# Patient Record
Sex: FEMALE | Race: WHITE | NOT HISPANIC OR LATINO | Employment: OTHER | URBAN - METROPOLITAN AREA
[De-identification: names, ages, dates, MRNs, and addresses within clinical notes are randomized per-mention and may not be internally consistent; named-entity substitution may affect disease eponyms.]

---

## 2018-03-18 ENCOUNTER — HOSPITAL ENCOUNTER (INPATIENT)
Facility: HOSPITAL | Age: 83
LOS: 1 days | Discharge: HOME | DRG: 247 | End: 2018-03-20
Attending: SURGERY | Admitting: HOSPITALIST
Payer: MEDICARE

## 2018-03-18 DIAGNOSIS — R74.8 ABNORMAL LEVELS OF OTHER SERUM ENZYMES: ICD-10-CM

## 2018-03-18 DIAGNOSIS — I25.9 MYOCARDIAL ISCHEMIA: ICD-10-CM

## 2018-03-18 DIAGNOSIS — I21.4 NSTEMI (NON-ST ELEVATED MYOCARDIAL INFARCTION): Primary | ICD-10-CM

## 2018-03-18 DIAGNOSIS — R07.9 CHEST PAIN: ICD-10-CM

## 2018-03-18 DIAGNOSIS — I21.21: ICD-10-CM

## 2018-03-18 DIAGNOSIS — M25.512 SHOULDER PAIN, LEFT: ICD-10-CM

## 2018-03-18 LAB
ALBUMIN SERPL BCP-MCNC: 4.4 G/DL
ALP SERPL-CCNC: 58 U/L
ALT SERPL W/O P-5'-P-CCNC: 32 U/L
ANION GAP SERPL CALC-SCNC: 16 MMOL/L
AST SERPL-CCNC: 33 U/L
BASOPHILS # BLD AUTO: 0.06 K/UL
BASOPHILS NFR BLD: 0.7 %
BILIRUB SERPL-MCNC: 0.6 MG/DL
BUN SERPL-MCNC: 19 MG/DL
CALCIUM SERPL-MCNC: 10 MG/DL
CHLORIDE SERPL-SCNC: 101 MMOL/L
CO2 SERPL-SCNC: 27 MMOL/L
CREAT SERPL-MCNC: 0.75 MG/DL
DIFFERENTIAL METHOD: ABNORMAL
EOSINOPHIL # BLD AUTO: 0.2 K/UL
EOSINOPHIL NFR BLD: 1.9 %
ERYTHROCYTE [DISTWIDTH] IN BLOOD BY AUTOMATED COUNT: 14.6 %
EST. GFR  (AFRICAN AMERICAN): >60 ML/MIN/1.73 M^2
EST. GFR  (NON AFRICAN AMERICAN): >60 ML/MIN/1.73 M^2
GLUCOSE SERPL-MCNC: 157 MG/DL
HCT VFR BLD AUTO: 39.9 %
HGB BLD-MCNC: 12.8 G/DL
INR PPP: 1.1
LYMPHOCYTES # BLD AUTO: 2 K/UL
LYMPHOCYTES NFR BLD: 24.3 %
MCH RBC QN AUTO: 28 PG
MCHC RBC AUTO-ENTMCNC: 32.1 G/DL
MCV RBC AUTO: 87 FL
MONOCYTES # BLD AUTO: 0.7 K/UL
MONOCYTES NFR BLD: 8.1 %
NEUTROPHILS # BLD AUTO: 5.4 K/UL
NEUTROPHILS NFR BLD: 64.9 %
PLATELET # BLD AUTO: 222 K/UL
PMV BLD AUTO: 10.9 FL
POTASSIUM SERPL-SCNC: 4.1 MMOL/L
PROT SERPL-MCNC: 7.8 G/DL
PROTHROMBIN TIME: 12.6 SEC
RBC # BLD AUTO: 4.57 M/UL
SODIUM SERPL-SCNC: 144 MMOL/L
TROPONIN I SERPL DL<=0.01 NG/ML-MCNC: 0.78 NG/ML
WBC # BLD AUTO: 8.35 K/UL

## 2018-03-18 PROCEDURE — 96374 THER/PROPH/DIAG INJ IV PUSH: CPT

## 2018-03-18 PROCEDURE — 94760 N-INVAS EAR/PLS OXIMETRY 1: CPT

## 2018-03-18 PROCEDURE — 93005 ELECTROCARDIOGRAM TRACING: CPT

## 2018-03-18 PROCEDURE — 84484 ASSAY OF TROPONIN QUANT: CPT

## 2018-03-18 PROCEDURE — 85025 COMPLETE CBC W/AUTO DIFF WBC: CPT

## 2018-03-18 PROCEDURE — 96361 HYDRATE IV INFUSION ADD-ON: CPT

## 2018-03-18 PROCEDURE — 25000003 PHARM REV CODE 250: Performed by: SURGERY

## 2018-03-18 PROCEDURE — 93010 ELECTROCARDIOGRAM REPORT: CPT | Mod: ,,, | Performed by: INTERNAL MEDICINE

## 2018-03-18 PROCEDURE — 63600175 PHARM REV CODE 636 W HCPCS: Performed by: SURGERY

## 2018-03-18 PROCEDURE — S0028 INJECTION, FAMOTIDINE, 20 MG: HCPCS | Performed by: SURGERY

## 2018-03-18 PROCEDURE — 99291 CRITICAL CARE FIRST HOUR: CPT | Mod: 25

## 2018-03-18 PROCEDURE — 85610 PROTHROMBIN TIME: CPT

## 2018-03-18 PROCEDURE — 96375 TX/PRO/DX INJ NEW DRUG ADDON: CPT

## 2018-03-18 PROCEDURE — 25000003 PHARM REV CODE 250

## 2018-03-18 PROCEDURE — 80053 COMPREHEN METABOLIC PANEL: CPT

## 2018-03-18 PROCEDURE — 21400001 HC TELEMETRY ROOM

## 2018-03-18 PROCEDURE — 27000221 HC OXYGEN, UP TO 24 HOURS

## 2018-03-18 RX ORDER — OXYBUTYNIN CHLORIDE 15 MG/1
5 TABLET, EXTENDED RELEASE ORAL DAILY
COMMUNITY
End: 2018-03-19 | Stop reason: HOSPADM

## 2018-03-18 RX ORDER — METOCLOPRAMIDE HYDROCHLORIDE 5 MG/ML
10 INJECTION INTRAMUSCULAR; INTRAVENOUS
Status: COMPLETED | OUTPATIENT
Start: 2018-03-18 | End: 2018-03-18

## 2018-03-18 RX ORDER — FAMOTIDINE 10 MG/ML
20 INJECTION INTRAVENOUS 2 TIMES DAILY
Status: DISCONTINUED | OUTPATIENT
Start: 2018-03-18 | End: 2018-03-19

## 2018-03-18 RX ORDER — POTASSIUM CITRATE AND CITRIC ACID MONOHYDRATE 1100; 334 MG/5ML; MG/5ML
SOLUTION ORAL
COMMUNITY
End: 2018-03-19 | Stop reason: HOSPADM

## 2018-03-18 RX ORDER — SIMVASTATIN 20 MG/1
20 TABLET, FILM COATED ORAL NIGHTLY
COMMUNITY
End: 2018-03-19 | Stop reason: HOSPADM

## 2018-03-18 RX ORDER — ALPRAZOLAM 0.25 MG/1
0.25 TABLET ORAL 3 TIMES DAILY
COMMUNITY
End: 2018-03-19 | Stop reason: HOSPADM

## 2018-03-18 RX ORDER — ASPIRIN 325 MG
325 TABLET, DELAYED RELEASE (ENTERIC COATED) ORAL
Status: COMPLETED | OUTPATIENT
Start: 2018-03-18 | End: 2018-03-18

## 2018-03-18 RX ORDER — LOSARTAN POTASSIUM AND HYDROCHLOROTHIAZIDE 12.5; 1 MG/1; MG/1
1 TABLET ORAL DAILY
COMMUNITY
End: 2018-03-19 | Stop reason: HOSPADM

## 2018-03-18 RX ORDER — DEXLANSOPRAZOLE 30 MG/1
CAPSULE, DELAYED RELEASE ORAL
COMMUNITY
End: 2018-03-19 | Stop reason: HOSPADM

## 2018-03-18 RX ORDER — METFORMIN HYDROCHLORIDE 1000 MG/1
1000 TABLET ORAL 2 TIMES DAILY WITH MEALS
COMMUNITY
End: 2018-03-19 | Stop reason: HOSPADM

## 2018-03-18 RX ADMIN — METOCLOPRAMIDE 10 MG: 5 INJECTION, SOLUTION INTRAMUSCULAR; INTRAVENOUS at 10:03

## 2018-03-18 RX ADMIN — FAMOTIDINE 20 MG: 10 INJECTION, SOLUTION INTRAVENOUS at 10:03

## 2018-03-18 RX ADMIN — ASPIRIN 325 MG: 325 TABLET, DELAYED RELEASE ORAL at 11:03

## 2018-03-18 RX ADMIN — SODIUM CHLORIDE 1000 ML: 0.9 INJECTION, SOLUTION INTRAVENOUS at 10:03

## 2018-03-18 RX ADMIN — NITROGLYCERIN 1 INCH: 20 OINTMENT TOPICAL at 10:03

## 2018-03-18 RX ADMIN — NITROGLYCERIN: 20 OINTMENT TOPICAL at 11:03

## 2018-03-18 RX ADMIN — LIDOCAINE HYDROCHLORIDE: 20 SOLUTION ORAL; TOPICAL at 11:03

## 2018-03-19 ENCOUNTER — SURGERY (OUTPATIENT)
Age: 83
End: 2018-03-19

## 2018-03-19 PROBLEM — E11.9 DIABETES MELLITUS, TYPE 2: Status: ACTIVE | Noted: 2018-03-19

## 2018-03-19 PROBLEM — E11.9 DIABETES MELLITUS, TYPE 2: Chronic | Status: ACTIVE | Noted: 2018-03-19

## 2018-03-19 PROBLEM — E66.812 CLASS 2 OBESITY IN ADULT: Status: ACTIVE | Noted: 2018-03-19

## 2018-03-19 PROBLEM — R79.89 ELEVATED TROPONIN: Status: ACTIVE | Noted: 2018-03-19

## 2018-03-19 PROBLEM — R07.9 CHEST PAIN: Status: ACTIVE | Noted: 2018-03-19

## 2018-03-19 PROBLEM — K21.9 GERD (GASTROESOPHAGEAL REFLUX DISEASE): Status: ACTIVE | Noted: 2018-03-19

## 2018-03-19 PROBLEM — I10 ESSENTIAL HYPERTENSION: Chronic | Status: ACTIVE | Noted: 2018-03-19

## 2018-03-19 PROBLEM — I10 ESSENTIAL HYPERTENSION: Status: ACTIVE | Noted: 2018-03-19

## 2018-03-19 PROBLEM — I25.9 MYOCARDIAL ISCHEMIA: Status: ACTIVE | Noted: 2018-03-19

## 2018-03-19 PROBLEM — R01.1 MURMUR, CARDIAC: Status: ACTIVE | Noted: 2018-03-19

## 2018-03-19 PROBLEM — E66.9 CLASS 2 OBESITY IN ADULT: Status: ACTIVE | Noted: 2018-03-19

## 2018-03-19 PROBLEM — I21.4 NSTEMI (NON-ST ELEVATED MYOCARDIAL INFARCTION): Status: ACTIVE | Noted: 2018-03-19

## 2018-03-19 LAB
ANION GAP SERPL CALC-SCNC: 9 MMOL/L
AORTIC VALVE STENOSIS: ABNORMAL
BILIRUB UR QL STRIP: NEGATIVE
BNP SERPL-MCNC: 318 PG/ML
BUN SERPL-MCNC: 14 MG/DL
CALCIUM SERPL-MCNC: 9.6 MG/DL
CHLORIDE SERPL-SCNC: 104 MMOL/L
CHOLEST SERPL-MCNC: 150 MG/DL
CHOLEST/HDLC SERPL: 4.4 {RATIO}
CLARITY UR: CLEAR
CO2 SERPL-SCNC: 28 MMOL/L
COLOR UR: YELLOW
CREAT SERPL-MCNC: 0.7 MG/DL
DIASTOLIC DYSFUNCTION: YES
EST. GFR  (AFRICAN AMERICAN): >60 ML/MIN/1.73 M^2
EST. GFR  (NON AFRICAN AMERICAN): >60 ML/MIN/1.73 M^2
ESTIMATED AVG GLUCOSE: 126 MG/DL
ESTIMATED PA SYSTOLIC PRESSURE: 39
GLUCOSE SERPL-MCNC: 119 MG/DL
GLUCOSE UR QL STRIP: NEGATIVE
HBA1C MFR BLD HPLC: 6 %
HDLC SERPL-MCNC: 34 MG/DL
HDLC SERPL: 22.7 %
HGB UR QL STRIP: NEGATIVE
KETONES UR QL STRIP: NEGATIVE
LDLC SERPL CALC-MCNC: 96.2 MG/DL
LEUKOCYTE ESTERASE UR QL STRIP: NEGATIVE
MAGNESIUM SERPL-MCNC: 1.5 MG/DL
MITRAL VALVE MOBILITY: NORMAL
NITRITE UR QL STRIP: NEGATIVE
NONHDLC SERPL-MCNC: 116 MG/DL
PH UR STRIP: 6 [PH] (ref 5–8)
PHOSPHATE SERPL-MCNC: 2.9 MG/DL
POCT GLUCOSE: 134 MG/DL (ref 70–110)
POCT GLUCOSE: 164 MG/DL (ref 70–110)
POCT GLUCOSE: 186 MG/DL (ref 70–110)
POTASSIUM SERPL-SCNC: 4 MMOL/L
PROT UR QL STRIP: NEGATIVE
RETIRED EF AND QEF - SEE NOTES: 55 (ref 55–65)
SODIUM SERPL-SCNC: 141 MMOL/L
SP GR UR STRIP: <=1.005 (ref 1–1.03)
TRIGL SERPL-MCNC: 99 MG/DL
TROPONIN I SERPL DL<=0.01 NG/ML-MCNC: 18.79 NG/ML
TROPONIN I SERPL DL<=0.01 NG/ML-MCNC: 4.93 NG/ML
TSH SERPL DL<=0.005 MIU/L-ACNC: 1.5 UIU/ML
URN SPEC COLLECT METH UR: ABNORMAL
UROBILINOGEN UR STRIP-ACNC: NEGATIVE EU/DL

## 2018-03-19 PROCEDURE — 99152 MOD SED SAME PHYS/QHP 5/>YRS: CPT | Mod: ,,, | Performed by: INTERNAL MEDICINE

## 2018-03-19 PROCEDURE — 93005 ELECTROCARDIOGRAM TRACING: CPT

## 2018-03-19 PROCEDURE — 81003 URINALYSIS AUTO W/O SCOPE: CPT

## 2018-03-19 PROCEDURE — 63600175 PHARM REV CODE 636 W HCPCS: Performed by: NURSE PRACTITIONER

## 2018-03-19 PROCEDURE — 83036 HEMOGLOBIN GLYCOSYLATED A1C: CPT

## 2018-03-19 PROCEDURE — 84443 ASSAY THYROID STIM HORMONE: CPT

## 2018-03-19 PROCEDURE — 80048 BASIC METABOLIC PNL TOTAL CA: CPT

## 2018-03-19 PROCEDURE — 92978 ENDOLUMINL IVUS OCT C 1ST: CPT | Mod: LC

## 2018-03-19 PROCEDURE — 84100 ASSAY OF PHOSPHORUS: CPT

## 2018-03-19 PROCEDURE — 25000003 PHARM REV CODE 250

## 2018-03-19 PROCEDURE — 92978 ENDOLUMINL IVUS OCT C 1ST: CPT | Mod: 26,LC,, | Performed by: INTERNAL MEDICINE

## 2018-03-19 PROCEDURE — 93306 TTE W/DOPPLER COMPLETE: CPT

## 2018-03-19 PROCEDURE — 25000003 PHARM REV CODE 250: Performed by: NURSE PRACTITIONER

## 2018-03-19 PROCEDURE — 36415 COLL VENOUS BLD VENIPUNCTURE: CPT

## 2018-03-19 PROCEDURE — 87086 URINE CULTURE/COLONY COUNT: CPT

## 2018-03-19 PROCEDURE — 83880 ASSAY OF NATRIURETIC PEPTIDE: CPT

## 2018-03-19 PROCEDURE — 21400001 HC TELEMETRY ROOM

## 2018-03-19 PROCEDURE — 80061 LIPID PANEL: CPT

## 2018-03-19 PROCEDURE — A4216 STERILE WATER/SALINE, 10 ML: HCPCS | Performed by: NURSE PRACTITIONER

## 2018-03-19 PROCEDURE — B240ZZ3 ULTRASONOGRAPHY OF SINGLE CORONARY ARTERY, INTRAVASCULAR: ICD-10-PCS | Performed by: INTERNAL MEDICINE

## 2018-03-19 PROCEDURE — 25500020 PHARM REV CODE 255

## 2018-03-19 PROCEDURE — B211YZZ FLUOROSCOPY OF MULTIPLE CORONARY ARTERIES USING OTHER CONTRAST: ICD-10-PCS | Performed by: INTERNAL MEDICINE

## 2018-03-19 PROCEDURE — 027034Z DILATION OF CORONARY ARTERY, ONE ARTERY WITH DRUG-ELUTING INTRALUMINAL DEVICE, PERCUTANEOUS APPROACH: ICD-10-PCS | Performed by: INTERNAL MEDICINE

## 2018-03-19 PROCEDURE — 93458 L HRT ARTERY/VENTRICLE ANGIO: CPT | Mod: 26,59,, | Performed by: INTERNAL MEDICINE

## 2018-03-19 PROCEDURE — 83735 ASSAY OF MAGNESIUM: CPT

## 2018-03-19 PROCEDURE — 63600175 PHARM REV CODE 636 W HCPCS: Performed by: HOSPITALIST

## 2018-03-19 PROCEDURE — 92928 PRQ TCAT PLMT NTRAC ST 1 LES: CPT | Mod: LC,,, | Performed by: INTERNAL MEDICINE

## 2018-03-19 PROCEDURE — 84484 ASSAY OF TROPONIN QUANT: CPT

## 2018-03-19 PROCEDURE — 63600175 PHARM REV CODE 636 W HCPCS

## 2018-03-19 PROCEDURE — 94761 N-INVAS EAR/PLS OXIMETRY MLT: CPT

## 2018-03-19 PROCEDURE — 99223 1ST HOSP IP/OBS HIGH 75: CPT | Mod: 25,GC,, | Performed by: NURSE PRACTITIONER

## 2018-03-19 PROCEDURE — 4A023N7 MEASUREMENT OF CARDIAC SAMPLING AND PRESSURE, LEFT HEART, PERCUTANEOUS APPROACH: ICD-10-PCS | Performed by: INTERNAL MEDICINE

## 2018-03-19 PROCEDURE — 93306 TTE W/DOPPLER COMPLETE: CPT | Mod: 26,,, | Performed by: INTERNAL MEDICINE

## 2018-03-19 PROCEDURE — 25000003 PHARM REV CODE 250: Performed by: INTERNAL MEDICINE

## 2018-03-19 PROCEDURE — B215YZZ FLUOROSCOPY OF LEFT HEART USING OTHER CONTRAST: ICD-10-PCS | Performed by: INTERNAL MEDICINE

## 2018-03-19 RX ORDER — SIMVASTATIN 20 MG/1
20 TABLET, FILM COATED ORAL NIGHTLY
Status: DISCONTINUED | OUTPATIENT
Start: 2018-03-19 | End: 2018-03-20 | Stop reason: HOSPADM

## 2018-03-19 RX ORDER — NITROGLYCERIN 0.4 MG/1
0.4 TABLET SUBLINGUAL EVERY 5 MIN PRN
Status: DISCONTINUED | OUTPATIENT
Start: 2018-03-19 | End: 2018-03-20 | Stop reason: HOSPADM

## 2018-03-19 RX ORDER — HYDROCHLOROTHIAZIDE 12.5 MG/1
12.5 TABLET ORAL DAILY
Status: DISCONTINUED | OUTPATIENT
Start: 2018-03-19 | End: 2018-03-20 | Stop reason: HOSPADM

## 2018-03-19 RX ORDER — LOSARTAN POTASSIUM AND HYDROCHLOROTHIAZIDE 25; 100 MG/1; MG/1
1 TABLET ORAL DAILY
COMMUNITY
End: 2018-04-06 | Stop reason: ALTCHOICE

## 2018-03-19 RX ORDER — ONDANSETRON 8 MG/1
8 TABLET, ORALLY DISINTEGRATING ORAL EVERY 8 HOURS PRN
Status: DISCONTINUED | OUTPATIENT
Start: 2018-03-19 | End: 2018-03-20 | Stop reason: HOSPADM

## 2018-03-19 RX ORDER — ASPIRIN 81 MG/1
81 TABLET ORAL DAILY
Status: DISCONTINUED | OUTPATIENT
Start: 2018-03-19 | End: 2018-03-20 | Stop reason: HOSPADM

## 2018-03-19 RX ORDER — PROCHLORPERAZINE EDISYLATE 5 MG/ML
5 INJECTION INTRAMUSCULAR; INTRAVENOUS EVERY 6 HOURS PRN
Status: DISCONTINUED | OUTPATIENT
Start: 2018-03-19 | End: 2018-03-20 | Stop reason: HOSPADM

## 2018-03-19 RX ORDER — OXYBUTYNIN CHLORIDE 15 MG/1
5 TABLET, EXTENDED RELEASE ORAL DAILY
COMMUNITY
End: 2018-06-06 | Stop reason: SDUPTHER

## 2018-03-19 RX ORDER — CLOPIDOGREL BISULFATE 75 MG/1
75 TABLET ORAL DAILY
Status: DISCONTINUED | OUTPATIENT
Start: 2018-03-20 | End: 2018-03-20 | Stop reason: HOSPADM

## 2018-03-19 RX ORDER — FAMOTIDINE 20 MG/1
20 TABLET, FILM COATED ORAL 2 TIMES DAILY
Status: DISCONTINUED | OUTPATIENT
Start: 2018-03-19 | End: 2018-03-19

## 2018-03-19 RX ORDER — HYDRALAZINE HYDROCHLORIDE 20 MG/ML
10 INJECTION INTRAMUSCULAR; INTRAVENOUS ONCE
Status: COMPLETED | OUTPATIENT
Start: 2018-03-19 | End: 2018-03-19

## 2018-03-19 RX ORDER — DEXLANSOPRAZOLE 60 MG/1
60 CAPSULE, DELAYED RELEASE ORAL DAILY
COMMUNITY
End: 2018-04-23

## 2018-03-19 RX ORDER — SIMVASTATIN 20 MG/1
20 TABLET, FILM COATED ORAL NIGHTLY
Status: ON HOLD | COMMUNITY
End: 2018-03-20 | Stop reason: HOSPADM

## 2018-03-19 RX ORDER — POLYETHYLENE GLYCOL 3350 17 G/17G
17 POWDER, FOR SOLUTION ORAL 2 TIMES DAILY PRN
Status: DISCONTINUED | OUTPATIENT
Start: 2018-03-19 | End: 2018-03-20 | Stop reason: HOSPADM

## 2018-03-19 RX ORDER — IBUPROFEN 200 MG
24 TABLET ORAL
Status: DISCONTINUED | OUTPATIENT
Start: 2018-03-19 | End: 2018-03-20 | Stop reason: HOSPADM

## 2018-03-19 RX ORDER — METHYLPREDNISOLONE SOD SUCC 125 MG
125 VIAL (EA) INJECTION ONCE
Status: COMPLETED | OUTPATIENT
Start: 2018-03-19 | End: 2018-03-19

## 2018-03-19 RX ORDER — SODIUM CHLORIDE 9 MG/ML
3 INJECTION, SOLUTION INTRAVENOUS CONTINUOUS
Status: ACTIVE | OUTPATIENT
Start: 2018-03-19 | End: 2018-03-19

## 2018-03-19 RX ORDER — OXYBUTYNIN CHLORIDE 5 MG/1
5 TABLET, EXTENDED RELEASE ORAL DAILY
Status: DISCONTINUED | OUTPATIENT
Start: 2018-03-19 | End: 2018-03-20 | Stop reason: HOSPADM

## 2018-03-19 RX ORDER — INSULIN ASPART 100 [IU]/ML
0-5 INJECTION, SOLUTION INTRAVENOUS; SUBCUTANEOUS
Status: DISCONTINUED | OUTPATIENT
Start: 2018-03-19 | End: 2018-03-20 | Stop reason: HOSPADM

## 2018-03-19 RX ORDER — METOPROLOL TARTRATE 25 MG/1
25 TABLET, FILM COATED ORAL 2 TIMES DAILY
Status: DISCONTINUED | OUTPATIENT
Start: 2018-03-19 | End: 2018-03-20 | Stop reason: HOSPADM

## 2018-03-19 RX ORDER — IBUPROFEN 200 MG
16 TABLET ORAL
Status: DISCONTINUED | OUTPATIENT
Start: 2018-03-19 | End: 2018-03-20 | Stop reason: HOSPADM

## 2018-03-19 RX ORDER — LOSARTAN POTASSIUM 50 MG/1
100 TABLET ORAL DAILY
Status: DISCONTINUED | OUTPATIENT
Start: 2018-03-19 | End: 2018-03-20 | Stop reason: HOSPADM

## 2018-03-19 RX ORDER — CLOPIDOGREL BISULFATE 75 MG/1
600 TABLET ORAL ONCE
Status: COMPLETED | OUTPATIENT
Start: 2018-03-19 | End: 2018-03-19

## 2018-03-19 RX ORDER — GLUCAGON 1 MG
1 KIT INJECTION
Status: DISCONTINUED | OUTPATIENT
Start: 2018-03-19 | End: 2018-03-20 | Stop reason: HOSPADM

## 2018-03-19 RX ORDER — ACETAMINOPHEN 325 MG/1
650 TABLET ORAL EVERY 4 HOURS PRN
Status: DISCONTINUED | OUTPATIENT
Start: 2018-03-19 | End: 2018-03-20 | Stop reason: HOSPADM

## 2018-03-19 RX ORDER — SODIUM CHLORIDE 0.9 % (FLUSH) 0.9 %
3 SYRINGE (ML) INJECTION EVERY 8 HOURS
Status: DISCONTINUED | OUTPATIENT
Start: 2018-03-19 | End: 2018-03-20 | Stop reason: HOSPADM

## 2018-03-19 RX ORDER — PANTOPRAZOLE SODIUM 40 MG/1
40 TABLET, DELAYED RELEASE ORAL DAILY
Status: DISCONTINUED | OUTPATIENT
Start: 2018-03-19 | End: 2018-03-20 | Stop reason: HOSPADM

## 2018-03-19 RX ORDER — RAMELTEON 8 MG/1
8 TABLET ORAL NIGHTLY PRN
Status: DISCONTINUED | OUTPATIENT
Start: 2018-03-19 | End: 2018-03-20 | Stop reason: HOSPADM

## 2018-03-19 RX ORDER — DIPHENHYDRAMINE HYDROCHLORIDE 50 MG/ML
25 INJECTION INTRAMUSCULAR; INTRAVENOUS ONCE
Status: COMPLETED | OUTPATIENT
Start: 2018-03-19 | End: 2018-03-19

## 2018-03-19 RX ORDER — METFORMIN HYDROCHLORIDE 1000 MG/1
1000 TABLET ORAL 2 TIMES DAILY WITH MEALS
COMMUNITY
End: 2018-06-06 | Stop reason: SDUPTHER

## 2018-03-19 RX ADMIN — OXYBUTYNIN CHLORIDE 5 MG: 5 TABLET, EXTENDED RELEASE ORAL at 08:03

## 2018-03-19 RX ADMIN — METOPROLOL TARTRATE 25 MG: 25 TABLET ORAL at 08:03

## 2018-03-19 RX ADMIN — HYDROCHLOROTHIAZIDE 12.5 MG: 12.5 TABLET ORAL at 08:03

## 2018-03-19 RX ADMIN — SODIUM CHLORIDE 3 ML/KG/HR: 0.9 INJECTION, SOLUTION INTRAVENOUS at 11:03

## 2018-03-19 RX ADMIN — LOSARTAN POTASSIUM 100 MG: 50 TABLET, FILM COATED ORAL at 08:03

## 2018-03-19 RX ADMIN — DIPHENHYDRAMINE HYDROCHLORIDE 25 MG: 50 INJECTION, SOLUTION INTRAMUSCULAR; INTRAVENOUS at 10:03

## 2018-03-19 RX ADMIN — SODIUM CHLORIDE, PRESERVATIVE FREE 3 ML: 5 INJECTION INTRAVENOUS at 05:03

## 2018-03-19 RX ADMIN — SIMVASTATIN 20 MG: 20 TABLET, FILM COATED ORAL at 08:03

## 2018-03-19 RX ADMIN — HYDRALAZINE HYDROCHLORIDE 10 MG: 20 INJECTION INTRAMUSCULAR; INTRAVENOUS at 03:03

## 2018-03-19 RX ADMIN — PANTOPRAZOLE SODIUM 40 MG: 40 TABLET, DELAYED RELEASE ORAL at 05:03

## 2018-03-19 RX ADMIN — SODIUM CHLORIDE, PRESERVATIVE FREE 3 ML: 5 INJECTION INTRAVENOUS at 10:03

## 2018-03-19 RX ADMIN — METHYLPREDNISOLONE SODIUM SUCCINATE 125 MG: 125 INJECTION, POWDER, FOR SOLUTION INTRAMUSCULAR; INTRAVENOUS at 10:03

## 2018-03-19 RX ADMIN — METOPROLOL TARTRATE 25 MG: 25 TABLET ORAL at 10:03

## 2018-03-19 RX ADMIN — SODIUM CHLORIDE 1.5 ML/KG/HR: 0.9 INJECTION, SOLUTION INTRAVENOUS at 04:03

## 2018-03-19 RX ADMIN — CLOPIDOGREL 600 MG: 75 TABLET, FILM COATED ORAL at 11:03

## 2018-03-19 RX ADMIN — ASPIRIN 81 MG: 81 TABLET, COATED ORAL at 08:03

## 2018-03-19 NOTE — SUBJECTIVE & OBJECTIVE
Past Medical History:   Diagnosis Date    Diabetes mellitus     Hyperlipemia     Hypertension        History reviewed. No pertinent surgical history.    Review of patient's allergies indicates:   Allergen Reactions    Iodine and iodide containing products     Sulfa (sulfonamide antibiotics) Hives       No current facility-administered medications on file prior to encounter.      No current outpatient prescriptions on file prior to encounter.     Family History     Problem Relation (Age of Onset)    Heart disease Father    No Known Problems Mother        Social History Main Topics    Smoking status: Never Smoker    Smokeless tobacco: Not on file    Alcohol use No    Drug use: No    Sexual activity: Not on file     Review of Systems   Constitution: Negative for chills, decreased appetite, diaphoresis, fever and weakness.   Cardiovascular: Positive for chest pain. Negative for claudication, cyanosis, dyspnea on exertion, irregular heartbeat, leg swelling, near-syncope, orthopnea, palpitations, paroxysmal nocturnal dyspnea and syncope.   Respiratory: Negative for cough, hemoptysis, shortness of breath and wheezing.    Gastrointestinal: Negative for bloating, abdominal pain, constipation, diarrhea, melena, nausea and vomiting.   Neurological: Negative for dizziness.     Objective:     Vital Signs (Most Recent):  Temp: 97.8 °F (36.6 °C) (03/19/18 0732)  Pulse: 72 (03/19/18 0800)  Resp: 18 (03/19/18 0732)  BP: (!) 148/65 (03/19/18 0732)  SpO2: (!) 94 % (03/19/18 0746) Vital Signs (24h Range):  Temp:  [97.4 °F (36.3 °C)-98.3 °F (36.8 °C)] 97.8 °F (36.6 °C)  Pulse:  [72-95] 72  Resp:  [18-35] 18  SpO2:  [94 %-98 %] 94 %  BP: (148-222)/(65-98) 148/65     Weight: 102.5 kg (226 lb)  Body mass index is 38.79 kg/m².    SpO2: (!) 94 %  O2 Device (Oxygen Therapy): room air      Intake/Output Summary (Last 24 hours) at 03/19/18 1006  Last data filed at 03/19/18 0531   Gross per 24 hour   Intake             1000 ml   Output               800 ml   Net              200 ml       Lines/Drains/Airways     Peripheral Intravenous Line                 Peripheral IV - Single Lumen 03/18/18 2238 Right Antecubital less than 1 day                Physical Exam   Constitutional: She is oriented to person, place, and time. She appears well-developed and well-nourished. No distress.   Cardiovascular: Normal rate and regular rhythm.  Exam reveals no gallop.    No murmur heard.  Pulses:       Radial pulses are 2+ on the right side, and 2+ on the left side.        Dorsalis pedis pulses are 2+ on the right side, and 2+ on the left side.        Posterior tibial pulses are 2+ on the right side, and 2+ on the left side.   Pulmonary/Chest: Effort normal and breath sounds normal. No respiratory distress. She has no wheezes.   Abdominal: Soft. Bowel sounds are normal. She exhibits no distension. There is no tenderness.   Neurological: She is alert and oriented to person, place, and time.   Skin: Skin is warm and dry.       Significant Labs:       Recent Labs  Lab 03/19/18  0445      K 4.0      CO2 28   BUN 14   CREATININE 0.7   MG 1.5*       Recent Labs  Lab 03/18/18 2237   WBC 8.35   RBC 4.57   HGB 12.8   HCT 39.9      MCV 87   MCH 28.0   MCHC 32.1       Recent Labs  Lab 03/19/18  0445   TROPONINI 4.934*       Significant Imaging: Echocardiogram: 2D echo with color flow doppler:   2/14/2018 at [facility]    Normal LV function with EF 60-65%  Normal RV systolic function with estimated PA pressure 25mmHg  Aortic valve mildly sclerotic with mean gradient 17mmHg and SERAFIN 1.68cm2  Mitral Valve mildly sclerotic    Repeat echo ordered and pending today

## 2018-03-19 NOTE — ED NOTES
Report called to [facility] to Calixto Wilburn Rn.  [facility] (#[id number]) has custody of the patient at this time.  Patient ready for transport.

## 2018-03-19 NOTE — ASSESSMENT & PLAN NOTE
-BP elevated on admission at 197/84  -BP trended down overnight and 140s-150s/60s this AM  -on Losartan./HCTZ 100/25mg daily  -will add Metoprolol to regimen  -monitor BP and adjust meds prn

## 2018-03-19 NOTE — ASSESSMENT & PLAN NOTE
Takes Metformin 1000 mg BID. Holding. Check blood glucose AC and HS and use SSI. Diabetic diet,  Check A1c.

## 2018-03-19 NOTE — ASSESSMENT & PLAN NOTE
Epigastric/Sternal Pain  Not responding to out-patient dexlansoprazole or famotadine. She does associate the pain with food. Has not had EGD in the Past  --Give Pantoprozole 40 mg daily. Consider GI Consult if Cardiology finds this to be non-coronary

## 2018-03-19 NOTE — INTERVAL H&P NOTE
The patient has been examined and the H&P has been reviewed:    I concur with the findings and no changes have occurred since H&P was written.    Anesthesia/Surgery risks, benefits and alternative options discussed and understood by patient/family.          Active Hospital Problems    Diagnosis  POA    *NSTEMI (non-ST elevated myocardial infarction) [I21.4]  Yes    Elevated troponin [R74.8]  Yes    GERD (gastroesophageal reflux disease) [K21.9]  Yes    Diabetes mellitus, type 2 [E11.9]  Yes     Chronic    Essential hypertension [I10]  Yes     Chronic    Myocardial ischemia [I25.9]  Yes    Murmur, cardiac [R01.1]  Yes    Class 2 obesity in adult [E66.9]  Yes      Resolved Hospital Problems    Diagnosis Date Resolved POA   No resolved problems to display.

## 2018-03-19 NOTE — NURSING
Patient transferred to floor on tele monitor. VSS no c/o of pain.  Patient attached to tele monitor upon arrival in room.  Right radial site reviewed with Yuli PERSON.  CDI, no hematoma no bleeding.    All questions answered.   at bedside.

## 2018-03-19 NOTE — CONSULTS
"[facility]  Cardiology  Consult Note    Patient Name: Makenzie Haile  MRN: [id number]  Admission Date: 3/18/2018  Hospital Length of Stay: 0 days  Code Status: Full Code   Attending Provider: Mario Griffin MD   Consulting Provider: RODERICK Payne ANP  Primary Care Physician: Edyta Sanders MD  Principal Problem:NSTEMI (non-ST elevated myocardial infarction)    Patient information was obtained from patient and past medical records.     Inpatient consult to [facility]  Consult performed by: COLT EISENBERG  Consult ordered by: RON DE LA FUENTE  Reason for consult: elevated troponin/NSTEMI         Subjective:     Chief Complaint:  Chest pain     HPI:   83yo female with history of DMII, HTN, cardiac murmur, GERD and obesity who presented to the ER with complaints of chest pain. Ms. Haile complains of chest pain described as burning sensation that started about 2 weeks ago. The episodes would occur after eating and would eventually ease with rest or OTC medications. She was seen by her PCP and was given samples of Dexilant. She reports her symptoms persisted and were not improving and she placed a call to his office for follow up. She denies any complaints with exertion but reports she has been more sedentary for the past 2 years since [facility]. She had symptoms on Saturday night that eventually relieved with OTC Pepcid and Tums and was able to rest. She reports recurrent symptoms after eating breakfast yesterday with intermittent symptoms throughout the day. She presented to the ER last night stating "she could take another night of the pain" She denies any worsening pain last night. She reports associated symptoms of radiation to her bilateral shoulders but denies any SOB, nausea, vomiting or diaphoresis. She denies any history of CAD, PCI or CABG. She was a social smoker but quit 30 years ago    Hospital Course:  3/18/2018 Presented to the ER with complaints of chest " pain described as a burning sensation. Initial troponin .776 with EKG with NSR with LAD and no STTWC although poor quality tracing. Admitted to [facility] for further observation and evaluation  3/19/2018 No recurrent symptoms overnight. Repeat troponin this AM up to 4.934 with repeat EKG pending. HR and BP stable with no arrhythmias noted on telemetry overnight. Cardiology consulted for evaluation of elevated troponin. NPO and decision to proceed with [facility] for further evaluation. Echocardiogram today for evaluation of LVEF and valve structure       Past Medical History:   Diagnosis Date    Diabetes mellitus     Hyperlipemia     Hypertension        History reviewed. No pertinent surgical history.    Review of patient's allergies indicates:   Allergen Reactions    Iodine and iodide containing products     Sulfa (sulfonamide antibiotics) Hives       No current facility-administered medications on file prior to encounter.      No current outpatient prescriptions on file prior to encounter.     Family History     Problem Relation (Age of Onset)    Heart disease Father    No Known Problems Mother        Social History Main Topics    Smoking status: Never Smoker    Smokeless tobacco: Not on file    Alcohol use No    Drug use: No    Sexual activity: Not on file     Review of Systems   Constitution: Negative for chills, decreased appetite, diaphoresis, fever and weakness.   Cardiovascular: Positive for chest pain. Negative for claudication, cyanosis, dyspnea on exertion, irregular heartbeat, leg swelling, near-syncope, orthopnea, palpitations, paroxysmal nocturnal dyspnea and syncope.   Respiratory: Negative for cough, hemoptysis, shortness of breath and wheezing.    Gastrointestinal: Negative for bloating, abdominal pain, constipation, diarrhea, melena, nausea and vomiting.   Neurological: Negative for dizziness.     Objective:     Vital Signs (Most Recent):  Temp: 97.8 °F (36.6 °C) (03/19/18  0732)  Pulse: 72 (03/19/18 0800)  Resp: 18 (03/19/18 0732)  BP: (!) 148/65 (03/19/18 0732)  SpO2: (!) 94 % (03/19/18 0746) Vital Signs (24h Range):  Temp:  [97.4 °F (36.3 °C)-98.3 °F (36.8 °C)] 97.8 °F (36.6 °C)  Pulse:  [72-95] 72  Resp:  [18-35] 18  SpO2:  [94 %-98 %] 94 %  BP: (148-222)/(65-98) 148/65     Weight: 102.5 kg (226 lb)  Body mass index is 38.79 kg/m².    SpO2: (!) 94 %  O2 Device (Oxygen Therapy): room air      Intake/Output Summary (Last 24 hours) at 03/19/18 1006  Last data filed at 03/19/18 0531   Gross per 24 hour   Intake             1000 ml   Output              800 ml   Net              200 ml       Lines/Drains/Airways     Peripheral Intravenous Line                 Peripheral IV - Single Lumen 03/18/18 2238 Right Antecubital less than 1 day                Physical Exam   Constitutional: She is oriented to person, place, and time. She appears well-developed and well-nourished. No distress.   Cardiovascular: Normal rate and regular rhythm.  Exam reveals no gallop.    No murmur heard.  Pulses:       Radial pulses are 2+ on the right side, and 2+ on the left side.        Dorsalis pedis pulses are 2+ on the right side, and 2+ on the left side.        Posterior tibial pulses are 2+ on the right side, and 2+ on the left side.   Pulmonary/Chest: Effort normal and breath sounds normal. No respiratory distress. She has no wheezes.   Abdominal: Soft. Bowel sounds are normal. She exhibits no distension. There is no tenderness.   Neurological: She is alert and oriented to person, place, and time.   Skin: Skin is warm and dry.       Significant Labs:       Recent Labs  Lab 03/19/18  0445      K 4.0      CO2 28   BUN 14   CREATININE 0.7   MG 1.5*       Recent Labs  Lab 03/18/18  2237   WBC 8.35   RBC 4.57   HGB 12.8   HCT 39.9      MCV 87   MCH 28.0   MCHC 32.1       Recent Labs  Lab 03/19/18  0445   TROPONINI 4.934*       Significant Imaging: Echocardiogram: 2D echo with color flow  doppler:   2/14/2018 at [facility]    Normal LV function with EF 60-65%  Normal RV systolic function with estimated PA pressure 25mmHg  Aortic valve mildly sclerotic with mean gradient 17mmHg and SERAFIN 1.68cm2  Mitral Valve mildly sclerotic    Repeat echo ordered and pending today     Assessment and Plan:     * NSTEMI (non-ST elevated myocardial infarction)    -presented with complaints of chest pain atypical in presentation  -initial troponin .776 with trend up to 4.934 this AM  -initial EKG with no acute ST changes although poor tracing  -repeat EKG this AM pending  -concerned about ischemic etiology given risk factors and marked troponin elevation  -recommended [facility] for further evaluation-patient agreeable and will remain NPO for C later today; will give dose of IV Solumedrol and IV Benadryl due to documented allergy to Iodine  -continue ASA, statin and ARB; will load with Plavix and place on daily Plavix along with Metoprolol BID  -echocardiogram today for repeat evaluation of LVEF and valve structure; recent echo with normal findings  -further recs once [facility] completed         Murmur, cardiac    -noted on exam  -likely related to sclerotic aortic valve  -recent echo in February with no acute valve abnormality  -repeat echo today         Essential hypertension    -BP elevated on admission at 197/84  -BP trended down overnight and 140s-150s/60s this AM  -on Losartan./HCTZ 100/25mg daily  -will add Metoprolol to regimen  -monitor BP and adjust meds prn         Diabetes mellitus, type 2    -HgbA1c 6.0  -CBG this   -on Metformin BID at home; on hold due to interaction with IV contrast  -anticipate up trending of blood sugar given need for IV Solumedrol due to iodine allergy             VTE Risk Mitigation         Ordered     Place sequential compression device  Until discontinued      03/19/18 0131     Place MAYRA hose  Until discontinued      03/19/18 0131     IP VTE LOW RISK PATIENT  Once      03/19/18  4556          Thank you for your consult. I will follow-up with patient. Please contact us if you have any additional questions.    RODERICK Payne, ANP  Cardiology   [facility]

## 2018-03-19 NOTE — ASSESSMENT & PLAN NOTE
"Chest Pain, Heart Murmer  Hypertension, Hyperlipidemia  Obesity (BMI 38.8)  82 year old obese female with hypertension, hyperlipemia, and diabetes with 2 weeks of burning chest pain, unrelieved by histamine blockers and PPI. ECG with new Left Anterior Fascicular Block. Troponin 0.776. Systolic Murmer which patient states is old and a prior ECHO and ECG that was "Fine."  Cardiomegaly on chest x-ray. Given Aspirin 325 mg in ED  --Trend Troponin, Telemetry Monitoring  --Nitro SL PRN CP, ECG PRN CP  --Start Aspirin 81 mg daily  --Continue home Simastatin 20 mg daily.  --Giving Losartan 100 mg + HCTZ 12.5 for home Hyzaar  --Cardiology Consult    "

## 2018-03-19 NOTE — H&P
"[facility] Hospital Medicine  History & Physical    Patient Name: Makenzie Haile  MRN: [id number]  Admission Date: 3/18/2018  Attending Physician: Mario Griffin MD   Primary Care Provider: Edyta Sanders MD         Patient information was obtained from patient, past medical records and ER records.     Subjective:     Principal Problem:Chest pain    Chief Complaint:   Chief Complaint   Patient presents with    Gastroesophageal Reflux     pt reports indigestion x 2 weeks -seen by PCP two weeks ago for same and Rx Pepcid        HPI: Makenzie Haile is an 82 year old female with a PMHx of Hypertension, Type 2 Diabetes, Hyperlipidemia, Heart Murmer, and Overactive Bladder.  Her PCP is Dr. Sanders.  She lives with her . She walks with a cane at times.    She started have burning mid-sternal, epigastric pain in her chest about 2 weeks ago.  She was started on dexlansoprazole by her PCP without relief, so she also started taking OTC Pepcid without relief the pain. She states that the pain is worse when she eats, except when she eats only bread.  It radiates to both shoulders at times.  The pain got worse last night and is worse tonight the pain is located substernal with radiation into the mid sternum so she went to [facility] for evaluation.  She denies shortness of breath, nausea, fever, or edema.  She states that she an echocardiogram and ECG about a month ago that was "fine."  She has never had a stress test or EGD.  She has not had these symptoms before. Her Chest X-Ray showed clear lungs with Cardiomegaly.  Her Troponin is 0.776 with no prior troponin to compare to. ECG shows Normal sinus rhythm  Left anterior fascicular block, and lateral ST and T wave abnormality. This is a change from ECG in 2016.  Labs were otherwise normal. She was given a GI cocktail and famotidine with minimal relief.  She was then treated with Nitropaste and Aspirin 325 mg and also " given a liter normal saline bolus. She is admitted to [facility], observation status for further Cardiac Work-up. Cardiology will be consulted.     Past Medical History:   Diagnosis Date    Diabetes mellitus     Hyperlipemia     Hypertension        No past surgical history on file.    Review of patient's allergies indicates:   Allergen Reactions    Sulfa (sulfonamide antibiotics) Hives       No current facility-administered medications on file prior to encounter.      No current outpatient prescriptions on file prior to encounter.     Family History     None        Social History Main Topics    Smoking status: Not on file    Smokeless tobacco: Not on file    Alcohol use Not on file    Drug use: Unknown    Sexual activity: Not on file     Review of Systems   Constitutional: Negative for chills, diaphoresis and fever.   HENT: Negative for sore throat and trouble swallowing.    Eyes: Negative for photophobia and visual disturbance.   Respiratory: Negative for cough, shortness of breath and wheezing.    Cardiovascular: Positive for chest pain (Burning, Midsternal, Radiates to both shoulders at times). Negative for palpitations.   Gastrointestinal: Negative for abdominal pain, constipation, diarrhea, nausea and vomiting.        Normal BM on 3/18/18   Endocrine: Negative for polydipsia and polyphagia.   Genitourinary: Positive for frequency (Increased Urine Volume). Negative for decreased urine volume, dysuria, hematuria and urgency.   Musculoskeletal: Negative for joint swelling, neck pain and neck stiffness.   Neurological: Negative for weakness, numbness and headaches.   Psychiatric/Behavioral: Negative for agitation, dysphoric mood and suicidal ideas.     Objective:     Vital Signs (Most Recent):  Temp: 98.3 °F (36.8 °C) (03/19/18 0217)  Pulse: 88 (03/19/18 0314)  Resp: 20 (03/19/18 0217)  BP: (!) 174/73 (03/19/18 0217)  SpO2: (!) 94 % (03/19/18 0217) Vital Signs (24h Range):  Temp:  [98 °F  (36.7 °C)-98.3 °F (36.8 °C)] 98.3 °F (36.8 °C)  Pulse:  [82-95] 88  Resp:  [18-35] 20  SpO2:  [94 %-98 %] 94 %  BP: (172-222)/(73-98) 174/73     Weight: 102.5 kg (226 lb)  Body mass index is 38.79 kg/m².    Physical Exam   Constitutional: She is oriented to person, place, and time. She appears well-developed and well-nourished. No distress.   HENT:   Head: Normocephalic and atraumatic.   Mouth/Throat: Oropharynx is clear and moist. No oropharyngeal exudate.   Eyes: Conjunctivae are normal. Pupils are equal, round, and reactive to light. No scleral icterus.   Neck: Neck supple.   Cardiovascular: Normal rate, regular rhythm and intact distal pulses.  Exam reveals no gallop and no friction rub.    Murmur heard.   Systolic murmur is present with a grade of 3/6   Pulmonary/Chest: Effort normal and breath sounds normal. No respiratory distress. She has no wheezes. She has no rales.   Abdominal: Soft. Bowel sounds are normal. She exhibits distension. There is no tenderness. There is no rebound and no guarding.   Musculoskeletal: She exhibits no edema, tenderness or deformity.   Neurological: She is alert and oriented to person, place, and time. She exhibits normal muscle tone.   Skin: Skin is warm and dry. Capillary refill takes 2 to 3 seconds. No rash noted. She is not diaphoretic. There is pallor.   Psychiatric: She has a normal mood and affect. Her behavior is normal.   Nursing note and vitals reviewed.        CRANIAL NERVES     CN III, IV, VI   Pupils are equal, round, and reactive to light.       Significant Labs:   CBC:   Recent Labs  Lab 03/18/18  2237   WBC 8.35   HGB 12.8   HCT 39.9        CMP:   Recent Labs  Lab 03/18/18  2237      K 4.1      CO2 27   *   BUN 19*   CREATININE 0.75   CALCIUM 10.0   PROT 7.8   ALBUMIN 4.4   BILITOT 0.6   ALKPHOS 58   AST 33   ALT 32   ANIONGAP 16   EGFRNONAA >60.0     Cardiac Markers: No results for input(s): CKMB, MYOGLOBIN, BNP, TROPISTAT in the last 48  "hours.  Coagulation:   Recent Labs  Lab 03/18/18 2237   INR 1.1     POCT Glucose:   Recent Labs  Lab 03/19/18  0253   POCTGLUCOSE 134*     Troponin:   Recent Labs  Lab 03/18/18 2237   TROPONINI 0.776*     ECG 3/18/18 2226 pm  Normal sinus rhythm (HR 94), Left anterior fascicular block, Lateral ST and T wave abnormality    Significant Imaging: I have reviewed all pertinent imaging results/findings within the past 24 hours.   Imaging Results          X-Ray Chest 1 View (Final result)  Result time 03/18/18 22:53:59    Final result by VINAY Ulloa Sr., MD (03/18/18 22:53:59)                 Impression:        1. The lungs are clear.   2. There is mild cardiomegaly.  Electronically signed by: VINAY ULLOA MD  Date:     03/18/18  Time:    22:53              Narrative:    One-view chest x-ray  Clinical History: Chest pain  Finding: The size of the heart is prominent. The lungs are clear. There is no pneumothorax.  The costophrenic angles are sharp.                                Assessment/Plan:     Diabetes mellitus, type 2    Takes Metformin 1000 mg BID. Holding. Check blood glucose AC and HS and use SSI. Diabetic diet,  Check A1c.              GERD (gastroesophageal reflux disease)    Epigastric/Sternal Pain  Not responding to out-patient dexlansoprazole or famotadine. She does associate the pain with food. Has not had EGD in the Past  --Give Pantoprozole 40 mg daily. Consider GI Consult if Cardiology finds this to be non-coronary          Elevated troponin    Chest Pain, Heart Murmer  Hypertension, Hyperlipidemia  Obesity (BMI 38.8)  82 year old obese female with hypertension, hyperlipemia, and diabetes with 2 weeks of burning chest pain, unrelieved by histamine blockers and PPI. ECG with new Left Anterior Fascicular Block. Troponin 0.776. Systolic Murmer which patient states is old and a prior ECHO and ECG that was "Fine."  Cardiomegaly on chest x-ray. Given Aspirin 325 mg in ED  --Trend Troponin, Telemetry " Monitoring  --Nitro SL PRN CP, ECG PRN CP  --Start Aspirin 81 mg daily  --Continue home Simastatin 20 mg daily.  --Giving Losartan 100 mg + HCTZ 12.5 for home Hyzaar  --Cardiology Consult            VTE Risk Mitigation         Ordered     Place sequential compression device  Until discontinued      03/19/18 0131     Place MAYRA hose  Until discontinued      03/19/18 0131     IP VTE LOW RISK PATIENT  Once      03/19/18 0131             Loretta Nichole NP  Department of Hospital Medicine   [facility]

## 2018-03-19 NOTE — PROGRESS NOTES
Report received from Jason Herrera RN and will review orders and resume care once pt arrives to the unit.

## 2018-03-19 NOTE — ASSESSMENT & PLAN NOTE
-noted on exam  -likely related to sclerotic aortic valve  -recent echo in February with no acute valve abnormality  -repeat echo today

## 2018-03-19 NOTE — HPI
"Makenzie Haile is a 82 y.o. woman with hypertension, diabetes mellitus type 2, hyperlipidemia, aortic stenosis, and overactive bladder.  She lives in [place], [place].  She is .  She walks with a cane at times.  Her primary care physician is Dr. Edyta Sanders.   She presented to [facility] on 3/18/18 with 2 weeks of burning mid-sternal epigastric pain unrelieved by dexlansoprazole prescribed by Dr. Sanders and by over-the-counter famotidine.  Pain was worse when eating and radiated to both shoulders.  The pain worsened the night before presentation.  She denied shortness of breath, nausea, fever, or edema.  She reported having an ECG and echocardiogram about a month ago that was "fine."  In the ED, chest x-ray showed clear lungs with cardiomegaly.  Troponin was 0.776.  ECG showed left anterior fascicular block and lateral ST and T wave abnormality, changed from ECG in 2016.  She was given a GI cocktail and famotidine with minimal relief, then given aspirin, nitroglycerin paste, and 1 liter of normal saline.  She was admitted to [facility] with a consult to Cardiology.  "

## 2018-03-19 NOTE — HOSPITAL COURSE
3/18/2018 Presented to the ER with complaints of chest pain described as a burning sensation. Initial troponin .776 with EKG with NSR with LAD and no STTWC although poor quality tracing. Admitted to [facility] for further observation and evaluation  3/19/2018 No recurrent symptoms overnight. Repeat troponin this AM up to 4.934 with repeat EKG pending. HR and BP stable with no arrhythmias noted on telemetry overnight. Cardiology consulted for evaluation of elevated troponin. NPO and decision to proceed with LHC for further evaluation. Echocardiogram today for evaluation of LVEF and valve structure   319/2018 Echocardiogram yesterday with normal LVEF. Underwent LHC via right radial access yesterday as well severe mid LCX stenosis noted and successful PCI with SURAJ performed. 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 Tolerated procedure well and recovered on telemetry floor. Reports no recurrent chest pain/burning overnight. HR and BP stable. Troponin up to 18 this AM-related to combination of NSTEMI and PCI yesterday. No arrhythmias noted on telemetry. On GDMT with ASA, Plavix, statin, BB and ARB. Suitable for discharge today from cardiac standpoint

## 2018-03-19 NOTE — HPI
"81yo female with history of DMII, HTN, cardiac murmur, GERD and obesity who presented to the ER with complaints of chest pain. Ms. Haile complains of chest pain described as burning sensation that started about 2 weeks ago. The episodes would occur after eating and would eventually ease with rest or OTC medications. She was seen by her PCP and was given samples of Dexilant. She reports her symptoms persisted and were not improving and she placed a call to his office for follow up. She denies any complaints with exertion but reports she has been more sedentary for the past 2 years since assisted. She had symptoms on Saturday night that eventually relieved with OTC Pepcid and Tums and was able to rest. She reports recurrent symptoms after eating breakfast yesterday with intermittent symptoms throughout the day. She presented to the ER last night stating "she could take another night of the pain" She denies any worsening pain last night. She reports associated symptoms of radiation to her bilateral shoulders but denies any SOB, nausea, vomiting or diaphoresis. She denies any history of CAD, PCI or CABG. She was a social smoker but quit 30 years ago  "

## 2018-03-19 NOTE — PROCEDURES
Post Procedure Note: PCI    The pt was brought to the cath lab and under sterile technique, right radial access was obtained without difficulty. Images were obtained in multiple views and severe mid LCX stenosis noted and successful PCI with SURAJ performed. Please see full report for details. The pt tolerated the procedure well without complications. Radial band device used with successful hemostasis.     Vitals:    03/19/18 1515 03/19/18 1530 03/19/18 1533 03/19/18 1545   BP: (!) 210/86 (!) 193/81 (!) 193/81 (!) 142/65   BP Location: Left arm Left arm  Left arm   Patient Position: Lying Lying  Lying   Pulse: 76 74  84   Resp: (!) 22 (!) 22  (!) 22   Temp:       TempSrc:       SpO2: (!) 93% 96%  95%   Weight:       Height:             Gen: NAD  Ext: 2+ radial pulse, no evidence of hematoma    Plan:  -Post cath care per protocol  -ASA for life, plavix at least a year  -Cardiac rehab

## 2018-03-19 NOTE — ASSESSMENT & PLAN NOTE
-presented with complaints of chest pain atypical in presentation  -initial troponin .776 with trend up to 4.934 this AM  -initial EKG with no acute ST changes although poor tracing  -repeat EKG this AM pending  -concerned about ischemic etiology given risk factors and marked troponin elevation  -recommended [facility] for further evaluation-patient agreeable and will remain NPO for LHC later today; will give dose of IV Solumedrol and IV Benadryl due to documented allergy to Iodine  -continue ASA, statin and ARB; will load with Plavix and place on daily Plavix along with Metoprolol BID  -echocardiogram today for repeat evaluation of LVEF and valve structure; recent echo with normal findings  -further recs once LHC completed

## 2018-03-19 NOTE — SUBJECTIVE & OBJECTIVE
Past Medical History:   Diagnosis Date    Diabetes mellitus     Hyperlipemia     Hypertension        No past surgical history on file.    Review of patient's allergies indicates:   Allergen Reactions    Sulfa (sulfonamide antibiotics) Hives       No current facility-administered medications on file prior to encounter.      No current outpatient prescriptions on file prior to encounter.     Family History     None        Social History Main Topics    Smoking status: Not on file    Smokeless tobacco: Not on file    Alcohol use Not on file    Drug use: Unknown    Sexual activity: Not on file     Review of Systems   Constitutional: Negative for chills, diaphoresis and fever.   HENT: Negative for sore throat and trouble swallowing.    Eyes: Negative for photophobia and visual disturbance.   Respiratory: Negative for cough, shortness of breath and wheezing.    Cardiovascular: Positive for chest pain (Burning, Midsternal, Radiates to both shoulders at times). Negative for palpitations.   Gastrointestinal: Negative for abdominal pain, constipation, diarrhea, nausea and vomiting.        Normal BM on 3/18/18   Endocrine: Negative for polydipsia and polyphagia.   Genitourinary: Positive for frequency (Increased Urine Volume). Negative for decreased urine volume, dysuria, hematuria and urgency.   Musculoskeletal: Negative for joint swelling, neck pain and neck stiffness.   Neurological: Negative for weakness, numbness and headaches.   Psychiatric/Behavioral: Negative for agitation, dysphoric mood and suicidal ideas.     Objective:     Vital Signs (Most Recent):  Temp: 98.3 °F (36.8 °C) (03/19/18 0217)  Pulse: 88 (03/19/18 0314)  Resp: 20 (03/19/18 0217)  BP: (!) 174/73 (03/19/18 0217)  SpO2: (!) 94 % (03/19/18 0217) Vital Signs (24h Range):  Temp:  [98 °F (36.7 °C)-98.3 °F (36.8 °C)] 98.3 °F (36.8 °C)  Pulse:  [82-95] 88  Resp:  [18-35] 20  SpO2:  [94 %-98 %] 94 %  BP: (172-222)/(73-98) 174/73     Weight: 102.5 kg (226  lb)  Body mass index is 38.79 kg/m².    Physical Exam   Constitutional: She is oriented to person, place, and time. She appears well-developed and well-nourished. No distress.   HENT:   Head: Normocephalic and atraumatic.   Mouth/Throat: Oropharynx is clear and moist. No oropharyngeal exudate.   Eyes: Conjunctivae are normal. Pupils are equal, round, and reactive to light. No scleral icterus.   Neck: Neck supple.   Cardiovascular: Normal rate, regular rhythm and intact distal pulses.  Exam reveals no gallop and no friction rub.    Murmur heard.   Systolic murmur is present with a grade of 3/6   Pulmonary/Chest: Effort normal and breath sounds normal. No respiratory distress. She has no wheezes. She has no rales.   Abdominal: Soft. Bowel sounds are normal. She exhibits distension. There is no tenderness. There is no rebound and no guarding.   Musculoskeletal: She exhibits no edema, tenderness or deformity.   Neurological: She is alert and oriented to person, place, and time. She exhibits normal muscle tone.   Skin: Skin is warm and dry. Capillary refill takes 2 to 3 seconds. No rash noted. She is not diaphoretic. There is pallor.   Psychiatric: She has a normal mood and affect. Her behavior is normal.   Nursing note and vitals reviewed.        CRANIAL NERVES     CN III, IV, VI   Pupils are equal, round, and reactive to light.       Significant Labs:   CBC:   Recent Labs  Lab 03/18/18 2237   WBC 8.35   HGB 12.8   HCT 39.9        CMP:   Recent Labs  Lab 03/18/18 2237      K 4.1      CO2 27   *   BUN 19*   CREATININE 0.75   CALCIUM 10.0   PROT 7.8   ALBUMIN 4.4   BILITOT 0.6   ALKPHOS 58   AST 33   ALT 32   ANIONGAP 16   EGFRNONAA >60.0     Cardiac Markers: No results for input(s): CKMB, MYOGLOBIN, BNP, TROPISTAT in the last 48 hours.  Coagulation:   Recent Labs  Lab 03/18/18 2237   INR 1.1     POCT Glucose:   Recent Labs  Lab 03/19/18  0253   POCTGLUCOSE 134*     Troponin:   Recent  Labs  Lab 03/18/18 2237   TROPONINI 0.776*     ECG 3/18/18 2226 pm  Normal sinus rhythm (HR 94), Left anterior fascicular block, Lateral ST and T wave abnormality    Significant Imaging: I have reviewed all pertinent imaging results/findings within the past 24 hours.   Imaging Results          X-Ray Chest 1 View (Final result)  Result time 03/18/18 22:53:59    Final result by VINAY Ulloa Sr., MD (03/18/18 22:53:59)                 Impression:        1. The lungs are clear.   2. There is mild cardiomegaly.  Electronically signed by: VINAY ULLOA MD  Date:     03/18/18  Time:    22:53              Narrative:    One-view chest x-ray  Clinical History: Chest pain  Finding: The size of the heart is prominent. The lungs are clear. There is no pneumothorax.  The costophrenic angles are sharp.

## 2018-03-19 NOTE — H&P (VIEW-ONLY)
"[facility]  Cardiology  Consult Note    Patient Name: Makenzie Haile  MRN: [id number]  Admission Date: 3/18/2018  Hospital Length of Stay: 0 days  Code Status: Full Code   Attending Provider: Mario Griffin MD   Consulting Provider: RODERICK Payne ANP  Primary Care Physician: Edyta Sanders MD  Principal Problem:NSTEMI (non-ST elevated myocardial infarction)    Patient information was obtained from patient and past medical records.     Inpatient consult to [facility]  Consult performed by: COLT EISENBERG  Consult ordered by: RON DE LA FUENTE  Reason for consult: elevated troponin/NSTEMI         Subjective:     Chief Complaint:  Chest pain     HPI:   83yo female with history of DMII, HTN, cardiac murmur, GERD and obesity who presented to the ER with complaints of chest pain. Ms. Haile complains of chest pain described as burning sensation that started about 2 weeks ago. The episodes would occur after eating and would eventually ease with rest or OTC medications. She was seen by her PCP and was given samples of Dexilant. She reports her symptoms persisted and were not improving and she placed a call to his office for follow up. She denies any complaints with exertion but reports she has been more sedentary for the past 2 years since halfway. She had symptoms on Saturday night that eventually relieved with OTC Pepcid and Tums and was able to rest. She reports recurrent symptoms after eating breakfast yesterday with intermittent symptoms throughout the day. She presented to the ER last night stating "she could take another night of the pain" She denies any worsening pain last night. She reports associated symptoms of radiation to her bilateral shoulders but denies any SOB, nausea, vomiting or diaphoresis. She denies any history of CAD, PCI or CABG. She was a social smoker but quit 30 years ago    Hospital Course:  3/18/2018 Presented to the ER with complaints of chest " pain described as a burning sensation. Initial troponin .776 with EKG with NSR with LAD and no STTWC although poor quality tracing. Admitted to [facility] for further observation and evaluation  3/19/2018 No recurrent symptoms overnight. Repeat troponin this AM up to 4.934 with repeat EKG pending. HR and BP stable with no arrhythmias noted on telemetry overnight. Cardiology consulted for evaluation of elevated troponin. NPO and decision to proceed with [facility] for further evaluation. Echocardiogram today for evaluation of LVEF and valve structure       Past Medical History:   Diagnosis Date    Diabetes mellitus     Hyperlipemia     Hypertension        History reviewed. No pertinent surgical history.    Review of patient's allergies indicates:   Allergen Reactions    Iodine and iodide containing products     Sulfa (sulfonamide antibiotics) Hives       No current facility-administered medications on file prior to encounter.      No current outpatient prescriptions on file prior to encounter.     Family History     Problem Relation (Age of Onset)    Heart disease Father    No Known Problems Mother        Social History Main Topics    Smoking status: Never Smoker    Smokeless tobacco: Not on file    Alcohol use No    Drug use: No    Sexual activity: Not on file     Review of Systems   Constitution: Negative for chills, decreased appetite, diaphoresis, fever and weakness.   Cardiovascular: Positive for chest pain. Negative for claudication, cyanosis, dyspnea on exertion, irregular heartbeat, leg swelling, near-syncope, orthopnea, palpitations, paroxysmal nocturnal dyspnea and syncope.   Respiratory: Negative for cough, hemoptysis, shortness of breath and wheezing.    Gastrointestinal: Negative for bloating, abdominal pain, constipation, diarrhea, melena, nausea and vomiting.   Neurological: Negative for dizziness.     Objective:     Vital Signs (Most Recent):  Temp: 97.8 °F (36.6 °C) (03/19/18  0732)  Pulse: 72 (03/19/18 0800)  Resp: 18 (03/19/18 0732)  BP: (!) 148/65 (03/19/18 0732)  SpO2: (!) 94 % (03/19/18 0746) Vital Signs (24h Range):  Temp:  [97.4 °F (36.3 °C)-98.3 °F (36.8 °C)] 97.8 °F (36.6 °C)  Pulse:  [72-95] 72  Resp:  [18-35] 18  SpO2:  [94 %-98 %] 94 %  BP: (148-222)/(65-98) 148/65     Weight: 102.5 kg (226 lb)  Body mass index is 38.79 kg/m².    SpO2: (!) 94 %  O2 Device (Oxygen Therapy): room air      Intake/Output Summary (Last 24 hours) at 03/19/18 1006  Last data filed at 03/19/18 0531   Gross per 24 hour   Intake             1000 ml   Output              800 ml   Net              200 ml       Lines/Drains/Airways     Peripheral Intravenous Line                 Peripheral IV - Single Lumen 03/18/18 2238 Right Antecubital less than 1 day                Physical Exam   Constitutional: She is oriented to person, place, and time. She appears well-developed and well-nourished. No distress.   Cardiovascular: Normal rate and regular rhythm.  Exam reveals no gallop.    No murmur heard.  Pulses:       Radial pulses are 2+ on the right side, and 2+ on the left side.        Dorsalis pedis pulses are 2+ on the right side, and 2+ on the left side.        Posterior tibial pulses are 2+ on the right side, and 2+ on the left side.   Pulmonary/Chest: Effort normal and breath sounds normal. No respiratory distress. She has no wheezes.   Abdominal: Soft. Bowel sounds are normal. She exhibits no distension. There is no tenderness.   Neurological: She is alert and oriented to person, place, and time.   Skin: Skin is warm and dry.       Significant Labs:       Recent Labs  Lab 03/19/18  0445      K 4.0      CO2 28   BUN 14   CREATININE 0.7   MG 1.5*       Recent Labs  Lab 03/18/18  2237   WBC 8.35   RBC 4.57   HGB 12.8   HCT 39.9      MCV 87   MCH 28.0   MCHC 32.1       Recent Labs  Lab 03/19/18  0445   TROPONINI 4.934*       Significant Imaging: Echocardiogram: 2D echo with color flow  doppler:   2/14/2018 at [facility]    Normal LV function with EF 60-65%  Normal RV systolic function with estimated PA pressure 25mmHg  Aortic valve mildly sclerotic with mean gradient 17mmHg and SERAFIN 1.68cm2  Mitral Valve mildly sclerotic    Repeat echo ordered and pending today     Assessment and Plan:     * NSTEMI (non-ST elevated myocardial infarction)    -presented with complaints of chest pain atypical in presentation  -initial troponin .776 with trend up to 4.934 this AM  -initial EKG with no acute ST changes although poor tracing  -repeat EKG this AM pending  -concerned about ischemic etiology given risk factors and marked troponin elevation  -recommended [facility] for further evaluation-patient agreeable and will remain NPO for C later today; will give dose of IV Solumedrol and IV Benadryl due to documented allergy to Iodine  -continue ASA, statin and ARB; will load with Plavix and place on daily Plavix along with Metoprolol BID  -echocardiogram today for repeat evaluation of LVEF and valve structure; recent echo with normal findings  -further recs once [facility] completed         Murmur, cardiac    -noted on exam  -likely related to sclerotic aortic valve  -recent echo in February with no acute valve abnormality  -repeat echo today         Essential hypertension    -BP elevated on admission at 197/84  -BP trended down overnight and 140s-150s/60s this AM  -on Losartan./HCTZ 100/25mg daily  -will add Metoprolol to regimen  -monitor BP and adjust meds prn         Diabetes mellitus, type 2    -HgbA1c 6.0  -CBG this   -on Metformin BID at home; on hold due to interaction with IV contrast  -anticipate up trending of blood sugar given need for IV Solumedrol due to iodine allergy             VTE Risk Mitigation         Ordered     Place sequential compression device  Until discontinued      03/19/18 0131     Place MAYRA hose  Until discontinued      03/19/18 0131     IP VTE LOW RISK PATIENT  Once      03/19/18  8205          Thank you for your consult. I will follow-up with patient. Please contact us if you have any additional questions.    RODERICK Payne, ANP  Cardiology   [facility]

## 2018-03-19 NOTE — ED PROVIDER NOTES
Encounter Date: 3/18/2018       History     Chief Complaint   Patient presents with    Gastroesophageal Reflux     pt reports indigestion x 2 weeks -seen by PCP two weeks ago for same and Rx Pepcid     Patient has been seen by her primary doctor who is treating her for reflux for last 2 weeks for chest pain she has been taking Pepcid without relief the pain got worse last night and is worse tonight the pain is located substernal with radiation into the mid sternum.  There is no radiation to the left arm however she has had radiating pain in the past but not recently      The history is provided by the patient.   Chest Pain   The current episode started yesterday. Duration of episode(s) is 1 minute. Chest pain occurs frequently. The chest pain is unchanged. The pain is associated with exertion. At its most intense, the chest pain is at 8/10. The chest pain is currently at 4/10. The quality of the pain is described as aching. The pain does not radiate. Chest pain is worsened by certain positions. Pertinent negatives for primary symptoms include no fever, no shortness of breath and no wheezing.     Review of patient's allergies indicates:   Allergen Reactions    Sulfa (sulfonamide antibiotics) Hives     Past Medical History:   Diagnosis Date    Diabetes mellitus     Hyperlipemia     Hypertension      No past surgical history on file.  No family history on file.  Social History   Substance Use Topics    Smoking status: Not on file    Smokeless tobacco: Not on file    Alcohol use Not on file     Review of Systems   Constitutional: Negative.  Negative for fever.   HENT: Negative.    Eyes: Negative.    Respiratory: Negative.  Negative for shortness of breath and wheezing.    Cardiovascular: Positive for chest pain.   Gastrointestinal: Negative.    Endocrine: Negative.    Genitourinary: Negative.    Musculoskeletal: Negative.    Allergic/Immunologic: Negative.    Neurological: Negative.    Hematological: Negative.     Psychiatric/Behavioral: Negative.    All other systems reviewed and are negative.      Physical Exam     Initial Vitals [03/18/18 2211]   BP Pulse Resp Temp SpO2   (!) 214/94 95 20 98 °F (36.7 °C) (!) 94 %      MAP       134         Physical Exam    Nursing note and vitals reviewed.  Constitutional: She appears well-developed and well-nourished. She is not diaphoretic. No distress.   HENT:   Head: Normocephalic.   Eyes: Conjunctivae are normal.   Neck: Normal range of motion. Neck supple.   Cardiovascular: Normal rate, regular rhythm, normal heart sounds and intact distal pulses.   Pulmonary/Chest: Breath sounds normal.   Abdominal: Soft.   Neurological: She is alert and oriented to person, place, and time. She has normal strength.   Skin: Skin is warm and dry.   Psychiatric: She has a normal mood and affect.         ED Course   Critical Care  Date/Time: 3/19/2018 1:33 AM  Performed by: RAMAN DIAZ III  Authorized by: RAMAN DIAZ III   Direct patient critical care time: 30 minutes  Additional history critical care time: 10 minutes  Ordering / reviewing critical care time: 10 minutes  Documentation critical care time: 20 minutes  Consulting other physicians critical care time: 5 minutes  Consult with family critical care time: 15 minutes  Total critical care time (exclusive of procedural time) : 90 minutes        Labs Reviewed   CBC W/ AUTO DIFFERENTIAL - Abnormal; Notable for the following:        Result Value    RDW 14.6 (*)     All other components within normal limits   COMPREHENSIVE METABOLIC PANEL - Abnormal; Notable for the following:     Glucose 157 (*)     BUN, Bld 19 (*)     All other components within normal limits   PROTIME-INR - Abnormal; Notable for the following:     Prothrombin Time 12.6 (*)     All other components within normal limits   TROPONIN I - Abnormal; Notable for the following:     Troponin I 0.776 (*)     All other components within normal limits   TROPONIN I   B-TYPE  NATRIURETIC PEPTIDE   BASIC METABOLIC PANEL   MAGNESIUM   PHOSPHORUS   LIPID PANEL   HEMOGLOBIN A1C   HEMOGLOBIN A1C   POCT GLUCOSE MONITORING CONTINUOUS     EKG Readings: (Independently Interpreted)   Other EKG Interpretations: Repeat EKG done 2 hours after admitted shows no acute changes from previous EKG          Medical Decision Making:   Initial Assessment:   Chest pain  ED Management:  Workup shows elevated troponins with some mild ST segment changes.  Impression is non-STEMI  Will transfer to [facility]  Other:   I have discussed this case with another health care provider.                      Clinical Impression:   The primary encounter diagnosis was Myocardial ischemia. Diagnoses of Chest pain, Shoulder pain, left, and Chest pain were also pertinent to this visit.    Disposition:   Disposition: Admitted                        RAMAN Macario III, MD  03/19/18 0016       RAMAN Macario III, MD  03/19/18 0100       RAMAN Macario III, MD  03/19/18 0134

## 2018-03-19 NOTE — ASSESSMENT & PLAN NOTE
-HgbA1c 6.0  -CBG this   -on Metformin BID at home; on hold due to interaction with IV contrast  -anticipate up trending of blood sugar given need for IV Solumedrol due to iodine allergy

## 2018-03-20 VITALS
TEMPERATURE: 99 F | SYSTOLIC BLOOD PRESSURE: 151 MMHG | OXYGEN SATURATION: 94 % | HEART RATE: 56 BPM | HEIGHT: 64 IN | BODY MASS INDEX: 38.58 KG/M2 | RESPIRATION RATE: 18 BRPM | DIASTOLIC BLOOD PRESSURE: 66 MMHG | WEIGHT: 226 LBS

## 2018-03-20 DIAGNOSIS — I21.21: Primary | ICD-10-CM

## 2018-03-20 PROBLEM — Z95.5 PRESENCE OF DRUG-ELUTING STENT IN LEFT CIRCUMFLEX CORONARY ARTERY: Status: ACTIVE | Noted: 2018-03-19

## 2018-03-20 PROBLEM — I35.0 AORTIC STENOSIS: Chronic | Status: ACTIVE | Noted: 2018-03-19

## 2018-03-20 LAB
BACTERIA UR CULT: NORMAL
POCT GLUCOSE: 132 MG/DL (ref 70–110)

## 2018-03-20 PROCEDURE — A4216 STERILE WATER/SALINE, 10 ML: HCPCS | Performed by: NURSE PRACTITIONER

## 2018-03-20 PROCEDURE — 25000003 PHARM REV CODE 250: Performed by: NURSE PRACTITIONER

## 2018-03-20 PROCEDURE — 99232 SBSQ HOSP IP/OBS MODERATE 35: CPT | Mod: ,,, | Performed by: NURSE PRACTITIONER

## 2018-03-20 PROCEDURE — 94761 N-INVAS EAR/PLS OXIMETRY MLT: CPT

## 2018-03-20 RX ORDER — NITROGLYCERIN 0.4 MG/1
0.4 TABLET SUBLINGUAL EVERY 5 MIN PRN
Qty: 25 TABLET | Refills: 11 | Status: SHIPPED | OUTPATIENT
Start: 2018-03-20 | End: 2019-02-13 | Stop reason: ALTCHOICE

## 2018-03-20 RX ORDER — CLOPIDOGREL BISULFATE 75 MG/1
75 TABLET ORAL DAILY
Qty: 30 TABLET | Refills: 11 | Status: ON HOLD | OUTPATIENT
Start: 2018-03-21 | End: 2019-02-19 | Stop reason: HOSPADM

## 2018-03-20 RX ORDER — ASPIRIN 81 MG/1
81 TABLET ORAL DAILY
Qty: 30 TABLET | Refills: 11 | Status: ON HOLD | OUTPATIENT
Start: 2018-03-21 | End: 2019-02-19 | Stop reason: SDUPTHER

## 2018-03-20 RX ORDER — METOPROLOL SUCCINATE 25 MG/1
25 TABLET, EXTENDED RELEASE ORAL DAILY
Qty: 30 TABLET | Refills: 11 | Status: ON HOLD | OUTPATIENT
Start: 2018-03-20 | End: 2019-02-15 | Stop reason: HOSPADM

## 2018-03-20 RX ORDER — ATORVASTATIN CALCIUM 40 MG/1
40 TABLET, FILM COATED ORAL DAILY
Qty: 30 TABLET | Refills: 11 | Status: SHIPPED | OUTPATIENT
Start: 2018-03-20 | End: 2023-05-25

## 2018-03-20 RX ADMIN — OXYBUTYNIN CHLORIDE 5 MG: 5 TABLET, EXTENDED RELEASE ORAL at 08:03

## 2018-03-20 RX ADMIN — ASPIRIN 81 MG: 81 TABLET, COATED ORAL at 08:03

## 2018-03-20 RX ADMIN — HYDROCHLOROTHIAZIDE 12.5 MG: 12.5 TABLET ORAL at 08:03

## 2018-03-20 RX ADMIN — LOSARTAN POTASSIUM 100 MG: 50 TABLET, FILM COATED ORAL at 08:03

## 2018-03-20 RX ADMIN — PANTOPRAZOLE SODIUM 40 MG: 40 TABLET, DELAYED RELEASE ORAL at 08:03

## 2018-03-20 RX ADMIN — CLOPIDOGREL BISULFATE 75 MG: 75 TABLET ORAL at 08:03

## 2018-03-20 RX ADMIN — SODIUM CHLORIDE, PRESERVATIVE FREE 3 ML: 5 INJECTION INTRAVENOUS at 05:03

## 2018-03-20 NOTE — SUBJECTIVE & OBJECTIVE
Review of Systems   Constitution: Negative for chills, decreased appetite, diaphoresis, fever and weakness.   Cardiovascular: Negative for chest pain, claudication, cyanosis, dyspnea on exertion, irregular heartbeat, leg swelling, near-syncope, orthopnea, palpitations, paroxysmal nocturnal dyspnea and syncope.   Respiratory: Negative for cough, hemoptysis, shortness of breath and wheezing.    Gastrointestinal: Negative for bloating, abdominal pain, constipation, diarrhea, melena, nausea and vomiting.   Neurological: Negative for dizziness.     Objective:     Vital Signs (Most Recent):  Temp: 99.1 °F (37.3 °C) (03/20/18 0752)  Pulse: (!) 56 (03/20/18 0800)  Resp: 18 (03/20/18 0752)  BP: (!) 151/66 (03/20/18 0752)  SpO2: (!) 94 % (03/20/18 0805) Vital Signs (24h Range):  Temp:  [97.1 °F (36.2 °C)-99.1 °F (37.3 °C)] 99.1 °F (37.3 °C)  Pulse:  [56-98] 56  Resp:  [12-22] 18  SpO2:  [92 %-96 %] 94 %  BP: (131-210)/(65-86) 151/66     Weight: 102.5 kg (226 lb)  Body mass index is 38.79 kg/m².     SpO2: (!) 94 %  O2 Device (Oxygen Therapy): room air      Intake/Output Summary (Last 24 hours) at 03/20/18 1110  Last data filed at 03/20/18 0830   Gross per 24 hour   Intake              180 ml   Output              500 ml   Net             -320 ml       Lines/Drains/Airways          No matching active lines, drains, or airways          Physical Exam   Constitutional: She is oriented to person, place, and time. She appears well-developed and well-nourished. No distress.   Cardiovascular: Normal rate and regular rhythm.  Exam reveals no gallop.    No murmur heard.  Pulses:       Radial pulses are 2+ on the right side.   Pulmonary/Chest: Effort normal and breath sounds normal. No respiratory distress. She has no wheezes.   Abdominal: Soft. Bowel sounds are normal. She exhibits no distension. There is no tenderness.   Neurological: She is alert and oriented to person, place, and time.   Skin: Skin is warm and dry.   Right radial  site soft with slight swelling and ecchymosis; no active bleeding or hematoma noted; full ROM and good capillary refill present        Significant Labs:         Recent Labs  Lab 03/19/18  1254   TROPONINI 18.789*       Significant Imaging: Echocardiogram:   2D echo with color flow doppler:   Results for orders placed or performed during the hospital encounter of 03/18/18   2D echo with color flow doppler   Result Value Ref Range    EF 55 55 - 65    Diastolic Dysfunction Yes (A)     Aortic Valve Stenosis MILD (A)     Est. PA Systolic Pressure 39     Mitral Valve Mobility NORMAL

## 2018-03-20 NOTE — ASSESSMENT & PLAN NOTE
-BP elevated on admission at 197/84  -BP 130s-150s/60s-70s overnight  -on Losartan/HCTZ 100/25mg daily at home; added Metoprolol to regimen   -will continue upon discharge  -follow up as an outpatient for further titration

## 2018-03-20 NOTE — ASSESSMENT & PLAN NOTE
-noted on exam  -likely related to sclerotic aortic valve  -recent echo in February with no acute valve abnormality  -repeat echo yesterday with normal LVEF and mild AS

## 2018-03-20 NOTE — DISCHARGE SUMMARY
"[facility] Hospital Medicine  Discharge Summary      Patient Name: Makenzie Haile  MRN: [id number]  Admission Date: 3/18/2018  Hospital Length of Stay: 1 days  Discharge Date and Time: 3/20/2018 12:04 PM  Attending Physician: Gary Chavez MD   Discharging Provider: Gary Chavez MD  Primary Care Provider: Edyta Sanders MD      HPI:   Makenzie Haile is a 82 y.o. woman with hypertension, diabetes mellitus type 2, hyperlipidemia, aortic stenosis, and overactive bladder.  She lives in [place], [place].  She is .  She walks with a cane at times.  Her primary care physician is Dr. Edyta Sanders.   She presented to [facility] on 3/18/18 with 2 weeks of burning mid-sternal epigastric pain unrelieved by dexlansoprazole prescribed by Dr. Sanders and by over-the-counter famotidine.  Pain was worse when eating and radiated to both shoulders.  The pain worsened the night before presentation.  She denied shortness of breath, nausea, fever, or edema.  She reported having an ECG and echocardiogram about a month ago that was "fine."  In the ED, chest x-ray showed clear lungs with cardiomegaly.  Troponin was 0.776.  ECG showed left anterior fascicular block and lateral ST and T wave abnormality, changed from ECG in 2016.  She was given a GI cocktail and famotidine with minimal relief, then given aspirin, nitroglycerin paste, and 1 liter of normal saline.  She was admitted to [facility] with a consult to Cardiology.    Procedure(s) (LRB):  LEFT HEART CATH (N/A)      Hospital Course:   Cardiologist Dr. Ciro Moncada placed a drug-eluting stent in the left circumflex artery via the right radial artery and recommended lifelong aspirin and at least a year of clopidrogel.  Cardiology also started metoprolol succinate and referred her to cardiac rehab.  She was discharged home on 3/20/18 and will follow up with Dr. Moncada.  She will " switch her primary care physician to Dr. Farhat Rapp.    Consults:   Consults         Status Ordering Provider     Inpatient consult to [facility]iology[facility]     Provider:  MD Mark Emery ROSANNE M.          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    Specialties:  INTERVENTIONAL CARDIOLOGY, Cardiology  Contact information:  [address] [place], [place] [id number]  [phone]                 Patient Instructions:     Diet diabetic     Activity as tolerated     Lifting restrictions   Order Comments: Avoid lifting anything over 5 pounds with your right hand for the next 5 days.     Notify your health care provider if you experience any of the following:  difficulty breathing or increased cough     Notify your health care provider if you experience any of the following:  severe uncontrolled pain     Notify your health care provider if you experience any of the following:  persistent dizziness, light-headedness, or visual disturbances     Notify your health care provider if you experience any of the following:  increased confusion or weakness     Notify your health care provider if you experience any  of the following:   Order Comments: Bleeding or worsening bruise on your wrist         Significant Diagnostic Studies:   2D echo with color flow doppler 3/19/18:     1 - Normal left ventricular systolic function (EF 55-60%). Poor endocardial borders but appear normal.     2 - Impaired LV relaxation, increased LVEDP.     3 - Normal right ventricular systolic function .     4 - Mild aortic stenosis, SERAFIN = 1.48 cm2, AVAi = 0.72 cm2/m2, peak velocity = 2.08 m/s, mean gradient = 10 mmHg.     5 - The estimated PA systolic pressure is 39 mmHg.        Medications:  Reconciled Home Medications:   Current Discharge Medication List      START taking these medications    Details   aspirin (ECOTRIN) 81 MG EC tablet Take 1 tablet (81 mg total) by mouth once daily.  Qty: 30 tablet, Refills: 11      atorvastatin (LIPITOR) 40 MG tablet Take 1 tablet (40 mg total) by mouth once daily.  Qty: 30 tablet, Refills: 11      clopidogrel (PLAVIX) 75 mg tablet Take 1 tablet (75 mg total) by mouth once daily.  Qty: 30 tablet, Refills: 11      metoprolol succinate (TOPROL-XL) 25 MG 24 hr tablet Take 1 tablet (25 mg total) by mouth once daily.  Qty: 30 tablet, Refills: 11      nitroGLYCERIN (NITROSTAT) 0.4 MG SL tablet Place 1 tablet (0.4 mg total) under the tongue every 5 (five) minutes as needed for Chest pain.  Qty: 25 tablet, Refills: 11         CONTINUE these medications which have NOT CHANGED    Details   dexlansoprazole (DEXILANT) 60 mg capsule Take 60 mg by mouth once daily.      losartan-hydrochlorothiazide 100-25 mg (HYZAAR) 100-25 mg per tablet Take 1 tablet by mouth once daily.      metFORMIN (GLUCOPHAGE) 1000 MG tablet Take 1,000 mg by mouth 2 (two) times daily with meals.      oxybutynin (DITROPAN XL) 15 MG TR24 Take 5 mg by mouth once daily.         STOP taking these medications       simvastatin (ZOCOR) 20 MG tablet Comments:   Reason for Stopping:               Indwelling Lines/Drains at time of discharge: None  Time spent on  the discharge of patient: 35 minutes  Patient was seen and examined on the date of discharge and determined to be suitable for discharge.         Gary Chavez MD  Department of Hospital Medicine  [facility]

## 2018-03-20 NOTE — HOSPITAL COURSE
Cardiologist Dr. Ciro Moncada placed a drug-eluting stent in the left circumflex artery via the right radial artery and recommended lifelong aspirin and at least a year of clopidrogel.  Cardiology also started metoprolol succinate, changed her statin, and referred her to cardiac rehab.  She was discharged home on 3/20/18 and will follow up with Dr. Moncada.

## 2018-03-20 NOTE — ASSESSMENT & PLAN NOTE
-presented with complaints of chest pain atypical in presentation  -initial troponin .776 with trend up to 4.934; repeat troponin 18 this AM related to combination of NSTEMI and recent PCI   -no recurrent complaints overnight and complete resolution of symptoms per patient   -[facility] yesterday with severe LCX stenosis treated with SURAJ placement with good result  -continue ASA, Plavix, BB and ARB; will change Zocor to Lipitor   -echocardiogram yesterday with normal LVEF and mild AS-unchanged from previous   -will refer to cardiac rehab as an outpatient  -discussed compliance with medication  with particular attention paid to importance of  DAPT for one year without interruption. The risk of abrupt stent occlusion and MI with early discontinuation was specifically stressed with patient, son, daughter and   -suitable for discharge from cardiac standpoint

## 2018-03-20 NOTE — PROGRESS NOTES
.Pharmacy New Medication Education    Patient accepted medication education.    Pharmacy educated patient on name and purpose of medications and possible side effects, using the teach-back method.     Current Inpatient Medication Orders   acetaminophen tablet 650 mg   acetaminophen tablet 650 mg   aspirin EC tablet 81 mg   clopidogrel tablet 75 mg   dextrose 50% injection 12.5 g   dextrose 50% injection 25 g   glucagon (human recombinant) injection 1 mg   glucose chewable tablet 16 g   glucose chewable tablet 24 g   hydroCHLOROthiazide tablet 12.5 mg   insulin aspart U-100 pen 0-5 Units   losartan tablet 100 mg   metoprolol tartrate (LOPRESSOR) tablet 25 mg   nitroGLYCERIN SL tablet 0.4 mg   ondansetron disintegrating tablet 8 mg   ondansetron disintegrating tablet 8 mg   oxybutynin 24 hr tablet 5 mg   pantoprazole EC tablet 40 mg   polyethylene glycol packet 17 g   prochlorperazine injection Soln 5 mg   ramelteon tablet 8 mg   simvastatin tablet 20 mg   sodium chloride 0.9% flush 3 mL       Learners of pharmacy medication education included:  Patient    Patient +/- learner response:  Verbalized Understanding, Teachback

## 2018-03-20 NOTE — PROGRESS NOTES
[facility]  Cardiology  Progress Note    Patient Name: Makenzie Haile  MRN: [id number]  Admission Date: 3/18/2018  Hospital Length of Stay: 1 days  Code Status: Full Code   Attending Physician: Gary Chavez MD   Primary Care Physician: Farhat Rapp MD  Expected Discharge Date: 3/20/2018  Principal Problem:Acute myocardial infarction involving left circumflex coronary artery    Subjective:     Hospital Course:   3/18/2018 Presented to the ER with complaints of chest pain described as a burning sensation. Initial troponin .776 with EKG with NSR with LAD and no STTWC although poor quality tracing. Admitted to [facility] for further observation and evaluation  3/19/2018 No recurrent symptoms overnight. Repeat troponin this AM up to 4.934 with repeat EKG pending. HR and BP stable with no arrhythmias noted on telemetry overnight. Cardiology consulted for evaluation of elevated troponin. NPO and decision to proceed with LHC for further evaluation. Echocardiogram today for evaluation of LVEF and valve structure   319/2018 Echocardiogram yesterday with normal LVEF. Underwent LHC via right radial access yesterday as well severe mid LCX stenosis noted and successful PCI with SURAJ performed. 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 Tolerated procedure well and recovered on telemetry floor. Reports no recurrent chest pain/burning overnight. HR and BP stable. Troponin up to 18 this AM-related to combination of NSTEMI and PCI yesterday. No arrhythmias noted on telemetry. On GDMT with ASA, Plavix, statin, BB and ARB. Suitable for discharge today from cardiac standpoint         Review of Systems   Constitution: Negative for chills, decreased appetite, diaphoresis, fever and weakness.   Cardiovascular: Negative for chest pain, claudication, cyanosis, dyspnea on exertion, irregular heartbeat, leg swelling, near-syncope, orthopnea, palpitations, paroxysmal nocturnal  dyspnea and syncope.   Respiratory: Negative for cough, hemoptysis, shortness of breath and wheezing.    Gastrointestinal: Negative for bloating, abdominal pain, constipation, diarrhea, melena, nausea and vomiting.   Neurological: Negative for dizziness.     Objective:     Vital Signs (Most Recent):  Temp: 99.1 °F (37.3 °C) (03/20/18 0752)  Pulse: (!) 56 (03/20/18 0800)  Resp: 18 (03/20/18 0752)  BP: (!) 151/66 (03/20/18 0752)  SpO2: (!) 94 % (03/20/18 0805) Vital Signs (24h Range):  Temp:  [97.1 °F (36.2 °C)-99.1 °F (37.3 °C)] 99.1 °F (37.3 °C)  Pulse:  [56-98] 56  Resp:  [12-22] 18  SpO2:  [92 %-96 %] 94 %  BP: (131-210)/(65-86) 151/66     Weight: 102.5 kg (226 lb)  Body mass index is 38.79 kg/m².     SpO2: (!) 94 %  O2 Device (Oxygen Therapy): room air      Intake/Output Summary (Last 24 hours) at 03/20/18 1110  Last data filed at 03/20/18 0830   Gross per 24 hour   Intake              180 ml   Output              500 ml   Net             -320 ml       Lines/Drains/Airways          No matching active lines, drains, or airways          Physical Exam   Constitutional: She is oriented to person, place, and time. She appears well-developed and well-nourished. No distress.   Cardiovascular: Normal rate and regular rhythm.  Exam reveals no gallop.    No murmur heard.  Pulses:       Radial pulses are 2+ on the right side.   Pulmonary/Chest: Effort normal and breath sounds normal. No respiratory distress. She has no wheezes.   Abdominal: Soft. Bowel sounds are normal. She exhibits no distension. There is no tenderness.   Neurological: She is alert and oriented to person, place, and time.   Skin: Skin is warm and dry.   Right radial site soft with slight swelling and ecchymosis; no active bleeding or hematoma noted; full ROM and good capillary refill present        Significant Labs:         Recent Labs  Lab 03/19/18  1254   TROPONINI 18.789*       Significant Imaging: Echocardiogram:   2D echo with color flow doppler:  "  Results for orders placed or performed during the hospital encounter of 03/18/18   2D echo with color flow doppler   Result Value Ref Range    EF 55 55 - 65    Diastolic Dysfunction Yes (A)     Aortic Valve Stenosis MILD (A)     Est. PA Systolic Pressure 39     Mitral Valve Mobility NORMAL      Assessment and Plan:     Brief HPI: Seen this morning on AM NP rounds while patient resting in bed with , son and daughter at the bedside. Patient without any complaints and states "I feel great and the best I have in while". Discussed POC as detailed below-all verbalized understanding and agree with POC     * Acute myocardial infarction involving left circumflex coronary artery    -presented with complaints of chest pain atypical in presentation  -initial troponin .776 with trend up to 4.934; repeat troponin 18 this AM related to combination of NSTEMI and recent PCI   -no recurrent complaints overnight and complete resolution of symptoms per patient   -[facility] yesterday with severe LCX stenosis treated with SURAJ placement with good result  -continue ASA, Plavix, BB and ARB; will change Zocor to Lipitor   -echocardiogram yesterday with normal LVEF and mild AS-unchanged from previous   -will refer to cardiac rehab as an outpatient  -discussed compliance with medication  with particular attention paid to importance of  DAPT for one year without interruption. The risk of abrupt stent occlusion and MI with early discontinuation was specifically stressed with patient, son, daughter and   -suitable for discharge from cardiac standpoint        Aortic stenosis    -noted on exam  -likely related to sclerotic aortic valve  -recent echo in February with no acute valve abnormality  -repeat echo yesterday with normal LVEF and mild AS          Essential hypertension    -BP elevated on admission at 197/84  -BP 130s-150s/60s-70s overnight  -on Losartan/HCTZ 100/25mg daily at home; added Metoprolol to regimen   -will continue upon " discharge  -follow up as an outpatient for further titration           Diabetes mellitus, type 2    -HgbA1c 6.0  -CBG this   -on Metformin BID at home; on hold due to interaction with IV contrast  -anticipate up trending of blood sugar given need for IV Solumedrol due to iodine allergy         Elevated troponin    -as detailed previously             VTE Risk Mitigation         Ordered     Place sequential compression device  Until discontinued      03/19/18 0131     Place MAYRA hose  Until discontinued      03/19/18 0131     IP VTE LOW RISK PATIENT  Once      03/19/18 0131        Plan as detailed above. Suitable for discharge from cardiac standpoint   RODERICK Payne, ANP  Cardiology  [facility]

## 2018-03-20 NOTE — NURSING
Pt is awake and alert,oriented x4. Pt continued to be SB/NSR on telemetry with HR in the 50's-60's.Denies any pain. No distress noted. IV removed from right forearm,cath tip intact. Tele monitor removed. Discharge instructions given to patient and spouse. Discussed side effects of new medications.All questions answered. Verbalized understanding.

## 2018-03-21 LAB
CORONARY STENOSIS: ABNORMAL
CORONARY STENT: YES

## 2018-03-22 LAB
POC ACTIVATED CLOTTING TIME K: 263 SEC (ref 74–137)
SAMPLE: ABNORMAL

## 2018-03-26 ENCOUNTER — TELEPHONE (OUTPATIENT)
Dept: CARDIAC REHAB | Facility: CLINIC | Age: 83
End: 2018-03-26

## 2018-03-27 ENCOUNTER — OFFICE VISIT (OUTPATIENT)
Dept: PRIMARY CARE CLINIC | Facility: CLINIC | Age: 83
End: 2018-03-27
Payer: MEDICARE

## 2018-03-27 VITALS
TEMPERATURE: 98 F | SYSTOLIC BLOOD PRESSURE: 138 MMHG | HEART RATE: 69 BPM | WEIGHT: 231.25 LBS | OXYGEN SATURATION: 93 % | BODY MASS INDEX: 39.7 KG/M2 | DIASTOLIC BLOOD PRESSURE: 66 MMHG

## 2018-03-27 DIAGNOSIS — I10 ESSENTIAL HYPERTENSION: Chronic | ICD-10-CM

## 2018-03-27 DIAGNOSIS — E11.9 TYPE 2 DIABETES MELLITUS WITHOUT COMPLICATION, WITHOUT LONG-TERM CURRENT USE OF INSULIN: Chronic | ICD-10-CM

## 2018-03-27 DIAGNOSIS — Z95.5 PRESENCE OF DRUG-ELUTING STENT IN LEFT CIRCUMFLEX CORONARY ARTERY: ICD-10-CM

## 2018-03-27 DIAGNOSIS — I21.21: Primary | ICD-10-CM

## 2018-03-27 DIAGNOSIS — G62.9 NEUROPATHY: ICD-10-CM

## 2018-03-27 PROBLEM — R79.89 ELEVATED TROPONIN: Status: RESOLVED | Noted: 2018-03-19 | Resolved: 2018-03-27

## 2018-03-27 PROCEDURE — 99499 UNLISTED E&M SERVICE: CPT | Mod: S$GLB,,, | Performed by: INTERNAL MEDICINE

## 2018-03-27 PROCEDURE — 99999 PR PBB SHADOW E&M-EST. PATIENT-LVL III: CPT | Mod: PBBFAC,,, | Performed by: INTERNAL MEDICINE

## 2018-03-27 PROCEDURE — 3075F SYST BP GE 130 - 139MM HG: CPT | Mod: CPTII,S$GLB,, | Performed by: INTERNAL MEDICINE

## 2018-03-27 PROCEDURE — 99205 OFFICE O/P NEW HI 60 MIN: CPT | Mod: S$GLB,,, | Performed by: INTERNAL MEDICINE

## 2018-03-27 PROCEDURE — 3078F DIAST BP <80 MM HG: CPT | Mod: CPTII,S$GLB,, | Performed by: INTERNAL MEDICINE

## 2018-03-27 RX ORDER — GABAPENTIN 300 MG/1
300 CAPSULE ORAL NIGHTLY
Qty: 30 CAPSULE | Refills: 11 | Status: SHIPPED | OUTPATIENT
Start: 2018-03-27 | End: 2018-04-23

## 2018-03-27 NOTE — PROGRESS NOTES
[facility]  New Visit Progress Note   Recent Hospital Discharge     PRESENTING HISTORY     Chief Complaint/Reason for Admission:  Follow up Hospital Discharge     PCP: Farhat Rapp MD    History of Present Illness:  Ms. Makenzie Haile is a 82 y.o. female who was recently admitted to the hospital.    Patient Name: Makenzie Haile  MRN: [id number]  Admission Date: 3/18/2018  Hospital Length of Stay: 1 days  Discharge Date and Time: 3/20/2018 12:04 PM  Attending Physician: Gary Chavez MD   Discharging Provider: Gary Chavez MD  Primary Care Provider: Edyta Sanders MD  ___________________________________________________________________    Today:  Presents to [facility] for hospital follow up.  Recently hospitalized for NSTEMI.  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  Responded well to above intervention and supportive care.  Medical therapy maximized with BBlocker, ARB, Aspirin, Plavix, Statin.  Discharged to home with plans for outpatient cardiac rehabilitation.     Accompanied today by family.  Patient ambulatory and independent with ADL's.   No falls reported.   Tells me she is unable to attend cardiac rehabilitation due to transportation issues.   Reports compliance with all medication.   C/O new onset numbness, tingling, burning sensation to tips of fingers and soles of feet    Review of Systems  General ROS: negative for chills, fever or weight loss  + generalized weakness, fatigue- improving   Psychological ROS: negative for hallucination, depression or suicidal ideation  Ophthalmic ROS: negative for blurry vision, photophobia or eye pain  ENT ROS: negative for epistaxis, sore throat or rhinorrhea  Respiratory ROS: no cough, shortness of breath, or wheezing  Cardiovascular ROS: no chest pain or dyspnea on exertion  Gastrointestinal ROS: no abdominal pain, change in bowel habits, or black/ bloody stools  Genito-Urinary ROS: no dysuria, trouble voiding, or  hematuria  Musculoskeletal ROS: negative for gait disturbance or muscular weakness  Neurological ROS: no syncope or seizures; no ataxia  Dermatological ROS: negative for pruritis, rash and jaundice        PAST HISTORY:     Past Medical History:   Diagnosis Date    Acute myocardial infarction involving left circumflex coronary artery 3/19/2018    Diabetes mellitus     Hyperlipemia     Hypertension        Past Surgical History:   Procedure Laterality Date    CORONARY ANGIOPLASTY WITH STENT PLACEMENT  03/19/2018       Family History   Problem Relation Age of Onset    No Known Problems Mother     Heart disease Father        Social History     Social History    Marital status:      Spouse name: N/A    Number of children: N/A    Years of education: N/A     Social History Main Topics    Smoking status: Never Smoker    Smokeless tobacco: Not on file    Alcohol use No    Drug use: No    Sexual activity: Not on file     Other Topics Concern    Not on file     Social History Narrative    No narrative on file       MEDICATIONS & ALLERGIES:     Current Outpatient Prescriptions on File Prior to Visit   Medication Sig Dispense Refill    aspirin (ECOTRIN) 81 MG EC tablet Take 1 tablet (81 mg total) by mouth once daily. 30 tablet 11    atorvastatin (LIPITOR) 40 MG tablet Take 1 tablet (40 mg total) by mouth once daily. 30 tablet 11    clopidogrel (PLAVIX) 75 mg tablet Take 1 tablet (75 mg total) by mouth once daily. 30 tablet 11    dexlansoprazole (DEXILANT) 60 mg capsule Take 60 mg by mouth once daily.      losartan-hydrochlorothiazide 100-25 mg (HYZAAR) 100-25 mg per tablet Take 1 tablet by mouth once daily.      metFORMIN (GLUCOPHAGE) 1000 MG tablet Take 1,000 mg by mouth 2 (two) times daily with meals.      metoprolol succinate (TOPROL-XL) 25 MG 24 hr tablet Take 1 tablet (25 mg total) by mouth once daily. 30 tablet 11    nitroGLYCERIN (NITROSTAT) 0.4 MG SL tablet Place 1 tablet (0.4 mg total)  under the tongue every 5 (five) minutes as needed for Chest pain. 25 tablet 11    oxybutynin (DITROPAN XL) 15 MG TR24 Take 5 mg by mouth once daily.       No current facility-administered medications on file prior to visit.         Review of patient's allergies indicates:   Allergen Reactions    Iodine and iodide containing products     Sulfa (sulfonamide antibiotics) Hives       OBJECTIVE:     Vital Signs:  Vitals:    03/27/18 1317   BP: 138/66   Pulse: 69   Temp: 98.3 °F (36.8 °C)     Wt Readings from Last 1 Encounters:   03/19/18 0217 102.5 kg (226 lb)   03/18/18 2211 102.5 kg (226 lb)     Body mass index is 39.7 kg/m².        Physical Exam:  /66 (BP Location: Left arm, Patient Position: Sitting, BP Method: Small (Automatic))   Pulse 69   Temp 98.3 °F (36.8 °C) (Oral)   Wt 104.9 kg (231 lb 4.2 oz)   SpO2 (!) 93%   BMI 39.70 kg/m²   General appearance: alert, cooperative, no distress  Constitutional:Oriented to person, place, and time  + appears well-developed and well-nourished.   HEENT: Normocephalic, atraumatic, neck symmetrical, no nasal discharge   Eyes: conjunctivae/corneas clear, PERRL, EOM's intact  Lungs: clear to auscultation bilaterally, no dullness to percussion bilaterally  Heart: regular rate and rhythm without rub; no displacement of the PMI   + murmur  Abdomen: soft, non-tender; bowel sounds normoactive; no organomegaly  Extremities: extremities symmetric; no clubbing, cyanosis, + trace bilateral LE edema  Integument: Skin color, texture, turgor normal; no rashes; hair distrubution normal  Neurologic: Alert and oriented X 3, normal strength, normal coordination and gait  Psychiatric: no pressured speech; normal affect; no evidence of impaired cognition     Laboratory  Lab Results   Component Value Date    WBC 8.35 03/18/2018    HGB 12.8 03/18/2018    HCT 39.9 03/18/2018    MCV 87 03/18/2018     03/18/2018     BMP  Lab Results   Component Value Date     03/19/2018    K 4.0  03/19/2018     03/19/2018    CO2 28 03/19/2018    BUN 14 03/19/2018    CREATININE 0.7 03/19/2018    CALCIUM 9.6 03/19/2018    ANIONGAP 9 03/19/2018    ESTGFRAFRICA >60 03/19/2018    EGFRNONAA >60 03/19/2018     Lab Results   Component Value Date    ALT 32 03/18/2018    AST 33 03/18/2018    ALKPHOS 58 03/18/2018    BILITOT 0.6 03/18/2018     Lab Results   Component Value Date    INR 1.1 03/18/2018     Lab Results   Component Value Date    HGBA1C 6.0 (H) 03/19/2018     No results for input(s): POCTGLUCOSE in the last 72 hours.    Diagnostic Results:  2 D echo 3/19/18:    CONCLUSIONS     1 - Normal left ventricular systolic function (EF 55-60%). Poor endocardial borders but appear normal.     2 - Impaired LV relaxation, increased LVEDP.     3 - Normal right ventricular systolic function .     4 - Mild aortic stenosis, SERAFIN = 1.48 cm2, AVAi = 0.72 cm2/m2, peak velocity = 2.08 m/s, mean gradient = 10 mmHg.     5 - The estimated PA systolic pressure is 39 mmHg.       ASSESSMENT & PLAN:       Acute myocardial infarction involving left circumflex coronary artery  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  - recent hospitalization for NSTEMI  - SURAJ placed to LCX  - patient on appropriate medical therapy  - has cardiology follow up 4/23 with Dr Moncada  - cardiac rehab ordered but patient unable to attend due to transportation issues      Essential hypertension  - under good control presently, no medication changes today    Type 2 diabetes mellitus without complication, without long-term current use of insulin  - under good control with HgBA1C 6.0  - stable on Metformin    Neuropathy  - new symptoms suggestive of neuropathy, will send for further evaluation and start a trial of gabapentin   -     Ambulatory consult to Neurology  -     gabapentin (NEURONTIN) 300 MG capsule; Take 1 capsule (300 mg total) by mouth every evening.  Dispense: 30 capsule; Refill: 11      I will see patient again in  [facility] 4/23/18.   Instructions for the patient:      Scheduled Follow-up :  Future Appointments  Date Time Provider Department Center   4/19/2018 4:00 PM Ciro Moncada MD [facility] CARDIO [place]   4/23/2018 11:20 AM Cm Pollock MD [facility]URO [place] [facility]   4/23/2018 1:00 PM Nicole Donohue MD [facility] IM [facility] [place] [facility]       Post Visit Medication List:     Medication List          Accurate as of 3/27/18 11:59 PM. If you have any questions, ask your nurse or doctor.               START taking these medications    gabapentin 300 MG capsule  Commonly known as:  NEURONTIN  Take 1 capsule (300 mg total) by mouth every evening.  Started by:  Nicole Donohue MD        CONTINUE taking these medications    aspirin 81 MG EC tablet  Commonly known as:  ECOTRIN  Take 1 tablet (81 mg total) by mouth once daily.     atorvastatin 40 MG tablet  Commonly known as:  LIPITOR  Take 1 tablet (40 mg total) by mouth once daily.     clopidogrel 75 mg tablet  Commonly known as:  PLAVIX  Take 1 tablet (75 mg total) by mouth once daily.     dexlansoprazole 60 mg capsule  Commonly known as:  DEXILANT     losartan-hydrochlorothiazide 100-25 mg 100-25 mg per tablet  Commonly known as:  HYZAAR     metFORMIN 1000 MG tablet  Commonly known as:  GLUCOPHAGE     metoprolol succinate 25 MG 24 hr tablet  Commonly known as:  TOPROL-XL  Take 1 tablet (25 mg total) by mouth once daily.     nitroGLYCERIN 0.4 MG SL tablet  Commonly known as:  NITROSTAT  Place 1 tablet (0.4 mg total) under the tongue every 5 (five) minutes as needed for Chest pain.     oxybutynin 15 MG Tr24  Commonly known as:  DITROPAN XL           Where to Get Your Medications      These medications were sent to [facility] - [place], [place] - [address]  [address], [place] [address]    Phone:  [phone]   · gabapentin 300 MG capsule         Signing Physician:  Nicole Donohue MD

## 2018-03-29 NOTE — PROGRESS NOTES
Patient, Makenzie Haile (MRN #[id number]), presented with a recorded BMI of 39.7 kg/m^2 and a documented comorbidity(s):  - Diabetes Mellitus Type 2  - Hypertension  to which the severe obesity is a contributing factor. This is consistent with the definition of severe obesity (BMI 35.0-35.9) with comorbidity (ICD-10 E66.01, Z68.35). The patient's severe obesity was monitored, evaluated, addressed and/or treated. This addendum to the medical record is made on 03/29/2018.

## 2018-04-06 ENCOUNTER — TELEPHONE (OUTPATIENT)
Dept: PRIMARY CARE CLINIC | Facility: CLINIC | Age: 83
End: 2018-04-06

## 2018-04-06 NOTE — TELEPHONE ENCOUNTER
Patient states that she is feeling light headed and urinating all night long. She feels tired and fatigued, she is wondering is this could be to much lasix?

## 2018-04-19 ENCOUNTER — OFFICE VISIT (OUTPATIENT)
Dept: CARDIOLOGY | Facility: CLINIC | Age: 83
End: 2018-04-19
Payer: MEDICARE

## 2018-04-19 VITALS
HEART RATE: 72 BPM | SYSTOLIC BLOOD PRESSURE: 160 MMHG | WEIGHT: 231 LBS | BODY MASS INDEX: 42.51 KG/M2 | DIASTOLIC BLOOD PRESSURE: 84 MMHG | HEIGHT: 62 IN

## 2018-04-19 DIAGNOSIS — I35.0 AORTIC VALVE STENOSIS, ETIOLOGY OF CARDIAC VALVE DISEASE UNSPECIFIED: Chronic | ICD-10-CM

## 2018-04-19 DIAGNOSIS — Z95.5 PRESENCE OF DRUG-ELUTING STENT IN LEFT CIRCUMFLEX CORONARY ARTERY: ICD-10-CM

## 2018-04-19 DIAGNOSIS — I21.21: ICD-10-CM

## 2018-04-19 DIAGNOSIS — E66.09 CLASS 2 OBESITY DUE TO EXCESS CALORIES IN ADULT, UNSPECIFIED BMI, UNSPECIFIED WHETHER SERIOUS COMORBIDITY PRESENT: ICD-10-CM

## 2018-04-19 DIAGNOSIS — K21.9 GASTROESOPHAGEAL REFLUX DISEASE, ESOPHAGITIS PRESENCE NOT SPECIFIED: ICD-10-CM

## 2018-04-19 DIAGNOSIS — Z95.5 HISTORY OF CORONARY ARTERY STENT PLACEMENT: ICD-10-CM

## 2018-04-19 DIAGNOSIS — I10 ESSENTIAL HYPERTENSION: Chronic | ICD-10-CM

## 2018-04-19 DIAGNOSIS — I25.10 CORONARY ARTERY DISEASE INVOLVING NATIVE CORONARY ARTERY OF NATIVE HEART WITHOUT ANGINA PECTORIS: Primary | ICD-10-CM

## 2018-04-19 PROCEDURE — 99214 OFFICE O/P EST MOD 30 MIN: CPT | Mod: S$GLB,,, | Performed by: INTERNAL MEDICINE

## 2018-04-19 PROCEDURE — 99499 UNLISTED E&M SERVICE: CPT | Mod: S$GLB,,, | Performed by: INTERNAL MEDICINE

## 2018-04-19 PROCEDURE — 3079F DIAST BP 80-89 MM HG: CPT | Mod: CPTII,S$GLB,, | Performed by: INTERNAL MEDICINE

## 2018-04-19 PROCEDURE — 99999 PR PBB SHADOW E&M-EST. PATIENT-LVL III: CPT | Mod: PBBFAC,,, | Performed by: INTERNAL MEDICINE

## 2018-04-19 PROCEDURE — 3077F SYST BP >= 140 MM HG: CPT | Mod: CPTII,S$GLB,, | Performed by: INTERNAL MEDICINE

## 2018-04-19 NOTE — PROGRESS NOTES
Subjective:    Patient ID:  Makenzie Haile is a 82 y.o. female who presents for follow-up of Coronary Artery Disease      HPI  83 y/o female with hx of CAD s/p recent NSTEMI with subsequent PCI with SURAJ to LCX, HTN, DM, GERD, obesity, mild AS (EF 55-60%, SERAFIN 1.48, PV 2.08, MG 10) who presents after hospitalization for NSTEMI. Had been having epigastric pain for about 2 weeks she thought was heartburn, presented to ED, had elevated trop 0.77, [facility] with LCX lesion s/p PCI with SURAJ, resolution of pain, and discharged home. No recurrent symptoms since discharge. Denies CP, SOB, orthopnea, PND, syncope, palps. Compliant with and tolerating meds well. Inquiring about activity and diet. Cannot do cardiac rehab due to transportation issues. Non smoker, no EtOH.      Review of Systems   Constitution: Negative for weakness and malaise/fatigue.   HENT: Negative for congestion.    Eyes: Negative for blurred vision.   Cardiovascular: Positive for leg swelling. Negative for chest pain, claudication, cyanosis, dyspnea on exertion, irregular heartbeat, near-syncope, orthopnea, palpitations, paroxysmal nocturnal dyspnea and syncope.   Respiratory: Negative for shortness of breath.    Endocrine: Negative for polyuria.   Hematologic/Lymphatic: Negative for bleeding problem.   Skin: Negative for itching and rash.   Musculoskeletal: Negative for joint swelling, muscle cramps and muscle weakness.   Gastrointestinal: Negative for abdominal pain, hematemesis, hematochezia, melena, nausea and vomiting.   Genitourinary: Negative for dysuria and hematuria.   Neurological: Negative for dizziness, focal weakness, headaches, light-headedness and loss of balance.   Psychiatric/Behavioral: Negative for depression. The patient is not nervous/anxious.         Objective:    Physical Exam   Constitutional: She is oriented to person, place, and time. She appears well-developed and well-nourished.   HENT:   Head: Normocephalic and atraumatic.   Neck:  Neck supple. No JVD present.   Cardiovascular: Normal rate and regular rhythm.    Murmur heard.   Harsh midsystolic murmur is present with a grade of 3/6  at the upper right sternal border radiating to the neck  Pulses:       Carotid pulses are 2+ on the right side, and 2+ on the left side.       Radial pulses are 2+ on the right side, and 2+ on the left side.        Femoral pulses are 2+ on the right side, and 2+ on the left side.       Dorsalis pedis pulses are 2+ on the right side, and 2+ on the left side.        Posterior tibial pulses are 1+ on the right side, and 1+ on the left side.   Pulmonary/Chest: Effort normal and breath sounds normal.   Abdominal: Soft. Bowel sounds are normal.   Musculoskeletal: She exhibits edema.   Neurological: She is alert and oriented to person, place, and time.   Skin: Skin is warm and dry.   Psychiatric: She has a normal mood and affect. Her behavior is normal. Thought content normal.         Assessment:       1. Coronary artery disease involving native coronary artery of native heart without angina pectoris    2. History of coronary artery stent placement    3. Essential hypertension    4. Acute myocardial infarction involving left circumflex coronary artery    5. Aortic valve stenosis, etiology of cardiac valve disease unspecified    6. Gastroesophageal reflux disease, esophagitis presence not specified    7. 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 Class 2 obesity due to excess calories in adult, unspecified BMI, unspecified whether serious comorbidity present      81 y/o female with hx and presentation as above. Doing well from a cardiac perspective with no active cardiac complaints. DAPT x at least 1 year. Discussed the importance of regular exercise and heart healthy diet. Weight loss and med compliance. Information given.        Plan:       -Continue current medical management  -f/u in 6 months

## 2018-04-19 NOTE — PROGRESS NOTES
Patient, Makenzie Haile (MRN #[id number]), presented with a recorded BMI of 42.25 kg/m^2 consistent with the definition of morbid obesity (ICD-10 E66.01). The patient's morbid obesity was monitored, evaluated, addressed and/or treated. This addendum to the medical record is made on 04/19/2018.

## 2018-04-23 ENCOUNTER — HOSPITAL ENCOUNTER (OUTPATIENT)
Dept: RADIOLOGY | Facility: HOSPITAL | Age: 83
Discharge: HOME | End: 2018-04-23
Attending: INTERNAL MEDICINE
Payer: MEDICARE

## 2018-04-23 ENCOUNTER — OFFICE VISIT (OUTPATIENT)
Dept: PRIMARY CARE CLINIC | Facility: CLINIC | Age: 83
End: 2018-04-23
Payer: MEDICARE

## 2018-04-23 VITALS
HEART RATE: 70 BPM | WEIGHT: 230.25 LBS | SYSTOLIC BLOOD PRESSURE: 167 MMHG | DIASTOLIC BLOOD PRESSURE: 84 MMHG | TEMPERATURE: 99 F | BODY MASS INDEX: 42.12 KG/M2 | OXYGEN SATURATION: 94 %

## 2018-04-23 DIAGNOSIS — I10 ESSENTIAL HYPERTENSION: Chronic | ICD-10-CM

## 2018-04-23 DIAGNOSIS — Z95.5 PRESENCE OF DRUG-ELUTING STENT IN LEFT CIRCUMFLEX CORONARY ARTERY: ICD-10-CM

## 2018-04-23 DIAGNOSIS — I21.21: Primary | ICD-10-CM

## 2018-04-23 DIAGNOSIS — R05.9 COUGH: ICD-10-CM

## 2018-04-23 PROCEDURE — 99999 PR PBB SHADOW E&M-EST. PATIENT-LVL III: CPT | Mod: PBBFAC,,, | Performed by: INTERNAL MEDICINE

## 2018-04-23 PROCEDURE — 99214 OFFICE O/P EST MOD 30 MIN: CPT | Mod: S$GLB,,, | Performed by: INTERNAL MEDICINE

## 2018-04-23 PROCEDURE — 3077F SYST BP >= 140 MM HG: CPT | Mod: CPTII,S$GLB,, | Performed by: INTERNAL MEDICINE

## 2018-04-23 PROCEDURE — 71046 X-RAY EXAM CHEST 2 VIEWS: CPT | Mod: 26,,, | Performed by: RADIOLOGY

## 2018-04-23 PROCEDURE — 3079F DIAST BP 80-89 MM HG: CPT | Mod: CPTII,S$GLB,, | Performed by: INTERNAL MEDICINE

## 2018-04-23 PROCEDURE — 71046 X-RAY EXAM CHEST 2 VIEWS: CPT | Mod: TC,FY

## 2018-04-23 RX ORDER — FUROSEMIDE 20 MG/1
20 TABLET ORAL DAILY
Qty: 30 TABLET | Refills: 2 | Status: SHIPPED | OUTPATIENT
Start: 2018-04-23 | End: 2018-05-23 | Stop reason: DRUGHIGH

## 2018-04-23 NOTE — PROGRESS NOTES
Subjective:       Patient ID: Makenzie Haile is a 82 y.o. female.    Chief Complaint: Hospital Medicine    HPI:  Returns to [facility] for hospital follow up.  Recently hospitalized for NSTEMI.  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  Responded well to above intervention and supportive care.  Medical therapy maximized with BBlocker, ARB, Aspirin, Plavix, Statin.  Discharged to home with plans for outpatient cardiac rehabilitation.     Was unable to participate in cardiac rehabilitation due to transportation issues.  Had cardiology follow up with Dr Moncada 4/19/18.     Reports new onset LE swelling.  Also with new onset cough- present about 10 days, non productive.  Sleeping upright in recliner.     Review of Systems  General ROS: negative for chills, fever or weight loss  Psychological ROS: negative for hallucination, depression or suicidal ideation  Ophthalmic ROS: negative for blurry vision, photophobia or eye pain  ENT ROS: negative for epistaxis, sore throat or rhinorrhea  Respiratory ROS: no shortness of breath, or wheezing + non productive cough X 10 days  Cardiovascular ROS: no chest pain or dyspnea on exertion  + orthopnea, sleeping in recliner + LE edema   Gastrointestinal ROS: no abdominal pain, change in bowel habits, or black/ bloody stools  Genito-Urinary ROS: no dysuria, trouble voiding, or hematuria  Musculoskeletal ROS: negative for gait disturbance or muscular weakness  Neurological ROS: no syncope or seizures; no ataxia  Dermatological ROS: negative for pruritis, rash and jaundice          Objective:      Vital Signs:  Vitals:    04/23/18 1310   BP: (!) 167/84   Pulse: 70   Temp: 99 °F (37.2 °C)     Wt Readings from Last 1 Encounters:   04/23/18 1310 104.5 kg (230 lb 4.3 oz)     Body mass index is 42.12 kg/m².   SpO2 Readings from Last 1 Encounters:   04/23/18 (!) 94%       Physical Exam:  BP (!) 167/84 (BP Location: Left arm, Patient Position: Sitting, BP Method: Small  (Automatic))   Pulse 70   Temp 99 °F (37.2 °C) (Oral)   Wt 104.5 kg (230 lb 4.3 oz)   SpO2 (!) 94%   BMI 42.12 kg/m²   General appearance: alert, cooperative, no distress  Constitutional:Oriented to person, place, and time.appears well-developed and well-nourished.   HEENT: Normocephalic, atraumatic, neck symmetrical, no nasal discharge   Eyes: conjunctivae/corneas clear, PERRL, EOM's intact  Lungs: clear to auscultation bilaterally, no dullness to percussion bilaterally  Heart: regular rate and rhythm without rub; no displacement of the PMI   + murmur   Abdomen: soft, non-tender; bowel sounds normoactive; no organomegaly  Extremities: extremities symmetric; no clubbing, cyanosis,   +2 LE pittind edema, feet to knees  Integument: Skin color, texture, turgor normal; no rashes; hair distrubution normal  Neurologic: Alert and oriented X 3, normal strength, normal coordination and gait  Psychiatric: no pressured speech; normal affect; no evidence of impaired cognition      Results for AHLINA RODRIGUEZ (MRN [id number]) as of 5/1/2018 20:14   Ref. Range 4/23/2018 14:04   Sodium Latest Ref Range: 136 - 145 mmol/L 140   Potassium Latest Ref Range: 3.5 - 5.1 mmol/L 4.3   Chloride Latest Ref Range: 95 - 110 mmol/L 103   CO2 Latest Ref Range: 23 - 29 mmol/L 29   Anion Gap Latest Ref Range: 8 - 16 mmol/L 8   BUN, Bld Latest Ref Range: 8 - 23 mg/dL 18   Creatinine Latest Ref Range: 0.5 - 1.4 mg/dL 0.8   eGFR if non African American Latest Ref Range: >60 mL/min/1.73 m^2 >60   eGFR if  Latest Ref Range: >60 mL/min/1.73 m^2 >60   Glucose Latest Ref Range: 70 - 110 mg/dL 94   Calcium Latest Ref Range: 8.7 - 10.5 mg/dL 10.1     Results for HALINA RODRIGUEZ (MRN [id number]) as of 5/1/2018 20:14   Ref. Range 4/23/2018 14:04   BNP Latest Ref Range: 0 - 99 pg/mL 615 (H)     Chest X Ray: 4/23/18  Mild cardiomegaly, similar to prior exam.      Assessment:       1. Acute myocardial infarction involving left circumflex  coronary artery, unspecified MI type    2. 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 Essential hypertension    4. 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  - recent hospitalization for NSTEMI  - SURAJ placed to LCX  - patient on appropriate medical therapy  - has established cardiology follow up with Dr Moncada  - cardiac rehab ordered but patient unable to attend due to transportation issues    Essential hypertension  - Blood pressure not at goal today and patient with signs of fluid retention  - will start low dose Lasix and assess response   -     furosemide (LASIX) 20 MG tablet; Take 1 tablet (20 mg total) by mouth once daily.    Cough  -     Basic metabolic panel; Reviewed  -     Brain natriuretic peptide; Reviewed  -     X-Ray Chest PA And Lateral; Reviewed    I will see patient again in [facility] 5/8/18.       Scheduled Follow-up :  Future Appointments  Date Time Provider Department Center   5/8/2018 9:30 AM Nicole Donoh[facility]       Post Visit Medication List:     Medication List          Accurate as of 4/23/18 11:59 PM. If you have any questions, ask your nurse or doctor.               START taking these medications    furosemide 20 MG tablet  Commonly known as:  LASIX  Take 1 tablet (20 mg total) by mouth once daily.  Started by:  Nicole Donohue MD        CONTINUE taking these medications    aspirin 81 MG EC tablet  Commonly known as:  ECOTRIN  Take 1 tablet (81 mg total) by mouth once daily.     atorvastatin 40 MG tablet  Commonly known as:  LIPITOR  Take 1 tablet (40 mg total) by mouth once daily.     CENTRUM SILVER ORAL     clopidogrel 75 mg tablet  Commonly known as:  PLAVIX  Take 1 tablet (75 mg total) by mouth once daily.     metFORMIN 1000 MG  tablet  Commonly known as:  GLUCOPHAGE     metoprolol succinate 25 MG 24 hr tablet  Commonly known as:  TOPROL-XL  Take 1 tablet (25 mg total) by mouth once daily.     nitroGLYCERIN 0.4 MG SL tablet  Commonly known as:  NITROSTAT  Place 1 tablet (0.4 mg total) under the tongue every 5 (five) minutes as needed for Chest pain.     oxybutynin 15 MG Tr24  Commonly known as:  DITROPAN XL        STOP taking these medications    dexlansoprazole 60 mg capsule  Commonly known as:  DEXILANT  Stopped by:  Nicole Donohue MD     gabapentin 300 MG capsule  Commonly known as:  NEURONTIN  Stopped by:  Nicole Donohue MD           Where to Get Your Medications      These medications were sent to [facility] - [place], [place] - [address]  [address], [place] [address]    Phone:  [phone]   · furosemide 20 MG tablet

## 2018-04-24 ENCOUNTER — TELEPHONE (OUTPATIENT)
Dept: PRIMARY CARE CLINIC | Facility: CLINIC | Age: 83
End: 2018-04-24

## 2018-04-24 NOTE — TELEPHONE ENCOUNTER
----- Message from Nicole Donohue MD sent at 4/24/2018  8:54 AM CDT -----  Please let Ms Haile know her labs from yesterday look good, but indicate that she is likely holding on to extra fluid as I discussed with her during the office visit. She is to continue on the Lasix that was started yesterday, restrict her salt intake as discussed, and keep her follow up appointment. Thanks.

## 2018-04-24 NOTE — TELEPHONE ENCOUNTER
Called patient and let her know that her labs looked good but it does look like she is holding onto some extra fluid. She stated that she did not realize how much of what she was eating contained so much salt. She is going to pay better attention now. I also told her per Dr. Donohue to keep her follow up appointments.

## 2018-05-08 ENCOUNTER — OFFICE VISIT (OUTPATIENT)
Dept: PRIMARY CARE CLINIC | Facility: CLINIC | Age: 83
End: 2018-05-08
Payer: MEDICARE

## 2018-05-08 VITALS
HEART RATE: 67 BPM | OXYGEN SATURATION: 93 % | SYSTOLIC BLOOD PRESSURE: 153 MMHG | DIASTOLIC BLOOD PRESSURE: 80 MMHG | BODY MASS INDEX: 41.21 KG/M2 | TEMPERATURE: 98 F | WEIGHT: 225.31 LBS

## 2018-05-08 DIAGNOSIS — R05.9 COUGH: ICD-10-CM

## 2018-05-08 DIAGNOSIS — Z95.5 PRESENCE OF DRUG-ELUTING STENT IN LEFT CIRCUMFLEX CORONARY ARTERY: ICD-10-CM

## 2018-05-08 DIAGNOSIS — I21.21: Primary | ICD-10-CM

## 2018-05-08 DIAGNOSIS — I10 ESSENTIAL HYPERTENSION: Chronic | ICD-10-CM

## 2018-05-08 PROCEDURE — 3077F SYST BP >= 140 MM HG: CPT | Mod: CPTII,S$GLB,, | Performed by: INTERNAL MEDICINE

## 2018-05-08 PROCEDURE — 99999 PR PBB SHADOW E&M-EST. PATIENT-LVL III: CPT | Mod: PBBFAC,,, | Performed by: INTERNAL MEDICINE

## 2018-05-08 PROCEDURE — 3079F DIAST BP 80-89 MM HG: CPT | Mod: CPTII,S$GLB,, | Performed by: INTERNAL MEDICINE

## 2018-05-08 PROCEDURE — 99214 OFFICE O/P EST MOD 30 MIN: CPT | Mod: S$GLB,,, | Performed by: INTERNAL MEDICINE

## 2018-05-08 NOTE — PROGRESS NOTES
Subjective:       Patient ID: Makenzie Haile is a 82 y.o. female.    Chief Complaint: Hospital Follow Up    HPI:  Returns to [facility] for hospital follow up.  Recently hospitalized for NSTEMI.  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  Responded well to above intervention and supportive care.  Medical therapy maximized with BBlocker, ARB, Aspirin, Plavix, Statin.  Discharged to home with plans for outpatient cardiac rehabilitation.      Was unable to participate in cardiac rehabilitation due to transportation issues.  Had cardiology follow up with Dr Moncada 4/19/18.     At hospital follow up visit on 4/23/18, patient found to have LE swelling, new onset cough, dyspnea, and orthopnea- sleeping upright in recliner.  Lasix initiated.  She returns today for further evaluation.     Cough has improved but still present. Non productive.  Denies chest pain, fever, or chills.   Spending some nights in recliner but able to to spend some nights supine in bed with 2 pillows.   No dyspnea with exertion- she tolerated ambulating into office today unassisted from  parking area- did not stop for breaks.   LE swelling resolved since starting Lasix, but returned after dietary indiscretion yesterday- she was on a road trip and at [facility] for lunch.       Review of Systems  General ROS: negative for chills, fever or weight loss  Psychological ROS: negative for hallucination, depression or suicidal ideation  Ophthalmic ROS: negative for blurry vision, photophobia or eye pain  ENT ROS: negative for epistaxis, sore throat or rhinorrhea  Respiratory ROS: no shortness of breath, or wheezing  + non productive cough   Cardiovascular ROS: no chest pain or dyspnea on exertion  + intermittent orthopnea   Gastrointestinal ROS: no abdominal pain, change in bowel habits, or black/ bloody stools  Genito-Urinary ROS: no dysuria, trouble voiding, or hematuria  Musculoskeletal ROS: negative for gait disturbance or  muscular weakness  + edema to ankles   Neurological ROS: no syncope or seizures; no ataxia  Dermatological ROS: negative for pruritis, rash and jaundice        Objective:      Vital Signs:  Vitals:    05/08/18 0936   BP: (!) 153/80   Pulse: 67   Temp: 97.8 °F (36.6 °C)     Wt Readings from Last 1 Encounters:   04/23/18 1310 104.5 kg (230 lb 4.3 oz)     Body mass index is 41.21 kg/m².   SpO2 Readings from Last 1 Encounters:   04/23/18 (!) 94%       Physical Exam:  BP (!) 153/80 (BP Location: Left arm, Patient Position: Sitting, BP Method: Small (Automatic))   Pulse 67   Temp 97.8 °F (36.6 °C) (Oral)   Wt 102.2 kg (225 lb 5 oz)   SpO2 (!) 93%   BMI 41.21 kg/m²   General appearance: alert, cooperative, no distress  Constitutional:Oriented to person, place, and time.appears well-developed and well-nourished.   HEENT: Normocephalic, atraumatic, neck symmetrical, no nasal discharge   Eyes: conjunctivae/corneas clear, PERRL, EOM's intact  Lungs: + decreased breath sounds LLL; no crackles, no wheezing   Heart: regular rate and rhythm without rub; no displacement of the PMI   + murmur   Abdomen: soft, non-tender; bowel sounds normoactive; no organomegaly  Extremities: extremities symmetric; no clubbing, cyanosis, or edema  Integument: Skin color, texture, turgor normal; no rashes; hair distrubution normal  Neurologic: Alert and oriented X 3, normal strength, normal coordination and gait  Psychiatric: no pressured speech; normal affect; no evidence of impaired cognition    Assessment:       1. Acute myocardial infarction involving left circumflex coronary artery, unspecified MI type    2. 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 Essential hypertension    4. 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  - recent  hospitalization for NSTEMI  - SURAJ placed to LCX  - patient on appropriate medical therapy with aspirin, plavix, statin, Bblocker  - has established cardiology follow up with Dr Moncada  - cardiac rehab ordered but patient unable to attend due to transportation issues    Essential hypertension  - not at goal; patient tells me home blood pressures have been well controlled and today's office reading is an outlier  - I have asked her to keep home blood pressure log to present at next visit    Cough  - has improved since last visit but is still present  - will continue Lasix therapy and supportive care, re-assess in two weeks  - repeat Chest X ray and conduct further work up if it does not resolve       I will [facility] again [facility] 5/23/18.  She is to bring home blood pressure log.     Scheduled Follow-up :  Future Appointments  Date Time Provider Department Center   5/23/2018 9:30 AM Nicole Donohue MD [facility]       Post Visit Medication List:     Medication List          Accurate as of 5/8/18 10:53 AM. If you have any questions, ask your nurse or doctor.               CONTINUE taking these medications    ACCU-CHEK FASTCLIX MISC     aspirin 81 MG EC tablet  Commonly known as:  ECOTRIN  Take 1 tablet (81 mg total) by mouth once daily.     atorvastatin 40 MG tablet  Commonly known as:  LIPITOR  Take 1 tablet (40 mg total) by mouth once daily.     BLADDER CONTROL PADS EX ABSORB MISC     CENTRUM SILVER ORAL     clopidogrel 75 mg tablet  Commonly known as:  PLAVIX  Take 1 tablet (75 mg total) by mouth once daily.     furosemide 20 MG tablet  Commonly known as:  LASIX  Take 1 tablet (20 mg total) by mouth once daily.     metFORMIN 1000 MG tablet  Commonly known as:  GLUCOPHAGE     metoprolol succinate 25 MG 24 hr tablet  Commonly known as:  TOPROL-XL  Take 1 tablet (25 mg total) by mouth once daily.     nitroGLYCERIN 0.4 MG SL tablet  Commonly known as:  NITROSTAT  Place 1 tablet (0.4 mg  total) under the tongue every 5 (five) minutes as needed for Chest pain.     oxybutynin 15 MG Tr24  Commonly known as:  DITROPAN XL

## 2018-05-23 ENCOUNTER — OFFICE VISIT (OUTPATIENT)
Dept: PRIMARY CARE CLINIC | Facility: CLINIC | Age: 83
End: 2018-05-23
Payer: MEDICARE

## 2018-05-23 VITALS
OXYGEN SATURATION: 91 % | WEIGHT: 223.19 LBS | SYSTOLIC BLOOD PRESSURE: 186 MMHG | DIASTOLIC BLOOD PRESSURE: 89 MMHG | BODY MASS INDEX: 40.83 KG/M2 | TEMPERATURE: 98 F | HEART RATE: 71 BPM

## 2018-05-23 DIAGNOSIS — I10 ESSENTIAL HYPERTENSION: Chronic | ICD-10-CM

## 2018-05-23 DIAGNOSIS — Z95.5 PRESENCE OF DRUG-ELUTING STENT IN LEFT CIRCUMFLEX CORONARY ARTERY: ICD-10-CM

## 2018-05-23 DIAGNOSIS — R05.9 COUGH: ICD-10-CM

## 2018-05-23 DIAGNOSIS — E11.9 TYPE 2 DIABETES MELLITUS WITHOUT COMPLICATION, WITHOUT LONG-TERM CURRENT USE OF INSULIN: Chronic | ICD-10-CM

## 2018-05-23 DIAGNOSIS — I21.21: Primary | ICD-10-CM

## 2018-05-23 PROCEDURE — 99213 OFFICE O/P EST LOW 20 MIN: CPT | Mod: S$GLB,,, | Performed by: INTERNAL MEDICINE

## 2018-05-23 PROCEDURE — 99999 PR PBB SHADOW E&M-EST. PATIENT-LVL III: CPT | Mod: PBBFAC,,, | Performed by: INTERNAL MEDICINE

## 2018-05-23 PROCEDURE — 3079F DIAST BP 80-89 MM HG: CPT | Mod: CPTII,S$GLB,, | Performed by: INTERNAL MEDICINE

## 2018-05-23 PROCEDURE — 3077F SYST BP >= 140 MM HG: CPT | Mod: CPTII,S$GLB,, | Performed by: INTERNAL MEDICINE

## 2018-05-23 NOTE — PROGRESS NOTES
Subjective:       Patient ID: Makenzie Haile is a 82 y.o. female.    Chief Complaint: Hospital Follow Up     HPI:  Returns to [facility] for continued hospital follow up.  Recently hospitalized for NSTEMI.  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  Responded well to above intervention and supportive care.  Medical therapy maximized with BBlocker, ARB, Aspirin, Plavix, Statin.  Discharged to home with plans for outpatient cardiac rehabilitation.      Was unable to participate in cardiac rehabilitation due to transportation issues.  Had cardiology follow up with Dr Moncada 4/19/18.   At [facility] follow up on 4/23/18, patient found to have cough, orthopnea, dyspnea and LE swelling.  Lasix initiated and when she returned for follow up most symptoms had resolved, but cough persisted.   HTN was uncontrolled. She reported this was an outlier.   She was asked to keep a blood pressure log, adhere to a low sodium diet, and return today for evaluation.    Today all symptoms of volume overload have resolved.  She is sleeping supine in her bed nightly with one or two pillows.  Cough has resolved.  She ambulated from parking lot into my office without dyspnea and without needing a break.  No LE swelling.  Reports mostly following a low sodium diet, with some indiscretion- eating frozen vegetable breakfast sausage biscuits. Counseling provided.   She is motivated to stop the lasix if possible.    HTN remains uncontrolled on office reading today.  In contrast, review of home blood pressure log shows consistently acceptable readings.   I have asked her to return to our office with her home blood pressure cuff and we will compare values in real time.     Review of Systems  General ROS: negative for chills, fever or weight loss  Psychological ROS: negative for hallucination, depression or suicidal ideation  Ophthalmic ROS: negative for blurry vision, photophobia or eye pain  ENT ROS: negative for  epistaxis, sore throat or rhinorrhea  Respiratory ROS: no cough, shortness of breath, or wheezing  Cardiovascular ROS: no chest pain or dyspnea on exertion  Gastrointestinal ROS: no abdominal pain, change in bowel habits, or black/ bloody stools  Genito-Urinary ROS: no dysuria, trouble voiding, or hematuria  Musculoskeletal ROS: negative for gait disturbance or muscular weakness  Neurological ROS: no syncope or seizures; no ataxia  Dermatological ROS: negative for pruritis, rash and jaundice        Objective:      Vital Signs:  Vitals:    05/23/18 0939   BP: (!) 186/89   Pulse:    Temp:      Wt Readings from Last 1 Encounters:   05/08/18 0936 102.2 kg (225 lb 5 oz)     Body mass index is 40.83 kg/m².   SpO2 Readings from Last 1 Encounters:   05/08/18 (!) 93%       Physical Exam:  BP (!) 186/89 (BP Location: Left arm, Patient Position: Sitting, BP Method: Large (Automatic))   Pulse 71   Temp 97.8 °F (36.6 °C) (Oral)   Wt 101.2 kg (223 lb 3.5 oz)   SpO2 (!) 91%   BMI 40.83 kg/m²   General appearance: alert, cooperative, no distress  Constitutional:Oriented to person, place, and time.  + obese   HEENT: Normocephalic, atraumatic, neck symmetrical, no nasal discharge   Eyes: conjunctivae/corneas clear, PERRL, EOM's intact  Lungs: clear to auscultation bilaterally, no dullness to percussion bilaterally  Heart: regular rate and rhythm without rub; no displacement of the PMI   + murmur   Abdomen: soft, non-tender; bowel sounds normoactive; no organomegaly  Extremities: extremities symmetric; no clubbing, cyanosis, or edema  Integument: Skin color, texture, turgor normal; no rashes; hair distrubution normal  Neurologic: Alert and oriented X 3, normal strength, normal coordination and gait  Psychiatric: no pressured speech; normal affect; no evidence of impaired cognition    Assessment:       1. Acute myocardial infarction involving left circumflex coronary artery, unspecified MI type    2. Presence of drug-eluting stent  in left circumflex coronary artery    3. Essential hypertension    4. Type 2 diabetes mellitus without complication, without long-term current use of insulin    5. 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  - recent hospitalization for NSTEMI  - SURAJ placed to LCX  - patient on appropriate medical therapy with aspirin, plavix, statin, Bblocker  - has established cardiology follow up with Dr Moncada  - cardiac rehab ordered but patient unable to attend due to transportation issues    Essential hypertension  - not at goal; patient tells me home blood pressures have been well controlled and today's office reading is an outlier- review of home blood pressure log shows good blood pressure control  - patient will return to clinic with her home blood pressure cuff in order to compare readings    Type 2 diabetes mellitus without complication, without long-term current use of insulin  - HgBA1C 6.0  - on Metformin     Cough  - resolved   - stop Lasix and monitor her response- patient instructed to call clinic for weight gain of 3 lb or more, dyspnea, cough, orthopnea, or LE swelling         I will see [facility] again in [facility]; she will bring her home blood pressure cuff.     Scheduled Follow-up :  Future Appointments  Date Time Provider Department Center   6/6/2018 9:30 AM Nicole Donohue MD [facility]       Post Visit Medication List:     Medication List          Accurate as of 5/23/18 10:18 AM. If you have any questions, ask your nurse or doctor.               CONTINUE taking these medications    ACCU-CHEK FASTCLIX MISC     aspirin 81 MG EC tablet  Commonly known as:  ECOTRIN  Take 1 tablet (81 mg total) by mouth once daily.     atorvastatin 40 MG tablet  Commonly known as:  LIPITOR  Take 1 tablet (40 mg total) by  mouth once daily.     BLADDER CONTROL PADS EX ABSORB MISC     CENTRUM SILVER ORAL     clopidogrel 75 mg tablet  Commonly known as:  PLAVIX  Take 1 tablet (75 mg total) by mouth once daily.     metFORMIN 1000 MG tablet  Commonly known as:  GLUCOPHAGE     metoprolol succinate 25 MG 24 hr tablet  Commonly known as:  TOPROL-XL  Take 1 tablet (25 mg total) by mouth once daily.     nitroGLYCERIN 0.4 MG SL tablet  Commonly known as:  NITROSTAT  Place 1 tablet (0.4 mg total) under the tongue every 5 (five) minutes as needed for Chest pain.     oxybutynin 15 MG Tr24  Commonly known as:  DITROPAN XL        STOP taking these medications    furosemide 20 MG tablet  Commonly known as:  LASIX  Stopped by:  Nicole Donohue MD

## 2018-06-06 ENCOUNTER — OFFICE VISIT (OUTPATIENT)
Dept: PRIMARY CARE CLINIC | Facility: CLINIC | Age: 83
End: 2018-06-06
Payer: MEDICARE

## 2018-06-06 VITALS
WEIGHT: 224.19 LBS | DIASTOLIC BLOOD PRESSURE: 97 MMHG | TEMPERATURE: 99 F | OXYGEN SATURATION: 94 % | BODY MASS INDEX: 41.01 KG/M2 | SYSTOLIC BLOOD PRESSURE: 182 MMHG | HEART RATE: 65 BPM

## 2018-06-06 DIAGNOSIS — E66.01 CLASS 3 SEVERE OBESITY DUE TO EXCESS CALORIES WITH SERIOUS COMORBIDITY AND BODY MASS INDEX (BMI) OF 40.0 TO 44.9 IN ADULT: ICD-10-CM

## 2018-06-06 DIAGNOSIS — I10 ESSENTIAL HYPERTENSION: Chronic | ICD-10-CM

## 2018-06-06 DIAGNOSIS — I35.0 NONRHEUMATIC AORTIC VALVE STENOSIS: Chronic | ICD-10-CM

## 2018-06-06 DIAGNOSIS — I21.21: Primary | ICD-10-CM

## 2018-06-06 DIAGNOSIS — E11.9 TYPE 2 DIABETES MELLITUS WITHOUT COMPLICATION, WITHOUT LONG-TERM CURRENT USE OF INSULIN: ICD-10-CM

## 2018-06-06 DIAGNOSIS — Z95.5 PRESENCE OF DRUG-ELUTING STENT IN LEFT CIRCUMFLEX CORONARY ARTERY: ICD-10-CM

## 2018-06-06 PROBLEM — R05.9 COUGH: Status: RESOLVED | Noted: 2018-04-23 | Resolved: 2018-06-06

## 2018-06-06 PROBLEM — E66.813 CLASS 3 SEVERE OBESITY DUE TO EXCESS CALORIES WITH SERIOUS COMORBIDITY AND BODY MASS INDEX (BMI) OF 40.0 TO 44.9 IN ADULT: Status: ACTIVE | Noted: 2018-03-19

## 2018-06-06 PROCEDURE — 99214 OFFICE O/P EST MOD 30 MIN: CPT | Mod: S$GLB,,, | Performed by: INTERNAL MEDICINE

## 2018-06-06 PROCEDURE — 99999 PR PBB SHADOW E&M-EST. PATIENT-LVL III: CPT | Mod: PBBFAC,,, | Performed by: INTERNAL MEDICINE

## 2018-06-06 PROCEDURE — 3077F SYST BP >= 140 MM HG: CPT | Mod: CPTII,S$GLB,, | Performed by: INTERNAL MEDICINE

## 2018-06-06 PROCEDURE — 3078F DIAST BP <80 MM HG: CPT | Mod: CPTII,S$GLB,, | Performed by: INTERNAL MEDICINE

## 2018-06-06 RX ORDER — METFORMIN HYDROCHLORIDE 1000 MG/1
1000 TABLET ORAL 2 TIMES DAILY WITH MEALS
Qty: 180 TABLET | Refills: 4 | Status: SHIPPED | OUTPATIENT
Start: 2018-06-06

## 2018-06-06 RX ORDER — OXYBUTYNIN CHLORIDE 15 MG/1
15 TABLET, EXTENDED RELEASE ORAL DAILY
Qty: 90 TABLET | Refills: 3 | Status: SHIPPED | OUTPATIENT
Start: 2018-06-06

## 2018-06-06 RX ORDER — LISINOPRIL 20 MG/1
20 TABLET ORAL DAILY
Qty: 30 TABLET | Refills: 0 | Status: ON HOLD | OUTPATIENT
Start: 2018-06-06 | End: 2019-02-15 | Stop reason: SDUPTHER

## 2018-06-06 NOTE — PROGRESS NOTES
Subjective:       Patient ID: Makenzie Haile is a 82 y.o. female.    Chief Complaint: Hospital Follow up    HPI:  Returns to [facility] for continued hospital follow up.  Recently hospitalized for NSTEMI.  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  Responded well to above intervention and supportive care.  Medical therapy maximized with BBlocker, ARB, Aspirin, Plavix, Statin.  Discharged to home with plans for outpatient cardiac rehabilitation.   Was unable to participate in cardiac rehabilitation due to transportation issues.    HTN noted to be uncontrolled during office follow up visits.  Home blood pressure logs reviewed and numbers are in acceptable range.  She returns today for further evaluation and to compare our office reading to the reading on her home blood pressure cuff, as well as to evaluate her method of obtaining home blood pressure reading.     Pressure elevated today on both our manual reading and with her home wrist cuff.  Patient observed using proper technique to measure blood pressure with her wrist cuff.  Discussed adding Lisinopril and monitoring blood pressure further.   She will see her PCP in two weeks for appropriate follow up.   Discussed potential side effects and reactions to ACEI class of medication including cough and angioedema. Patient and her daughter voiced understanding.     Review of Systems  General ROS: negative for chills, fever or weight loss  Psychological ROS: negative for hallucination, depression or suicidal ideation  Ophthalmic ROS: negative for blurry vision, photophobia or eye pain  ENT ROS: negative for epistaxis, sore throat or rhinorrhea  Respiratory ROS: no cough, shortness of breath, or wheezing  Cardiovascular ROS: no chest pain or dyspnea on exertion  Gastrointestinal ROS: no abdominal pain, change in bowel habits, or black/ bloody stools  Genito-Urinary ROS: no dysuria, trouble voiding, or hematuria  Musculoskeletal ROS: negative for gait  disturbance or muscular weakness  Neurological ROS: no syncope or seizures; no ataxia  Dermatological ROS: negative for pruritis, rash and jaundice        Objective:      Vital Signs:  Vitals:    06/06/18 0950   BP: (!) 182/97, 192/77   Pulse:    Temp:      Wt Readings from Last 1 Encounters:   05/23/18 0932 101.2 kg (223 lb 3.5 oz)     There is no height or weight on file to calculate BMI.   SpO2 Readings from Last 1 Encounters:   05/23/18 (!) 91%       Physical Exam:  BP (!) 182/97, 192/77 (BP Location: Other (Comment)) Comment (BP Location): left wrist own wrist cuff from home self performed in clinic  Pulse 65   Temp 99.2 °F (37.3 °C) (Oral)   Wt 101.7 kg (224 lb 3.3 oz)   SpO2 (!) 94%   BMI 41.01 kg/m²   General appearance: alert, cooperative, no distress  Constitutional:Oriented to person, place, and time  + obese   HEENT: Normocephalic, atraumatic, neck symmetrical, no nasal discharge   Eyes: conjunctivae/corneas clear, PERRL, EOM's intact  Lungs: clear to auscultation bilaterally, no dullness to percussion bilaterally  Heart: regular rate and rhythm without rub; no displacement of the PMI   + murmur  Abdomen: soft, non-tender; bowel sounds normoactive; no organomegaly  Extremities: extremities symmetric; no clubbing, cyanosis,   + trace pretibial edema bilateral LE   Integument: Skin color, texture, turgor normal; no rashes; hair distrubution normal  Neurologic: Alert and oriented X 3, normal strength, normal coordination and gait  Psychiatric: no pressured speech; normal affect; no evidence of impaired cognition      Assessment:       1. Acute myocardial infarction involving left circumflex coronary artery, unspecified MI type    2. 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 Nonrheumatic aortic valve stenosis    4. Essential hypertension    5. Class 3 severe obesity due to excess calories with serious comorbidity and body mass index (BMI) of 40.0 to 44.9 in adult    6. 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  - recent hospitalization for NSTEMI  - SURAJ placed to LCX  - patient on appropriate medical therapy with aspirin, plavix, statin, Bblocker  - has established cardiology follow up with Dr Moncada  - cardiac rehab ordered but patient unable to attend due to transportation issues    Nonrheumatic aortic valve stenosis  - mild per last echo    Essential hypertension  - uncontrolled  - patient to continue keeping home blood pressure log to present to her PCP in 2 weeks  - continue to follow low sodium diet   - discussed weight loss and exercise  -     lisinopril (PRINIVIL,ZESTRIL) 20 MG tablet; Take 1 tablet (20 mg total) by mouth once daily.    Class 3 severe obesity due to excess calories with serious comorbidity and body mass index (BMI) of 40.0 to 44.9 in adult  - counseling provided    Type 2 diabetes mellitus without complication, without long-term current use of insulin  - HgBA1C 6.0  - on Metformin     Other orders  Refilled following meds today via [facility] order pharmacy:    -     metFORMIN (GLUCOPHAGE) 1000 MG tablet; Take 1 tablet (1,000 mg total) by mouth 2 (two) times daily with meals.  -     oxybutynin (DITROPAN XL) 15 MG TR24; Take 1 tablet (15 mg total) by mouth once daily.        Patient will be released from [facility].  She will see her PCP, Dr Farhat Rapp, in 2 weeks for HTN check and to resume long term medical management.     Scheduled Follow-up :  No future appointments.    Post Visit Medication List:     Medication List          Accurate as of 6/6/18 10:25 AM. If you have any questions, ask your nurse or doctor.               START taking these medications    lisinopril 20 MG  tablet  Commonly known as:  PRINIVIL,ZESTRIL  Take 1 tablet (20 mg total) by mouth once daily.  Started by:  Nicole Donohue MD        CHANGE how you take these medications    oxybutynin 15 MG Tr24  Commonly known as:  DITROPAN XL  Take 1 tablet (15 mg total) by mouth once daily.  What changed:  how much to take  Changed by:  Nicole Donohue MD        CONTINUE taking these medications    ACCU-CHEK FASTCLIX MISC     aspirin 81 MG EC tablet  Commonly known as:  ECOTRIN  Take 1 tablet (81 mg total) by mouth once daily.     atorvastatin 40 MG tablet  Commonly known as:  LIPITOR  Take 1 tablet (40 mg total) by mouth once daily.     BLADDER CONTROL PADS EX ABSORB MISC     CENTRUM SILVER ORAL     clopidogrel 75 mg tablet  Commonly known as:  PLAVIX  Take 1 tablet (75 mg total) by mouth once daily.     metFORMIN 1000 MG tablet  Commonly known as:  GLUCOPHAGE  Take 1 tablet (1,000 mg total) by mouth 2 (two) times daily with meals.     metoprolol succinate 25 MG 24 hr tablet  Commonly known as:  TOPROL-XL  Take 1 tablet (25 mg total) by mouth once daily.     nitroGLYCERIN 0.4 MG SL tablet  Commonly known as:  NITROSTAT  Place 1 tablet (0.4 mg total) under the tongue every 5 (five) minutes as needed for Chest pain.           Where to Get Your Medications      These medications were sent to [facility] - [place], [place] - [address]  [address], [facility] [address]    Phone:  [phone]   · lisinopril 20 MG tablet     These medications were sent to [facility] - [place], [place] - [address]  [address], [facility] [address]    Phone:  [phone]   · metFORMIN 1000 MG tablet  · oxybutynin 15 MG Tr24

## 2018-10-04 ENCOUNTER — OFFICE VISIT (OUTPATIENT)
Dept: CARDIOLOGY | Facility: CLINIC | Age: 83
End: 2018-10-04
Payer: MEDICARE

## 2018-10-04 VITALS
DIASTOLIC BLOOD PRESSURE: 74 MMHG | SYSTOLIC BLOOD PRESSURE: 180 MMHG | HEART RATE: 59 BPM | OXYGEN SATURATION: 93 % | BODY MASS INDEX: 39.93 KG/M2 | WEIGHT: 217 LBS | HEIGHT: 62 IN

## 2018-10-04 DIAGNOSIS — I10 ESSENTIAL HYPERTENSION: Chronic | ICD-10-CM

## 2018-10-04 DIAGNOSIS — E11.9 TYPE 2 DIABETES MELLITUS WITHOUT COMPLICATION, WITHOUT LONG-TERM CURRENT USE OF INSULIN: ICD-10-CM

## 2018-10-04 DIAGNOSIS — K21.9 GASTROESOPHAGEAL REFLUX DISEASE, ESOPHAGITIS PRESENCE NOT SPECIFIED: ICD-10-CM

## 2018-10-04 DIAGNOSIS — I25.10 CORONARY ARTERY DISEASE INVOLVING NATIVE CORONARY ARTERY OF NATIVE HEART WITHOUT ANGINA PECTORIS: Primary | ICD-10-CM

## 2018-10-04 DIAGNOSIS — Z95.5 PRESENCE OF DRUG-ELUTING STENT IN LEFT CIRCUMFLEX CORONARY ARTERY: ICD-10-CM

## 2018-10-04 DIAGNOSIS — I35.0 NONRHEUMATIC AORTIC VALVE STENOSIS: Chronic | ICD-10-CM

## 2018-10-04 DIAGNOSIS — G62.9 NEUROPATHY: ICD-10-CM

## 2018-10-04 DIAGNOSIS — E66.01 CLASS 3 SEVERE OBESITY DUE TO EXCESS CALORIES WITH SERIOUS COMORBIDITY AND BODY MASS INDEX (BMI) OF 40.0 TO 44.9 IN ADULT: ICD-10-CM

## 2018-10-04 DIAGNOSIS — I21.21: ICD-10-CM

## 2018-10-04 PROCEDURE — 3077F SYST BP >= 140 MM HG: CPT | Mod: CPTII,,, | Performed by: INTERNAL MEDICINE

## 2018-10-04 PROCEDURE — 1101F PT FALLS ASSESS-DOCD LE1/YR: CPT | Mod: CPTII,,, | Performed by: INTERNAL MEDICINE

## 2018-10-04 PROCEDURE — 99213 OFFICE O/P EST LOW 20 MIN: CPT | Mod: PBBFAC,PO | Performed by: INTERNAL MEDICINE

## 2018-10-04 PROCEDURE — 99214 OFFICE O/P EST MOD 30 MIN: CPT | Mod: S$PBB,,, | Performed by: INTERNAL MEDICINE

## 2018-10-04 PROCEDURE — 3078F DIAST BP <80 MM HG: CPT | Mod: CPTII,,, | Performed by: INTERNAL MEDICINE

## 2018-10-04 PROCEDURE — 99999 PR PBB SHADOW E&M-EST. PATIENT-LVL III: CPT | Mod: PBBFAC,,, | Performed by: INTERNAL MEDICINE

## 2018-11-15 ENCOUNTER — OFFICE VISIT (OUTPATIENT)
Dept: CARDIOLOGY | Facility: CLINIC | Age: 83
End: 2018-11-15
Payer: MEDICARE

## 2018-11-15 VITALS
BODY MASS INDEX: 40.67 KG/M2 | SYSTOLIC BLOOD PRESSURE: 197 MMHG | DIASTOLIC BLOOD PRESSURE: 73 MMHG | HEIGHT: 62 IN | WEIGHT: 221 LBS | HEART RATE: 63 BPM

## 2018-11-15 DIAGNOSIS — I10 ESSENTIAL HYPERTENSION: Chronic | ICD-10-CM

## 2018-11-15 DIAGNOSIS — Z95.5 PRESENCE OF DRUG-ELUTING STENT IN LEFT CIRCUMFLEX CORONARY ARTERY: ICD-10-CM

## 2018-11-15 DIAGNOSIS — I25.10 CORONARY ARTERY DISEASE INVOLVING NATIVE CORONARY ARTERY OF NATIVE HEART WITHOUT ANGINA PECTORIS: Primary | ICD-10-CM

## 2018-11-15 DIAGNOSIS — G62.9 NEUROPATHY: ICD-10-CM

## 2018-11-15 DIAGNOSIS — E66.01 CLASS 3 SEVERE OBESITY DUE TO EXCESS CALORIES WITH SERIOUS COMORBIDITY AND BODY MASS INDEX (BMI) OF 40.0 TO 44.9 IN ADULT: ICD-10-CM

## 2018-11-15 DIAGNOSIS — E11.9 TYPE 2 DIABETES MELLITUS WITHOUT COMPLICATION, WITHOUT LONG-TERM CURRENT USE OF INSULIN: ICD-10-CM

## 2018-11-15 DIAGNOSIS — I35.0 NONRHEUMATIC AORTIC VALVE STENOSIS: Chronic | ICD-10-CM

## 2018-11-15 DIAGNOSIS — I21.21: ICD-10-CM

## 2018-11-15 DIAGNOSIS — K21.9 GASTROESOPHAGEAL REFLUX DISEASE, ESOPHAGITIS PRESENCE NOT SPECIFIED: ICD-10-CM

## 2018-11-15 PROCEDURE — 1101F PT FALLS ASSESS-DOCD LE1/YR: CPT | Mod: CPTII,S$GLB,, | Performed by: INTERNAL MEDICINE

## 2018-11-15 PROCEDURE — 3078F DIAST BP <80 MM HG: CPT | Mod: CPTII,S$GLB,, | Performed by: INTERNAL MEDICINE

## 2018-11-15 PROCEDURE — 3077F SYST BP >= 140 MM HG: CPT | Mod: CPTII,S$GLB,, | Performed by: INTERNAL MEDICINE

## 2018-11-15 PROCEDURE — 99214 OFFICE O/P EST MOD 30 MIN: CPT | Mod: S$GLB,,, | Performed by: INTERNAL MEDICINE

## 2018-11-15 PROCEDURE — 99999 PR PBB SHADOW E&M-EST. PATIENT-LVL III: CPT | Mod: PBBFAC,,, | Performed by: INTERNAL MEDICINE

## 2018-11-15 NOTE — PROGRESS NOTES
Subjective:    Patient ID:  Makenzie Haile is a 83 y.o. female who presents for follow-up of Hypertension and Coronary Artery Disease      HPI     82 y/o female with hx of CAD s/p NSTEMI with subsequent PCI with SURAJ to LCX, HTN, DM, GERD, obesity, mild AS (EF 55-60%, SERAFIN 1.48, PV 2.08, MG 10) who presents for f/u. Presented initially with epigastric pain she thought was heartburn, presented to ED, had elevated trop 0.77, [facility] with LCX lesion s/p PCI with SURAJ, resolution of pain, and discharged home.   Has done well with no recurrent symptoms. Denies CP, SOB, orthopnea, PND, syncope, palps. Tolerating meds well. Non smoker, no EtOH. BP elevated again today in clinic as she did not take her meds this AM. Has BP log available and -150's with average in 140's. Attempting to follow a low salt diet, does not exercise.     Review of Systems   Constitution: Negative for weakness and malaise/fatigue.   HENT: Negative for congestion.    Eyes: Negative for blurred vision.   Cardiovascular: Negative for chest pain, claudication, cyanosis, dyspnea on exertion, irregular heartbeat, leg swelling, near-syncope, orthopnea, palpitations, paroxysmal nocturnal dyspnea and syncope.   Respiratory: Negative for shortness of breath.    Endocrine: Negative for polyuria.   Hematologic/Lymphatic: Negative for bleeding problem.   Skin: Negative for itching and rash.   Musculoskeletal: Negative for joint swelling, muscle cramps and muscle weakness.   Gastrointestinal: Negative for abdominal pain, hematemesis, hematochezia, melena, nausea and vomiting.   Genitourinary: Negative for dysuria and hematuria.   Neurological: Negative for dizziness, focal weakness, headaches, light-headedness and loss of balance.   Psychiatric/Behavioral: Negative for depression. The patient is not nervous/anxious.         Objective:    Physical Exam   Constitutional: She is oriented to person, place, and time. She appears well-developed and well-nourished.    HENT:   Head: Normocephalic and atraumatic.   Neck: Neck supple. No JVD present.   Cardiovascular: Normal rate and regular rhythm.   Murmur heard.   Systolic murmur is present with a grade of 3/6.  Pulses:       Carotid pulses are 2+ on the right side, and 2+ on the left side.       Radial pulses are 2+ on the right side, and 2+ on the left side.        Femoral pulses are 2+ on the right side, and 2+ on the left side.       Dorsalis pedis pulses are 2+ on the right side, and 2+ on the left side.        Posterior tibial pulses are 2+ on the right side, and 2+ on the left side.   Pulmonary/Chest: Effort normal and breath sounds normal.   Abdominal: Soft. Bowel sounds are normal.   Musculoskeletal: She exhibits no edema.   Neurological: She is alert and oriented to person, place, and time.   Skin: Skin is warm and dry.   Psychiatric: She has a normal mood and affect. Her behavior is normal. Thought content normal.         Assessment:       1. Coronary artery disease involving native coronary artery of native heart without angina pectoris    2. 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 Essential hypertension    4. Acute myocardial infarction involving left circumflex coronary artery, unspecified MI type    5. Neuropathy    6. Nonrheumatic aortic valve stenosis    7. Type 2 diabetes mellitus without complication, without long-term current use of insulin    8. Gastroesophageal reflux disease, esophagitis presence not specified    9. Class 3 severe obesity due to excess calories with serious comorbidity and body mass index (BMI) of 40.0 to 44.9 in adult      82 y/o pt with hx and presentation as above. Doing well from a cardiac perspective and compensated from a HF perspective. She brought BP cuff with her today and matches our cuff pressure. Will enroll in digital HTN clinic. DAPT x at least 1 year post PCI (3/2019) and will discuss discontinuation of plavix at next clinic visit.         Plan:       -Will enroll in digital HTN clinic  -f/u 6 months

## 2019-02-13 ENCOUNTER — HOSPITAL ENCOUNTER (INPATIENT)
Facility: HOSPITAL | Age: 84
LOS: 2 days | Discharge: HOME | DRG: 357 | End: 2019-02-15
Attending: EMERGENCY MEDICINE | Admitting: INTERNAL MEDICINE
Payer: MEDICARE

## 2019-02-13 DIAGNOSIS — Z95.5 PRESENCE OF DRUG-ELUTING STENT IN LEFT CIRCUMFLEX CORONARY ARTERY: ICD-10-CM

## 2019-02-13 DIAGNOSIS — K92.2 LOWER GI BLEED: Primary | ICD-10-CM

## 2019-02-13 DIAGNOSIS — I10 ESSENTIAL HYPERTENSION: Chronic | ICD-10-CM

## 2019-02-13 DIAGNOSIS — K92.2 ACUTE UPPER GI BLEED: ICD-10-CM

## 2019-02-13 DIAGNOSIS — E11.9 TYPE 2 DIABETES MELLITUS WITHOUT COMPLICATION, WITHOUT LONG-TERM CURRENT USE OF INSULIN: ICD-10-CM

## 2019-02-13 DIAGNOSIS — D64.9 NORMOCYTIC ANEMIA: ICD-10-CM

## 2019-02-13 DIAGNOSIS — R53.1 WEAKNESS: ICD-10-CM

## 2019-02-13 DIAGNOSIS — D62 ACUTE BLOOD LOSS ANEMIA: ICD-10-CM

## 2019-02-13 DIAGNOSIS — K92.2 GASTROINTESTINAL HEMORRHAGE, UNSPECIFIED GASTROINTESTINAL HEMORRHAGE TYPE: ICD-10-CM

## 2019-02-13 DIAGNOSIS — I25.10 CORONARY ARTERY DISEASE INVOLVING NATIVE CORONARY ARTERY OF NATIVE HEART WITHOUT ANGINA PECTORIS: ICD-10-CM

## 2019-02-13 DIAGNOSIS — D50.0 IRON DEFICIENCY ANEMIA DUE TO CHRONIC BLOOD LOSS: ICD-10-CM

## 2019-02-13 DIAGNOSIS — E78.2 MIXED HYPERLIPIDEMIA: ICD-10-CM

## 2019-02-13 PROBLEM — E78.5 HLD (HYPERLIPIDEMIA): Status: ACTIVE | Noted: 2019-02-13

## 2019-02-13 PROBLEM — R23.8 SKIN BREAKDOWN: Status: ACTIVE | Noted: 2019-02-13

## 2019-02-13 LAB
ABO + RH BLD: NORMAL
ALBUMIN SERPL BCP-MCNC: 3.6 G/DL
ALP SERPL-CCNC: 39 U/L
ALT SERPL W/O P-5'-P-CCNC: 10 U/L
ANION GAP SERPL CALC-SCNC: 9 MMOL/L
APTT BLDCRRT: 26.8 SEC
AST SERPL-CCNC: 16 U/L
BACTERIA #/AREA URNS HPF: ABNORMAL /HPF
BASOPHILS # BLD AUTO: 0.02 K/UL
BASOPHILS # BLD AUTO: 0.04 K/UL
BASOPHILS NFR BLD: 0.3 %
BASOPHILS NFR BLD: 0.4 %
BILIRUB SERPL-MCNC: 0.9 MG/DL
BILIRUB UR QL STRIP: NEGATIVE
BLD GP AB SCN CELLS X3 SERPL QL: NORMAL
BUN SERPL-MCNC: 28 MG/DL
CALCIUM SERPL-MCNC: 9.6 MG/DL
CHLORIDE SERPL-SCNC: 106 MMOL/L
CK SERPL-CCNC: 55 U/L
CLARITY UR: CLEAR
CO2 SERPL-SCNC: 26 MMOL/L
COLOR UR: YELLOW
CREAT SERPL-MCNC: 0.8 MG/DL
DIFFERENTIAL METHOD: ABNORMAL
DIFFERENTIAL METHOD: ABNORMAL
EOSINOPHIL # BLD AUTO: 0.1 K/UL
EOSINOPHIL # BLD AUTO: 0.1 K/UL
EOSINOPHIL NFR BLD: 1.3 %
EOSINOPHIL NFR BLD: 1.6 %
ERYTHROCYTE [DISTWIDTH] IN BLOOD BY AUTOMATED COUNT: 14.7 %
ERYTHROCYTE [DISTWIDTH] IN BLOOD BY AUTOMATED COUNT: 14.9 %
EST. GFR  (AFRICAN AMERICAN): >60 ML/MIN/1.73 M^2
EST. GFR  (NON AFRICAN AMERICAN): >60 ML/MIN/1.73 M^2
ESTIMATED AVG GLUCOSE: 117 MG/DL
FERRITIN SERPL-MCNC: 37 NG/ML
FOLATE SERPL-MCNC: 15.4 NG/ML
GLUCOSE SERPL-MCNC: 114 MG/DL
GLUCOSE UR QL STRIP: NEGATIVE
HBA1C MFR BLD HPLC: 5.7 %
HCT VFR BLD AUTO: 27.9 %
HCT VFR BLD AUTO: 33.1 %
HGB BLD-MCNC: 10.5 G/DL
HGB BLD-MCNC: 8.7 G/DL
HGB UR QL STRIP: ABNORMAL
INR PPP: 1.1
IRON SERPL-MCNC: 49 UG/DL
KETONES UR QL STRIP: NEGATIVE
LEUKOCYTE ESTERASE UR QL STRIP: ABNORMAL
LYMPHOCYTES # BLD AUTO: 1.1 K/UL
LYMPHOCYTES # BLD AUTO: 1.8 K/UL
LYMPHOCYTES NFR BLD: 15.8 %
LYMPHOCYTES NFR BLD: 19 %
MCH RBC QN AUTO: 26.9 PG
MCH RBC QN AUTO: 27.1 PG
MCHC RBC AUTO-ENTMCNC: 31.2 G/DL
MCHC RBC AUTO-ENTMCNC: 31.7 G/DL
MCV RBC AUTO: 85 FL
MCV RBC AUTO: 87 FL
MICROSCOPIC COMMENT: ABNORMAL
MONOCYTES # BLD AUTO: 0.5 K/UL
MONOCYTES # BLD AUTO: 0.7 K/UL
MONOCYTES NFR BLD: 6.7 %
MONOCYTES NFR BLD: 7.6 %
NEUTROPHILS # BLD AUTO: 5.3 K/UL
NEUTROPHILS # BLD AUTO: 6.8 K/UL
NEUTROPHILS NFR BLD: 71.7 %
NEUTROPHILS NFR BLD: 75.6 %
NITRITE UR QL STRIP: NEGATIVE
PH UR STRIP: 6 [PH] (ref 5–8)
PLATELET # BLD AUTO: 198 K/UL
PLATELET # BLD AUTO: 218 K/UL
PMV BLD AUTO: 10.5 FL
PMV BLD AUTO: 11.2 FL
POCT GLUCOSE: 108 MG/DL (ref 70–110)
POCT GLUCOSE: 127 MG/DL (ref 70–110)
POCT GLUCOSE: 136 MG/DL (ref 70–110)
POCT GLUCOSE: 142 MG/DL (ref 70–110)
POTASSIUM SERPL-SCNC: 4.2 MMOL/L
PROT SERPL-MCNC: 6.6 G/DL
PROT UR QL STRIP: NEGATIVE
PROTHROMBIN TIME: 10.9 SEC
RBC # BLD AUTO: 3.21 M/UL
RBC # BLD AUTO: 3.91 M/UL
RBC #/AREA URNS HPF: 3 /HPF (ref 0–4)
SATURATED IRON: 15 %
SODIUM SERPL-SCNC: 141 MMOL/L
SP GR UR STRIP: 1.01 (ref 1–1.03)
SQUAMOUS #/AREA URNS HPF: 1 /HPF
TOTAL IRON BINDING CAPACITY: 336 UG/DL
TRANSFERRIN SERPL-MCNC: 227 MG/DL
TSH SERPL DL<=0.005 MIU/L-ACNC: 1.31 UIU/ML
URN SPEC COLLECT METH UR: ABNORMAL
UROBILINOGEN UR STRIP-ACNC: NEGATIVE EU/DL
VIT B12 SERPL-MCNC: 284 PG/ML
WBC # BLD AUTO: 7.02 K/UL
WBC # BLD AUTO: 9.43 K/UL
WBC #/AREA URNS HPF: 12 /HPF (ref 0–5)
WBC CLUMPS URNS QL MICRO: ABNORMAL

## 2019-02-13 PROCEDURE — C9113 INJ PANTOPRAZOLE SODIUM, VIA: HCPCS | Performed by: STUDENT IN AN ORGANIZED HEALTH CARE EDUCATION/TRAINING PROGRAM

## 2019-02-13 PROCEDURE — 85025 COMPLETE CBC W/AUTO DIFF WBC: CPT | Mod: 91

## 2019-02-13 PROCEDURE — 93010 ELECTROCARDIOGRAM REPORT: CPT | Mod: 76,,, | Performed by: STUDENT IN AN ORGANIZED HEALTH CARE EDUCATION/TRAINING PROGRAM

## 2019-02-13 PROCEDURE — 85610 PROTHROMBIN TIME: CPT

## 2019-02-13 PROCEDURE — 82728 ASSAY OF FERRITIN: CPT

## 2019-02-13 PROCEDURE — 25000003 PHARM REV CODE 250: Performed by: EMERGENCY MEDICINE

## 2019-02-13 PROCEDURE — 63600175 PHARM REV CODE 636 W HCPCS: Performed by: STUDENT IN AN ORGANIZED HEALTH CARE EDUCATION/TRAINING PROGRAM

## 2019-02-13 PROCEDURE — 93010 ELECTROCARDIOGRAM REPORT: CPT | Mod: ,,, | Performed by: STUDENT IN AN ORGANIZED HEALTH CARE EDUCATION/TRAINING PROGRAM

## 2019-02-13 PROCEDURE — 85730 THROMBOPLASTIN TIME PARTIAL: CPT

## 2019-02-13 PROCEDURE — 25000003 PHARM REV CODE 250: Performed by: STUDENT IN AN ORGANIZED HEALTH CARE EDUCATION/TRAINING PROGRAM

## 2019-02-13 PROCEDURE — 93005 ELECTROCARDIOGRAM TRACING: CPT

## 2019-02-13 PROCEDURE — 82607 VITAMIN B-12: CPT

## 2019-02-13 PROCEDURE — 20000000 HC ICU ROOM

## 2019-02-13 PROCEDURE — 25500020 PHARM REV CODE 255: Performed by: INTERNAL MEDICINE

## 2019-02-13 PROCEDURE — 84443 ASSAY THYROID STIM HORMONE: CPT

## 2019-02-13 PROCEDURE — 99291 CRITICAL CARE FIRST HOUR: CPT | Mod: 25

## 2019-02-13 PROCEDURE — 81000 URINALYSIS NONAUTO W/SCOPE: CPT

## 2019-02-13 PROCEDURE — 86920 COMPATIBILITY TEST SPIN: CPT

## 2019-02-13 PROCEDURE — 36415 COLL VENOUS BLD VENIPUNCTURE: CPT

## 2019-02-13 PROCEDURE — 93010 EKG 12-LEAD: ICD-10-PCS | Mod: ,,, | Performed by: STUDENT IN AN ORGANIZED HEALTH CARE EDUCATION/TRAINING PROGRAM

## 2019-02-13 PROCEDURE — 83540 ASSAY OF IRON: CPT

## 2019-02-13 PROCEDURE — 25000003 PHARM REV CODE 250: Performed by: INTERNAL MEDICINE

## 2019-02-13 PROCEDURE — 82962 GLUCOSE BLOOD TEST: CPT

## 2019-02-13 PROCEDURE — 82550 ASSAY OF CK (CPK): CPT

## 2019-02-13 PROCEDURE — 81003 URINALYSIS AUTO W/O SCOPE: CPT

## 2019-02-13 PROCEDURE — 96374 THER/PROPH/DIAG INJ IV PUSH: CPT

## 2019-02-13 PROCEDURE — 96361 HYDRATE IV INFUSION ADD-ON: CPT

## 2019-02-13 PROCEDURE — 80053 COMPREHEN METABOLIC PANEL: CPT

## 2019-02-13 PROCEDURE — 82746 ASSAY OF FOLIC ACID SERUM: CPT

## 2019-02-13 PROCEDURE — 83036 HEMOGLOBIN GLYCOSYLATED A1C: CPT

## 2019-02-13 PROCEDURE — 99292 CRITICAL CARE ADDL 30 MIN: CPT

## 2019-02-13 PROCEDURE — 86850 RBC ANTIBODY SCREEN: CPT

## 2019-02-13 PROCEDURE — 94761 N-INVAS EAR/PLS OXIMETRY MLT: CPT

## 2019-02-13 RX ORDER — LABETALOL HCL 20 MG/4 ML
10 SYRINGE (ML) INTRAVENOUS EVERY 6 HOURS PRN
Status: DISCONTINUED | OUTPATIENT
Start: 2019-02-13 | End: 2019-02-13

## 2019-02-13 RX ORDER — LISINOPRIL 20 MG/1
40 TABLET ORAL DAILY
Status: DISCONTINUED | OUTPATIENT
Start: 2019-02-14 | End: 2019-02-15 | Stop reason: HOSPADM

## 2019-02-13 RX ORDER — SODIUM CHLORIDE, SODIUM LACTATE, POTASSIUM CHLORIDE, CALCIUM CHLORIDE 600; 310; 30; 20 MG/100ML; MG/100ML; MG/100ML; MG/100ML
INJECTION, SOLUTION INTRAVENOUS CONTINUOUS
Status: DISCONTINUED | OUTPATIENT
Start: 2019-02-13 | End: 2019-02-13

## 2019-02-13 RX ORDER — POLYETHYLENE GLYCOL 3350, SODIUM SULFATE ANHYDROUS, SODIUM BICARBONATE, SODIUM CHLORIDE, POTASSIUM CHLORIDE 236; 22.74; 6.74; 5.86; 2.97 G/4L; G/4L; G/4L; G/4L; G/4L
4000 POWDER, FOR SOLUTION ORAL ONCE
Status: COMPLETED | OUTPATIENT
Start: 2019-02-13 | End: 2019-02-13

## 2019-02-13 RX ORDER — INSULIN ASPART 100 [IU]/ML
0-5 INJECTION, SOLUTION INTRAVENOUS; SUBCUTANEOUS EVERY 6 HOURS PRN
Status: DISCONTINUED | OUTPATIENT
Start: 2019-02-13 | End: 2019-02-15 | Stop reason: HOSPADM

## 2019-02-13 RX ORDER — ASPIRIN 81 MG/1
81 TABLET ORAL DAILY
Status: DISCONTINUED | OUTPATIENT
Start: 2019-02-13 | End: 2019-02-15 | Stop reason: HOSPADM

## 2019-02-13 RX ORDER — ONDANSETRON 2 MG/ML
4 INJECTION INTRAMUSCULAR; INTRAVENOUS EVERY 8 HOURS PRN
Status: DISCONTINUED | OUTPATIENT
Start: 2019-02-13 | End: 2019-02-15 | Stop reason: HOSPADM

## 2019-02-13 RX ORDER — LANOLIN ALCOHOL/MO/W.PET/CERES
1000 CREAM (GRAM) TOPICAL DAILY
Status: DISCONTINUED | OUTPATIENT
Start: 2019-02-14 | End: 2019-02-15 | Stop reason: HOSPADM

## 2019-02-13 RX ORDER — PANTOPRAZOLE SODIUM 40 MG/10ML
80 INJECTION, POWDER, LYOPHILIZED, FOR SOLUTION INTRAVENOUS ONCE
Status: COMPLETED | OUTPATIENT
Start: 2019-02-13 | End: 2019-02-13

## 2019-02-13 RX ORDER — METOPROLOL SUCCINATE 25 MG/1
25 TABLET, EXTENDED RELEASE ORAL DAILY
Status: DISCONTINUED | OUTPATIENT
Start: 2019-02-14 | End: 2019-02-13

## 2019-02-13 RX ORDER — LISINOPRIL 10 MG/1
20 TABLET ORAL DAILY
Status: DISCONTINUED | OUTPATIENT
Start: 2019-02-13 | End: 2019-02-13

## 2019-02-13 RX ORDER — METOPROLOL SUCCINATE 25 MG/1
25 TABLET, EXTENDED RELEASE ORAL
Status: COMPLETED | OUTPATIENT
Start: 2019-02-13 | End: 2019-02-13

## 2019-02-13 RX ORDER — AMLODIPINE BESYLATE 5 MG/1
5 TABLET ORAL DAILY
Status: DISCONTINUED | OUTPATIENT
Start: 2019-02-13 | End: 2019-02-15

## 2019-02-13 RX ORDER — PANTOPRAZOLE SODIUM 40 MG/10ML
40 INJECTION, POWDER, LYOPHILIZED, FOR SOLUTION INTRAVENOUS 2 TIMES DAILY
Status: DISCONTINUED | OUTPATIENT
Start: 2019-02-14 | End: 2019-02-13

## 2019-02-13 RX ORDER — ATORVASTATIN CALCIUM 40 MG/1
40 TABLET, FILM COATED ORAL DAILY
Status: DISCONTINUED | OUTPATIENT
Start: 2019-02-13 | End: 2019-02-15 | Stop reason: HOSPADM

## 2019-02-13 RX ORDER — OXYBUTYNIN CHLORIDE 5 MG/1
15 TABLET, EXTENDED RELEASE ORAL DAILY
Status: DISCONTINUED | OUTPATIENT
Start: 2019-02-13 | End: 2019-02-15 | Stop reason: HOSPADM

## 2019-02-13 RX ORDER — GLUCAGON 1 MG
1 KIT INJECTION
Status: DISCONTINUED | OUTPATIENT
Start: 2019-02-13 | End: 2019-02-15 | Stop reason: HOSPADM

## 2019-02-13 RX ORDER — PANTOPRAZOLE SODIUM 40 MG/10ML
40 INJECTION, POWDER, LYOPHILIZED, FOR SOLUTION INTRAVENOUS 2 TIMES DAILY
Status: DISCONTINUED | OUTPATIENT
Start: 2019-02-13 | End: 2019-02-15

## 2019-02-13 RX ORDER — DIPHENHYDRAMINE HYDROCHLORIDE 50 MG/ML
50 INJECTION INTRAMUSCULAR; INTRAVENOUS ONCE
Status: COMPLETED | OUTPATIENT
Start: 2019-02-13 | End: 2019-02-13

## 2019-02-13 RX ORDER — LABETALOL HCL 20 MG/4 ML
10 SYRINGE (ML) INTRAVENOUS EVERY 6 HOURS PRN
Status: DISCONTINUED | OUTPATIENT
Start: 2019-02-13 | End: 2019-02-14

## 2019-02-13 RX ORDER — LISINOPRIL 20 MG/1
20 TABLET ORAL 2 TIMES DAILY
Status: DISCONTINUED | OUTPATIENT
Start: 2019-02-13 | End: 2019-02-13

## 2019-02-13 RX ORDER — METOPROLOL SUCCINATE 25 MG/1
25 TABLET, EXTENDED RELEASE ORAL DAILY
Status: DISCONTINUED | OUTPATIENT
Start: 2019-02-13 | End: 2019-02-13

## 2019-02-13 RX ORDER — LISINOPRIL 10 MG/1
20 TABLET ORAL
Status: COMPLETED | OUTPATIENT
Start: 2019-02-13 | End: 2019-02-13

## 2019-02-13 RX ORDER — LISINOPRIL 20 MG/1
20 TABLET ORAL ONCE
Status: COMPLETED | OUTPATIENT
Start: 2019-02-13 | End: 2019-02-13

## 2019-02-13 RX ADMIN — ASPIRIN 81 MG: 81 TABLET, COATED ORAL at 11:02

## 2019-02-13 RX ADMIN — LABETALOL HYDROCHLORIDE 10 MG: 5 INJECTION, SOLUTION INTRAVENOUS at 02:02

## 2019-02-13 RX ADMIN — LABETALOL HYDROCHLORIDE 10 MG: 5 INJECTION, SOLUTION INTRAVENOUS at 11:02

## 2019-02-13 RX ADMIN — SODIUM CHLORIDE, SODIUM LACTATE, POTASSIUM CHLORIDE, AND CALCIUM CHLORIDE: .6; .31; .03; .02 INJECTION, SOLUTION INTRAVENOUS at 11:02

## 2019-02-13 RX ADMIN — POLYETHYLENE GLYCOL 3350, SODIUM SULFATE ANHYDROUS, SODIUM BICARBONATE, SODIUM CHLORIDE, POTASSIUM CHLORIDE 4000 ML: 236; 22.74; 6.74; 5.86; 2.97 POWDER, FOR SOLUTION ORAL at 07:02

## 2019-02-13 RX ADMIN — IOHEXOL 130 ML: 350 INJECTION, SOLUTION INTRAVENOUS at 11:02

## 2019-02-13 RX ADMIN — PANTOPRAZOLE SODIUM 40 MG: 40 INJECTION, POWDER, FOR SOLUTION INTRAVENOUS at 10:02

## 2019-02-13 RX ADMIN — AMLODIPINE BESYLATE 5 MG: 5 TABLET ORAL at 02:02

## 2019-02-13 RX ADMIN — METHYLPREDNISOLONE SODIUM SUCCINATE 20 MG: 40 INJECTION, POWDER, FOR SOLUTION INTRAMUSCULAR; INTRAVENOUS at 11:02

## 2019-02-13 RX ADMIN — LISINOPRIL 20 MG: 10 TABLET ORAL at 09:02

## 2019-02-13 RX ADMIN — PANTOPRAZOLE SODIUM 80 MG: 40 INJECTION, POWDER, FOR SOLUTION INTRAVENOUS at 02:02

## 2019-02-13 RX ADMIN — LISINOPRIL 20 MG: 20 TABLET ORAL at 02:02

## 2019-02-13 RX ADMIN — LABETALOL HYDROCHLORIDE 10 MG: 5 INJECTION, SOLUTION INTRAVENOUS at 10:02

## 2019-02-13 RX ADMIN — SODIUM CHLORIDE 1000 ML: 0.9 INJECTION, SOLUTION INTRAVENOUS at 06:02

## 2019-02-13 RX ADMIN — METOPROLOL SUCCINATE 25 MG: 25 TABLET, EXTENDED RELEASE ORAL at 09:02

## 2019-02-13 RX ADMIN — DIPHENHYDRAMINE HYDROCHLORIDE 50 MG: 50 INJECTION, SOLUTION INTRAMUSCULAR; INTRAVENOUS at 11:02

## 2019-02-13 NOTE — PLAN OF CARE
Pt arrived on unit via bed. Pt is awake and oriented. She is on room air.  Family at the bedside. Telemetry applied on. Fall risk band applied. No lightheadedness or SOB reported. Denies chest pain. Bed in lowest position. Bed alarm set. Pt instructed to call for assistance. VS documented refer to flowsheet. Will continue to monitor.

## 2019-02-13 NOTE — CONSULTS
[facility]    CC: hematochezia    HPI   Ms. Haile is an 83 year old woman with new, severe, worsening, painless hematochezia that occurred this morning and was associated with mild cramping. She had 3 episodes and notes some dark blood with clots. She denies epigastric pain. She does not take NSAIDS since her coronary stenting last march for which she is now on ASA and plavix. She had a colonoscopy over 10 years ago and was told she had diverticulosis. She has had diverticular bleeding over 10 years ago and at the time was being considered for surgical resection as they could not localize the bleed. However, bleeding stopped and has not recurred until now. She denies vomiting, diarrhea, or rectal pain. She has not had any bloody bowel movements for the last 6 hours.     Collateral obtained from son and  at bedside. Chart reviewed and summarized here.    Past Medical History  Diverticulosis with bleeding  Obesity  HTN  DM2  CAD with stent in 3/2018    Past Surgical History  Hysterectomy  Other lower abdominal surgery she can't recall, maybe hernia repair?    Social History  Denies smoking or drinking    Family History  No GI cancers    Review of Systems  General ROS: negative for chills, fever or weight loss  Psychological ROS: negative for hallucination, depression or suicidal ideation  Ophthalmic ROS: negative for blurry vision, photophobia or eye pain  ENT ROS: negative for epistaxis, sore throat or rhinorrhea  Respiratory ROS: no cough, shortness of breath, or wheezing  Cardiovascular ROS: no chest pain or dyspnea on exertion  Gastrointestinal ROS: Positive for hematochezia, cramping, no vomiting or constipation  Genito-Urinary ROS: no dysuria, trouble voiding, or hematuria  Musculoskeletal ROS: negative for gait disturbance or muscular weakness  Neurological ROS: no syncope or seizures; no ataxia Positive for memory loss  Dermatological ROS: negative for pruritis, rash and jaundice    Physical  "Examination  BP (!) 187/75 (BP Location: Left arm, Patient Position: Sitting)   Pulse 90   Temp 97.5 °F (36.4 °C) (Oral)   Resp 18   Ht 5' 4" (1.626 m)   Wt 99.8 kg (220 lb)   SpO2 95%   Breastfeeding? No   BMI 37.76 kg/m²   General appearance: alert, cooperative, no distress, obese  HENT: Normocephalic, atraumatic, neck symmetrical, no nasal discharge   Eyes: conjunctivae/corneas clear, PERRL, EOM's intact  Lungs: clear to auscultation in all fields, no dullness to percussion bilaterally, no wheeze  Heart: normal rate, regular rhythm without rub; palpable peripheral pulses  Abdomen: soft, NT, ND, well healed low transverse scar noted  Extremities: extremities symmetric; no clubbing, cyanosis, or edema  Integument: Skin color, texture, turgor normal; no rashes; hair distrubution normal  Neurologic: Alert and oriented X 3, normal strength, normal coordination  Psychiatric: no pressured speech; normal affect; no evidence of impaired cognition     Labs:  Hg 10.5 (12.8 last year)  MCV 85  Plt 198    BUN 28  Cr 0.8 (at baseline)    Imaging:  CXR 4/23/2018  Cardiomegaly noted    I have personally reviewed these images    Assessment:   Ms. Haile is an 83 year old woman with likely recurrent diverticular bleed on plavix that seems to have stopped with slight anemia. She would benefit from observation overnight with EGD and colonoscopy tomorrow for workup.     Plan:  - hold plavix now but DO NOT stop her aspirin  - high dose IV PPI  - clears now and NPO at midnight  - prep tonight for EGD and colonoscopy tomorrow    Thank you for this consult. Please call with any questions. The case will be discussed with staff, Dr. Meyer.     Noa Washington MD MPH  [facility] PGY 4  [phone] cell  [phone] pager    "

## 2019-02-13 NOTE — ED TRIAGE NOTES
Patient presents to the ED with reports of having rectal bleeding that started at 2AM this morning. States having had x 3 episodes at home. Reports feeling weak with ambulation. Denies any chest pain or shortness of breath. Patient states having intermittent abdominal cramping while having bowel movements but then pain goes away.     Review of patient's allergies indicates:   Allergen Reactions    Iodine and iodide containing products     Sulfa (sulfonamide antibiotics) Hives        Patient has verified the spelling of their name and  on armband.   APPEARANCE: Patient is alert, calm, oriented x 4, and does not appear distressed.  SKIN: Skin is normal for race, warm, and dry. Normal skin turgor and mucous membranes are dry at this time.  CARDIAC: Normal rate and rhythm, no murmur heard. Patient noted to be hypertensive at this time.  RESPIRATORY:Normal rate and effort. Breath sounds clear bilaterally throughout chest. Respirations are equal and unlabored.    GASTRO: Bowel sounds normal, abdomen is soft, no tenderness, and no abdominal distention. Intermittent abdominal cramping. +Rectal Bleeding x 3 episodes.   MUSCLE: Full ROM. No bony tenderness or soft tissue tenderness. No obvious deformity.  PERIPHERAL VASCULAR: peripheral pulses present. Normal cap refill. + Edema to BLE noted. Warm to touch.  MENTAL STATUS: awake, alert and aware of environment.  ENT: EARS: no obvious drainage. NOSE: no active bleeding. THROAT: no redness or swelling.

## 2019-02-13 NOTE — PLAN OF CARE
Problem: Adult Inpatient Plan of Care  Goal: Plan of Care Review  Outcome: Ongoing (interventions implemented as appropriate)  Plan of care reviewed with the patient and family. Family at the bedside. Verbalized clear understanding. Bed alarm set. Bed in lowest position. Pt remain afebrile and free of fall. Call light within reach. Instructed pt to call when getting out of bed. Denies any pain or discomfort. NSR on telemetry monitor. No report of SOB or lightheadedness. NPO after midnight for colonoscopy and EGD tomorrow. Accucheck. Given prn labetalol for high blood pressure. Pt has BM X1 red with clots. Will continue to monitor.

## 2019-02-13 NOTE — PLAN OF CARE
Pt has had her 2nd bloody red BM no noted clots this time. Pt's son requested to see MD. Informed Dr. Hull and MD is aware. Md states will see pt 1- 1/2 hr. Will continue to monitor.

## 2019-02-13 NOTE — PLAN OF CARE
02/13/19 1230   ANDUJAR Message   Medicare Outpatient and Observation Notification regarding financial responsibility Given to patient/caregiver;Explained to patient/caregiver;Signed/date by patient/caregiver   Date ANDUJAR was signed 02/13/19   Time ANDUJAR was signed 1227

## 2019-02-13 NOTE — NURSING
VN note: VN cued into pt's room for introduction and to do admit questions. VN informed pt that VN would be working closely along side bedside nurse, PCT, and the rest of care team and making rounds throughout the shift. Pt verbalized understanding. Allowed time for questions. Patient denies any pain or discomfort at this time.   Patient educated on safety to call before getting up, because we don't want the patient to fall and harm themselves in any way. Patient also educated on pressure injuries and turning in the bed throughout the shift. Patient has family at the bedside.  VN will continue to be available to patient and intervene prn.

## 2019-02-13 NOTE — ED PROVIDER NOTES
Encounter Date: 2/13/2019    SCRIBE #1 NOTE: I, America Conway, am scribing for, and in the presence of,  Dr. Stevens. I have scribed the entire note.       History     Chief Complaint   Patient presents with    Rectal Bleeding     Patient presents to the ED with reports of having rectal bleeding that started at 2AM this morning. States having had x 3 episodes at home. Reports feeling weak with ambulation. Denies any chest pain or shortness of breath.      This is a 83 y.o. female who presents to the Emergency Department with a cc of 3 episodes of bloody stool since 02:00AM hours ago. Prior to each episode, she reports mild lower abdominal cramping that resolves after BM; no other eliciting, exacerbating, or alleviating factors reported. She reports associated dry mouth. Pt denies lightheadedness, dizziness, nausea, vomiting, chest pain, or shortness of breath. Patient reports a prior history of similar symptoms secondary to diverticulosis 10 years ago; per family, she was hospitalized for 1 week at that time where she received multiple blood transfusions, almost had a partial colectomy, but the bleeding resolved spontaneously and she has had no other symptoms since that time.       The history is provided by the patient.     Review of patient's allergies indicates:   Allergen Reactions    Iodine and iodide containing products     Sulfa (sulfonamide antibiotics) Hives     Past Medical History:   Diagnosis Date    Acute myocardial infarction involving left circumflex coronary artery 3/19/2018    Diabetes mellitus     Hyperlipemia     Hypertension      Past Surgical History:   Procedure Laterality Date    CORONARY ANGIOPLASTY WITH STENT PLACEMENT  03/19/2018     Family History   Problem Relation Age of Onset    No Known Problems Mother     Heart disease Father      Social History     Tobacco Use    Smoking status: Never Smoker   Substance Use Topics    Alcohol use: No    Drug use: No     Review of  Systems   Constitutional: Negative for chills, fatigue and fever.   HENT: Negative for facial swelling, trouble swallowing and voice change.         Positive for dry mouth.   Eyes: Negative for photophobia, pain and redness.   Respiratory: Negative for cough, choking and shortness of breath.    Cardiovascular: Negative for chest pain, palpitations and leg swelling.   Gastrointestinal: Positive for abdominal pain (cramping) and blood in stool. Negative for diarrhea, nausea and vomiting.   Genitourinary: Negative for dysuria, frequency and urgency.   Musculoskeletal: Negative for back pain, neck pain and neck stiffness.   Neurological: Negative for dizziness, seizures, speech difficulty, light-headedness, numbness and headaches.   All other systems reviewed and are negative.      Physical Exam     Initial Vitals [02/13/19 0547]   BP Pulse Resp Temp SpO2   (!) 214/85 92 18 97.5 °F (36.4 °C) 95 %      MAP       --         Physical Exam    Nursing note and vitals reviewed.  Constitutional: She appears well-developed and well-nourished. No distress.   HENT:   Head: Normocephalic and atraumatic.   Oropharynx clear; Dry MM   Eyes: Conjunctivae and EOM are normal. Pupils are equal, round, and reactive to light.   Neck: Normal range of motion. Neck supple. No tracheal deviation present.   Cardiovascular: Normal rate, regular rhythm, normal heart sounds and intact distal pulses.   Pulmonary/Chest: Breath sounds normal. No respiratory distress. She has no wheezes. She has no rhonchi. She has no rales.   Abdominal: Soft. Bowel sounds are normal. She exhibits no distension. There is no tenderness.   Musculoskeletal: Normal range of motion. She exhibits edema. She exhibits no tenderness.   1+ pitting edema to bilateral lower extremities.   Neurological: She is alert and oriented to person, place, and time. She has normal strength. No cranial nerve deficit or sensory deficit. GCS score is 15. GCS eye subscore is 4. GCS verbal  subscore is 5. GCS motor subscore is 6.   Skin: Skin is warm and dry. Capillary refill takes less than 2 seconds.   Psychiatric: She has a normal mood and affect.         ED Course   Critical Care  Date/Time: 2/13/2019 8:59 AM  Performed by: Giovanni Stevens MD  Authorized by: Giovanni Stevens MD   Direct patient critical care time: 15 minutes  Additional history critical care time: 15 minutes  Ordering / reviewing critical care time: 15 minutes  Documentation critical care time: 15 minutes  Consulting other physicians critical care time: 15 minutes  Consult with family critical care time: 10 minutes  Total critical care time (exclusive of procedural time) : 85 minutes  Critical care time was exclusive of separately billable procedures and treating other patients.  Critical care was necessary to treat or prevent imminent or life-threatening deterioration of the following conditions: circulatory failure.  Critical care was time spent personally by me on the following activities: discussions with consultants, evaluation of patient's response to treatment, obtaining history from patient or surrogate, ordering and review of laboratory studies, pulse oximetry, review of old charts, ordering and review of radiographic studies, ordering and performing treatments and interventions, re-evaluation of patient's condition, examination of patient and interpretation of cardiac output measurements.        Labs Reviewed   CBC W/ AUTO DIFFERENTIAL   COMPREHENSIVE METABOLIC PANEL   URINALYSIS   POCT GLUCOSE MONITORING CONTINUOUS     EKG Readings: (Independently Interpreted)   Initial Reading: No STEMI. Previous EKG: Compared with most recent EKG Previous EKG Date: 3/19/18 (Nonspecific change). Rhythm: Normal Sinus Rhythm. Heart Rate: 87. Ectopy: No Ectopy. Conduction: LAFP. ST Segments: Normal ST Segments. T Waves: Normal. Axis: Normal.       Imaging Results    None          Medical Decision Making:   Initial Assessment:   This  is a 83 y.o. female  who presents to the Emergency Department with a cc of 3 episodes of blood in stool since 02:00AM  Differential Diagnosis:   Blood loss anemia, hemorrhoids, gastroenteritis, diverticulosis v diverticulitis, inflammatory bowel disease  Independently Interpreted Test(s):   I have ordered and independently interpreted EKG Reading(s) - see prior notes  Clinical Tests:   Lab Tests: Reviewed       <> Summary of Lab: Somewhat decreased H/H compared to 03/18, otherwise benign  ED Management:  Patient remains asymptomatic with stable vital signs.  Has had no further bloody bowel movements.  I discussed the case with Dr. Washington with [facility], who states she will see the patient and discuss the case with staff and give recommendations afterward.    Doctor Felix is seen the patient.  Patient has not had a colonoscopy in several years, and doctor Washington and the GI staff prefer admission for observation and colonoscopy tomorrow to ensure that there is not a a more significant reason for the bleed.  Patient is comfortable with admission and I have order her some blood pressure medicines in the ER since her blood pressures up and she did not take her medicines this morning.  I have also discussed the case with [facility] who will see and admit the patient for observation.                      Clinical Impression:     1. Lower GI bleed    2. Weakness            Disposition:   Disposition: Placed in Observation  Condition: Stable    Scribe attestation:  I, Dr. Giovanni Stevens, personally performed the services described in this documentation. All medical record entries made by the scribe were at my direction and in my presence.  I have reviewed the chart and agree that the record reflects my personal performance and is accurate and complete. Giovanni Stevens MD.  9:02 AM 02/13/2019                        Giovanni Stevens MD  02/13/19 0902

## 2019-02-14 LAB
ALBUMIN SERPL BCP-MCNC: 3.2 G/DL
ALP SERPL-CCNC: 38 U/L
ALT SERPL W/O P-5'-P-CCNC: 10 U/L
ANION GAP SERPL CALC-SCNC: 9 MMOL/L
AST SERPL-CCNC: 16 U/L
BASOPHILS # BLD AUTO: 0.01 K/UL
BASOPHILS # BLD AUTO: 0.02 K/UL
BASOPHILS NFR BLD: 0.1 %
BASOPHILS NFR BLD: 0.2 %
BILIRUB SERPL-MCNC: 2 MG/DL
BLD PROD TYP BPU: NORMAL
BLOOD UNIT EXPIRATION DATE: NORMAL
BLOOD UNIT TYPE CODE: 6200
BLOOD UNIT TYPE: NORMAL
BUN SERPL-MCNC: 15 MG/DL
CALCIUM SERPL-MCNC: 8.8 MG/DL
CHLORIDE SERPL-SCNC: 108 MMOL/L
CO2 SERPL-SCNC: 26 MMOL/L
CODING SYSTEM: NORMAL
CREAT SERPL-MCNC: 0.7 MG/DL
DIFFERENTIAL METHOD: ABNORMAL
DISPENSE STATUS: NORMAL
EOSINOPHIL # BLD AUTO: 0 K/UL
EOSINOPHIL NFR BLD: 0 %
EOSINOPHIL NFR BLD: 0.4 %
ERYTHROCYTE [DISTWIDTH] IN BLOOD BY AUTOMATED COUNT: 14.7 %
ERYTHROCYTE [DISTWIDTH] IN BLOOD BY AUTOMATED COUNT: 14.9 %
ERYTHROCYTE [DISTWIDTH] IN BLOOD BY AUTOMATED COUNT: 15.1 %
ERYTHROCYTE [DISTWIDTH] IN BLOOD BY AUTOMATED COUNT: 15.1 %
ERYTHROCYTE [DISTWIDTH] IN BLOOD BY AUTOMATED COUNT: 15.2 %
EST. GFR  (AFRICAN AMERICAN): >60 ML/MIN/1.73 M^2
EST. GFR  (NON AFRICAN AMERICAN): >60 ML/MIN/1.73 M^2
GLUCOSE SERPL-MCNC: 158 MG/DL
HCT VFR BLD AUTO: 26.2 %
HCT VFR BLD AUTO: 27.4 %
HCT VFR BLD AUTO: 27.6 %
HCT VFR BLD AUTO: 30 %
HCT VFR BLD AUTO: 30.2 %
HGB BLD-MCNC: 8.3 G/DL
HGB BLD-MCNC: 8.8 G/DL
HGB BLD-MCNC: 8.8 G/DL
HGB BLD-MCNC: 9.5 G/DL
HGB BLD-MCNC: 9.6 G/DL
LYMPHOCYTES # BLD AUTO: 0.4 K/UL
LYMPHOCYTES # BLD AUTO: 0.5 K/UL
LYMPHOCYTES # BLD AUTO: 0.5 K/UL
LYMPHOCYTES # BLD AUTO: 0.6 K/UL
LYMPHOCYTES # BLD AUTO: 0.9 K/UL
LYMPHOCYTES NFR BLD: 11.1 %
LYMPHOCYTES NFR BLD: 4.2 %
LYMPHOCYTES NFR BLD: 4.2 %
LYMPHOCYTES NFR BLD: 5.3 %
LYMPHOCYTES NFR BLD: 6.1 %
MCH RBC QN AUTO: 26.4 PG
MCH RBC QN AUTO: 27 PG
MCH RBC QN AUTO: 27.1 PG
MCH RBC QN AUTO: 27.4 PG
MCH RBC QN AUTO: 27.5 PG
MCHC RBC AUTO-ENTMCNC: 31.5 G/DL
MCHC RBC AUTO-ENTMCNC: 31.7 G/DL
MCHC RBC AUTO-ENTMCNC: 31.9 G/DL
MCHC RBC AUTO-ENTMCNC: 32 G/DL
MCHC RBC AUTO-ENTMCNC: 32.1 G/DL
MCV RBC AUTO: 83 FL
MCV RBC AUTO: 85 FL
MCV RBC AUTO: 85 FL
MCV RBC AUTO: 86 FL
MCV RBC AUTO: 86 FL
MONOCYTES # BLD AUTO: 0.2 K/UL
MONOCYTES # BLD AUTO: 0.3 K/UL
MONOCYTES # BLD AUTO: 0.3 K/UL
MONOCYTES # BLD AUTO: 0.5 K/UL
MONOCYTES # BLD AUTO: 0.5 K/UL
MONOCYTES NFR BLD: 2.1 %
MONOCYTES NFR BLD: 3.4 %
MONOCYTES NFR BLD: 3.9 %
MONOCYTES NFR BLD: 4 %
MONOCYTES NFR BLD: 5 %
NEUTROPHILS # BLD AUTO: 10.3 K/UL
NEUTROPHILS # BLD AUTO: 7.1 K/UL
NEUTROPHILS # BLD AUTO: 8.4 K/UL
NEUTROPHILS # BLD AUTO: 8.4 K/UL
NEUTROPHILS # BLD AUTO: 8.5 K/UL
NEUTROPHILS NFR BLD: 84.3 %
NEUTROPHILS NFR BLD: 90.2 %
NEUTROPHILS NFR BLD: 90.5 %
NEUTROPHILS NFR BLD: 91.4 %
NEUTROPHILS NFR BLD: 92.4 %
PLATELET # BLD AUTO: 189 K/UL
PLATELET # BLD AUTO: 196 K/UL
PLATELET # BLD AUTO: 196 K/UL
PLATELET # BLD AUTO: 199 K/UL
PLATELET # BLD AUTO: 223 K/UL
PMV BLD AUTO: 10.3 FL
PMV BLD AUTO: 10.5 FL
PMV BLD AUTO: 10.6 FL
POCT GLUCOSE: 145 MG/DL (ref 70–110)
POCT GLUCOSE: 145 MG/DL (ref 70–110)
POCT GLUCOSE: 183 MG/DL (ref 70–110)
POCT GLUCOSE: 215 MG/DL (ref 70–110)
POTASSIUM SERPL-SCNC: 4.1 MMOL/L
PROT SERPL-MCNC: 5.9 G/DL
RBC # BLD AUTO: 3.07 M/UL
RBC # BLD AUTO: 3.21 M/UL
RBC # BLD AUTO: 3.33 M/UL
RBC # BLD AUTO: 3.49 M/UL
RBC # BLD AUTO: 3.5 M/UL
SODIUM SERPL-SCNC: 143 MMOL/L
TRANS ERYTHROCYTES VOL PATIENT: NORMAL ML
WBC # BLD AUTO: 11.23 K/UL
WBC # BLD AUTO: 8.47 K/UL
WBC # BLD AUTO: 9.05 K/UL
WBC # BLD AUTO: 9.28 K/UL
WBC # BLD AUTO: 9.38 K/UL

## 2019-02-14 PROCEDURE — C9113 INJ PANTOPRAZOLE SODIUM, VIA: HCPCS | Performed by: STUDENT IN AN ORGANIZED HEALTH CARE EDUCATION/TRAINING PROGRAM

## 2019-02-14 PROCEDURE — 94761 N-INVAS EAR/PLS OXIMETRY MLT: CPT

## 2019-02-14 PROCEDURE — 11000001 HC ACUTE MED/SURG PRIVATE ROOM

## 2019-02-14 PROCEDURE — 25000003 PHARM REV CODE 250: Performed by: STUDENT IN AN ORGANIZED HEALTH CARE EDUCATION/TRAINING PROGRAM

## 2019-02-14 PROCEDURE — 25000003 PHARM REV CODE 250: Performed by: RADIOLOGY

## 2019-02-14 PROCEDURE — 25000003 PHARM REV CODE 250: Performed by: HOSPITALIST

## 2019-02-14 PROCEDURE — 27000221 HC OXYGEN, UP TO 24 HOURS

## 2019-02-14 PROCEDURE — 63600175 PHARM REV CODE 636 W HCPCS: Performed by: RADIOLOGY

## 2019-02-14 PROCEDURE — 63600175 PHARM REV CODE 636 W HCPCS: Performed by: STUDENT IN AN ORGANIZED HEALTH CARE EDUCATION/TRAINING PROGRAM

## 2019-02-14 PROCEDURE — P9021 RED BLOOD CELLS UNIT: HCPCS

## 2019-02-14 PROCEDURE — 36430 TRANSFUSION BLD/BLD COMPNT: CPT

## 2019-02-14 PROCEDURE — 85025 COMPLETE CBC W/AUTO DIFF WBC: CPT

## 2019-02-14 PROCEDURE — 36415 COLL VENOUS BLD VENIPUNCTURE: CPT

## 2019-02-14 PROCEDURE — 80053 COMPREHEN METABOLIC PANEL: CPT

## 2019-02-14 RX ORDER — FENTANYL CITRATE 50 UG/ML
INJECTION, SOLUTION INTRAMUSCULAR; INTRAVENOUS CODE/TRAUMA/SEDATION MEDICATION
Status: COMPLETED | OUTPATIENT
Start: 2019-02-14 | End: 2019-02-14

## 2019-02-14 RX ORDER — HYDROCODONE BITARTRATE AND ACETAMINOPHEN 500; 5 MG/1; MG/1
TABLET ORAL
Status: DISCONTINUED | OUTPATIENT
Start: 2019-02-14 | End: 2019-02-14

## 2019-02-14 RX ORDER — LIDOCAINE HYDROCHLORIDE 10 MG/ML
INJECTION INFILTRATION; PERINEURAL CODE/TRAUMA/SEDATION MEDICATION
Status: COMPLETED | OUTPATIENT
Start: 2019-02-14 | End: 2019-02-14

## 2019-02-14 RX ORDER — MIDAZOLAM HYDROCHLORIDE 1 MG/ML
INJECTION INTRAMUSCULAR; INTRAVENOUS CODE/TRAUMA/SEDATION MEDICATION
Status: COMPLETED | OUTPATIENT
Start: 2019-02-14 | End: 2019-02-14

## 2019-02-14 RX ADMIN — CYANOCOBALAMIN TAB 1000 MCG 1000 MCG: 1000 TAB at 08:02

## 2019-02-14 RX ADMIN — AMLODIPINE BESYLATE 5 MG: 5 TABLET ORAL at 08:02

## 2019-02-14 RX ADMIN — LIDOCAINE HYDROCHLORIDE 10 ML: 10 INJECTION, SOLUTION INFILTRATION; PERINEURAL at 03:02

## 2019-02-14 RX ADMIN — METHYLPREDNISOLONE SODIUM SUCCINATE 20 MG: 40 INJECTION, POWDER, FOR SOLUTION INTRAMUSCULAR; INTRAVENOUS at 11:02

## 2019-02-14 RX ADMIN — PANTOPRAZOLE SODIUM 40 MG: 40 INJECTION, POWDER, FOR SOLUTION INTRAVENOUS at 08:02

## 2019-02-14 RX ADMIN — LISINOPRIL 40 MG: 20 TABLET ORAL at 08:02

## 2019-02-14 RX ADMIN — LABETALOL HYDROCHLORIDE 10 MG: 5 INJECTION, SOLUTION INTRAVENOUS at 04:02

## 2019-02-14 RX ADMIN — ATORVASTATIN CALCIUM 40 MG: 40 TABLET, FILM COATED ORAL at 08:02

## 2019-02-14 RX ADMIN — ASPIRIN 81 MG: 81 TABLET, COATED ORAL at 08:02

## 2019-02-14 RX ADMIN — FENTANYL CITRATE 50 MCG: 50 INJECTION, SOLUTION INTRAMUSCULAR; INTRAVENOUS at 03:02

## 2019-02-14 RX ADMIN — OXYBUTYNIN CHLORIDE 15 MG: 5 TABLET, EXTENDED RELEASE ORAL at 08:02

## 2019-02-14 RX ADMIN — INSULIN ASPART 2 UNITS: 100 INJECTION, SOLUTION INTRAVENOUS; SUBCUTANEOUS at 07:02

## 2019-02-14 RX ADMIN — METHYLPREDNISOLONE SODIUM SUCCINATE 20 MG: 40 INJECTION, POWDER, FOR SOLUTION INTRAMUSCULAR; INTRAVENOUS at 06:02

## 2019-02-14 RX ADMIN — METHYLPREDNISOLONE SODIUM SUCCINATE 20 MG: 40 INJECTION, POWDER, FOR SOLUTION INTRAMUSCULAR; INTRAVENOUS at 07:02

## 2019-02-14 RX ADMIN — MIDAZOLAM HYDROCHLORIDE 1 MG: 1 INJECTION, SOLUTION INTRAMUSCULAR; INTRAVENOUS at 03:02

## 2019-02-14 NOTE — PLAN OF CARE
0315: Patient transported to the IR with ICU nurse and IR nurse.  Family updated before transferring patient to the IR.     0450: Procedure completed. Vital signs monitored throughout the procedure. Patient tolerated well. Patient transported back to ICU. No c/o pain, right groin Incision site soft, no bleeding or hematoma noted at site. Right dorsalis pedis pulse 2+. Patient on bed rest to keep the right leg straight for 2 hours. IR MD updated son about the procedure and answered all the questions.     0500: Called primary team to verify about finishing the rest of the prep of Golytely as patient just came back from the IR. Primary team unsure about GI plans after the IR procedure. Will reconfirm with GI.

## 2019-02-14 NOTE — CONSULTS
[facility]  History & Physical - Short Stay  Interventional Radiology    SUBJECTIVE:     Chief Complaint/Reason for Admission: GI bleed    History of Present Illness:  Makenzie Haile is a 83 y.o. female with a history of GI bleed, right colon.    OBJECTIVE:     Vital Signs (Most Recent):  Temp: 98.8 °F (37.1 °C) (02/14/19 0115)  Pulse: 83 (02/14/19 0230)  Resp: (!) 22 (02/14/19 0230)  BP: (!) 177/66 (02/14/19 0130)  SpO2: 95 % (02/14/19 0230)    Physical Exam:  Awake, alert and oriented   In no acute distress  Pe,  Eomi  No labored breathing   Moves extremities spontaneously      ASSESSMENT/PLAN:     GI bleed, right colon    Angio/embolization      Chart documents Iodine allergy but patient underwent CTA, will clarify exact nature of allergy.     Sedation/Anesthesia Assessment:  ASA Classification: III = Severe systemic disease limiting activity  Mallampati Score: II (hard and soft palate, upper portion of tonsils anduvula visible)    Sedation History: no problems    Sedation Plan: moderate    Above allergy was possible hives to contract approx 40 years ago. Will monitor during procedure with benadryl as needed.

## 2019-02-14 NOTE — PLAN OF CARE
Problem: Bleeding (Gastrointestinal Bleeding)  Goal: Hemostasis  Outcome: Outcome(s) achieved Date Met: 02/14/19  Coil placed by the IR, no bloody BM, no c/o abdominal pain, right groin site soft, no bleeding or hemartoma noted, patient on bed rest for two hours to keep the right leg straight, fall precautions maintained, bed locked and in lowest position.

## 2019-02-14 NOTE — PLAN OF CARE
Spoke with Dr. Washington to verify about EGD and  Colonoscopy. No need to finish second part of  Golytely after the IR procedure but keep patient NPO until she discuss the case with the staff.

## 2019-02-14 NOTE — PLAN OF CARE
"Pt reports she lives with her spouse and has independent with use of a cane. Pt reports she still drives and her spouse or son can provide assistance and transportation as needed. TN discussed PCC follow up as Tn noted pt has been to PCC in past however pt stated, "It's too far . I want to see my doctor this time."  Tn gave pt d/c brochure and card and encouraged to call for any needs/concerns.     02/14/19 1372   Discharge Assessment   Assessment Type Discharge Planning Assessment   Confirmed/corrected address and phone number on facesheet? Yes   Assessment information obtained from? Patient   Expected Length of Stay (days) 2   Communicated expected length of stay with patient/caregiver yes   Prior to hospitilization cognitive status: Alert/Oriented   Prior to hospitalization functional status: Independent;Assistive Equipment   Current cognitive status: Alert/Oriented   Current Functional Status: Needs Assistance   Lives With spouse   Able to Return to Prior Arrangements yes   Is patient able to care for self after discharge? Yes   Who are your caregiver(s) and their phone number(s)? Pedro (spouse) [phone] or Esequiel (son) [phone]   Patient's perception of discharge disposition home or selfcare   Readmission Within the Last 30 Days no previous admission in last 30 days   Patient currently being followed by outpatient case management? No   Patient currently receives any other outside agency services? No   Equipment Currently Used at Home cane, straight  (pt reports she only uses cane but has rollator, w/c, bsc)   Do you have any problems affording any of your prescribed medications? No   Is the patient taking medications as prescribed? yes   Does the patient have transportation home? Yes   Transportation Anticipated family or friend will provide   Does the patient receive services at the [facility]? No   Discharge Plan A Home with family   Discharge Plan B Home with family;Home Health   DME Needed Upon " Discharge  none   Patient/Family in Agreement with Plan yes

## 2019-02-14 NOTE — OP NOTE
Interventional Radiology Procedure Note    Procedure: angiogram/embolization branch sma    Pre procedure diagnosis: gi bleed    Post procedure diagnosis: same    : MD Ana    Specimens removed: none     Estimated Blood Loss: 0 ml     Complications: none     Findings: small vascular blush seen in the same region as CTA. 2mm coils placed within the branch.

## 2019-02-14 NOTE — PROGRESS NOTES
"[facility] Hospital Medicine Progress Note    Primary Team: [facility] Hospitalist Team A  Attending Physician: Shey Burgess MD  Resident: Lesli  Intern: Kurtis    Subjective:      Patient doing well no recent bloody movements. Denies sob, chest pain, or dizziness.    Please see most recent plan of care note written by me last night; in summary: patient had a large bbm with vasgovagal symptoms. Underwent CTA, IR came in for procedure.      Objective:     Last 24 Hour Vital Signs:  BP  Min: 126/51  Max: 227/89  Temp  Av.6 °F (37 °C)  Min: 98 °F (36.7 °C)  Max: 99.1 °F (37.3 °C)  Pulse  Av.2  Min: 63  Max: 98  Resp  Av.2  Min: 16  Max: 50  SpO2  Av.8 %  Min: 91 %  Max: 99 %  Height  Av' 4" (162.6 cm)  Min: 5' 4" (162.6 cm)  Max: 5' 4" (162.6 cm)  Weight  Av.7 kg (213 lb 3.2 oz)  Min: 96.7 kg (213 lb 3.2 oz)  Max: 96.7 kg (213 lb 3.2 oz)  I/O last 3 completed shifts:  In:  [P.O.:853; Blood:260; IV Piggyback:1000]  Out: 181 [Urine:1810; Stool:1]    Physical Examination:  General appearance: alert, appears stated age and cooperative  Head: Normocephalic, without obvious abnormality, atraumatic  Eyes: PERRLA. Clear conjunctiva. No scleral icterus  Neck: no adenopathy and carotid bruit, bilaterally, that decreases as one auscultates up the neck.   Lungs: clear to auscultation bilaterally  Heart: RRR with no rubs or murmrus. Systolic murmur appreciated   Abdomen: soft, non-tender; bowel sounds normal; no masses,  no organomegaly  Extremities: extremities normal, atraumatic, no cyanosis or edema  Skin: Skin color, texture, turgor normal. No rashes or lesions except for buttocks which was erythremic and raw appearing.          Laboratory:  Laboratory Data Reviewed: yes  Pertinent Findings:  Awaiting labs    Microbiology Data Reviewed: yes  Pertinent Findings:  None    Other Results:    Radiology Data Reviewed: yes  Pertinent Findings:  Abdominal CTA showing acute GI bleed     Current Medications:     " Infusions:       Scheduled:   amLODIPine  5 mg Oral Daily    aspirin  81 mg Oral Daily    atorvastatin  40 mg Oral Daily    cyanocobalamin  1,000 mcg Oral Daily    lisinopril  40 mg Oral Daily    methylPREDNISolone sodium succinate  20 mg Intravenous Q6H    oxybutynin  15 mg Oral Daily    pantoprazole  40 mg Intravenous BID        PRN:  sodium chloride, dextrose 50%, glucagon (human recombinant), insulin aspart U-100, labetalol, omnipaque 350 iohexol, ondansetron    Antibiotics and Day Number of Therapy:  N/A    Lines and Day Number of Therapy:  piv    Assessment:     Makenzie Haile is a 83 y.o.female with  Patient Active Problem List    Diagnosis Date Noted    Normocytic anemia 02/13/2019    HLD (hyperlipidemia) 02/13/2019    Skin breakdown 02/13/2019    Lower GI bleed 02/13/2019    Iron deficiency anemia due to chronic blood loss 02/13/2019    Coronary artery disease involving native coronary artery of native heart without angina pectoris 04/19/2018    Neuropathy 03/27/2018    Acute myocardial infarction involving left circumflex coronary artery 03/19/2018    GERD (gastroesophageal reflux disease) 03/19/2018    Type 2 diabetes mellitus without complication, without long-term current use of insulin 03/19/2018    Essential hypertension 03/19/2018    Aortic stenosis 03/19/2018    Class 3 severe obesity due to excess calories with serious comorbidity and body mass index (BMI) of 40.0 to 44.9 in adult 03/19/2018    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   Trending H/H Q6 with morning CBC2 Large bore IVs; transfusion threshold at 8. Typed and screened  Holding plavix; continuing ASA.    9/14:   patient had a large bbm with vasgovagal symptoms. Underwent CTA, IR came in for procedure to embolize. Patient transfused one unit   Awaiting gi recs     Iron  deficiency anemia 2/2 acute on chronic gi bleed  H/H :10.5/33.1. MCV: 85  Iron studies ordered: awaiting Iron/TIBC. B12: 284, 15.4, 37  As above; patient with acute bleeding episode; Hgb: 8.7 from 10.5 in the AM. Transfused one unit. Hgb: 9.5.      HTN  Takes lisinopril 20 bid; transitioned to 40 mg qd and coreg 25;  Patient has been ; started amlodipine. PRN 10mg labetalol       CAD w/ PMH oh PCI w/ SURAJ to LCX  Patient received PCI in March 2018.   Followed by PCP and cards. Takes ASA and plavix. Holding Plavix and continuing ASA.      DM2  Takes metformin 1000 bid; have patient on SSI with hypoglycemic protocol.      HLD  Patient takes atorvastatin; continuing.     Overflow incontinence    Patient takes oxybutynin; continuing      Urinalysis with occasional WBCs  Asymptomatic. Will not treat per guidelines     HCM  A1C: 5.7  TSH: wnl  Immunizations up to date.      F: None; patient has been HTN  E: Awaiting cmp  N: NPO  DVTppx: Holding anticoagulation. SCDs  Dispo: Awaiting       Jacky Hull DO  \[facility]\"" Internal Medicine HO-I    \[facility]\"" Medicine Hospitalist Pager numbers:   \[facility]\"" Hospitalist Medicine Team A (Gilson/Aditya): [phone]  \[facility]\"" Hospitalist Medicine Team B (Ginger/Ran):  [phone]

## 2019-02-14 NOTE — PLAN OF CARE
Pt transferred from the floor to the ICU due to active GI bleeding and passing out. AAO x4, no c/o abdominal pain, BP high, PRN labetalol given, on room air, STAT abdomen and pelvis CT ordered, patient allergic to iodine, prophylaxis benadryl and methyl prednisone given prior to CT.    Dr. Bautista notified charge nurse Elayne the CT results of active GI bleeding in the cecum. Primary team notified by charge nurse. Plan to call the IR by the primary team.

## 2019-02-14 NOTE — PLAN OF CARE
"Medicine team in ICU speaking with family members of another patient when a code blue was called on Ms. Haile. Medicine team with ICU RNs rushed to bedside. At arrival, multiple RNs and RTs in hallway and at bedside. Several members of group stating code blue was not a code blue but should have been a rapid.    Immediately enter room to find patient sitting on bedside commode appearing embarrassed. Per her RN, patient was using restroom then "appeared to either lose consciousness or have a seizure" . Likely patient vaso-vagaled.  Patient was awake and aware of surroundings upon team's arrival to room, as above. Patient was slowly moved to bed with vitals taken showing: bp 162/106, hr 63, rr 22, and o2 sat 97%. Stat H/H ordered.    Spoke with [facility] fellow, Dr. Washington, who recommended calling IR for either CTA or tagged rbc study. Called IR who preferred STAT CTA abdomen. Patient with hx of iodine allergy; however,per chart review, patient underwent LHC with IV solumedrol and iv benadryl.  CTA of abdomen ordered. IR requested they be called when study performed so that they can assess for active bleed that would required immediate intervention. Stat H/H 8.7; down from 10.5 Patient consented for blood. Patient given iv solumedrol. CTA ordered and showing active bleed. One unit of prbcs ordered.    Awaiting IR response to finding.   "

## 2019-02-14 NOTE — PROGRESS NOTES
"[facility]    CC: Hematochezia    Interval History: Overnight, patient had bloody BM with vasovagal syncopal episode. CTA was then obtained suggesting bleeding in right colon. IR was consulted for embolization had 2mm coil placed in vascular bed which was bleeding. H/H had decreased to 8.7 from 10.5 and patient was given 1 unit PRBC .   Patient has since had 1 maroon BM, no further syncopal episodes. Denies any chest pain,SOB, abdominal pain, Nausea or vomiting.       Past Medical History:   Diagnosis Date    Acute myocardial infarction involving left circumflex coronary artery 3/19/2018    Diabetes mellitus     Hyperlipemia     Hypertension          Review of Systems  Gastrointestinal ROS: no abdominal pain, + bright red bloody BM     Physical Examination  BP (!) 144/63 (BP Location: Right arm, Patient Position: Lying)   Pulse 79   Temp 98.2 °F (36.8 °C) (Oral)   Resp (!) 21   Ht 5' 4" (1.626 m)   Wt 96.7 kg (213 lb 3.2 oz)   SpO2 99%   Breastfeeding? Unknown   BMI 36.60 kg/m²   General appearance: alert, cooperative, no distress  HENT: Normocephalic, atraumatic, neck symmetrical, no nasal discharge   Lungs: clear to auscultation bilaterally, no dullness to percussion bilaterally  Heart: regular rate and rhythm without rub; no displacement of the PMI   Abdomen: soft, non-tender; bowel sounds normoactive; no organomegaly    Labs:  Hb 10.5- > 8.7    Imaging:  CTA abdomen: Intraluminal contrast seen in right colon suggesting acute GI bleeding.    I have personally reviewed these images.     Assessment:   Ms. Haile is an 83 year old woman with likely recurrent diverticular bleed on plavix and ASA. Patient with recurrent bleed associated with vasovagal syncope overnight 12/13, underwent CTA abdomen showing right colon bleed, IR consulted and performed embolization.  If any further bleeding, may require surgical consultation for possible partial colectomy.      Plan:  - s/p IR embolization with 2mm " coil placed in area of bleed on CTA  -OK to stop IV PPI, OK to start advancing diet   -continue to monitor H/H for signs of re-bleed  -if remains stable, could step down from ICU

## 2019-02-14 NOTE — H&P
[facility] Medicine H&P Note     Admitting Team: [facility] Hospitalist Team A  Attending Physician: Shey Burgess MD  Resident: Lesli  Intern: Kurtis    Date of Admit: 2/13/2019    Chief Complaint     Rectal bleeding x4 episodes    Subjective:      History of Present Illness:  Makenzie Haile is a 83 y.o.  female who  has a past medical history of Acute myocardial infarction involving left circumflex coronary artery (3/19/2018), Diabetes mellitus, Hyperlipemia, and Hypertension.. The patient presented to [facility] on 2/13/2019 with a primary complaint of rectal bleeding.     The patient was in their usual state of health, independent of all ADLs (can cook for self, clean the house and go grocery shopping), when she began to experience cramping pain at 2 AM on the day of admission. Patient states that at that time she went to use the restroom and noticed some dark-red stool. She stated she went back to sleep and had another episode approximately 2 hours after the initial episode.  A few hours patient had another episode. Patient stated that she had one episode when she first arrived to the ED but had not had any more episodes.     Of note, patient did have a similar episode about 10-12 yrs ago; at that time patient was found to have diverticulitis and was transferred to [facility].    Patient denies abdominal pain, nausea and vomiting. Additionally patient denies unintentional wt loss, fevers, chills, night sweats, dysuria and hematuria.    Denies taking ibuprofen, naproxen, goody's powder and GC powder.    Past Medical History:  Past Medical History:   Diagnosis Date    Acute myocardial infarction involving left circumflex coronary artery 3/19/2018    Diabetes mellitus     Hyperlipemia     Hypertension        Past Surgical History:  Past Surgical History:   Procedure Laterality Date    CORONARY ANGIOPLASTY WITH STENT PLACEMENT  03/19/2018   Tonsillectomy, hysterectomy, and hernia  repair    Allergies:  Review of patient's allergies indicates:   Allergen Reactions    Iodine and iodide containing products     Sulfa (sulfonamide antibiotics) Hives       Home Medications:  Prior to Admission medications    Medication Sig Start Date End Date Taking? Authorizing Provider   aspirin (ECOTRIN) 81 MG EC tablet Take 1 tablet (81 mg total) by mouth once daily. 3/21/18 3/21/19 Yes RODERICK Colon ANP   atorvastatin (LIPITOR) 40 MG tablet Take 1 tablet (40 mg total) by mouth once daily. 3/20/18 3/20/19 Yes RODERICK Colon ANP   clopidogrel (PLAVIX) 75 mg tablet Take 1 tablet (75 mg total) by mouth once daily. 3/21/18 3/21/19 Yes RODERICK Colon ANP   lisinopril (PRINIVIL,ZESTRIL) 20 MG tablet Take 1 tablet (20 mg total) by mouth once daily. 6/6/18 6/6/19 Yes Nicole Donohue MD   metFORMIN (GLUCOPHAGE) 1000 MG tablet Take 1 tablet (1,000 mg total) by mouth 2 (two) times daily with meals. 6/6/18  Yes Nicole Donohue MD   metoprolol succinate (TOPROL-XL) 25 MG 24 hr tablet Take 1 tablet (25 mg total) by mouth once daily. 3/20/18 3/20/19 Yes RODERICK Colon ANP   oxybutynin (DITROPAN XL) 15 MG TR24 Take 1 tablet (15 mg total) by mouth once daily. 6/6/18  Yes Nicole Donohue MD   folic acid/multivit-min/lutein (CENTRUM SILVER ORAL) Take by mouth.    Historical Provider, MD   incontinence pad,liner,disp (BLADDER CONTROL PADS EX ABSORB MISC) bladder control pads    Historical Provider, MD   lancets (ACCU-CHEK FASTCLIX MISC) Accu-Chek FastClix    Historical Provider, MD   nitroGLYCERIN (NITROSTAT) 0.4 MG SL tablet Place 1 tablet (0.4 mg total) under the tongue every 5 (five) minutes as needed for Chest pain. 3/20/18 2/13/19  RODERICK Colon ANP       Family History:  Family History   Problem Relation Age of Onset    No Known Problems Mother     Heart disease Father        Social History:  Social History     Tobacco Use    Smoking status: Never Smoker   Substance  "Use Topics    Alcohol use: No    Drug use: No       Review of Systems:  Please see HPI.    All other systems are reviewed and are negative.    Health Maintaince :   Primary Care Physician: Tamela    Immunizations:   TDap UTD  Flu UTD  Pna UTD    Cancer Screening:  PAP: no longer indicated  MMG: no longer indicated  Colonoscopy: patient to undergo colonoscopy by GI tomorrow.       Objective:   Last 24 Hour Vital Signs:  BP  Min: 167/70  Max: 227/89  Temp  Av.3 °F (36.8 °C)  Min: 97.5 °F (36.4 °C)  Max: 99.1 °F (37.3 °C)  Pulse  Av.7  Min: 64  Max: 92  Resp  Av.9  Min: 16  Max: 24  SpO2  Av.2 %  Min: 94 %  Max: 97 %  Height  Av' 4" (162.6 cm)  Min: 5' 4" (162.6 cm)  Max: 5' 4" (162.6 cm)  Weight  Av.2 kg (216 lb 9.6 oz)  Min: 96.7 kg (213 lb 3.2 oz)  Max: 99.8 kg (220 lb)  Body mass index is 36.6 kg/m².  No intake/output data recorded.    Physical Examination:  General appearance: alert, appears stated age and cooperative  Head: Normocephalic, without obvious abnormality, atraumatic  Eyes: PERRLA. Clear conjunctiva. No scleral icterus  Neck: no adenopathy and carotid bruit, bilaterally, that decreases as one auscultates up the neck.   Lungs: clear to auscultation bilaterally  Heart: RRR with no rubs or murmrus. Systolic murmur appreciated   Abdomen: soft, non-tender; bowel sounds normal; no masses,  no organomegaly  Extremities: extremities normal, atraumatic, no cyanosis or edema  Skin: Skin color, texture, turgor normal. No rashes or lesions except for buttocks which was erythremic and raw appearing.   Neurologic: AAOx3. UE 5/5 bilaterally. LE 3/5 bilaterally. UE and LE sensation intact.    Rectal exam: maroon colored stool on SUN    Laboratory:  Most Recent Data:  CBC:   Lab Results   Component Value Date    WBC 7.02 2019    HGB 10.5 (L) 2019    HCT 33.1 (L) 2019     2019    MCV 85 2019    RDW 14.7 (H) 2019     WBC Differential: 75.6 % N,  " 15.8 % L, 6.7 % M, 1.6 % Eo, 0.3 % Baso,     BMP:   Lab Results   Component Value Date     02/13/2019    K 4.2 02/13/2019     02/13/2019    CO2 26 02/13/2019    BUN 28 (H) 02/13/2019    CREATININE 0.8 02/13/2019     (H) 02/13/2019    CALCIUM 9.6 02/13/2019    MG 1.5 (L) 03/19/2018    PHOS 2.9 03/19/2018     LFTs:   Lab Results   Component Value Date    PROT 6.6 02/13/2019    ALBUMIN 3.6 02/13/2019    BILITOT 0.9 02/13/2019    AST 16 02/13/2019    ALKPHOS 39 (L) 02/13/2019    ALT 10 02/13/2019     Coags:   Lab Results   Component Value Date    INR 1.1 02/13/2019     FLP:   Lab Results   Component Value Date    CHOL 150 03/19/2018    HDL 34 (L) 03/19/2018    LDLCALC 96.2 03/19/2018    TRIG 99 03/19/2018    CHOLHDL 22.7 03/19/2018     DM:   Lab Results   Component Value Date    HGBA1C 5.7 (H) 02/13/2019    HGBA1C 6.0 (H) 03/19/2018    LDLCALC 96.2 03/19/2018    CREATININE 0.8 02/13/2019     Thyroid:   Lab Results   Component Value Date    TSH 1.307 02/13/2019     Anemia:   Lab Results   Component Value Date    FERRITIN 37 02/13/2019    TENRLION04 284 02/13/2019    FOLATE 15.4 02/13/2019     Cardiac:   Lab Results   Component Value Date    TROPONINI 18.789 (H) 03/19/2018     (H) 04/23/2018     Urinalysis:   Lab Results   Component Value Date    LABURIN No significant growth 03/19/2018    COLORU Yellow 02/13/2019    SPECGRAV 1.010 02/13/2019    NITRITE Negative 02/13/2019    KETONESU Negative 02/13/2019    UROBILINOGEN Negative 02/13/2019    WBCUA 12 (H) 02/13/2019       Trended Lab Data:  Recent Labs   Lab 02/13/19  0618   WBC 7.02   HGB 10.5*   HCT 33.1*      MCV 85   RDW 14.7*      K 4.2      CO2 26   BUN 28*   CREATININE 0.8   *   PROT 6.6   ALBUMIN 3.6   BILITOT 0.9   AST 16   ALKPHOS 39*   ALT 10         Microbiology Data:  None    Other Results:  EKG (my interpretation): No ST elevations or ST depressions. No LVH.    Radiology:  Imaging Results    None               Assessment:     Makenzie Haile is a 83 y.o. female with a PMH of HTN, DM, HLD, PCI w/ SURAJ to LCX who presented to the ED with a CC of rectal bleeding. Patient seen by GI and will undergo EGD and colonoscopy. H/H Q6 with daily CBC. Transfusion threshold of ten. Patient doing well with no other acute complaints.        Plan:     Rectal bleeding   Patient with 4 episodes of maroon colored stool since morning of admission.   SUN + , maroon colored.   Consulted GI who will perform an EGD and colonoscopy in the AM.   Trending H/H Q6 with morning CBC2 Large bore IVs; transfusion threshold at 8. Typed and screened  Holding plavix; continuing ASA.       Normocytic anemia  H/H :10.5/33.1. MCV: 85  Iron studies ordered: awaiting Iron/TIBC. B12: 284, 15.4, 37  Awaiting iron as above, likely GENEVIEVE that will require ferrous sulfate.     HTN  Takes lisinopril 20 bid; transitioned to 40 mg qd and coreg 25;  Patient has been ; started amlodipine. PRN 10mg labetalol      CAD w/ PMH oh PCI w/ SURAJ to LCX  Patient received PCI in March 2018.   Followed by PCP and cards. Takes ASA and plavix. Holding Plavix and continuing ASA.     DM  Takes metformin 1000 bid; have patient on SSI with hypoglycemic protocol.     HLD  Patient takes atorvastatin; continuing.    Overflow incontinence    Patient takes oxybutynin; continuing     Urinalysis with occasional WBCs  Asymptomatic. Will not treat per guidelines    HCM  A1C: 5.7  TSH: wnl  Immunizations up to date.     F: None; patient has been HTN  E: None  N: Clear liquid diet with NPO at midnight  DVTppx: Holding anticoagulation. SCDs  Dispo: Colonoscopy and EGD.       Code Status:     Full    Jacky Hull,   \[facility]\"" Internal Medicine HO-I    \[facility]\"" Medicine Hospitalist Pager numbers:   \[facility]\"" Hospitalist Medicine Team A (Gilson/Aditya): [phone]  \[facility]\"" Hospitalist Medicine Team B (Ginger/Ran):  [phone]

## 2019-02-14 NOTE — CONSULTS
Patient seen over video, chart reviewed.   83 year old woman with new, severe painless hematochezia that occurred this morning with mild cramping. She had 3 episodes and notes some dark blood with clots. Since her coronary stenting last march for which she is now on ASA and plavix. She had a colonoscopy over 10 years ago and was told she had diverticulosis & was even being considered for surgical resection as they could not localize the bleed. However, bleeding stopped and has not recurred until now. She has not had any bloody bowel movements for the last 6 hours.      Past Medical History  Diverticulosis with bleeding  Obesity  HTN  DM2  CAD with stent in 3/2018     Past Surgical History  Hysterectomy  Other lower abdominal surgery she can't recall, maybe hernia repair?    Labs :  Hb 8.7   Ferritin 37   INR 1.1   Cr 0.8   B12 284   Urine WBC 12       RECS :  Patient looks stable as of now. No more GI bleed   - GI on board , planning for EGD/colonoscopy   - high dose IV PPI   - on ASA but off Plavix   - f/u CT abdomen/pelvis report   - if patient continues to bleed heavy, then might need surgical consultation STAT   - Hb Q6   - B12 < 400, so will supplement

## 2019-02-14 NOTE — NURSING
Pt was unresponsive for short period of time during pt having BM, 1L thin bright red bloody stool and urine noted, code called; Pulse +, pt had vagal down for a minute.  BP rechecked_ 162/106, 63, 22, 97%.   Pt is currently stable, will be transfer to ICU.

## 2019-02-15 VITALS
WEIGHT: 201.94 LBS | RESPIRATION RATE: 18 BRPM | OXYGEN SATURATION: 95 % | HEIGHT: 64 IN | BODY MASS INDEX: 34.48 KG/M2 | HEART RATE: 90 BPM | SYSTOLIC BLOOD PRESSURE: 133 MMHG | DIASTOLIC BLOOD PRESSURE: 58 MMHG | TEMPERATURE: 97 F

## 2019-02-15 LAB
ALBUMIN SERPL BCP-MCNC: 3.2 G/DL
ALP SERPL-CCNC: 30 U/L
ALT SERPL W/O P-5'-P-CCNC: 10 U/L
ANION GAP SERPL CALC-SCNC: 6 MMOL/L
AST SERPL-CCNC: 18 U/L
BASOPHILS # BLD AUTO: 0.01 K/UL
BASOPHILS NFR BLD: 0.1 %
BILIRUB SERPL-MCNC: 0.6 MG/DL
BUN SERPL-MCNC: 23 MG/DL
CALCIUM SERPL-MCNC: 8.9 MG/DL
CHLORIDE SERPL-SCNC: 106 MMOL/L
CO2 SERPL-SCNC: 27 MMOL/L
CREAT SERPL-MCNC: 0.8 MG/DL
DIFFERENTIAL METHOD: ABNORMAL
EOSINOPHIL # BLD AUTO: 0 K/UL
EOSINOPHIL NFR BLD: 0 %
ERYTHROCYTE [DISTWIDTH] IN BLOOD BY AUTOMATED COUNT: 15.1 %
ERYTHROCYTE [DISTWIDTH] IN BLOOD BY AUTOMATED COUNT: 15.1 %
ERYTHROCYTE [DISTWIDTH] IN BLOOD BY AUTOMATED COUNT: 15.2 %
EST. GFR  (AFRICAN AMERICAN): >60 ML/MIN/1.73 M^2
EST. GFR  (NON AFRICAN AMERICAN): >60 ML/MIN/1.73 M^2
GLUCOSE SERPL-MCNC: 157 MG/DL
HCT VFR BLD AUTO: 24 %
HCT VFR BLD AUTO: 24.7 %
HCT VFR BLD AUTO: 25 %
HGB BLD-MCNC: 7.5 G/DL
HGB BLD-MCNC: 7.8 G/DL
HGB BLD-MCNC: 7.9 G/DL
LYMPHOCYTES # BLD AUTO: 0.5 K/UL
LYMPHOCYTES # BLD AUTO: 0.6 K/UL
LYMPHOCYTES # BLD AUTO: 1.3 K/UL
LYMPHOCYTES NFR BLD: 5 %
LYMPHOCYTES NFR BLD: 5.3 %
LYMPHOCYTES NFR BLD: 9.6 %
MCH RBC QN AUTO: 26.6 PG
MCH RBC QN AUTO: 26.9 PG
MCH RBC QN AUTO: 27.1 PG
MCHC RBC AUTO-ENTMCNC: 31.2 G/DL
MCHC RBC AUTO-ENTMCNC: 31.3 G/DL
MCHC RBC AUTO-ENTMCNC: 32 G/DL
MCV RBC AUTO: 85 FL
MCV RBC AUTO: 85 FL
MCV RBC AUTO: 86 FL
MONOCYTES # BLD AUTO: 0.2 K/UL
MONOCYTES # BLD AUTO: 0.6 K/UL
MONOCYTES # BLD AUTO: 0.8 K/UL
MONOCYTES NFR BLD: 2.2 %
MONOCYTES NFR BLD: 5 %
MONOCYTES NFR BLD: 5.9 %
NEUTROPHILS # BLD AUTO: 10.8 K/UL
NEUTROPHILS # BLD AUTO: 11.3 K/UL
NEUTROPHILS # BLD AUTO: 9.7 K/UL
NEUTROPHILS NFR BLD: 84.2 %
NEUTROPHILS NFR BLD: 89.4 %
NEUTROPHILS NFR BLD: 92.4 %
PLATELET # BLD AUTO: 198 K/UL
PLATELET # BLD AUTO: 205 K/UL
PLATELET # BLD AUTO: 211 K/UL
PMV BLD AUTO: 10.2 FL
PMV BLD AUTO: 10.7 FL
PMV BLD AUTO: 10.7 FL
POCT GLUCOSE: 126 MG/DL (ref 70–110)
POCT GLUCOSE: 159 MG/DL (ref 70–110)
POTASSIUM SERPL-SCNC: 4.1 MMOL/L
PROT SERPL-MCNC: 5.7 G/DL
RBC # BLD AUTO: 2.79 M/UL
RBC # BLD AUTO: 2.92 M/UL
RBC # BLD AUTO: 2.93 M/UL
SODIUM SERPL-SCNC: 139 MMOL/L
WBC # BLD AUTO: 10.51 K/UL
WBC # BLD AUTO: 12.04 K/UL
WBC # BLD AUTO: 13.46 K/UL

## 2019-02-15 PROCEDURE — 25000003 PHARM REV CODE 250: Performed by: HOSPITALIST

## 2019-02-15 PROCEDURE — 80053 COMPREHEN METABOLIC PANEL: CPT

## 2019-02-15 PROCEDURE — 94761 N-INVAS EAR/PLS OXIMETRY MLT: CPT

## 2019-02-15 PROCEDURE — 85025 COMPLETE CBC W/AUTO DIFF WBC: CPT | Mod: 91

## 2019-02-15 PROCEDURE — 36415 COLL VENOUS BLD VENIPUNCTURE: CPT

## 2019-02-15 PROCEDURE — 63600175 PHARM REV CODE 636 W HCPCS: Performed by: STUDENT IN AN ORGANIZED HEALTH CARE EDUCATION/TRAINING PROGRAM

## 2019-02-15 PROCEDURE — 25000003 PHARM REV CODE 250: Performed by: STUDENT IN AN ORGANIZED HEALTH CARE EDUCATION/TRAINING PROGRAM

## 2019-02-15 PROCEDURE — C9113 INJ PANTOPRAZOLE SODIUM, VIA: HCPCS | Performed by: STUDENT IN AN ORGANIZED HEALTH CARE EDUCATION/TRAINING PROGRAM

## 2019-02-15 RX ORDER — CARVEDILOL 25 MG/1
25 TABLET ORAL DAILY
Status: DISCONTINUED | OUTPATIENT
Start: 2019-02-15 | End: 2019-02-15 | Stop reason: HOSPADM

## 2019-02-15 RX ORDER — CARVEDILOL 25 MG/1
25 TABLET ORAL 2 TIMES DAILY
Status: DISCONTINUED | OUTPATIENT
Start: 2019-02-15 | End: 2019-02-15

## 2019-02-15 RX ORDER — LISINOPRIL 20 MG/1
40 TABLET ORAL DAILY
Qty: 30 TABLET | Refills: 0 | Status: SHIPPED | OUTPATIENT
Start: 2019-02-15 | End: 2023-03-23

## 2019-02-15 RX ORDER — CARVEDILOL 25 MG/1
25 TABLET ORAL 2 TIMES DAILY
Qty: 60 TABLET | Refills: 11 | Status: ON HOLD | OUTPATIENT
Start: 2019-02-15 | End: 2019-02-19 | Stop reason: SDUPTHER

## 2019-02-15 RX ORDER — DOCUSATE SODIUM 100 MG/1
100 CAPSULE, LIQUID FILLED ORAL 2 TIMES DAILY
Refills: 0 | COMMUNITY
Start: 2019-02-15 | End: 2019-04-18

## 2019-02-15 RX ORDER — FERROUS SULFATE 325(65) MG
325 TABLET ORAL
Refills: 0 | COMMUNITY
Start: 2019-02-15

## 2019-02-15 RX ADMIN — ATORVASTATIN CALCIUM 40 MG: 40 TABLET, FILM COATED ORAL at 09:02

## 2019-02-15 RX ADMIN — LISINOPRIL 40 MG: 20 TABLET ORAL at 05:02

## 2019-02-15 RX ADMIN — PANTOPRAZOLE SODIUM 40 MG: 40 INJECTION, POWDER, FOR SOLUTION INTRAVENOUS at 09:02

## 2019-02-15 RX ADMIN — ASPIRIN 81 MG: 81 TABLET, COATED ORAL at 09:02

## 2019-02-15 RX ADMIN — OXYBUTYNIN CHLORIDE 15 MG: 5 TABLET, EXTENDED RELEASE ORAL at 09:02

## 2019-02-15 RX ADMIN — AMLODIPINE BESYLATE 5 MG: 5 TABLET ORAL at 09:02

## 2019-02-15 RX ADMIN — CARVEDILOL 25 MG: 25 TABLET, FILM COATED ORAL at 09:02

## 2019-02-15 RX ADMIN — CYANOCOBALAMIN TAB 1000 MCG 1000 MCG: 1000 TAB at 09:02

## 2019-02-15 NOTE — DISCHARGE INSTRUCTIONS
Anemia (English) View Edit Remove   Diabetes, General Information (English) View Edit Remove   Diabetes, Healthy Meals for (English) View Edit Remove   Diabetes, Long-Term Complications (English) View Edit Remove   Diabetes, Type 2, Understanding (English) View Edit Remove   High Blood Pressure (Hypertension), Discharge Instructions (English) View Edit Remove   Gastrointestinal (GI) Bleeding, When You Have (English) View Edit Remove

## 2019-02-15 NOTE — PLAN OF CARE
Problem: Adult Inpatient Plan of Care  Goal: Plan of Care Review  Patient on oxygen with documented flow.  Will attempt to wean per O2 order protocol.

## 2019-02-15 NOTE — NURSING
Received patient upon rounds, 1905H, Seen patient in bed, on sitting position. Conscious , coherent to time, place date, and situation, with saline lock on the left arm gauge 18 , and right hand gauge 18 flushed patent, with clean and dry dressing.Patient has no subjective complaint of any pain, afebrile, stable VS. Telemetry Normal Sinus Rhythm (60's). Oxygen saturation 97% on room air.Patient had one BM around 2100H, moderate amount of dark red loose to liquid stool,  informed. Will monitor CBC every 6 hours.  Advised patient to call for any assistance. Advised to be on bed rest as ordered for now. Blood sugar monitored.Safety fall precaution measures noted, Bed alarm ON. Call bell with in reach. Will continue to monitor patient.

## 2019-02-15 NOTE — PROGRESS NOTES
"[facility]    CC: Hematochezia    Interval History: No further bleeding per patient, feels as though she is about to have a BM. Hb has remained stable and patient has not required any further blood transfusions. Denies any nausea, vomiting, abdominal pain.       Past Medical History:   Diagnosis Date    Acute myocardial infarction involving left circumflex coronary artery 3/19/2018    Diabetes mellitus     Hyperlipemia     Hypertension          Review of Systems  Gastrointestinal ROS: no abdominal pain, no further hematochezia     Physical Examination  BP (!) 154/67   Pulse (!) 58   Temp 97.6 °F (36.4 °C)   Resp 17   Ht 5' 4" (1.626 m)   Wt 91.6 kg (201 lb 15.1 oz)   SpO2 (!) 93%   Breastfeeding? Unknown   BMI 34.66 kg/m²   General appearance: alert, cooperative, no distress  HENT: Normocephalic, atraumatic, neck symmetrical, no nasal discharge   Lungs: clear to auscultation bilaterally, no dullness to percussion bilaterally  Heart: regular rate and rhythm without rub; no displacement of the PMI   Abdomen: soft, non-tender; bowel sounds normoactive; no organomegaly    Labs:  Hb 10.5- > 8.7->7.9-> 7.8    Imaging:  CTA abdomen: Intraluminal contrast seen in right colon suggesting acute GI bleeding.    I have personally reviewed these images.     Assessment:   Ms. Haile is an 83 year old woman with likely recurrent diverticular bleed on plavix and ASA. Patient with recurrent bleed associated with vasovagal syncope overnight 12/13, underwent CTA abdomen showing right colon bleed, IR consulted and performed embolization.  If any further bleeding, may require surgical consultation for possible partial colectomy.      Plan:  - s/p IR embolization with 2mm coil placed in area of bleed on CTA 2/14  -is tolerating PO diet.  -Hb has remained stable, OK from our standpoint discharge home and resume ASA/plavix         "

## 2019-02-15 NOTE — NURSING
Pt arrived from ICU. Telemetry monitor placed on patient. Bed in low and locked position. Bed alarm on. Call bell within reach. Family at bedside. Will continue to monitor.

## 2019-02-15 NOTE — PROGRESS NOTES
[facility] Medicine Progress Note    Primary Team: [facility] Hospitalist Team A  Attending Physician: Shey Burgess MD  Resident: Lesil  Intern: Kurtis    Subjective:      Patient with one dark red loose stool overnight. Patient denies any dizziness, lightheadedness, weakness, abdominal pain. She has tolerated her food. She has no complaints at this time.      Objective:     Last 24 Hour Vital Signs:  BP  Min: 113/58  Max: 202/79  Temp  Av.6 °F (36.4 °C)  Min: 96.9 °F (36.1 °C)  Max: 98.2 °F (36.8 °C)  Pulse  Av.9  Min: 53  Max: 103  Resp  Av  Min: 15  Max: 24  SpO2  Av.8 %  Min: 91 %  Max: 100 %  Weight  Av.6 kg (201 lb 15.1 oz)  Min: 91.6 kg (201 lb 15.1 oz)  Max: 91.6 kg (201 lb 15.1 oz)  I/O last 3 completed shifts:  In: 460 [P.O.:200; Blood:260]  Out: 1785 [Urine:1785]    Physical Examination:  General appearance: alert, appears stated age and cooperative  Head: Normocephalic, without obvious abnormality, atraumatic  Eyes: PERRLA. Clear conjunctiva. No scleral icterus  Neck: no adenopathy and carotid bruit, bilaterally, that decreases as one auscultates up the neck.   Lungs: clear to auscultation bilaterally  Heart: RRR with no rubs or murmrus. Systolic murmur appreciated at RUSB radiating to carotids    Abdomen: soft, non-tender; bowel sounds normal; no masses,  no organomegaly  Extremities: extremities normal, atraumatic, no cyanosis or edema  Skin: Skin color, texture, turgor normal. No rashes or lesions    Laboratory:  Laboratory Data Reviewed: yes  Pertinent Findings:  Trended Lab Data:  Recent Labs   Lab 19  0618  19  0603  19  1734 19  2342 02/15/19  0539   WBC 7.02   < > 9.38   < > 9.28 10.51 12.04   HGB 10.5*   < > 9.5*   < > 8.3* 7.9* 7.8*   HCT 33.1*   < > 30.2*   < > 26.2* 24.7* 25.0*      < > 189   < > 223 198 205   MCV 85   < > 86   < > 85 85 85   RDW 14.7*   < > 15.1*   < > 15.2* 15.1* 15.1*     --  143  --   --   --  139   K 4.2  --   4.1  --   --   --  4.1     --  108  --   --   --  106   CO2 26  --  26  --   --   --  27   BUN 28*  --  15  --   --   --  23   CREATININE 0.8  --  0.7  --   --   --  0.8   *  --  158*  --   --   --  157*   PROT 6.6  --  5.9*  --   --   --  5.7*   ALBUMIN 3.6  --  3.2*  --   --   --  3.2*   BILITOT 0.9  --  2.0*  --   --   --  0.6   AST 16  --  16  --   --   --  18   ALKPHOS 39*  --  38*  --   --   --  30*   ALT 10  --  10  --   --   --  10    < > = values in this interval not displayed.     Microbiology Data Reviewed: yes  Pertinent Findings:  None    Other Results:    Radiology Data Reviewed: yes  Pertinent Findings:  2/13 Abdominal CTA showing acute GI bleed     Current Medications:     Infusions:       Scheduled:   amLODIPine  5 mg Oral Daily    aspirin  81 mg Oral Daily    atorvastatin  40 mg Oral Daily    cyanocobalamin  1,000 mcg Oral Daily    lisinopril  40 mg Oral Daily    oxybutynin  15 mg Oral Daily    pantoprazole  40 mg Intravenous BID        PRN:  dextrose 50%, glucagon (human recombinant), insulin aspart U-100, ondansetron    Antibiotics and Day Number of Therapy:  N/A    Assessment:     Makenzie Haile is a 83 y.o.female with  Patient Active Problem List    Diagnosis Date Noted    Acute blood loss anemia     Acute upper GI bleed     Normocytic anemia 02/13/2019    HLD (hyperlipidemia) 02/13/2019    Skin breakdown 02/13/2019    Lower GI bleed 02/13/2019    Iron deficiency anemia due to chronic blood loss 02/13/2019    Coronary artery disease involving native coronary artery of native heart without angina pectoris 04/19/2018    Neuropathy 03/27/2018    Acute myocardial infarction involving left circumflex coronary artery 03/19/2018    GERD (gastroesophageal reflux disease) 03/19/2018    Type 2 diabetes mellitus without complication, without long-term current use of insulin 03/19/2018    Essential hypertension 03/19/2018    Aortic stenosis 03/19/2018    Class 3 severe  obesity due to excess calories with serious comorbidity and body mass index (BMI) of 40.0 to 44.9 in adult 03/19/2018    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   Trending H/H Q6 with morning CBC; 2 Large bore IVs; transfusion threshold at 8. Typed and screened  Holding plavix; continuing ASA.    9/14:   patient had a large bbm with vasgovagal symptoms. Underwent CTA, IR came in for procedure to embolize. Patient transfused one unit   - H/H down trending but stable, 8.3 -> 7.9 -> 7.8, no transfusion at this time, will continue to monitor      Iron deficiency anemia 2/2 acute on chronic gi bleed  H/H :10.5/33.1. MCV: 85  Iron studies ordered: awaiting Iron/TIBC. B12: 284, 15.4, 37  As above; patient with acute bleeding episode; Hgb: 8.7 from 10.5 in the AM. Transfused one unit. Hgb: 9.5.      HTN  Takes lisinopril 20 bid; transitioned to 40 mg qd and coreg 25;  Patient has been ; started amlodipine 5mg.   - coreg added today, will monitor      CAD w/ PMH oh PCI w/ SURAJ to LCX  Patient received PCI in March 2018.   Followed by PCP and cards. Takes ASA and plavix. Holding Plavix and continuing ASA.      DM2  - hb a1c 5.7  - Takes metformin 1000 bid; have patient on SSI with hypoglycemic protocol.       HLD  Patient takes atorvastatin; continuing.     Overflow incontinence    Patient takes oxybutynin; continuing      Urinalysis with occasional WBCs  Asymptomatic. Will not treat per guidelines     HCM  A1C: 5.7  TSH: wnl  Immunizations up to date.      F: None; patient has been HTN  E: Awaiting cmp  N: NPO  DVTppx: Holding anticoagulation. SCDs  Dispo: Monitoring H/H       Robinson Rizzo MD  [facility] Internal Medicine/Pediatrics HO-I    [facility] Medicine Hospitalist Pager numbers:   [facility] Hospitalist Medicine Team A (Gilson/Aditya): [phone]  [facility] Hospitalist Medicine Team B  (Ginger/Ran):  [phone]

## 2019-02-15 NOTE — PLAN OF CARE
Problem: Adult Inpatient Plan of Care  Goal: Plan of Care Review  Outcome: Ongoing (interventions implemented as appropriate)  Patient's blood pressure in the morning was on the high side, Dr. Genao informed may give lisinopril dose early. Afebrile.Not in any pain. One episode of moderate amount dark red loose to liquid stool last night. Purewick external catheter changed last night, intact. No nausea no vomiting over the night. Free from fall. IV line patent. Telemetry no ectopy.Will endorse patient to day shift Nurse.

## 2019-02-15 NOTE — DISCHARGE SUMMARY
[facility] Hospital Medicine Discharge Summary    Primary Team: [facility] Hospitalist Team A   Attending Physician: Shey Burgess MD  Resident: Dr. Ballesteros   Intern: Dr. 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    Neuropathy    Coronary artery disease involving native coronary artery of native heart without angina pectoris    Normocytic anemia    HLD (hyperlipidemia)    Skin breakdown    Lower GI bleed    Iron deficiency anemia due to chronic blood loss    Acute blood loss anemia    Acute upper GI bleed       Consultants and Procedures     Consultants:  Gastroenterology   Interventional Radiology   Intensive Care     Procedures:   Angiogram/Embolization branch of SMA - 2mm coils placed within branch     Imaging:  CTA abdomen and pelvis (2/13/19)   The visualized portion of the heart is unremarkable.  The lung bases are clear.  Small right hepatic lobe cyst is seen.  There is no intra-or extrahepatic biliary ductal dilatation.  The gallbladder is unremarkable.  The stomach, pancreas, spleen, and adrenal glands are unremarkable.  Kidneys are functioning.  Prominent left extrarenal pelvis is seen.  No definite hydronephrosis.  Right renal cyst is visualized.  Ureters are unremarkable along their courses.  Urinary bladder is unremarkable.  Uterus has been removed.  Appendix is visualized and is unremarkable.  The visualized loops of small and  large bowel show no evidence of obstruction or inflammation.  Sigmoid colon is tortuous.  Scattered colonic diverticula are seen.  There is intraluminal contrast accumulation seen within the right colon on arterial and delayed phase consistent with active GI bleeding.  No free air or free fluid.  Aorta tapers normally with moderate atherosclerosis.  No acute osseous abnormality identified.  Degenerative changes are seen.  Subcutaneous soft tissue structures are unremarkable.      Brief History of Present Illness      Makenzie Haile is a 83 y.o.  female who  has a past medical history of Acute myocardial infarction involving left circumflex coronary artery (3/19/2018), Diabetes mellitus, Hyperlipemia, and Hypertension.. The patient presented to [facility] on 2/13/2019 with a primary complaint of rectal bleeding.      The patient was in their usual state of health, independent of all ADLs (can cook for self, clean the house and go grocery shopping), when she began to experience cramping pain at 2 AM on the day of admission. Patient states that at that time she went to use the restroom and noticed some dark-red stool. Had 2 more episodes and 1 more while in ED.   Of note, patient did have a similar episode about 10-12 yrs ago; at that time patient was found to have diverticulitis and was transferred to [facility].  Patient denies abdominal pain, nausea and vomiting. Additionally patient denies unintentional wt loss, fevers, chills, night sweats, dysuria and hematuria.  Denies taking ibuprofen, naproxen, goody's powder and GC powder.    For the full HPI please refer to the History & Physical from this admission.    Hospital Course By Problem with Pertinent Findings     Rectal bleeding   Patient with 4 episodes of maroon colored stool since morning of admission.   Trending H/H Q6 with morning CBC; 2 Large bore IVs; transfusion threshold at 8. Typed and screened  Holding plavix; continuing ASA.   2/14:  patient had a large bbm with vasgovagal symptoms. Underwent CTA, IR came in for procedure to embolize. Patient transfused one unit   - H/H down trending but stable, 8.3 -> 7.9 -> 7.8, no transfusion needed at this time  - pt stable with no weakness, dizziness, ready for discharge   - follow up with GI in 2 weeks      Iron deficiency anemia 2/2 acute on chronic gi bleed  H/H :10.5/33.1. MCV: 85  Iron studies ordered: awaiting Iron/TIBC. B12: 284, 15.4, 37  As above; patient with acute bleeding episode; Hgb: 8.7 from 10.5 in the AM. Transfused one unit. Hgb: 9.5, 7.9 at discharge  - Follow up with PCP in 2 weeks for recheck   - start ferrous sulfate 325mg with colace daily      HTN  -Takes lisinopril 20 bid and toprol XL 25 at home  - switched to lisinopril 40 mg qd and coreg 25mg BID, will discharge on this regimen; discontinue toprol XL     CAD w/ PMH oh PCI w/ SURAJ to LCX  Patient received PCI in March 2018.   Followed by PCP and cards. Takes ASA and plavix. Holding Plavix and continuing ASA.   - can continue both at discharge      DM2  - hb a1c 5.7  - Takes metformin 1000 bid; have patient on SSI with hypoglycemic protocol.       HLD  Patient takes atorvastatin; continuing.     Overflow incontinence    Patient takes oxybutynin; continuing      Urinalysis with occasional WBCs  Asymptomatic. Will not treat per guidelines     HCM  A1C: 5.7  TSH: wnl  Immunizations up to date.         Discharge Medications      Miranda Haile   Home Medication Instructions GUSTAVO:[id number]    Printed on:02/15/19 0973   Medication Information                      aspirin (ECOTRIN) 81 MG EC tablet  Take 1 tablet (81 mg total) by mouth once daily.             atorvastatin (LIPITOR) 40 MG tablet  Take 1 tablet (40 mg total) by mouth once daily.             carvedilol (COREG) 25 MG tablet  Take 1 tablet (25 mg total) by mouth 2 (two) times daily.             clopidogrel (PLAVIX) 75 mg tablet  Take 1 tablet (75 mg total) by mouth once daily.              docusate sodium (COLACE) 100 MG capsule  Take 1 capsule (100 mg total) by mouth 2 (two) times daily.             ferrous sulfate (FEOSOL) 325 mg (65 mg iron) Tab tablet  Take 1 tablet (325 mg total) by mouth daily with breakfast.             folic acid/multivit-min/lutein (CENTRUM SILVER ORAL)  Take by mouth.             incontinence pad,liner,disp (BLADDER CONTROL PADS EX ABSORB MISC)  bladder control pads             lancets (ACCU-CHEK FASTCLIX MISC)  Accu-Chek FastClix             lisinopril (PRINIVIL,ZESTRIL) 20 MG tablet  Take 2 tablets (40 mg total) by mouth once daily.             metFORMIN (GLUCOPHAGE) 1000 MG tablet  Take 1 tablet (1,000 mg total) by mouth 2 (two) times daily with meals.             oxybutynin (DITROPAN XL) 15 MG TR24  Take 1 tablet (15 mg total) by mouth once daily.                 Discharge Information:   Diet:  Diabetic     Physical Activity:  As tolerated              Instructions:  1. Take all medications as prescribed  2. Keep all follow-up appointments  3. Return to the hospital or call your primary care physicians if any worsening symptoms such as fever, chest pain, shortness of breath, return of symptoms, or any other concerns.    Follow-Up Appointments:  Follow up with PCP 2/22/19 with a CBC to check Hb, BP check to monitor for HTN as changed medications   Follow up with GI, will call with appointment     Robinson Rizzo MD  [facility]/Pediatrics, HO-1

## 2019-02-15 NOTE — PLAN OF CARE
Pt with  and son at bedside that will transport her home.  Appts scheduled.  She uses a cane at home to ambulate.  No other needs identified.         02/15/19 1316   Final Note   Assessment Type Final Discharge Note   Anticipated Discharge Disposition Home   Hospital Follow Up  Appt(s) scheduled? Yes   Discharge plans and expectations educations in teach back method with documentation complete? Yes   Right Care Referral Info   Post Acute Recommendation No Care       Follow up with MD Rona Vazquez from Dr. Meyer office will call you with appt date and time.  [address]   [facility] [place] [address]   [phone]     Feb22 Follow up with Farhat Rapp MD   Friday Feb 22, 2019   9:30am  [facility]

## 2019-02-16 ENCOUNTER — HOSPITAL ENCOUNTER (INPATIENT)
Facility: HOSPITAL | Age: 84
LOS: 5 days | Discharge: HOME HEALTH | DRG: 378 | End: 2019-02-21
Attending: EMERGENCY MEDICINE | Admitting: INTERNAL MEDICINE
Payer: MEDICARE

## 2019-02-16 ENCOUNTER — NURSE TRIAGE (OUTPATIENT)
Dept: ADMINISTRATIVE | Facility: CLINIC | Age: 84
End: 2019-02-16

## 2019-02-16 DIAGNOSIS — I25.10 CORONARY ARTERY DISEASE INVOLVING NATIVE CORONARY ARTERY OF NATIVE HEART WITHOUT ANGINA PECTORIS: ICD-10-CM

## 2019-02-16 DIAGNOSIS — E11.9 TYPE 2 DIABETES MELLITUS WITHOUT COMPLICATION, WITHOUT LONG-TERM CURRENT USE OF INSULIN: ICD-10-CM

## 2019-02-16 DIAGNOSIS — D50.0 IRON DEFICIENCY ANEMIA DUE TO CHRONIC BLOOD LOSS: ICD-10-CM

## 2019-02-16 DIAGNOSIS — R79.89 ELEVATED TROPONIN: ICD-10-CM

## 2019-02-16 DIAGNOSIS — K92.2 LOWER GI BLEED: Primary | ICD-10-CM

## 2019-02-16 DIAGNOSIS — D64.9 SYMPTOMATIC ANEMIA: ICD-10-CM

## 2019-02-16 DIAGNOSIS — N39.490 OVERFLOW INCONTINENCE OF URINE: ICD-10-CM

## 2019-02-16 DIAGNOSIS — Z95.5 PRESENCE OF DRUG-ELUTING STENT IN LEFT CIRCUMFLEX CORONARY ARTERY: ICD-10-CM

## 2019-02-16 DIAGNOSIS — I10 ESSENTIAL HYPERTENSION: Chronic | ICD-10-CM

## 2019-02-16 DIAGNOSIS — E78.2 MIXED HYPERLIPIDEMIA: ICD-10-CM

## 2019-02-16 DIAGNOSIS — Z87.19 HISTORY OF GI BLEED: ICD-10-CM

## 2019-02-16 DIAGNOSIS — R01.1 SYSTOLIC MURMUR: ICD-10-CM

## 2019-02-16 DIAGNOSIS — R53.81 PHYSICAL DECONDITIONING: ICD-10-CM

## 2019-02-16 DIAGNOSIS — K57.31 DIVERTICULOSIS OF LARGE INTESTINE WITH HEMORRHAGE: ICD-10-CM

## 2019-02-16 DIAGNOSIS — R53.1 WEAKNESS: ICD-10-CM

## 2019-02-16 DIAGNOSIS — N17.9 AKI (ACUTE KIDNEY INJURY): ICD-10-CM

## 2019-02-16 DIAGNOSIS — K92.1 GASTROINTESTINAL HEMORRHAGE WITH MELENA: ICD-10-CM

## 2019-02-16 DIAGNOSIS — R00.1 BRADYCARDIA: ICD-10-CM

## 2019-02-16 LAB
ABO + RH BLD: NORMAL
ALBUMIN SERPL BCP-MCNC: 3.3 G/DL
ALP SERPL-CCNC: 35 U/L
ALT SERPL W/O P-5'-P-CCNC: 13 U/L
ANION GAP SERPL CALC-SCNC: 8 MMOL/L
APTT BLDCRRT: 21.1 SEC
AST SERPL-CCNC: 22 U/L
BASOPHILS # BLD AUTO: 0.03 K/UL
BASOPHILS NFR BLD: 0.3 %
BILIRUB SERPL-MCNC: 0.5 MG/DL
BILIRUB UR QL STRIP: NEGATIVE
BLD GP AB SCN CELLS X3 SERPL QL: NORMAL
BUN SERPL-MCNC: 33 MG/DL
CALCIUM SERPL-MCNC: 9.2 MG/DL
CHLORIDE SERPL-SCNC: 106 MMOL/L
CLARITY UR: CLEAR
CO2 SERPL-SCNC: 27 MMOL/L
COLOR UR: YELLOW
CREAT SERPL-MCNC: 1.1 MG/DL
DIFFERENTIAL METHOD: ABNORMAL
EOSINOPHIL # BLD AUTO: 0.1 K/UL
EOSINOPHIL NFR BLD: 1.5 %
ERYTHROCYTE [DISTWIDTH] IN BLOOD BY AUTOMATED COUNT: 15.3 %
EST. GFR  (AFRICAN AMERICAN): 54 ML/MIN/1.73 M^2
EST. GFR  (NON AFRICAN AMERICAN): 47 ML/MIN/1.73 M^2
GLUCOSE SERPL-MCNC: 133 MG/DL
GLUCOSE UR QL STRIP: NEGATIVE
HCT VFR BLD AUTO: 23 %
HGB BLD-MCNC: 7.2 G/DL
HGB UR QL STRIP: NEGATIVE
INR PPP: 1
KETONES UR QL STRIP: ABNORMAL
LEUKOCYTE ESTERASE UR QL STRIP: NEGATIVE
LYMPHOCYTES # BLD AUTO: 1.4 K/UL
LYMPHOCYTES NFR BLD: 14.7 %
MCH RBC QN AUTO: 27.2 PG
MCHC RBC AUTO-ENTMCNC: 31.3 G/DL
MCV RBC AUTO: 87 FL
MONOCYTES # BLD AUTO: 0.7 K/UL
MONOCYTES NFR BLD: 7.6 %
NEUTROPHILS # BLD AUTO: 7.3 K/UL
NEUTROPHILS NFR BLD: 75.7 %
NITRITE UR QL STRIP: NEGATIVE
OB PNL STL: POSITIVE
PH UR STRIP: 6 [PH] (ref 5–8)
PLATELET # BLD AUTO: 228 K/UL
PMV BLD AUTO: 10.5 FL
POCT GLUCOSE: 148 MG/DL (ref 70–110)
POTASSIUM SERPL-SCNC: 4.3 MMOL/L
PROT SERPL-MCNC: 5.5 G/DL
PROT UR QL STRIP: NEGATIVE
PROTHROMBIN TIME: 10.5 SEC
RBC # BLD AUTO: 2.65 M/UL
SODIUM SERPL-SCNC: 141 MMOL/L
SP GR UR STRIP: 1.01 (ref 1–1.03)
TROPONIN I SERPL DL<=0.01 NG/ML-MCNC: 0.01 NG/ML
TROPONIN I SERPL DL<=0.01 NG/ML-MCNC: 0.02 NG/ML
TSH SERPL DL<=0.005 MIU/L-ACNC: 2.24 UIU/ML
URN SPEC COLLECT METH UR: ABNORMAL
UROBILINOGEN UR STRIP-ACNC: NEGATIVE EU/DL
WBC # BLD AUTO: 9.61 K/UL

## 2019-02-16 PROCEDURE — 86920 COMPATIBILITY TEST SPIN: CPT

## 2019-02-16 PROCEDURE — 84443 ASSAY THYROID STIM HORMONE: CPT

## 2019-02-16 PROCEDURE — 82272 OCCULT BLD FECES 1-3 TESTS: CPT

## 2019-02-16 PROCEDURE — 85025 COMPLETE CBC W/AUTO DIFF WBC: CPT

## 2019-02-16 PROCEDURE — 25000003 PHARM REV CODE 250: Performed by: EMERGENCY MEDICINE

## 2019-02-16 PROCEDURE — P9021 RED BLOOD CELLS UNIT: HCPCS

## 2019-02-16 PROCEDURE — 85730 THROMBOPLASTIN TIME PARTIAL: CPT

## 2019-02-16 PROCEDURE — 25000003 PHARM REV CODE 250: Performed by: STUDENT IN AN ORGANIZED HEALTH CARE EDUCATION/TRAINING PROGRAM

## 2019-02-16 PROCEDURE — 80053 COMPREHEN METABOLIC PANEL: CPT

## 2019-02-16 PROCEDURE — 96361 HYDRATE IV INFUSION ADD-ON: CPT

## 2019-02-16 PROCEDURE — 99285 EMERGENCY DEPT VISIT HI MDM: CPT | Mod: 25

## 2019-02-16 PROCEDURE — 11000001 HC ACUTE MED/SURG PRIVATE ROOM

## 2019-02-16 PROCEDURE — 93005 ELECTROCARDIOGRAM TRACING: CPT

## 2019-02-16 PROCEDURE — G0378 HOSPITAL OBSERVATION PER HR: HCPCS

## 2019-02-16 PROCEDURE — 93010 ELECTROCARDIOGRAM REPORT: CPT | Mod: 76,,, | Performed by: STUDENT IN AN ORGANIZED HEALTH CARE EDUCATION/TRAINING PROGRAM

## 2019-02-16 PROCEDURE — 93010 ELECTROCARDIOGRAM REPORT: CPT | Mod: ,,, | Performed by: STUDENT IN AN ORGANIZED HEALTH CARE EDUCATION/TRAINING PROGRAM

## 2019-02-16 PROCEDURE — 85610 PROTHROMBIN TIME: CPT

## 2019-02-16 PROCEDURE — 84484 ASSAY OF TROPONIN QUANT: CPT

## 2019-02-16 PROCEDURE — 36430 TRANSFUSION BLD/BLD COMPNT: CPT

## 2019-02-16 PROCEDURE — 86850 RBC ANTIBODY SCREEN: CPT

## 2019-02-16 PROCEDURE — 82962 GLUCOSE BLOOD TEST: CPT

## 2019-02-16 PROCEDURE — 93010 EKG 12-LEAD: ICD-10-PCS | Mod: 76,,, | Performed by: STUDENT IN AN ORGANIZED HEALTH CARE EDUCATION/TRAINING PROGRAM

## 2019-02-16 PROCEDURE — 81003 URINALYSIS AUTO W/O SCOPE: CPT

## 2019-02-16 PROCEDURE — 36415 COLL VENOUS BLD VENIPUNCTURE: CPT

## 2019-02-16 PROCEDURE — 96360 HYDRATION IV INFUSION INIT: CPT

## 2019-02-16 PROCEDURE — 84484 ASSAY OF TROPONIN QUANT: CPT | Mod: 91

## 2019-02-16 RX ORDER — GLUCAGON 1 MG
1 KIT INJECTION
Status: DISCONTINUED | OUTPATIENT
Start: 2019-02-16 | End: 2019-02-21 | Stop reason: HOSPADM

## 2019-02-16 RX ORDER — OXYBUTYNIN CHLORIDE 5 MG/1
15 TABLET, EXTENDED RELEASE ORAL DAILY
Status: DISCONTINUED | OUTPATIENT
Start: 2019-02-16 | End: 2019-02-21 | Stop reason: HOSPADM

## 2019-02-16 RX ORDER — SODIUM CHLORIDE, SODIUM LACTATE, POTASSIUM CHLORIDE, CALCIUM CHLORIDE 600; 310; 30; 20 MG/100ML; MG/100ML; MG/100ML; MG/100ML
INJECTION, SOLUTION INTRAVENOUS CONTINUOUS
Status: ACTIVE | OUTPATIENT
Start: 2019-02-16 | End: 2019-02-17

## 2019-02-16 RX ORDER — ATORVASTATIN CALCIUM 40 MG/1
40 TABLET, FILM COATED ORAL DAILY
Status: DISCONTINUED | OUTPATIENT
Start: 2019-02-16 | End: 2019-02-21 | Stop reason: HOSPADM

## 2019-02-16 RX ORDER — ONDANSETRON 2 MG/ML
4 INJECTION INTRAMUSCULAR; INTRAVENOUS EVERY 6 HOURS PRN
Status: DISCONTINUED | OUTPATIENT
Start: 2019-02-16 | End: 2019-02-21 | Stop reason: HOSPADM

## 2019-02-16 RX ORDER — IBUPROFEN 200 MG
16 TABLET ORAL
Status: DISCONTINUED | OUTPATIENT
Start: 2019-02-16 | End: 2019-02-21 | Stop reason: HOSPADM

## 2019-02-16 RX ORDER — SODIUM CHLORIDE 0.9 % (FLUSH) 0.9 %
5 SYRINGE (ML) INJECTION
Status: DISCONTINUED | OUTPATIENT
Start: 2019-02-16 | End: 2019-02-21 | Stop reason: HOSPADM

## 2019-02-16 RX ORDER — HYDROCODONE BITARTRATE AND ACETAMINOPHEN 500; 5 MG/1; MG/1
TABLET ORAL
Status: DISCONTINUED | OUTPATIENT
Start: 2019-02-16 | End: 2019-02-21 | Stop reason: HOSPADM

## 2019-02-16 RX ORDER — IBUPROFEN 200 MG
24 TABLET ORAL
Status: DISCONTINUED | OUTPATIENT
Start: 2019-02-16 | End: 2019-02-21 | Stop reason: HOSPADM

## 2019-02-16 RX ADMIN — ATORVASTATIN CALCIUM 40 MG: 40 TABLET, FILM COATED ORAL at 08:02

## 2019-02-16 RX ADMIN — SODIUM CHLORIDE, SODIUM LACTATE, POTASSIUM CHLORIDE, AND CALCIUM CHLORIDE: .6; .31; .03; .02 INJECTION, SOLUTION INTRAVENOUS at 04:02

## 2019-02-16 RX ADMIN — SODIUM CHLORIDE 1000 ML: 0.9 INJECTION, SOLUTION INTRAVENOUS at 11:02

## 2019-02-16 NOTE — H&P
"[facility] Medicine H&P Note     Admitting Team: [facility] Hospitalist Team A  Attending Physician: Shey Burgess MD  Resident: Lesli/Kris  Intern: Domenico    Date of Admit: 2/16/2019    Chief Complaint     Rectal Bleeding (pt presents to ED today family member reports black stool alst night pt appears pale. pt disorented to time. reports headache yesterday resolved with tylenol. pt was discharged yesterday for GI bleed. )   for 1 day    Subjective:      History of Present Illness:  Makenzie Haile is a 83 y.o. female who  has a past medical history of Acute myocardial infarction involving left circumflex coronary artery (3/19/2018), Diabetes mellitus, Hyperlipemia, and Hypertension.. The patient presented to [facility] on 2/16/2019 with a primary complaint of Rectal Bleeding (pt presents to ED today family member reports black stool alst night pt appears pale. pt disorented to time. reports headache yesterday resolved with tylenol. pt was discharged yesterday for GI bleed. )    Patient was discharged yesterday after being admitted for cramping abdominal pain and rectal bleeding.    Patient prior to these admission was independent of all ADLs, but had dark red stool starting 4 days prior to her most recent admission. States that since she left yesterday she continued to feel fatigued, had 2 episodes of dark tarry stool since last night. This morning, she continued to feel more fatigued which prompted her family to bring her to the hospital.    She states she just feels "dry" and tired, tolerated her diet yesterday without any issues. She states she was able to get up to walk around without much assistance but continued to feel "fatigued" and "weak" since her discharge. Endorsed melena, but denied any hematemesis, hematochezia.     Denied any fevers, chills, chest pain, SOB, abdominal pain, dysuria, constipation, diarrhea.    Past Medical History:  Past Medical History:   Diagnosis Date " "   Acute myocardial infarction involving left circumflex coronary artery 3/19/2018    Diabetes mellitus     Hyperlipemia     Hypertension      Past Surgical History:  Coronary Angioplasty with stent placement 3/19/2018  IR embolization with 2mm coil placed of NIKKI 2019    Allergies:  Review of patient's allergies indicates:   Allergen Reactions    Iodine and iodide containing products     Sulfa (sulfonamide antibiotics) Hives       Home Medications:      Family History:  Family History   Problem Relation Age of Onset    No Known Problems Mother     Heart disease Father        Social History:  Social History     Tobacco Use    Smoking status: Never Smoker   Substance Use Topics    Alcohol use: No    Drug use: No       Review of Systems:  Pertinent items are noted in HPI. All other systems are reviewed and are negative.    Health Maintaince :   Primary Care Physician: Ocmund    Immunizations:   TDap UTD    Flu UTD   Pna UTD    Cancer Screening:  PAP: No longer indicated  MMG: No longer indicated  Colonoscopy: C-Scope per GI     Objective:   Last 24 Hour Vital Signs:  BP  Min: 115/49  Max: 133/58  Temp  Av.9 °F (36.6 °C)  Min: 97.4 °F (36.3 °C)  Max: 98.4 °F (36.9 °C)  Pulse  Av.3  Min: 51  Max: 90  Resp  Av.5  Min: 18  Max: 19  SpO2  Av %  Min: 95 %  Max: 97 %  Height  Av' 4" (162.6 cm)  Min: 5' 4" (162.6 cm)  Max: 5' 4" (162.6 cm)  Weight  Av.2 kg (201 lb)  Min: 91.2 kg (201 lb)  Max: 91.2 kg (201 lb)  Body mass index is 34.5 kg/m².  No intake/output data recorded.    Physical Examination:  General: AOx3, NAD, Laying in ED [place] comfortably  HEENT: NC/AT, dry mucus membranes, PERRL, EOMI, JVP unable to assess, No thyromegaly noted, neck with full ROM  Cardiovascular: Regular Rate, Regular Rhythm, Normal S1/S2 appreciated, mid systolic ejection murmur 3/6 loudest at 2nd right intercostal and radiated to carotids.  Respiratory: CTA Bilaterally, no increased work of " breathing, symmetric chest rise on inspiration  Abdomen: Soft, Non-tender, Non-distended, +BS  Rectal: Per ED, Gross blood noted  Patient deferred my exam  Extremity: 2+ distal pulses in all extremities, no edema noted  Skin: warm, dry, no excoriations or bruising noted  Psych: Normal Mood, Normal Affect  Neuro: AAOx4, CN II-XII Grossly intact, Strength 5/5 in all extremities, Sensation to touch and 2 point discrimination intact throughout        Laboratory:  Most Recent Data:  CBC:   Lab Results   Component Value Date    WBC 9.61 02/16/2019    HGB 7.2 (L) 02/16/2019    HCT 23.0 (L) 02/16/2019     02/16/2019    MCV 87 02/16/2019    RDW 15.3 (H) 02/16/2019     WBC Differential: 76 % N, 0 % Bands, 15 % L, 8 % M, 1.5 % Eo, 0.3 % Baso, 0 additional cells seen    BMP:   Lab Results   Component Value Date     02/15/2019    K 4.1 02/15/2019     02/15/2019    CO2 27 02/15/2019    BUN 23 02/15/2019    CREATININE 0.8 02/15/2019     (H) 02/15/2019    CALCIUM 8.9 02/15/2019    MG 1.5 (L) 03/19/2018    PHOS 2.9 03/19/2018     LFTs:   Lab Results   Component Value Date    PROT 5.7 (L) 02/15/2019    ALBUMIN 3.2 (L) 02/15/2019    BILITOT 0.6 02/15/2019    AST 18 02/15/2019    ALKPHOS 30 (L) 02/15/2019    ALT 10 02/15/2019     Coags:   Lab Results   Component Value Date    INR 1.1 02/13/2019     FLP:   Lab Results   Component Value Date    CHOL 150 03/19/2018    HDL 34 (L) 03/19/2018    LDLCALC 96.2 03/19/2018    TRIG 99 03/19/2018    CHOLHDL 22.7 03/19/2018     DM:   Lab Results   Component Value Date    HGBA1C 5.7 (H) 02/13/2019    HGBA1C 6.0 (H) 03/19/2018    LDLCALC 96.2 03/19/2018    CREATININE 0.8 02/15/2019     Thyroid:   Lab Results   Component Value Date    TSH 1.307 02/13/2019     Anemia:   Lab Results   Component Value Date    IRON 49 02/13/2019    TIBC 336 02/13/2019    FERRITIN 37 02/13/2019    YDLLNYRH13 284 02/13/2019    FOLATE 15.4 02/13/2019     Cardiac:   Lab Results   Component Value  Date    TROPONINI 18.789 (H) 03/19/2018     (H) 04/23/2018     Urinalysis:   Lab Results   Component Value Date    LABURIN No significant growth 03/19/2018    COLORU Yellow 02/13/2019    SPECGRAV 1.010 02/13/2019    NITRITE Negative 02/13/2019    KETONESU Negative 02/13/2019    UROBILINOGEN Negative 02/13/2019    WBCUA 12 (H) 02/13/2019       Trended Lab Data:  Recent Labs   Lab 02/13/19  0618  02/14/19  0603  02/14/19  2342 02/15/19  0539 02/15/19  1150   WBC 7.02   < > 9.38   < > 10.51 12.04 13.46*   HGB 10.5*   < > 9.5*   < > 7.9* 7.8* 7.5*   HCT 33.1*   < > 30.2*   < > 24.7* 25.0* 24.0*      < > 189   < > 198 205 211   MCV 85   < > 86   < > 85 85 86   RDW 14.7*   < > 15.1*   < > 15.1* 15.1* 15.2*     --  143  --   --  139  --    K 4.2  --  4.1  --   --  4.1  --      --  108  --   --  106  --    CO2 26  --  26  --   --  27  --    BUN 28*  --  15  --   --  23  --    CREATININE 0.8  --  0.7  --   --  0.8  --    *  --  158*  --   --  157*  --    PROT 6.6  --  5.9*  --   --  5.7*  --    ALBUMIN 3.6  --  3.2*  --   --  3.2*  --    BILITOT 0.9  --  2.0*  --   --  0.6  --    AST 16  --  16  --   --  18  --    ALKPHOS 39*  --  38*  --   --  30*  --    ALT 10  --  10  --   --  10  --     < > = values in this interval not displayed.       Trended Cardiac Data:  No results for input(s): TROPONINI, CKTOTAL, CKMB, BNP in the last 168 hours.    Microbiology Data:  None    Other Results:    Radiology:  Imaging Results          X-Ray Chest 1 View (Final result)  Result time 02/16/19 11:47:18    Final result by Rolando Gómez DO (02/16/19 11:47:18)                 Impression:      No acute cardiopulmonary process.      Electronically signed by: Rolando Gómez DO  Date:    02/16/2019  Time:    11:47             Narrative:    EXAMINATION:  XR CHEST 1 VIEW    CLINICAL HISTORY:  bradycardia;    TECHNIQUE:  Single frontal view of the chest was performed.    COMPARISON:  04/23/2018    FINDINGS:  No  infiltrates or pleural effusions.  The heart appears to be at the upper limits of normal in size.  There is mild tortuosity of the descending thoracic aorta with calcification of the aortic knob..                                 Assessment:     Makenzie Haile is a 83 y.o. female with:  Patient Active Problem List    Diagnosis Date Noted    Acute blood loss anemia     Acute upper GI bleed     Normocytic anemia 02/13/2019    HLD (hyperlipidemia) 02/13/2019    Skin breakdown 02/13/2019    Lower GI bleed 02/13/2019    Iron deficiency anemia due to chronic blood loss 02/13/2019    Coronary artery disease involving native coronary artery of native heart without angina pectoris 04/19/2018    Neuropathy 03/27/2018    Acute myocardial infarction involving left circumflex coronary artery 03/19/2018    GERD (gastroesophageal reflux disease) 03/19/2018    Type 2 diabetes mellitus without complication, without long-term current use of insulin 03/19/2018    Essential hypertension 03/19/2018    Aortic stenosis 03/19/2018    Class 3 severe obesity due to excess calories with serious comorbidity and body mass index (BMI) of 40.0 to 44.9 in adult 03/19/2018    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 03/19/2018        Plan:     Symptomatic Anemia 2/2 Acute on Chronic GI Bleed, hx of GENEVIEVE  - Patient with 2 episodes of dark colored/tarry stool since discharge yesterday  - 2/14 CTA/IR embolized with 2mm coil placed in area of bleed on last admission ([facility]), also required 1U PRBCs  - H/H of previous admission H/H :10.5/33.1 MCV: 85  - Iron Panel: 49 Iron, TIBC 336, Trans 227, Ferritin 37, Folate 15.4, B12 284  - H/H on morning of admit: 7.2/23.0  INR 1.0/PT 10.5/PTT 21.1  - Transfuse 2U PRBCs today and repeat H/H  - Given 1L NS in the ED  Continue LR@100cc/hr  - Will hold ASA/Plavix with concerns of bleeding  Consider starting Protonix 40mg IV BID  - Consulted GI, appreciate their  recommendations, possible scope.    RANDELL  - Admit: BUN 33, Cr 1.1  Baseline Cr of her previous admission 0.7-0.8  - Patient volume down on exam, dry mucus membranes and dry skin, wanting to drink water  - Concern for pre-renal  Given 1L bolus in ED, Will keep her on maintenance fluid 100cc/hr       Essential Hypertension  - /49 in triage, has been 130-140s/60s since fluids started  - On Lisinopril 20mg BID and Toprol-XL 25mg previously but started on: Lisinopril 40mg daily and Coreg 25mg BID  - Per patient family, had taken it the morning of admission.  - Will hold for now, monitor and resume coreg tonight and lisinopril tomorrow morning if no longer actively bleeding.     CAD w/ PMH oh PCI w/ SURAJ to LCX  - PCI in 3/18 - On ASA/Plavix  - Will continue to hold while here in setting of GI bleed    DM2  - 2/13/2019 A1c 5.7  - On Metformin 1000mg BID at home  Will hold  - Starting SSI while here     HLD  - 3/2018: Chol 150, HDL 34, LDL 96, Trigly 99  - on atorvastatin 40mg daily at home  Continue while here     Overflow incontinence    - On Oxybutynin 15mg daily at home  Continue     HCM  - a1c 5.7  TSH WNL  - UTD on immunizations and cancer screenings per family      F: 1L NS Bolus in ED  Continue LR@100cc/hr  E: Will replete as necessary  N: Soft  Code: Full  Allergies: Iodine, Sulfa  DVT Prophylaxis: SCDs, holding anticoagulation in setting of GI bleed/symptomatic anemia  Dispo: Transfusion of PRBCs, pending GI evaluation, admit to floor as we believe she does not have an acute bleed at this time.    Code Status:     Full    Tres Miller MD  [facility] Internal Medicine HO-I    [facility] Medicine Hospitalist Pager numbers:   [facility] Hospitalist Medicine Team A (Gilson/Aditya): [phone]  [facility] Hospitalist Medicine Team B (Ginger/Ran):  [phone]

## 2019-02-16 NOTE — ED PROVIDER NOTES
Sort note: Makenzie Haile nontoxic/afebrile 83 y.o.  presented to the ED with c/o generalized weakness.  Pt recently admitted for GI bleed.      Patient seen and medically screened by Nurse practitioner in Sort process due to ED crowding.  Appropriate tests and/or medications ordered.  Care transferred to an alternate provider when patient was placed in an Exam Room from the [facility] for physical exam, additional orders, and disposition. 10:40 AM. DELON Tafoya  02/16/19 1046

## 2019-02-16 NOTE — ED NOTES
Pt. Discharged yesterday after admit for gi bleed, s/p egd/colonoscopy. Family reports pt. Was confused and weak last night and noted to have low bp and low hr. Pt. Denies abdominal pain and was able to tolerate p.o.. Pt. Had a small black stool this morning which they were informed was to be expected. The patient denies and abdominal pain, n/v or sob. Family also reports she was hypertensive on last admit and had bp medication changes. Pt. Is pale warm and dry. Does not appear in distress.

## 2019-02-16 NOTE — ED NOTES
Pt. And family updated on awaiting inpatient bed assignment and diet has been ordered for pt.. Assist with bedpan to void.

## 2019-02-16 NOTE — ED PROVIDER NOTES
"Encounter Date: 2/16/2019    SCRIBE #1 NOTE: I, Lucita Emmanuel, am scribing for, and in the presence of,  Dr. Art. I have scribed the entire note.       History     Chief Complaint   Patient presents with    Rectal Bleeding     pt presents to ED today family member reports black stool alst night pt appears pale. pt disorented to time. reports headache yesterday resolved with tylenol. pt was discharged yesterday for GI bleed.      Makenzie Haile is a 83 y.o. female who  has a past medical history of Acute myocardial infarction involving left circumflex coronary artery (3/19/2018), Diabetes mellitus, Hyperlipemia, and Hypertension.    The patient presents to the ED due to generalized weakness and fatigue since this morning. The patient was previously admitted 2/13/19 for GI bleed and discharged yesterday. Per relative, at approximately 6:30 PM last night confused, "as if she was really drunk," and hypotensive. Per relative, the patient reported of a headache last night that resolved with Tylenol. Per relative, the patient had 1 BM this morning with dark tarry stools. The patient denies any chest pain, SOB, or fever. Per relative, the patient received blood transfusion during her ED visit on 2/13/19.  Per relative, the patient did not receive an endoscopy or colonoscopy during her stay. Per relative, the patient's blood pressure medication was changed during her stay.      The history is provided by a relative.     Review of patient's allergies indicates:   Allergen Reactions    Iodine and iodide containing products     Sulfa (sulfonamide antibiotics) Hives     Past Medical History:   Diagnosis Date    Acute myocardial infarction involving left circumflex coronary artery 3/19/2018    Diabetes mellitus     Hyperlipemia     Hypertension      Past Surgical History:   Procedure Laterality Date    CORONARY ANGIOPLASTY WITH STENT PLACEMENT  03/19/2018     Family History   Problem Relation Age of Onset    No Known " Problems Mother     Heart disease Father      Social History     Tobacco Use    Smoking status: Never Smoker   Substance Use Topics    Alcohol use: No    Drug use: No     Review of Systems   Constitutional: Positive for fatigue. Negative for fever.   HENT: Negative.    Eyes: Negative.    Respiratory: Negative for shortness of breath.    Cardiovascular: Negative for chest pain.   Gastrointestinal: Positive for blood in stool.   Genitourinary: Negative.    Musculoskeletal: Negative.    Skin: Negative.    Neurological: Positive for weakness.   Psychiatric/Behavioral: Positive for confusion.   All other systems reviewed and are negative.      Physical Exam     Initial Vitals [02/16/19 1041]   BP Pulse Resp Temp SpO2   (!) 115/49 (!) 51 19 98.4 °F (36.9 °C) 97 %      MAP       --         Physical Exam    Nursing note and vitals reviewed.  Constitutional: She appears well-developed and well-nourished.   HENT:   Head: Normocephalic and atraumatic.   Moist MM   Eyes: EOM are normal. Pupils are equal, round, and reactive to light.   Conjunctivae pale   Neck: Normal range of motion. Neck supple.   Cardiovascular: Regular rhythm. Bradycardia present.    Murmur heard.  Systolic ejection mumur   Pulmonary/Chest: Breath sounds normal. No respiratory distress.   Abdominal: Soft. There is no tenderness.   Genitourinary:   Genitourinary Comments: Rectal exam: gross blood noted.    Musculoskeletal: Normal range of motion. She exhibits no tenderness.   Neurological: She is alert and oriented to person, place, and time. She has normal strength.   Skin: Skin is warm and dry. Capillary refill takes less than 2 seconds.         ED Course   Procedures  Labs Reviewed   COMPREHENSIVE METABOLIC PANEL   CBC W/ AUTO DIFFERENTIAL   TROPONIN I   APTT   PROTIME-INR   TSH   URINALYSIS   TYPE & SCREEN   POCT GLUCOSE MONITORING CONTINUOUS     EKG Readings: (Independently Interpreted)   Rate of 49, sinus bradycardia, no ST elevations, Normal T  waves         Imaging Results    None          Medical Decision Making:   Independently Interpreted Test(s):   I have ordered and independently interpreted EKG Reading(s) - see prior notes  Clinical Tests:   Lab Tests: Ordered and Reviewed  Radiological Study: Ordered and Reviewed  Medical Tests: Ordered and Reviewed  ED Management:  11:30 AM Case discussed with Dr. Miles of \[facility]\"" Internal Medicine who will accept the patient for admission.                      Clinical Impression:     1. Weakness         ***Scribe attestation

## 2019-02-16 NOTE — ED NOTES
Pt. Tolerated 100% of diet. Updated pt. And family on bed assignment and awaiting transport to floor

## 2019-02-16 NOTE — TELEPHONE ENCOUNTER
"127/58 HR is 48    Reason for Disposition   Unable to walk, or can only walk with assistance (e.g., requires support)    Answer Assessment - Initial Assessment Questions  1. DESCRIPTION: "Please describe your heart rate or heart beat that you are having" (e.g., fast/slow, regular/irregular, skipped or extra beats, "palpitations")      48  2. ONSET: "When did it start?" (Minutes, hours or days)       today  3. DURATION: "How long does it last" (e.g., seconds, minutes, hours)      Has been since 30 minutes  4. PATTERN "Does it come and go, or has it been constant since it started?"  "Does it get worse with exertion?"   "Are you feeling it now?"      Weakness comes and goes  5. TAP: "Using your hand, can you tap out what you are feeling on a chair or table in front of you, so that I can hear?" (Note: not all patients can do this)        Person calling is a nurse  6. HEART RATE: "Can you tell me your heart rate?" "How many beats in 15 seconds?"  (Note: not all patients can do this)        HR 48  7. RECURRENT SYMPTOM: "Have you ever had this before?" If so, ask: "When was the last time?" and "What happened that time?"       no  8. CAUSE: "What do you think is causing the palpitations?"      Unsure carvedilol  9. CARDIAC HISTORY: "Do you have any history of heart disease?" (e.g., heart attack, angina, bypass surgery, angioplasty, arrhythmia)       Heart attack  10. OTHER SYMPTOMS: "Do you have any other symptoms?" (e.g., dizziness, chest pain, sweating, difficulty breathing)      dizziness and weakness  11. PREGNANCY: "Is there any chance you are pregnant?" "When was your last menstrual period?"     no    Protocols used: ST HEART RATE AND HEART BEAT [id number]      "

## 2019-02-17 PROBLEM — K92.1 GASTROINTESTINAL HEMORRHAGE WITH MELENA: Status: ACTIVE | Noted: 2019-02-17

## 2019-02-17 LAB
ALBUMIN SERPL BCP-MCNC: 2.9 G/DL
ALP SERPL-CCNC: 34 U/L
ALT SERPL W/O P-5'-P-CCNC: 12 U/L
ANION GAP SERPL CALC-SCNC: 7 MMOL/L
AST SERPL-CCNC: 20 U/L
BASOPHILS # BLD AUTO: 0.03 K/UL
BASOPHILS # BLD AUTO: 0.04 K/UL
BASOPHILS NFR BLD: 0.4 %
BASOPHILS NFR BLD: 0.6 %
BILIRUB SERPL-MCNC: 1.3 MG/DL
BUN SERPL-MCNC: 29 MG/DL
CALCIUM SERPL-MCNC: 8.7 MG/DL
CHLORIDE SERPL-SCNC: 108 MMOL/L
CO2 SERPL-SCNC: 26 MMOL/L
CREAT SERPL-MCNC: 0.8 MG/DL
DIFFERENTIAL METHOD: ABNORMAL
DIFFERENTIAL METHOD: ABNORMAL
EOSINOPHIL # BLD AUTO: 0.1 K/UL
EOSINOPHIL # BLD AUTO: 0.2 K/UL
EOSINOPHIL NFR BLD: 1 %
EOSINOPHIL NFR BLD: 2.2 %
ERYTHROCYTE [DISTWIDTH] IN BLOOD BY AUTOMATED COUNT: 14.8 %
ERYTHROCYTE [DISTWIDTH] IN BLOOD BY AUTOMATED COUNT: 15.2 %
EST. GFR  (AFRICAN AMERICAN): >60 ML/MIN/1.73 M^2
EST. GFR  (NON AFRICAN AMERICAN): >60 ML/MIN/1.73 M^2
GLUCOSE SERPL-MCNC: 124 MG/DL
HCT VFR BLD AUTO: 25.5 %
HCT VFR BLD AUTO: 25.8 %
HGB BLD-MCNC: 8.1 G/DL
HGB BLD-MCNC: 8.1 G/DL
HGB BLD-MCNC: 8.5 G/DL
LYMPHOCYTES # BLD AUTO: 0.8 K/UL
LYMPHOCYTES # BLD AUTO: 1.5 K/UL
LYMPHOCYTES NFR BLD: 10.2 %
LYMPHOCYTES NFR BLD: 21.8 %
MCH RBC QN AUTO: 27.6 PG
MCH RBC QN AUTO: 28.1 PG
MCHC RBC AUTO-ENTMCNC: 31.4 G/DL
MCHC RBC AUTO-ENTMCNC: 31.8 G/DL
MCV RBC AUTO: 87 FL
MCV RBC AUTO: 90 FL
MONOCYTES # BLD AUTO: 0.6 K/UL
MONOCYTES # BLD AUTO: 0.9 K/UL
MONOCYTES NFR BLD: 12.9 %
MONOCYTES NFR BLD: 8 %
NEUTROPHILS # BLD AUTO: 4.3 K/UL
NEUTROPHILS # BLD AUTO: 6.4 K/UL
NEUTROPHILS NFR BLD: 62.4 %
NEUTROPHILS NFR BLD: 80.2 %
PLATELET # BLD AUTO: 177 K/UL
PLATELET # BLD AUTO: 182 K/UL
PMV BLD AUTO: 10.2 FL
PMV BLD AUTO: 10.4 FL
POCT GLUCOSE: 117 MG/DL (ref 70–110)
POCT GLUCOSE: 117 MG/DL (ref 70–110)
POCT GLUCOSE: 141 MG/DL (ref 70–110)
POCT GLUCOSE: 195 MG/DL (ref 70–110)
POTASSIUM SERPL-SCNC: 4.2 MMOL/L
PROT SERPL-MCNC: 5.2 G/DL
RBC # BLD AUTO: 2.88 M/UL
RBC # BLD AUTO: 2.93 M/UL
SODIUM SERPL-SCNC: 141 MMOL/L
TROPONIN I SERPL DL<=0.01 NG/ML-MCNC: 0.02 NG/ML
WBC # BLD AUTO: 6.83 K/UL
WBC # BLD AUTO: 8.02 K/UL

## 2019-02-17 PROCEDURE — 99222 1ST HOSP IP/OBS MODERATE 55: CPT | Mod: ,,, | Performed by: INTERNAL MEDICINE

## 2019-02-17 PROCEDURE — 36415 COLL VENOUS BLD VENIPUNCTURE: CPT

## 2019-02-17 PROCEDURE — 25000003 PHARM REV CODE 250: Performed by: STUDENT IN AN ORGANIZED HEALTH CARE EDUCATION/TRAINING PROGRAM

## 2019-02-17 PROCEDURE — 93010 EKG 12-LEAD: ICD-10-PCS | Mod: 76,,, | Performed by: STUDENT IN AN ORGANIZED HEALTH CARE EDUCATION/TRAINING PROGRAM

## 2019-02-17 PROCEDURE — C9113 INJ PANTOPRAZOLE SODIUM, VIA: HCPCS | Performed by: STUDENT IN AN ORGANIZED HEALTH CARE EDUCATION/TRAINING PROGRAM

## 2019-02-17 PROCEDURE — 93005 ELECTROCARDIOGRAM TRACING: CPT

## 2019-02-17 PROCEDURE — 99222 PR INITIAL HOSPITAL CARE,LEVL II: ICD-10-PCS | Mod: ,,, | Performed by: INTERNAL MEDICINE

## 2019-02-17 PROCEDURE — 63600175 PHARM REV CODE 636 W HCPCS: Performed by: STUDENT IN AN ORGANIZED HEALTH CARE EDUCATION/TRAINING PROGRAM

## 2019-02-17 PROCEDURE — 11000001 HC ACUTE MED/SURG PRIVATE ROOM

## 2019-02-17 PROCEDURE — 94761 N-INVAS EAR/PLS OXIMETRY MLT: CPT

## 2019-02-17 PROCEDURE — 80053 COMPREHEN METABOLIC PANEL: CPT

## 2019-02-17 PROCEDURE — 85025 COMPLETE CBC W/AUTO DIFF WBC: CPT

## 2019-02-17 PROCEDURE — 93010 ELECTROCARDIOGRAM REPORT: CPT | Mod: ,,, | Performed by: STUDENT IN AN ORGANIZED HEALTH CARE EDUCATION/TRAINING PROGRAM

## 2019-02-17 PROCEDURE — 85018 HEMOGLOBIN: CPT

## 2019-02-17 PROCEDURE — 97161 PT EVAL LOW COMPLEX 20 MIN: CPT

## 2019-02-17 PROCEDURE — 87040 BLOOD CULTURE FOR BACTERIA: CPT

## 2019-02-17 PROCEDURE — 97116 GAIT TRAINING THERAPY: CPT

## 2019-02-17 PROCEDURE — 25000003 PHARM REV CODE 250: Performed by: INTERNAL MEDICINE

## 2019-02-17 RX ORDER — PANTOPRAZOLE SODIUM 40 MG/1
40 TABLET, DELAYED RELEASE ORAL 2 TIMES DAILY
Status: DISCONTINUED | OUTPATIENT
Start: 2019-02-17 | End: 2019-02-17

## 2019-02-17 RX ORDER — POLYETHYLENE GLYCOL 3350, SODIUM SULFATE ANHYDROUS, SODIUM BICARBONATE, SODIUM CHLORIDE, POTASSIUM CHLORIDE 236; 22.74; 6.74; 5.86; 2.97 G/4L; G/4L; G/4L; G/4L; G/4L
4000 POWDER, FOR SOLUTION ORAL ONCE
Status: COMPLETED | OUTPATIENT
Start: 2019-02-17 | End: 2019-02-17

## 2019-02-17 RX ORDER — PANTOPRAZOLE SODIUM 40 MG/10ML
40 INJECTION, POWDER, LYOPHILIZED, FOR SOLUTION INTRAVENOUS 2 TIMES DAILY
Status: DISCONTINUED | OUTPATIENT
Start: 2019-02-17 | End: 2019-02-18

## 2019-02-17 RX ORDER — ACETAMINOPHEN 325 MG/1
650 TABLET ORAL ONCE
Status: COMPLETED | OUTPATIENT
Start: 2019-02-17 | End: 2019-02-17

## 2019-02-17 RX ORDER — LISINOPRIL 20 MG/1
40 TABLET ORAL DAILY
Status: DISCONTINUED | OUTPATIENT
Start: 2019-02-17 | End: 2019-02-21 | Stop reason: HOSPADM

## 2019-02-17 RX ORDER — DIPHENHYDRAMINE HCL 25 MG
25 CAPSULE ORAL ONCE
Status: COMPLETED | OUTPATIENT
Start: 2019-02-17 | End: 2019-02-17

## 2019-02-17 RX ORDER — ASPIRIN 81 MG/1
81 TABLET ORAL DAILY
Status: DISCONTINUED | OUTPATIENT
Start: 2019-02-17 | End: 2019-02-19

## 2019-02-17 RX ORDER — HYDRALAZINE HYDROCHLORIDE 25 MG/1
25 TABLET, FILM COATED ORAL EVERY 8 HOURS
Status: DISCONTINUED | OUTPATIENT
Start: 2019-02-17 | End: 2019-02-18

## 2019-02-17 RX ADMIN — HYDRALAZINE HYDROCHLORIDE 25 MG: 25 TABLET, FILM COATED ORAL at 10:02

## 2019-02-17 RX ADMIN — DIPHENHYDRAMINE HYDROCHLORIDE 25 MG: 25 CAPSULE ORAL at 02:02

## 2019-02-17 RX ADMIN — ASPIRIN 81 MG: 81 TABLET, COATED ORAL at 10:02

## 2019-02-17 RX ADMIN — ACETAMINOPHEN 650 MG: 325 TABLET ORAL at 02:02

## 2019-02-17 RX ADMIN — OXYBUTYNIN CHLORIDE 15 MG: 5 TABLET, EXTENDED RELEASE ORAL at 08:02

## 2019-02-17 RX ADMIN — LISINOPRIL 40 MG: 20 TABLET ORAL at 10:02

## 2019-02-17 RX ADMIN — ATORVASTATIN CALCIUM 40 MG: 40 TABLET, FILM COATED ORAL at 08:02

## 2019-02-17 RX ADMIN — PANTOPRAZOLE SODIUM 40 MG: 40 INJECTION, POWDER, LYOPHILIZED, FOR SOLUTION INTRAVENOUS at 09:02

## 2019-02-17 RX ADMIN — POLYETHYLENE GLYCOL 3350, SODIUM SULFATE ANHYDROUS, SODIUM BICARBONATE, SODIUM CHLORIDE, POTASSIUM CHLORIDE 4000 ML: 236; 22.74; 6.74; 5.86; 2.97 POWDER, FOR SOLUTION ORAL at 02:02

## 2019-02-17 NOTE — PROGRESS NOTES
"[facility] Medicine Progress Note       Subjective:      History of Present Illness:    Patient states that she is feeling tired today. In addition, states that she feels a little more confused than normal. States that she slept well last night. Has no other complaints.     Daughter asking about gi bleed; informed her we are awaiting gi recs       Objective:   Last 24 Hour Vital Signs:  BP  Min: 115/49  Max: 180/70  Temp  Av.8 °F (37.1 °C)  Min: 96.5 °F (35.8 °C)  Max: 101.4 °F (38.6 °C)  Pulse  Av.1  Min: 48  Max: 91  Resp  Av.6  Min: 16  Max: 68  SpO2  Av.5 %  Min: 92 %  Max: 98 %  Height  Av' 4" (162.6 cm)  Min: 5' 4" (162.6 cm)  Max: 5' 4" (162.6 cm)  Weight  Av.3 kg (214 lb 8.3 oz)  Min: 91.2 kg (201 lb)  Max: 100.5 kg (221 lb 9 oz)  I/O last 3 completed shifts:  In: 250 [P.O.:250]  Out: 1047 [Urine:1047]    Physical Examination:    General appearance: alert, appears stated age and cooperative  Head: Normocephalic, without obvious abnormality, atraumatic  Eyes: PERRLA. Clear conjunctiva. No scleral icterus  Neck: no adenopathy and carotid bruit, bilaterally, that decreases as one auscultates up the neck.   Lungs: clear to auscultation bilaterally  Heart: RRR with no rubs or murmrus. Systolic murmur appreciated   Abdomen: soft, non-tender; bowel sounds normal; no masses,  no organomegaly  Extremities: extremities normal, atraumatic, no cyanosis or edema  Skin: Skin color, texture, turgor normal. No rashes or lesions except for buttocks which was erythremic and raw appearing.   Neuro: Patient taking excess time to answer basic aao questions. Patient was able to state she was in the hospital, the year was 2019 and the president mk. However too extra time as above.         Laboratory:    Trended Lab Data:  Recent Labs   Lab 02/15/19  0539 02/15/19  1150 19  1101 19  0343   WBC 12.04 13.46* 9.61 8.02   HGB 7.8* 7.5* 7.2* 8.1*   HCT 25.0* 24.0* 23.0* 25.5*    211 " 228 177   MCV 85 86 87 87   RDW 15.1* 15.2* 15.3* 14.8*     --  141 141   K 4.1  --  4.3 4.2     --  106 108   CO2 27  --  27 26   BUN 23  --  33* 29*   CREATININE 0.8  --  1.1 0.8   *  --  133* 124*   PROT 5.7*  --  5.5* 5.2*   ALBUMIN 3.2*  --  3.3* 2.9*   BILITOT 0.6  --  0.5 1.3*   AST 18  --  22 20   ALKPHOS 30*  --  35* 34*   ALT 10  --  13 12       Microbiology Data:  Bcx: pending     Other Results:  EKG (my interpretation): RRR with no st elevations or depressions. PVCs noted    Radiology:  Imaging Results          X-Ray Chest 1 View (Final result)  Result time 02/16/19 11:47:18    Final result by Rolando Gómez DO (02/16/19 11:47:18)                 Impression:      No acute cardiopulmonary process.      Electronically signed by: Rolando Gómez DO  Date:    02/16/2019  Time:    11:47             Narrative:    EXAMINATION:  XR CHEST 1 VIEW    CLINICAL HISTORY:  bradycardia;    TECHNIQUE:  Single frontal view of the chest was performed.    COMPARISON:  04/23/2018    FINDINGS:  No infiltrates or pleural effusions.  The heart appears to be at the upper limits of normal in size.  There is mild tortuosity of the descending thoracic aorta with calcification of the aortic knob..                                Current Medications:     Infusions:   lactated ringers 100 mL/hr at 02/16/19 1620        Scheduled:   aspirin  81 mg Oral Daily    atorvastatin  40 mg Oral Daily    oxybutynin  15 mg Oral Daily        PRN:  sodium chloride, dextrose 50%, dextrose 50%, glucagon (human recombinant), glucose, glucose, ondansetron, sodium chloride 0.9%    Antibiotics and Day Number of Therapy:  None    Lines and Day Number of Therapy:  piv         Assessment:     Makenzie Haile is a 83 y.o. female with:  Patient Active Problem List    Diagnosis Date Noted    Symptomatic anemia 02/16/2019    Bradycardia 02/16/2019    Overflow incontinence of urine 02/16/2019    Acute blood loss anemia     Acute  upper GI bleed     Normocytic anemia 02/13/2019    HLD (hyperlipidemia) 02/13/2019    Skin breakdown 02/13/2019    History of GI bleed 02/13/2019    Iron deficiency anemia due to chronic blood loss 02/13/2019    Coronary artery disease involving native coronary artery of native heart without angina pectoris 04/19/2018    Neuropathy 03/27/2018    Acute myocardial infarction involving left circumflex coronary artery 03/19/2018    GERD (gastroesophageal reflux disease) 03/19/2018    Type 2 diabetes mellitus without complication, without long-term current use of insulin 03/19/2018    Essential hypertension 03/19/2018    Aortic stenosis 03/19/2018    Class 3 severe obesity due to excess calories with serious comorbidity and body mass index (BMI) of 40.0 to 44.9 in adult 03/19/2018    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 03/19/2018        Plan:       Symptomatic Anemia 2/2 Acute on Chronic GI Bleed, with GENEVIEVE  - Patient with 2 episodes of dark colored/tarry stool since discharge yesterday  - 2/14 CTA/IR embolized with 2mm coil placed in area of bleed on last admission ([facility]), also required 1U PRBCs  - H/H of previous admission H/H :10.5/33.1 MCV: 85  - Iron Panel: 49 Iron, TIBC 336, Trans 227, Ferritin 37, Folate 15.4, B12 284  - H/H on morning of admit: 7.2/23.0  INR 1.0/PT 10.5/PTT 21.1  - Transfuse 2U PRBCs today and repeat H/H  - Given 1L NS in the ED  Continue LR@100cc/hr  - Will hold ASA/Plavix with concerns of bleeding  Consider starting Protonix 40mg IV BID  - Consulted GI, appreciate their recommendations, possible scope.  2/17: Patient fevered after first unit; patient administered benadryl and tylenol. H/H: 8.1/25.5. Awaiting GI recs      RANDELL  - Admit: BUN 33, Cr 1.1  Baseline Cr of her previous admission 0.7-0.8  - Patient volume down on exam, dry mucus membranes and dry skin, wanting to drink water  - Concern for pre-renal  Given 1L bolus in ED, Will keep her on  maintenance fluid 100cc/hr   - Likely resolved cr 0.8      Essential Hypertension  - /49 in triage, has been 130-140s/60s since fluids started  - On Lisinopril 20mg BID and Toprol-XL 25mg previously but started on: Lisinopril 40mg daily and Coreg 25mg BID  - Per patient family, had taken it the morning of admission.  - Will hold for now, monitor and resume coreg tonight and lisinopril tomorrow morning if no longer actively bleeding.  2/17: /70 --> starting lisinopril 40. Will continue to hold reg currently      CAD w/ PMH oh PCI w/ SURAJ to LCX  - PCI in 3/18 - On ASA/Plavix  - Will continue to hold while here in setting of GI bleed     DM2  - 2/13/2019 A1c 5.7  - On Metformin 1000mg BID at home  Will hold  - Starting SSI while here     HLD  - 3/2018: Chol 150, HDL 34, LDL 96, Trigly 99  - on atorvastatin 40mg daily at home  Continue while here     Overflow incontinence    - On Oxybutynin 15mg daily at home  Continue     HCM  - a1c 5.7  TSH WNL  - UTD on immunizations and cancer screenings per family        F: 1L NS Bolus in ED  Continue LR@100cc/hr  E: Will replete as necessary  N: Soft  Code: Full  Allergies: Iodine, Sulfa  DVT Prophylaxis: SCDs, holding anticoagulation in setting of GI bleed/symptomatic anemia  Dispo: Transfusion of PRBCs, pending GI evaluation, admit to floor as we believe she does not have an acute bleed at this time.      Jacky Hull, DO  [facility]I    [facility] Hospitalist Pager numbers:   [facility] Hospital[facility] Medicine Team A (Gilson/Aditya): [phone]  [facility] Medicine Team B (Ginger/Ran):  [phone]

## 2019-02-17 NOTE — CONSULTS
[facility]  Gastroenterology  Consult Note    Patient Name: Makenzie Haile  MRN: [id number]  Admission Date: 2/16/2019  Hospital Length of Stay: 0 days  Code Status: Full Code   Attending Provider: Shey Burgess MD   Consulting Provider: Colleen Frazier MD  Primary Care Physician: Farhat Rapp MD  Principal Problem:Symptomatic anemia    Inpatient consult to [facility]  Consult performed by: Colleen Frazier MD  Consult ordered by: Tres Miller MD  Reason for consult: Anemia  Assessment/Recommendations: See A/P below        Subjective:     HPI:  84 yo F re-admitted due to possible rectal bleeding and worsening anemia.  The pt was just d/c from the hospital 2/15 s/p IR embolization of right colon bleed.  The pt and her daughter state that she bled again at home but can not provide specifics whether it was red blood or dark blood.  The pt is not sure if she has had a BM since admission last night.  She did take one dose of Plavix on the morning of 2/16.  She and her daughter very much would like to pursue endoscopic evaluation.    Past Medical History:   Diagnosis Date    Acute myocardial infarction involving left circumflex coronary artery 3/19/2018    Diabetes mellitus     Hyperlipemia     Hypertension        Past Surgical History:   Procedure Laterality Date    CORONARY ANGIOPLASTY WITH STENT PLACEMENT  03/19/2018       Review of patient's allergies indicates:   Allergen Reactions    Iodine and iodide containing products     Sulfa (sulfonamide antibiotics) Hives     Family History     Problem Relation (Age of Onset)    Heart disease Father    No Known Problems Mother        Tobacco Use    Smoking status: Never Smoker   Substance and Sexual Activity    Alcohol use: No    Drug use: No    Sexual activity: Not on file     Review of Systems   Constitutional: Negative for appetite change and unexpected weight change.   Eyes: Negative for photophobia and visual  disturbance.   Respiratory: Negative for chest tightness, shortness of breath and wheezing.    Cardiovascular: Negative for chest pain, palpitations and leg swelling.   Genitourinary: Negative for dysuria, flank pain and hematuria.   Musculoskeletal: Negative for joint swelling and myalgias.   Skin: Negative for color change and rash.   Neurological: Negative for dizziness and speech difficulty.   Psychiatric/Behavioral: Negative for confusion and hallucinations.     Objective:     Vital Signs (Most Recent):  Temp: 99.2 °F (37.3 °C) (02/17/19 1100)  Pulse: 66 (02/17/19 1200)  Resp: 18 (02/17/19 1142)  BP: (!) 149/66 (02/17/19 1100)  SpO2: (!) 93 % (02/17/19 1142) Vital Signs (24h Range):  Temp:  [96.5 °F (35.8 °C)-101.4 °F (38.6 °C)] 99.2 °F (37.3 °C)  Pulse:  [48-91] 66  Resp:  [16-68] 18  SpO2:  [92 %-98 %] 93 %  BP: (127-180)/(56-72) 149/66     Weight: 100.5 kg (221 lb 9 oz) (02/17/19 0600)  Body mass index is 38.03 kg/m².      Intake/Output Summary (Last 24 hours) at 2/17/2019 1333  Last data filed at 2/17/2019 0830  Gross per 24 hour   Intake 555 ml   Output 1047 ml   Net -492 ml       Lines/Drains/Airways     Peripheral Intravenous Line                 Peripheral IV - Single Lumen 02/16/19 1103 Left Antecubital 1 day         Peripheral IV - Single Lumen 02/17/19 0230 Left Hand less than 1 day                Physical Exam   Constitutional: She is oriented to person, place, and time. She appears well-developed and well-nourished.   Eyes: EOM are normal. Pupils are equal, round, and reactive to light.   Neck: Normal range of motion. Neck supple.   Cardiovascular: Normal rate and regular rhythm.   Pulmonary/Chest: Effort normal and breath sounds normal.   Abdominal: Soft. Bowel sounds are normal. She exhibits no distension and no mass. There is no tenderness. There is no rebound and no guarding.   Musculoskeletal: Normal range of motion. She exhibits no edema.   Neurological: She is alert and oriented to person,  place, and time.   Psychiatric: She has a normal mood and affect. Her behavior is normal. Judgment and thought content normal.       Significant Labs:  CBC:   Recent Labs   Lab 02/16/19  1101 02/17/19  0343   WBC 9.61 8.02   HGB 7.2* 8.1*   HCT 23.0* 25.5*    177     BMP:   Recent Labs   Lab 02/17/19  0343   *      K 4.2      CO2 26   BUN 29*   CREATININE 0.8   CALCIUM 8.7     CMP:   Recent Labs   Lab 02/17/19  0343   *   CALCIUM 8.7   ALBUMIN 2.9*   PROT 5.2*      K 4.2   CO2 26      BUN 29*   CREATININE 0.8   ALKPHOS 34*   ALT 12   AST 20   BILITOT 1.3*     Coagulation:   Recent Labs   Lab 02/16/19  1101   INR 1.0   APTT 21.1       Significant Imaging:  Imaging results within the past 24 hours have been reviewed.    Assessment/Plan:     * Symptomatic anemia    - It is difficult to determine whether she passed old blood at home or new blood, but I suspect it was old blood  - Transfuse as needed  - Plan for EGD/cscope tomorrow  - clear liquid diet, do not advance  - bid PPI          Thank you for your consult. I will follow-up with patient. Please contact us if you have any additional questions.    Colleen Frazier MD  Gastroenterology  [facility]

## 2019-02-17 NOTE — PLAN OF CARE
Problem: Physical Therapy Goal  Goal: Physical Therapy Goal  Goals to be met by: 3/17/2019     Patient will increase functional independence with mobility by performin. Supine to sit with Modified [place]  2. Sit to stand transfer with Modified [place]  3. Bed to chair transfer with Modified [place] using Rolling Walker  4. Gait  x 150 feet with Modified [place] using Rolling Walker.     Outcome: Ongoing (interventions implemented as appropriate)  Recommend Home with Home Health  May need RW , bedside commode

## 2019-02-17 NOTE — HPI
84 yo F re-admitted due to possible rectal bleeding and worsening anemia.  The pt was just d/c from the hospital 2/15 s/p IR embolization of right colon bleed.  The pt and her daughter state that she bled again at home but can not provide specifics whether it was red blood or dark blood.  The pt is not sure if she has had a BM since admission last night.  She did take one dose of Plavix on the morning of 2/16.  She and her daughter very much would like to pursue endoscopic evaluation.

## 2019-02-17 NOTE — PT/OT/SLP EVAL
Physical Therapy Evaluation    Patient Name:  Makenzie Haile   MRN:  [id number]    Recommendations:     Discharge Recommendations:  other (see comments)(Home with Home Health)   Discharge Equipment Recommendations: walker, rolling, commode   Barriers to discharge: None    Assessment:     Makenzie Haile is a 83 y.o. female admitted with a medical diagnosis of Symptomatic anemia.  She presents with the following impairments/functional limitations:  weakness, gait instability, impaired balance, impaired endurance, impaired cardiopulmonary response to activity, impaired self care skills, impaired functional mobilty, edema, decreased lower extremity function . Patient able to use RW to ambulate in room to chair at bedside. Very slow yolanda and small step lengths. Would benefit from post hospitalization therapy  .    Rehab Prognosis: Good; patient would benefit from acute skilled PT services to address these deficits and reach maximum level of function.    Recent Surgery: Procedure(s) (LRB):  EGD (ESOPHAGOGASTRODUODENOSCOPY) (N/A)  COLONOSCOPY (N/A)      Plan:     During this hospitalization, patient to be seen 6 x/week to address the identified rehab impairments via gait training, therapeutic activities, therapeutic exercises and progress toward the following goals:    · Plan of Care Expires:  03/17/19    Subjective     Chief Complaint: weakness  Patient/Family Comments/goals: go home  Pain/Comfort:  · Pain Rating 1: 0/10  · Pain Rating Post-Intervention 1: 0/10    Patients cultural, spiritual, Caodaism conflicts given the current situation:      Living Environment:  Lives with spouse no concerns  Prior to admission, patients level of function was independent.  Equipment used at home: cane, straight.  DME owned (not currently used): single point cane.  Upon discharge, patient will have assistance from family.    Objective:     Communicated with primary nurse prior to session.  Patient found all lines intact, call  button in reach, bed alarm on and family present telemetry, peripheral IV  upon PT entry to room.    General Precautions: Standard, fall   Orthopedic Precautions:N/A   Braces: N/A     Exams:  · RLE ROM: WFL  · RLE Strength: WFL  · LLE ROM: WFL  · LLE Strength: WFL    Functional Mobility:  · Bed Mobility:     · Supine to Sit: minimum assistance  · Transfers:     · Sit to Stand:  contact guard assistance and minimum assistance with rolling walker  · Gait: 15 ft with RW and CG assist   · Balance: fair with RW      Therapeutic Activities and Exercises:   Reviewed AROM ex for BUE and BLE      AM-PAC 6 CLICK MOBILITY  Total Score:19     Patient left up in chair with all lines intact, call button in reach and primary nurse notified.    GOALS:   Multidisciplinary Problems     Physical Therapy Goals        Problem: Physical Therapy Goal    Goal Priority Disciplines Outcome Goal Variances Interventions   Physical Therapy Goal     PT, PT/OT Ongoing (interventions implemented as appropriate)     Description:  Goals to be met by: 3/17/2019     Patient will increase functional independence with mobility by performin. Supine to sit with Modified [place]  2. Sit to stand transfer with Modified [place]  3. Bed to chair transfer with Modified [place] using Rolling Walker  4. Gait  x 150 feet with Modified [place] using Rolling Walker.                       History:     Past Medical History:   Diagnosis Date    Acute myocardial infarction involving left circumflex coronary artery 3/19/2018    Diabetes mellitus     Hyperlipemia     Hypertension        Past Surgical History:   Procedure Laterality Date    CORONARY ANGIOPLASTY WITH STENT PLACEMENT  2018       Time Tracking:     PT Received On: 19  PT Start Time: 1105     PT Stop Time: 1132  PT Total Time (min): 27 min     Billable Minutes: Evaluation 10 and Gait Training 16      Buzz Amador, PT  2019

## 2019-02-17 NOTE — PLAN OF CARE
Problem: Adult Inpatient Plan of Care  Goal: Plan of Care Review  Outcome: Ongoing (interventions implemented as appropriate)   02/17/19 0430   Plan of Care Review   Plan of Care Reviewed With patient   POC reviewed with the pt, verbalized understanding.  AAOx4.  No complaint of pain or any other distress noted.  Pt received 1 unit of blood throughout night, 2 unit held due to fever.  Pt received tylenol and benedryl one time dose.  On continuous telemetry monitor, SR.  No ectopy noted.  Infusing LR @ 100 mL/hr.  Blood glucose monitored.  Neuro check done.  Safety maintained, free of falls throughout shift.  Instructed to call for any assistance.  Will continue to monitor.

## 2019-02-17 NOTE — SUBJECTIVE & OBJECTIVE
Past Medical History:   Diagnosis Date    Acute myocardial infarction involving left circumflex coronary artery 3/19/2018    Diabetes mellitus     Hyperlipemia     Hypertension        Past Surgical History:   Procedure Laterality Date    CORONARY ANGIOPLASTY WITH STENT PLACEMENT  03/19/2018       Review of patient's allergies indicates:   Allergen Reactions    Iodine and iodide containing products     Sulfa (sulfonamide antibiotics) Hives     Family History     Problem Relation (Age of Onset)    Heart disease Father    No Known Problems Mother        Tobacco Use    Smoking status: Never Smoker   Substance and Sexual Activity    Alcohol use: No    Drug use: No    Sexual activity: Not on file     Review of Systems   Constitutional: Negative for appetite change and unexpected weight change.   Eyes: Negative for photophobia and visual disturbance.   Respiratory: Negative for chest tightness, shortness of breath and wheezing.    Cardiovascular: Negative for chest pain, palpitations and leg swelling.   Genitourinary: Negative for dysuria, flank pain and hematuria.   Musculoskeletal: Negative for joint swelling and myalgias.   Skin: Negative for color change and rash.   Neurological: Negative for dizziness and speech difficulty.   Psychiatric/Behavioral: Negative for confusion and hallucinations.     Objective:     Vital Signs (Most Recent):  Temp: 99.2 °F (37.3 °C) (02/17/19 1100)  Pulse: 66 (02/17/19 1200)  Resp: 18 (02/17/19 1142)  BP: (!) 149/66 (02/17/19 1100)  SpO2: (!) 93 % (02/17/19 1142) Vital Signs (24h Range):  Temp:  [96.5 °F (35.8 °C)-101.4 °F (38.6 °C)] 99.2 °F (37.3 °C)  Pulse:  [48-91] 66  Resp:  [16-68] 18  SpO2:  [92 %-98 %] 93 %  BP: (127-180)/(56-72) 149/66     Weight: 100.5 kg (221 lb 9 oz) (02/17/19 0600)  Body mass index is 38.03 kg/m².      Intake/Output Summary (Last 24 hours) at 2/17/2019 1333  Last data filed at 2/17/2019 0830  Gross per 24 hour   Intake 555 ml   Output 1047 ml   Net  -492 ml       Lines/Drains/Airways     Peripheral Intravenous Line                 Peripheral IV - Single Lumen 02/16/19 1103 Left Antecubital 1 day         Peripheral IV - Single Lumen 02/17/19 0230 Left Hand less than 1 day                Physical Exam   Constitutional: She is oriented to person, place, and time. She appears well-developed and well-nourished.   Eyes: EOM are normal. Pupils are equal, round, and reactive to light.   Neck: Normal range of motion. Neck supple.   Cardiovascular: Normal rate and regular rhythm.   Pulmonary/Chest: Effort normal and breath sounds normal.   Abdominal: Soft. Bowel sounds are normal. She exhibits no distension and no mass. There is no tenderness. There is no rebound and no guarding.   Musculoskeletal: Normal range of motion. She exhibits no edema.   Neurological: She is alert and oriented to person, place, and time.   Psychiatric: She has a normal mood and affect. Her behavior is normal. Judgment and thought content normal.       Significant Labs:  CBC:   Recent Labs   Lab 02/16/19  1101 02/17/19  0343   WBC 9.61 8.02   HGB 7.2* 8.1*   HCT 23.0* 25.5*    177     BMP:   Recent Labs   Lab 02/17/19  0343   *      K 4.2      CO2 26   BUN 29*   CREATININE 0.8   CALCIUM 8.7     CMP:   Recent Labs   Lab 02/17/19  0343   *   CALCIUM 8.7   ALBUMIN 2.9*   PROT 5.2*      K 4.2   CO2 26      BUN 29*   CREATININE 0.8   ALKPHOS 34*   ALT 12   AST 20   BILITOT 1.3*     Coagulation:   Recent Labs   Lab 02/16/19  1101   INR 1.0   APTT 21.1       Significant Imaging:  Imaging results within the past 24 hours have been reviewed.

## 2019-02-17 NOTE — PLAN OF CARE
Problem: Adult Inpatient Plan of Care  Goal: Plan of Care Review  Outcome: Ongoing (interventions implemented as appropriate)  Patient is currently on room air with acceptable O2 saturations. Will continue to monitor.

## 2019-02-17 NOTE — ASSESSMENT & PLAN NOTE
- It is difficult to determine whether she passed old blood at home or new blood, but I suspect it was old blood  - Transfuse as needed  - Plan for EGD/cscope tomorrow  - clear liquid diet, do not advance  - bid PPI

## 2019-02-18 ENCOUNTER — ANESTHESIA EVENT (OUTPATIENT)
Dept: ENDOSCOPY | Facility: HOSPITAL | Age: 84
DRG: 378 | End: 2019-02-18
Payer: MEDICARE

## 2019-02-18 ENCOUNTER — ANESTHESIA EVENT (OUTPATIENT)
Dept: INTENSIVE CARE | Facility: HOSPITAL | Age: 84
DRG: 378 | End: 2019-02-18
Payer: MEDICARE

## 2019-02-18 ENCOUNTER — ANESTHESIA (OUTPATIENT)
Dept: INTENSIVE CARE | Facility: HOSPITAL | Age: 84
DRG: 378 | End: 2019-02-18
Payer: MEDICARE

## 2019-02-18 ENCOUNTER — ANESTHESIA (OUTPATIENT)
Dept: ENDOSCOPY | Facility: HOSPITAL | Age: 84
DRG: 378 | End: 2019-02-18
Payer: MEDICARE

## 2019-02-18 LAB
ALBUMIN SERPL BCP-MCNC: 2.8 G/DL
ALP SERPL-CCNC: 33 U/L
ALT SERPL W/O P-5'-P-CCNC: 10 U/L
ANION GAP SERPL CALC-SCNC: 4 MMOL/L
AORTIC ROOT ANNULUS: 3.09 CM
AORTIC VALVE CUSP SEPERATION: 1.7 CM
AST SERPL-CCNC: 17 U/L
AV INDEX (PROSTH): 0.54
AV MEAN GRADIENT: 11.31 MMHG
AV PEAK GRADIENT: 17.31 MMHG
AV VALVE AREA: 1.86 CM2
AV VELOCITY RATIO: 0.58
BASOPHILS # BLD AUTO: 0.02 K/UL
BASOPHILS NFR BLD: 0.3 %
BILIRUB SERPL-MCNC: 0.7 MG/DL
BSA FOR ECHO PROCEDURE: 2.13 M2
BUN SERPL-MCNC: 16 MG/DL
CALCIUM SERPL-MCNC: 8.5 MG/DL
CHLORIDE SERPL-SCNC: 108 MMOL/L
CO2 SERPL-SCNC: 32 MMOL/L
CREAT SERPL-MCNC: 0.7 MG/DL
CV ECHO LV RWT: 0.54 CM
DIFFERENTIAL METHOD: ABNORMAL
DOP CALC AO PEAK VEL: 2.08 M/S
DOP CALC AO VTI: 50 CM
DOP CALC LVOT AREA: 3.46 CM2
DOP CALC LVOT DIAMETER: 2.1 CM
DOP CALC LVOT PEAK VEL: 1.2 M/S
DOP CALC LVOT STROKE VOLUME: 92.78 CM3
DOP CALCLVOT PEAK VEL VTI: 26.8 CM
E WAVE DECELERATION TIME: 296.74 MSEC
E/A RATIO: 0.56
ECHO LV POSTERIOR WALL: 1.16 CM (ref 0.6–1.1)
EOSINOPHIL # BLD AUTO: 0.2 K/UL
EOSINOPHIL NFR BLD: 2.9 %
ERYTHROCYTE [DISTWIDTH] IN BLOOD BY AUTOMATED COUNT: 15.2 %
EST. GFR  (AFRICAN AMERICAN): >60 ML/MIN/1.73 M^2
EST. GFR  (NON AFRICAN AMERICAN): >60 ML/MIN/1.73 M^2
FRACTIONAL SHORTENING: 42 % (ref 28–44)
GLUCOSE SERPL-MCNC: 107 MG/DL
HCT VFR BLD AUTO: 25.7 %
HGB BLD-MCNC: 8 G/DL
INTERVENTRICULAR SEPTUM: 1.02 CM (ref 0.6–1.1)
LA MAJOR: 5.19 CM
LA MINOR: 5.36 CM
LA WIDTH: 3.94 CM
LEFT ATRIUM SIZE: 3.71 CM
LEFT ATRIUM VOLUME INDEX: 31.9 ML/M2
LEFT ATRIUM VOLUME: 65.52 CM3
LEFT INTERNAL DIMENSION IN SYSTOLE: 2.51 CM (ref 2.1–4)
LEFT VENTRICLE DIASTOLIC VOLUME INDEX: 40.3 ML/M2
LEFT VENTRICLE DIASTOLIC VOLUME: 82.86 ML
LEFT VENTRICLE MASS INDEX: 78.2 G/M2
LEFT VENTRICLE SYSTOLIC VOLUME INDEX: 10.9 ML/M2
LEFT VENTRICLE SYSTOLIC VOLUME: 22.47 ML
LEFT VENTRICULAR INTERNAL DIMENSION IN DIASTOLE: 4.3 CM (ref 3.5–6)
LEFT VENTRICULAR MASS: 160.84 G
LV LATERAL E/E' RATIO: 9
LYMPHOCYTES # BLD AUTO: 1.4 K/UL
LYMPHOCYTES NFR BLD: 24.1 %
MCH RBC QN AUTO: 27.4 PG
MCHC RBC AUTO-ENTMCNC: 31.1 G/DL
MCV RBC AUTO: 88 FL
MONOCYTES # BLD AUTO: 0.6 K/UL
MONOCYTES NFR BLD: 9.4 %
MV PEAK A VEL: 1.13 M/S
MV PEAK E VEL: 0.63 M/S
NEUTROPHILS # BLD AUTO: 3.7 K/UL
NEUTROPHILS NFR BLD: 63.1 %
PLATELET # BLD AUTO: 178 K/UL
PMV BLD AUTO: 10.5 FL
POCT GLUCOSE: 107 MG/DL (ref 70–110)
POCT GLUCOSE: 122 MG/DL (ref 70–110)
POCT GLUCOSE: 122 MG/DL (ref 70–110)
POTASSIUM SERPL-SCNC: 3.9 MMOL/L
PROT SERPL-MCNC: 5.1 G/DL
PULM VEIN S/D RATIO: 1.91
PV PEAK D VEL: 0.32 M/S
PV PEAK S VEL: 0.61 M/S
PV PEAK VELOCITY: 1.48 CM/S
RA PRESSURE: 3 MMHG
RBC # BLD AUTO: 2.92 M/UL
RIGHT VENTRICULAR END-DIASTOLIC DIMENSION: 3.24 CM
SODIUM SERPL-SCNC: 144 MMOL/L
TDI LATERAL: 0.07
WBC # BLD AUTO: 5.84 K/UL

## 2019-02-18 PROCEDURE — 63600175 PHARM REV CODE 636 W HCPCS: Performed by: ANESTHESIOLOGY

## 2019-02-18 PROCEDURE — 80053 COMPREHEN METABOLIC PANEL: CPT

## 2019-02-18 PROCEDURE — 25000003 PHARM REV CODE 250: Performed by: NURSE ANESTHETIST, CERTIFIED REGISTERED

## 2019-02-18 PROCEDURE — 36415 COLL VENOUS BLD VENIPUNCTURE: CPT

## 2019-02-18 PROCEDURE — 25000003 PHARM REV CODE 250: Performed by: STUDENT IN AN ORGANIZED HEALTH CARE EDUCATION/TRAINING PROGRAM

## 2019-02-18 PROCEDURE — 94761 N-INVAS EAR/PLS OXIMETRY MLT: CPT

## 2019-02-18 PROCEDURE — 63600175 PHARM REV CODE 636 W HCPCS: Performed by: NURSE ANESTHETIST, CERTIFIED REGISTERED

## 2019-02-18 PROCEDURE — 43235 EGD DIAGNOSTIC BRUSH WASH: CPT | Performed by: INTERNAL MEDICINE

## 2019-02-18 PROCEDURE — 76937 US GUIDE VASCULAR ACCESS: CPT | Performed by: ANESTHESIOLOGY

## 2019-02-18 PROCEDURE — 85025 COMPLETE CBC W/AUTO DIFF WBC: CPT

## 2019-02-18 PROCEDURE — 45378 DIAGNOSTIC COLONOSCOPY: CPT | Performed by: INTERNAL MEDICINE

## 2019-02-18 PROCEDURE — 20000000 HC ICU ROOM

## 2019-02-18 PROCEDURE — 37000009 HC ANESTHESIA EA ADD 15 MINS: Performed by: INTERNAL MEDICINE

## 2019-02-18 PROCEDURE — 37000008 HC ANESTHESIA 1ST 15 MINUTES: Performed by: INTERNAL MEDICINE

## 2019-02-18 RX ORDER — HYDRALAZINE HYDROCHLORIDE 25 MG/1
25 TABLET, FILM COATED ORAL ONCE
Status: COMPLETED | OUTPATIENT
Start: 2019-02-18 | End: 2019-02-18

## 2019-02-18 RX ORDER — LABETALOL HCL 20 MG/4 ML
10 SYRINGE (ML) INTRAVENOUS EVERY 6 HOURS PRN
Status: DISCONTINUED | OUTPATIENT
Start: 2019-02-18 | End: 2019-02-18

## 2019-02-18 RX ORDER — LIDOCAINE HCL/PF 100 MG/5ML
SYRINGE (ML) INTRAVENOUS
Status: DISCONTINUED | OUTPATIENT
Start: 2019-02-18 | End: 2019-02-18

## 2019-02-18 RX ORDER — HYDRALAZINE HYDROCHLORIDE 20 MG/ML
10 INJECTION INTRAMUSCULAR; INTRAVENOUS ONCE
Status: COMPLETED | OUTPATIENT
Start: 2019-02-18 | End: 2019-02-18

## 2019-02-18 RX ORDER — SODIUM CHLORIDE 9 MG/ML
INJECTION, SOLUTION INTRAVENOUS CONTINUOUS PRN
Status: DISCONTINUED | OUTPATIENT
Start: 2019-02-18 | End: 2019-02-18

## 2019-02-18 RX ORDER — ETOMIDATE 2 MG/ML
INJECTION INTRAVENOUS
Status: DISCONTINUED | OUTPATIENT
Start: 2019-02-18 | End: 2019-02-18

## 2019-02-18 RX ORDER — PROPOFOL 10 MG/ML
VIAL (ML) INTRAVENOUS CONTINUOUS PRN
Status: DISCONTINUED | OUTPATIENT
Start: 2019-02-18 | End: 2019-02-18

## 2019-02-18 RX ORDER — AMLODIPINE BESYLATE 5 MG/1
5 TABLET ORAL DAILY
Status: DISCONTINUED | OUTPATIENT
Start: 2019-02-18 | End: 2019-02-21 | Stop reason: HOSPADM

## 2019-02-18 RX ORDER — AMLODIPINE BESYLATE 5 MG/1
5 TABLET ORAL DAILY
Status: DISCONTINUED | OUTPATIENT
Start: 2019-02-19 | End: 2019-02-18

## 2019-02-18 RX ORDER — HYDRALAZINE HYDROCHLORIDE 25 MG/1
50 TABLET, FILM COATED ORAL EVERY 8 HOURS
Status: DISCONTINUED | OUTPATIENT
Start: 2019-02-18 | End: 2019-02-20

## 2019-02-18 RX ADMIN — HYDRALAZINE HYDROCHLORIDE 50 MG: 25 TABLET, FILM COATED ORAL at 10:02

## 2019-02-18 RX ADMIN — SODIUM CHLORIDE: 9 INJECTION, SOLUTION INTRAVENOUS at 05:02

## 2019-02-18 RX ADMIN — HYDRALAZINE HYDROCHLORIDE 25 MG: 25 TABLET, FILM COATED ORAL at 02:02

## 2019-02-18 RX ADMIN — LIDOCAINE HYDROCHLORIDE 50 MG: 20 INJECTION, SOLUTION INTRAVENOUS at 05:02

## 2019-02-18 RX ADMIN — ETOMIDATE 8 MG: 2 INJECTION, SOLUTION INTRAVENOUS at 05:02

## 2019-02-18 RX ADMIN — AMLODIPINE BESYLATE 5 MG: 5 TABLET ORAL at 10:02

## 2019-02-18 RX ADMIN — HYDRALAZINE HYDROCHLORIDE 10 MG: 20 INJECTION INTRAMUSCULAR; INTRAVENOUS at 10:02

## 2019-02-18 RX ADMIN — HYDRALAZINE HYDROCHLORIDE 25 MG: 25 TABLET, FILM COATED ORAL at 06:02

## 2019-02-18 RX ADMIN — PROPOFOL 100 MCG/KG/MIN: 10 INJECTION, EMULSION INTRAVENOUS at 05:02

## 2019-02-18 RX ADMIN — LISINOPRIL 40 MG: 20 TABLET ORAL at 12:02

## 2019-02-18 RX ADMIN — HYDRALAZINE HYDROCHLORIDE 25 MG: 25 TABLET, FILM COATED ORAL at 07:02

## 2019-02-18 RX ADMIN — TOPICAL ANESTHETIC 1 EACH: 200 SPRAY DENTAL; PERIODONTAL at 05:02

## 2019-02-18 NOTE — PLAN OF CARE
EGD and colonoscopy complete. There is extensive colonic diverticulosis as the likely bleeding source.   Continue supportive care   Continue aspirin 81mg but do not restart plavix   Full liquid diet today

## 2019-02-18 NOTE — PROGRESS NOTES
[facility] Medicine Progress Note       Subjective:      History of Present Illness:    Patient had a black bowel movement last night; went to assess patient. AAOx3. No sob or cp. Patient seen by GI to be scoped tomorrow.      Patient denies any more black bowel movements. No bloody BMs. Awaiting to be scoped.          Objective:   Last 24 Hour Vital Signs:  BP  Min: 149/66  Max: 198/80  Temp  Av.4 °F (36.9 °C)  Min: 97.4 °F (36.3 °C)  Max: 99.2 °F (37.3 °C)  Pulse  Av.9  Min: 49  Max: 74  Resp  Av.2  Min: 16  Max: 18  SpO2  Av.4 %  Min: 93 %  Max: 99 %  Weight  Av kg (224 lb 13.9 oz)  Min: 102 kg (224 lb 13.9 oz)  Max: 102 kg (224 lb 13.9 oz)  I/O last 3 completed shifts:  In: 4430 [P.O.:4430]  Out: 751 [Urine:747; Stool:4]    Physical Examination:    General appearance: alert, appears stated age and cooperative  Head: Normocephalic, without obvious abnormality, atraumatic  Eyes: PERRLA. Clear conjunctiva. No scleral icterus  Neck: no adenopathy and carotid bruit, bilaterally, that decreases as one auscultates up the neck.   Lungs: clear to auscultation bilaterally  Heart: RRR with no rubs or murmrus. Systolic murmur appreciated   Abdomen: soft, non-tender; bowel sounds normal; no masses,  no organomegaly  Extremities: extremities normal, atraumatic, no cyanosis or edema  Skin: Skin color, texture, turgor normal. No rashes or lesions except for buttocks which was erythremic and raw appearing.   Neuro: AAOx3. Back to baseline.        Laboratory:    Trended Lab Data:  Recent Labs   Lab 19  1101 19  0343 19  1607 19  1916 19  0402   WBC 9.61 8.02 6.83  --  5.84   HGB 7.2* 8.1* 8.1* 8.5* 8.0*   HCT 23.0* 25.5* 25.8*  --  25.7*    177 182  --  178   MCV 87 87 90  --  88   RDW 15.3* 14.8* 15.2*  --  15.2*    141  --   --  144   K 4.3 4.2  --   --  3.9    108  --   --  108   CO2 27 26  --   --  32*   BUN 33* 29*  --   --  16   CREATININE 1.1 0.8   --   --  0.7   * 124*  --   --  107   PROT 5.5* 5.2*  --   --  5.1*   ALBUMIN 3.3* 2.9*  --   --  2.8*   BILITOT 0.5 1.3*  --   --  0.7   AST 22 20  --   --  17   ALKPHOS 35* 34*  --   --  33*   ALT 13 12  --   --  10       Microbiology Data:  Bcx: pending     Other Results:  EKG (my interpretation): sinus doni    Radiology:  Imaging Results          X-Ray Chest 1 View (Final result)  Result time 02/16/19 11:47:18    Final result by Rolando Gómez DO (02/16/19 11:47:18)                 Impression:      No acute cardiopulmonary process.      Electronically signed by: Rolando Gómez DO  Date:    02/16/2019  Time:    11:47             Narrative:    EXAMINATION:  XR CHEST 1 VIEW    CLINICAL HISTORY:  bradycardia;    TECHNIQUE:  Single frontal view of the chest was performed.    COMPARISON:  04/23/2018    FINDINGS:  No infiltrates or pleural effusions.  The heart appears to be at the upper limits of normal in size.  There is mild tortuosity of the descending thoracic aorta with calcification of the aortic knob..                                Current Medications:     Infusions:       Scheduled:   aspirin  81 mg Oral Daily    atorvastatin  40 mg Oral Daily    hydrALAZINE  25 mg Oral Q8H    lisinopril  40 mg Oral Daily    oxybutynin  15 mg Oral Daily    pantoprazole  40 mg Intravenous BID        PRN:  sodium chloride, dextrose 50%, dextrose 50%, glucagon (human recombinant), glucose, glucose, ondansetron, sodium chloride 0.9%    Antibiotics and Day Number of Therapy:  None    Lines and Day Number of Therapy:  piv         Assessment:     Makenzie Haile is a 83 y.o. female with:  Patient Active Problem List    Diagnosis Date Noted    Gastrointestinal hemorrhage with melena 02/17/2019    RANDELL (acute kidney injury)     Symptomatic anemia 02/16/2019    Bradycardia 02/16/2019    Overflow incontinence of urine 02/16/2019    Acute blood loss anemia     Acute upper GI bleed     Normocytic anemia 02/13/2019     HLD (hyperlipidemia) 02/13/2019    Skin breakdown 02/13/2019    History of GI bleed 02/13/2019    Iron deficiency anemia due to chronic blood loss 02/13/2019    Coronary artery disease involving native coronary artery of native heart without angina pectoris 04/19/2018    Neuropathy 03/27/2018    Acute myocardial infarction involving left circumflex coronary artery 03/19/2018    GERD (gastroesophageal reflux disease) 03/19/2018    Type 2 diabetes mellitus without complication, without long-term current use of insulin 03/19/2018    Essential hypertension 03/19/2018    Aortic stenosis 03/19/2018    Class 3 severe obesity due to excess calories with serious comorbidity and body mass index (BMI) of 40.0 to 44.9 in adult 03/19/2018    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 03/19/2018        Plan:       Symptomatic Anemia 2/2 Acute on Chronic GI Bleed, with GENEVIEVE  - Patient with 2 episodes of dark colored/tarry stool since discharge yesterday  - 2/14 CTA/IR embolized with 2mm coil placed in area of bleed on last admission ([facility]), also required 1U PRBCs  - H/H of previous admission H/H :10.5/33.1 MCV: 85  - Iron Panel: 49 Iron, TIBC 336, Trans 227, Ferritin 37, Folate 15.4, B12 284  - H/H on morning of admit: 7.2/23.0  INR 1.0/PT 10.5/PTT 21.1  - Transfuse 2U PRBCs today and repeat H/H  - Given 1L NS in the ED  Continue LR@100cc/hr  - Will hold ASA/Plavix with concerns of bleeding  Consider starting Protonix 40mg IV BID  - Consulted GI, appreciate their recommendations, possible scope.  2/17: Patient fevered after first unit; patient administered benadryl and tylenol. H/H: 8.1/25.5. Awaiting GI recs   2/18: Patient to be undergo scope today      RANDELL, reosolved  - Admit: BUN 33, Cr 1.1  Baseline Cr of her previous admission 0.7-0.8  - Patient volume down on exam, dry mucus membranes and dry skin, wanting to drink water  - Concern for pre-renal  Given 1L bolus in ED, Will keep her  on maintenance fluid 100cc/hr   -  resolved cr 0.7      Essential Hypertension  - /49 in triage, has been 130-140s/60s since fluids started  - On Lisinopril 20mg BID and Toprol-XL 25mg previously but started on: Lisinopril 40mg daily and Coreg 25mg BID  - Per patient family, had taken it the morning of admission.  - Holding coreg patient doni and lisinopril tomorrow morning if no longer actively bleeding.  2/17: /70 --> starting lisinopril 40. Will continue to hold reg currently   2/18: Added hydralazine. Will continue to monitor and adjust regiment      CAD w/ PMH oh PCI w/ SURAJ to LCX  - PCI in 3/18 - On ASA/Plavix  - Will continue to hold plavix while here in setting of GI bleed. Continuing ASA     DM2  - 2/13/2019 A1c 5.7  - On Metformin 1000mg BID at home  Will hold  - Starting SSI while here     HLD  - 3/2018: Chol 150, HDL 34, LDL 96, Trigly 99  - on atorvastatin 40mg daily at home  Continue while here     Overflow incontinence    - On Oxybutynin 15mg daily at home  Continue     HCM  - a1c 5.7  TSH WNL  - UTD on immunizations and cancer screenings per family        F:  Continue LR@100cc/hr  E: Will replete as necessary  N: Soft  Code: Full  Allergies: Iodine, Sulfa  DVT Prophylaxis: SCDs, holding anticoagulation in setting of GI bleed/symptomatic anemia  Dispo: GI scope       Jacky Hull, DO  [facility] Internal Medicine HO-I    [facility] Medicine Hospitalist Pager numbers:   [facility] Hospitalist Medicine Team A (Gilson/Aditya):   [facility] Hospitalist Medicine Team B (Ginger/Ran):  [phone]

## 2019-02-18 NOTE — PLAN OF CARE
Problem: Adult Inpatient Plan of Care  Goal: Plan of Care Review  Outcome: Ongoing (interventions implemented as appropriate)  Patient's blood pressure improved over the night.  No episodes of nausea and vomiting over the night. No subjective complaint of any pain. Four loose BM noted, still not clear, last bm was dark brown. Able to consumed 3500ml of golytely. IV line patent.  Free from fall. Will endorse patient to day shift Nurse.

## 2019-02-18 NOTE — PLAN OF CARE
Problem: Adult Inpatient Plan of Care  Goal: Plan of Care Review  Outcome: Ongoing (interventions implemented as appropriate)  Plan of care reviewed with patient and family today. Pt AA0x4 and able to verbalize needs to staff. Pt started on golitely for procedure in am. Large amounts of dark blood noted with bowel movements. Dr Mccarty notified and came to see pt.Staff will continue to monitor. Evening nurse aware. Pt denies pain. NPO after midnight . Verbalizes to staff understanding. NSR on monitor with 0 red alarms noted.  No acute distress observed at this time. Side rails x2, bed in low position, call bell within reach. Bed alarm in place for patient safety. Will relay to oncoming nurse to monitor for changes in condition.

## 2019-02-18 NOTE — [employer]ATION PATIENT INSTRUCTIONS
Discharge Summary/Instructions after an Endoscopic Procedure  Patient Name: Makenzie Haile  Patient MRN: [id number]  Patient YOB: 1935  Monday, February 18, 2019  Buzz Meyer MD  RESTRICTIONS:  During your procedure today, you received medications for sedation.  These   medications may affect your judgment, balance and coordination.  Therefore,   for 24 hours, you have the following restrictions:   - DO NOT drive a car, operate machinery, make legal/financial decisions,   sign important papers or drink alcohol.    ACTIVITY:  Today: no heavy lifting, straining or running due to procedural   sedation/anesthesia.  The following day: return to full activity including work.  DIET:  Eat and drink normally unless instructed otherwise.     TREATMENT FOR COMMON SIDE EFFECTS:  - Mild abdominal pain, nausea, belching, bloating or excessive gas:  rest,   eat lightly and use a heating pad.  - Sore Throat: treat with throat lozenges and/or gargle with warm salt   water.  - Because air was used during the procedure, expelling large amounts of air   from your rectum or belching is normal.  - If a bowel prep was taken, you may not have a bowel movement for 1-3 days.    This is normal.  SYMPTOMS TO WATCH FOR AND REPORT TO YOUR PHYSICIAN:  1. Abdominal pain or bloating, other than gas cramps.  2. Chest pain.  3. Back pain.  4. Signs of infection such as: chills or fever occurring within 24 hours   after the procedure.  5. Rectal bleeding, which would show as bright red, maroon, or black stools.   (A tablespoon of blood from the rectum is not serious, especially if   hemorrhoids are present.)  6. Vomiting.  7. Weakness or dizziness.  GO DIRECTLY TO THE NEAREST EMERGENCY ROOM IF YOU HAVE ANY OF THE FOLLOWING:      Difficulty breathing              Chills and/or fever over 101 F   Persistent vomiting and/or vomiting blood   Severe abdominal pain   Severe chest pain   Black, tarry stools   Bleeding- more than one  tablespoon   Any other symptom or condition that you feel may need urgent attention  Your doctor recommends these additional instructions:  If any biopsies were taken, your doctors clinic will contact you in 1 to 2   weeks with any results.  Observe in ICU overnight   Full liquid diet today   Reassess in AM for evidence of recurrent bleeding   Continue to avoid antiplatelet therapy for now but may resume aspirin in 48   hours   Do not resume plavix  For questions, problems or results please call your physician - Buzz Meyer MD at Work:  [phone].  EMERGENCY PHONE NUMBER: [phone],  LAB RESULTS: [phone]  IF A COMPLICATION OR EMERGENCY SITUATION ARISES AND YOU ARE UNABLE TO REACH   YOUR PHYSICIAN - GO DIRECTLY TO THE EMERGENCY ROOM.  MD Buzz Stack MD  2/18/2019 5:54:09 PM  This report has been verified and signed electronically.  [employer]

## 2019-02-18 NOTE — PT/OT/SLP PROGRESS
Occupational Therapy      Patient Name:  Makenzie Haile   MRN:  [id number]    Patient not seen today secondary to (pt transfered to ICU for hypertension. Will follow up tomorrow.). Will follow-up 2/19/2018.    Madhuri Sales, OT  2/18/2019

## 2019-02-18 NOTE — ANESTHESIA PREPROCEDURE EVALUATION
02/18/2019  Makenzie Rodriguez is a 83 y.o., female.  Admitted 2 days ago, rec'd 1 PRBC.  NPO.  NAAC.  Allergies - iodine, sulfa  PMH - CAD appears stable presently.  Chest - clear  EF  55 - 60%   2018  EKG - bradycardia  CXR - No acute disease  Anesthesia Evaluation    I have reviewed the Patient Summary Reports.    I have reviewed the Nursing Notes.   I have reviewed the Medications.     Review of Systems  Anesthesia Hx:  No problems with previous Anesthesia   Denies Personal Hx of Anesthesia complications.   Social:  Non-Smoker    Cardiovascular:   Hypertension Past MI CAD      Renal/:   Chronic Renal Disease    Hepatic/GI:   GERD    Endocrine:   Diabetes    The history is provided by a relative.           Review of patient's allergies indicates:   Allergen Reactions    Iodine and iodide containing products      Sulfa (sulfonamide antibiotics) Hives           Past Medical History:   Diagnosis Date    Acute myocardial infarction involving left circumflex coronary artery 3/19/2018    Diabetes mellitus      Hyperlipemia      Hypertension              Past Surgical History:   Procedure Laterality Date    CORONARY ANGIOPLASTY WITH STENT PLACEMENT       Results for CHHAYA RODRIGUEZ (MRN [id number]) as of 2/18/2019 09:40   Ref. Range 2/17/2019 16:07   Hemoglobin Latest Ref Range: 12.0 - 16.0 g/dL 8.1 (L)   Hematocrit Latest Ref Range: 37.0 - 48.5 % 25.8 (L)   Results for CHHAYA RODRIGUEZ (MRN [id number]) as of 2/18/2019 09:40   Ref. Range 2/18/2019 04:02   Sodium Latest Ref Range: 136 - 145 mmol/L 144   Potassium Latest Ref Range: 3.5 - 5.1 mmol/L 3.9   Chloride Latest Ref Range: 95 - 110 mmol/L 108   CO2 Latest Ref Range: 23 - 29 mmol/L 32 (H)   Anion Gap Latest Ref Range: 8 - 16 mmol/L 4 (L)   BUN, Bld Latest Ref Range: 8 - 23 mg/dL 16   Creatinine Latest Ref Range: 0.5 - 1.4 mg/dL 0.7   eGFR if non African American  Latest Ref Range: >60 mL/min/1.73 m^2 >60   eGFR if  Latest Ref Range: >60 mL/min/1.73 m^2 >60   Glucose Latest Ref Range: 70 - 110 mg/dL 107   Calcium Latest Ref Range: 8.7 - 10.5 mg/dL 8.5 (L)   Alkaline Phosphatase Latest Ref Range: 55 - 135 U/L 33 (L)   Total Protein Latest Ref Range: 6.0 - 8.4 g/dL 5.1 (L)   Albumin Latest Ref Range: 3.5 - 5.2 g/dL 2.8 (L)   Total Bilirubin Latest Ref Range: 0.1 - 1.0 mg/dL 0.7   AST Latest Ref Range: 10 - 40 U/L 17   ALT Latest Ref Range: 10 - 44 U/L 10     Temp (°C) 36.3-37.3 36.6  36.6 (97.9)  02/18 0732   Pulse 49-74 55-58  58  02/18 0800   Resp 16-18 18  18  02/18 0732   //80 185/71  185/71  02/18 0732   MAP (mmHg)  102  102  02/18 0732   SpO2 (%) 93-99 92  92  02/18 0748   Weight (kg) 102              Physical Exam  General:  Obesity    Airway/Jaw/Neck:  Airway Findings: Mouth Opening: Normal Tongue: Normal  General Airway Assessment: Adult  Mallampati: III  Improves to II with phonation.  TM Distance: Normal, at least 6 cm  Jaw/Neck Findings:  Neck ROM: Normal ROM       Chest/Lungs:  Chest/Lungs Findings: Clear to auscultation, Normal Respiratory Rate     Heart/Vascular:  Heart Findings: Rate: Normal  Rhythm: Regular Rhythm  Sounds: Quiet        Mental Status:  Mental Status Findings:  Cooperative         Anesthesia Plan  Type of Anesthesia, risks & benefits discussed:  Anesthesia Type:  MAC  Patient's Preference:   Intra-op Monitoring Plan:   Intra-op Monitoring Plan Comments:   Post Op Pain Control Plan:   Post Op Pain Control Plan Comments:   Induction:   IV  Beta Blocker:         Informed Consent: Patient understands risks and agrees with Anesthesia plan.  Questions answered. Anesthesia consent signed with patient.  ASA Score: 3     Day of Surgery Review of History & Physical:            Ready For Surgery From Anesthesia Perspective.

## 2019-02-18 NOTE — PLAN OF CARE
Pt readmitted Saturday 2/16/18 for GI Bleed to 4 th floor  Progress notes reviewed:  Symptomatic Anemia 2/2 Acute on Chronic GI Bleed, with GENEVIEVE  - Patient with 2 episodes of dark colored/tarry stool since discharge yesterday  - 2/14 CTA/IR embolized with 2mm coil placed in area of bleed on last admission ([facility]), also required 1U PRBCs  - H/H on morning of admit: 7.2/23.0  INR 1.0/PT 10.5/PTT 21.1  - Transfuse 2U PRBCs today and repeat H/H  - Given 1L NS in the ED  Continue LR@100cc/hr  - Will hold ASA/Plavix with concerns of bleeding  Consider starting Protonix 40mg IV BID  - Consulted GI, appreciate their recommendations, possible scope.  2/18: Patient to be undergo scope today    Pt's procedure was cancelled due to elevataed BP; pt was sent to ICU for vicente placement.    Tn visited with pt and spouse; pt uses cane to ambulate; no other dme, no hh.  PT/OT following.  Tn to follow up on recommendations.       02/18/19 1456   Discharge Assessment   Assessment Type Discharge Planning Assessment   Confirmed/corrected address and phone number on facesheet? Yes   Assessment information obtained from? Patient   Expected Length of Stay (days) 2   Communicated expected length of stay with patient/caregiver yes   Prior to hospitilization cognitive status: Alert/Oriented   Prior to hospitalization functional status: Independent   Current cognitive status: Alert/Oriented   Current Functional Status: Needs Assistance   Lives With spouse   Able to Return to Prior Arrangements yes   Is patient able to care for self after discharge? Unable to determine at this time (comments)   Who are your caregiver(s) and their phone number(s)? paulo (spouse) [phone] or mary ann Iraheta   Patient's perception of discharge disposition home or selfcare   Readmission Within the Last 30 Days previous discharge plan unsuccessful   If yes, most recent facility name: [facility]   Patient currently being followed by outpatient case management? No  "  Patient currently receives any other outside agency services? No   Equipment Currently Used at Home cane, straight   Do you have any problems affording any of your prescribed medications? No   Is the patient taking medications as prescribed? yes   Does the patient have transportation home? Yes   Transportation Anticipated family or friend will provide   Does the patient receive services at the Coumadin Clinic? No   Discharge Plan A Home with family   Discharge Plan B Home with family;Home Health   DME Needed Upon Discharge  (tbd)   Patient/Family in Agreement with Plan yes   Readmission Questionnaire   At the time of your discharge, did someone talk to you about what your health problems were? Yes   At the time of discharge, did someone talk to you about what to watch out for regarding worsening of your health problem? Yes   At the time of discharge, did someone talk to you about what to do if you experienced worsening of your health problem? Yes   At the time of discharge, did someone talk to you about which medication to take when you left the hospital and which ones to stop taking? Yes   At the time of discharge, did someone talk to you about when and where to follow up with a doctor after you left the hospital? Yes   What do you believe caused you to be sick enough to be re-admitted? " bleeding again"   How often do you need to have someone help you when you read instructions, pamphlets, or other written material from your doctor or pharmacy? Never   Do you have problems taking your medications as prescribed? No   Do you have any problems affording any of  your prescribed medications? No   Do you have problems obtaining/receiving your medications? No   Does the patient have transportation to healthcare appointments? Yes   Living Arrangements house   Does the patient have family/friends to help with healtcare needs after discharge? yes   Does your caregiver provide all the help you need? Yes   Are you currently " feeling confused? No   Are you currently having problems thinking? No   Are you currently having memory problems? No   Have you felt down, depressed, or hopeless? 0   Have you felt little interest or pleasure in doing things? 0   In the last 7 days, my sleep quality was: good

## 2019-02-18 NOTE — ANESTHESIA PROCEDURE NOTES
Arterial    Diagnosis: hypertension    Patient location during procedure: post-op  Procedure start time: 2/18/2019 12:01 PM  Timeout: 2/18/2019 12:00 PM  Procedure end time: 2/18/2019 12:30 PM  Staffing  Anesthesiologist: Steven Galvan MD  Resident/CRNA: Gerber Nelson MD  Performed: resident/CRNA   Anesthesiologist was present at the time of the procedure.  Preanesthetic Checklist  Completed: patient identified, site marked, surgical consent, pre-op evaluation, timeout performed, IV checked, risks and benefits discussed, monitors and equipment checked and anesthesia consent givenArterial  Skin Prep: chlorhexidine gluconate  Orientation: left  Location: radial  Catheter Size: 20 G  Catheter placement by Ultrasound guidance. Heme positive aspiration all ports.  Vessel Caliber: patent  Needle advanced into vessel with real time Ultrasound guidance.  Guidewire confirmed in vessel.  Image recorded and saved.Insertion Attempts: 1  Assessment  Dressing: secured with tape and tegaderm  Patient: Tolerated well

## 2019-02-18 NOTE — [employer] PATIENT INSTRUCTIONS
Discharge Summary/Instructions after an Endoscopic Procedure  Patient Name: Makenzie Haile  Patient MRN: [id number]  Patient YOB: 1935  Monday, February 18, 2019  Buzz Meyer MD  RESTRICTIONS:  During your procedure today, you received medications for sedation.  These   medications may affect your judgment, balance and coordination.  Therefore,   for 24 hours, you have the following restrictions:   - DO NOT drive a car, operate machinery, make legal/financial decisions,   sign important papers or drink alcohol.    ACTIVITY:  Today: no heavy lifting, straining or running due to procedural   sedation/anesthesia.  The following day: return to full activity including work.  DIET:  Eat and drink normally unless instructed otherwise.     TREATMENT FOR COMMON SIDE EFFECTS:  - Mild abdominal pain, nausea, belching, bloating or excessive gas:  rest,   eat lightly and use a heating pad.  - Sore Throat: treat with throat lozenges and/or gargle with warm salt   water.  - Because air was used during the procedure, expelling large amounts of air   from your rectum or belching is normal.  - If a bowel prep was taken, you may not have a bowel movement for 1-3 days.    This is normal.  SYMPTOMS TO WATCH FOR AND REPORT TO YOUR PHYSICIAN:  1. Abdominal pain or bloating, other than gas cramps.  2. Chest pain.  3. Back pain.  4. Signs of infection such as: chills or fever occurring within 24 hours   after the procedure.  5. Rectal bleeding, which would show as bright red, maroon, or black stools.   (A tablespoon of blood from the rectum is not serious, especially if   hemorrhoids are present.)  6. Vomiting.  7. Weakness or dizziness.  GO DIRECTLY TO THE NEAREST EMERGENCY ROOM IF YOU HAVE ANY OF THE FOLLOWING:      Difficulty breathing              Chills and/or fever over 101 F   Persistent vomiting and/or vomiting blood   Severe abdominal pain   Severe chest pain   Black, tarry stools   Bleeding- more than one  tablespoon   Any other symptom or condition that you feel may need urgent attention  Your doctor recommends these additional instructions:  If any biopsies were taken, your doctors clinic will contact you in 1 to 2   weeks with any results.  Proceed to colonoscopy  - Discharge patient to home.  For questions, problems or results please call your physician - Buzz Meyer MD at Work:  [phone].  EMERGENCY PHONE NUMBER: [phone],  LAB RESULTS: [phone]  IF A COMPLICATION OR EMERGENCY SITUATION ARISES AND YOU ARE UNABLE TO REACH   YOUR PHYSICIAN - GO DIRECTLY TO THE EMERGENCY ROOM.  MD Buzz Stack MD  2/18/2019 5:54:51 PM  This report has been verified and signed electronically.  [employer]

## 2019-02-18 NOTE — PLAN OF CARE
Patient re-evaluated and found to have improved BP control and adequate BP monitoring with arterial line. Although her BP instability remains a concern, delay in endoscopy is not recommended in the setting of what appears to be an active GI bleed. EGD and colonoscopy should proceed this afternoon.

## 2019-02-18 NOTE — PT/OT/SLP PROGRESS
Physical Therapy      Patient Name:  Makenzie Haile   MRN:  [id number]    Patient not seen today secondary to (pt t/f'd to ICU). Will follow-up per MD orders.    Brendon Mendes, PTA

## 2019-02-18 NOTE — PLAN OF CARE
Report received from NAYA Koenig. (Patient has been NPO since MN, prep completed by MN. Stools are loose and red. Last dose of plavix yesterday.Daughter at bedside. Procedure consents are signed.)

## 2019-02-19 PROBLEM — K57.31 DIVERTICULOSIS OF LARGE INTESTINE WITH HEMORRHAGE: Status: ACTIVE | Noted: 2019-02-19

## 2019-02-19 LAB
ALBUMIN SERPL BCP-MCNC: 3.2 G/DL
ALP SERPL-CCNC: 44 U/L
ALT SERPL W/O P-5'-P-CCNC: 12 U/L
ANION GAP SERPL CALC-SCNC: 9 MMOL/L
AST SERPL-CCNC: 19 U/L
BASOPHILS # BLD AUTO: 0.03 K/UL
BASOPHILS NFR BLD: 0.3 %
BILIRUB SERPL-MCNC: 0.9 MG/DL
BUN SERPL-MCNC: 12 MG/DL
CALCIUM SERPL-MCNC: 9.6 MG/DL
CHLORIDE SERPL-SCNC: 104 MMOL/L
CO2 SERPL-SCNC: 27 MMOL/L
CREAT SERPL-MCNC: 0.7 MG/DL
DIFFERENTIAL METHOD: ABNORMAL
EOSINOPHIL # BLD AUTO: 0.2 K/UL
EOSINOPHIL NFR BLD: 1.7 %
ERYTHROCYTE [DISTWIDTH] IN BLOOD BY AUTOMATED COUNT: 15.3 %
EST. GFR  (AFRICAN AMERICAN): >60 ML/MIN/1.73 M^2
EST. GFR  (NON AFRICAN AMERICAN): >60 ML/MIN/1.73 M^2
GLUCOSE SERPL-MCNC: 116 MG/DL
HCT VFR BLD AUTO: 31.4 %
HGB BLD-MCNC: 10.1 G/DL
LYMPHOCYTES # BLD AUTO: 1.3 K/UL
LYMPHOCYTES NFR BLD: 11.4 %
MCH RBC QN AUTO: 28.3 PG
MCHC RBC AUTO-ENTMCNC: 32.2 G/DL
MCV RBC AUTO: 88 FL
MONOCYTES # BLD AUTO: 0.7 K/UL
MONOCYTES NFR BLD: 6.4 %
NEUTROPHILS # BLD AUTO: 9 K/UL
NEUTROPHILS NFR BLD: 80 %
PLATELET # BLD AUTO: 266 K/UL
PMV BLD AUTO: 10.1 FL
POCT GLUCOSE: 116 MG/DL (ref 70–110)
POCT GLUCOSE: 123 MG/DL (ref 70–110)
POCT GLUCOSE: 125 MG/DL (ref 70–110)
POCT GLUCOSE: 155 MG/DL (ref 70–110)
POTASSIUM SERPL-SCNC: 3.7 MMOL/L
PROT SERPL-MCNC: 6.2 G/DL
RBC # BLD AUTO: 3.57 M/UL
SODIUM SERPL-SCNC: 140 MMOL/L
WBC # BLD AUTO: 11.2 K/UL

## 2019-02-19 PROCEDURE — 97535 SELF CARE MNGMENT TRAINING: CPT

## 2019-02-19 PROCEDURE — 94761 N-INVAS EAR/PLS OXIMETRY MLT: CPT

## 2019-02-19 PROCEDURE — 97110 THERAPEUTIC EXERCISES: CPT | Performed by: PHYSICAL THERAPIST

## 2019-02-19 PROCEDURE — 63600175 PHARM REV CODE 636 W HCPCS: Performed by: STUDENT IN AN ORGANIZED HEALTH CARE EDUCATION/TRAINING PROGRAM

## 2019-02-19 PROCEDURE — 80053 COMPREHEN METABOLIC PANEL: CPT

## 2019-02-19 PROCEDURE — 97530 THERAPEUTIC ACTIVITIES: CPT | Performed by: PHYSICAL THERAPIST

## 2019-02-19 PROCEDURE — 36415 COLL VENOUS BLD VENIPUNCTURE: CPT

## 2019-02-19 PROCEDURE — 25000003 PHARM REV CODE 250: Performed by: STUDENT IN AN ORGANIZED HEALTH CARE EDUCATION/TRAINING PROGRAM

## 2019-02-19 PROCEDURE — 20000000 HC ICU ROOM

## 2019-02-19 PROCEDURE — 97165 OT EVAL LOW COMPLEX 30 MIN: CPT

## 2019-02-19 PROCEDURE — 85025 COMPLETE CBC W/AUTO DIFF WBC: CPT

## 2019-02-19 RX ORDER — RAMELTEON 8 MG/1
8 TABLET ORAL NIGHTLY PRN
Status: DISCONTINUED | OUTPATIENT
Start: 2019-02-19 | End: 2019-02-21 | Stop reason: HOSPADM

## 2019-02-19 RX ORDER — AMLODIPINE BESYLATE 5 MG/1
5 TABLET ORAL DAILY
Qty: 30 TABLET | Refills: 11 | Status: SHIPPED | OUTPATIENT
Start: 2019-02-20 | End: 2020-03-11

## 2019-02-19 RX ORDER — HYDRALAZINE HYDROCHLORIDE 50 MG/1
50 TABLET, FILM COATED ORAL EVERY 8 HOURS
Qty: 90 TABLET | Refills: 11 | Status: SHIPPED | OUTPATIENT
Start: 2019-02-19 | End: 2019-02-20 | Stop reason: HOSPADM

## 2019-02-19 RX ORDER — HYDRALAZINE HYDROCHLORIDE 20 MG/ML
5 INJECTION INTRAMUSCULAR; INTRAVENOUS ONCE
Status: COMPLETED | OUTPATIENT
Start: 2019-02-19 | End: 2019-02-19

## 2019-02-19 RX ORDER — CARVEDILOL 6.25 MG/1
6.25 TABLET ORAL 2 TIMES DAILY
Qty: 60 TABLET | Refills: 11 | Status: SHIPPED | OUTPATIENT
Start: 2019-02-19 | End: 2019-02-20 | Stop reason: SDUPTHER

## 2019-02-19 RX ORDER — CARVEDILOL 6.25 MG/1
6.25 TABLET ORAL 2 TIMES DAILY
Status: DISCONTINUED | OUTPATIENT
Start: 2019-02-19 | End: 2019-02-20

## 2019-02-19 RX ORDER — ASPIRIN 81 MG/1
81 TABLET ORAL DAILY
Qty: 30 TABLET | Refills: 11 | Status: SHIPPED | OUTPATIENT
Start: 2019-02-20 | End: 2023-05-25

## 2019-02-19 RX ADMIN — CARVEDILOL 6.25 MG: 6.25 TABLET, FILM COATED ORAL at 09:02

## 2019-02-19 RX ADMIN — HYDRALAZINE HYDROCHLORIDE 50 MG: 25 TABLET, FILM COATED ORAL at 09:02

## 2019-02-19 RX ADMIN — AMLODIPINE BESYLATE 5 MG: 5 TABLET ORAL at 08:02

## 2019-02-19 RX ADMIN — RAMELTEON 8 MG: 8 TABLET, FILM COATED ORAL at 09:02

## 2019-02-19 RX ADMIN — CARVEDILOL 6.25 MG: 6.25 TABLET, FILM COATED ORAL at 12:02

## 2019-02-19 RX ADMIN — ATORVASTATIN CALCIUM 40 MG: 40 TABLET, FILM COATED ORAL at 08:02

## 2019-02-19 RX ADMIN — HYDRALAZINE HYDROCHLORIDE 50 MG: 25 TABLET, FILM COATED ORAL at 05:02

## 2019-02-19 RX ADMIN — HYDRALAZINE HYDROCHLORIDE 5 MG: 20 INJECTION INTRAMUSCULAR; INTRAVENOUS at 03:02

## 2019-02-19 RX ADMIN — OXYBUTYNIN CHLORIDE 15 MG: 5 TABLET, EXTENDED RELEASE ORAL at 08:02

## 2019-02-19 RX ADMIN — HYDRALAZINE HYDROCHLORIDE 50 MG: 25 TABLET, FILM COATED ORAL at 02:02

## 2019-02-19 RX ADMIN — LISINOPRIL 40 MG: 20 TABLET ORAL at 08:02

## 2019-02-19 NOTE — PLAN OF CARE
[facility] Plan of Care    BP still elevated to 200s systolic earlier via A line reading. Increased dose of hydralazine from 25mg TID to 50mg TID.     2 hours later BP still in 200s systolic. Starting norvasc daily. This will take time to lower BP but want to avoid beta blockers (that may dampen tachycardic response of bleeding) and hctz (that may affect fluid status in setting of bleeding).     Will continue to monitor.     Torri Burgess,   [facility] HO-2  02/18/2019  9:37 PM

## 2019-02-19 NOTE — PLAN OF CARE
Problem: Occupational Therapy Goal  Goal: Occupational Therapy Goal  Goals to be met by: 3/19/2019    Patient will increase functional independence with ADLs by performing:    UE Dressing with Modified [place].  LE Dressing with Modified [place].  Grooming while standing at sink with Supervision.  Toileting from bedside commode with Modified [place] for hygiene and clothing management.   Toilet transfer to bedside commode with Supervision.  Upper extremity exercise program x10 reps per handout, with independence.    Outcome: Ongoing (interventions implemented as appropriate)  Eval performed on this date, report to follow.

## 2019-02-19 NOTE — ANESTHESIA PREPROCEDURE EVALUATION
02/18/2019  Makenzie Rodriguez is a 83 y.o., female.  Admitted 2 days ago, rec'd 1 PRBC.  NPO.  NAAC.  Allergies - iodine, sulfa  PMH - CAD appears stable presently.  Chest - clear  EF  55 - 60%   2018  EKG - bradycardia  CXR - No acute disease  Pre-op Assessment    I have reviewed the Patient Summary Reports.     I have reviewed the Nursing Notes.   I have reviewed the Medications.     Review of Systems  Anesthesia Hx:  No problems with previous Anesthesia   Denies Personal Hx of Anesthesia complications.   Social:  Non-Smoker    Cardiovascular:   Hypertension Past MI CAD      Renal/:   Chronic Renal Disease    Hepatic/GI:   GERD    Endocrine:   Diabetes    The history is provided by a relative.           Review of patient's allergies indicates:   Allergen Reactions    Iodine and iodide containing products      Sulfa (sulfonamide antibiotics) Hives           Past Medical History:   Diagnosis Date    Acute myocardial infarction involving left circumflex coronary artery 3/19/2018    Diabetes mellitus      Hyperlipemia      Hypertension              Past Surgical History:   Procedure Laterality Date    CORONARY ANGIOPLASTY WITH STENT PLACEMENT       Results for CHHAYA RODRIGUEZ (MRN [id number]) as of 2/18/2019 09:40   Ref. Range 2/17/2019 16:07   Hemoglobin Latest Ref Range: 12.0 - 16.0 g/dL 8.1 (L)   Hematocrit Latest Ref Range: 37.0 - 48.5 % 25.8 (L)   Results for CHHAYA RODRIGUEZ (MRN [id number]) as of 2/18/2019 09:40   Ref. Range 2/18/2019 04:02   Sodium Latest Ref Range: 136 - 145 mmol/L 144   Potassium Latest Ref Range: 3.5 - 5.1 mmol/L 3.9   Chloride Latest Ref Range: 95 - 110 mmol/L 108   CO2 Latest Ref Range: 23 - 29 mmol/L 32 (H)   Anion Gap Latest Ref Range: 8 - 16 mmol/L 4 (L)   BUN, Bld Latest Ref Range: 8 - 23 mg/dL 16   Creatinine Latest Ref Range: 0.5 - 1.4 mg/dL 0.7   eGFR if non African American  Latest Ref Range: >60 mL/min/1.73 m^2 >60   eGFR if  Latest Ref Range: >60 mL/min/1.73 m^2 >60   Glucose Latest Ref Range: 70 - 110 mg/dL 107   Calcium Latest Ref Range: 8.7 - 10.5 mg/dL 8.5 (L)   Alkaline Phosphatase Latest Ref Range: 55 - 135 U/L 33 (L)   Total Protein Latest Ref Range: 6.0 - 8.4 g/dL 5.1 (L)   Albumin Latest Ref Range: 3.5 - 5.2 g/dL 2.8 (L)   Total Bilirubin Latest Ref Range: 0.1 - 1.0 mg/dL 0.7   AST Latest Ref Range: 10 - 40 U/L 17   ALT Latest Ref Range: 10 - 44 U/L 10     Temp (°C) 36.3-37.3 36.6  36.6 (97.9)  02/18 0732   Pulse 49-74 55-58  58  02/18 0800   Resp 16-18 18  18  02/18 0732   //80 185/71  185/71  02/18 0732   MAP (mmHg)  102  102  02/18 0732   SpO2 (%) 93-99 92  92  02/18 0748   Weight (kg) 102              Physical Exam  General:  Obesity    Airway/Jaw/Neck:  Airway Findings: Mouth Opening: Normal Tongue: Normal  General Airway Assessment: Adult  Mallampati: III  Improves to II with phonation.  TM Distance: Normal, at least 6 cm  Jaw/Neck Findings:  Neck ROM: Normal ROM       Chest/Lungs:  Chest/Lungs Findings: Clear to auscultation, Normal Respiratory Rate     Heart/Vascular:  Heart Findings: Rate: Normal  Rhythm: Regular Rhythm  Sounds: Quiet        Mental Status:  Mental Status Findings:  Cooperative         Anesthesia Plan  Type of Anesthesia, risks & benefits discussed:  Anesthesia Type:  MAC  Patient's Preference:   Intra-op Monitoring Plan:   Intra-op Monitoring Plan Comments:   Post Op Pain Control Plan:   Post Op Pain Control Plan Comments:   Induction:   IV  Beta Blocker:         Informed Consent: Patient understands risks and agrees with Anesthesia plan.  Questions answered. Anesthesia consent signed with patient.  ASA Score: 3     Day of Surgery Review of History & Physical:            Ready For Surgery From Anesthesia Perspective.

## 2019-02-19 NOTE — TRANSFER OF CARE
"Anesthesia Transfer of Care Note    Patient: Makenzie Haile    Procedure(s) Performed: Procedure(s) (LRB):  EGD (ESOPHAGOGASTRODUODENOSCOPY) (N/A)  COLONOSCOPY (N/A)    Patient location: GI    Anesthesia Type: MAC    Transport from OR: Transported from OR on room air with adequate spontaneous ventilation. Continuous ECG monitoring in transport. Continuous SpO2 monitoring in transport    Post pain: adequate analgesia    Post assessment: no apparent anesthetic complications and tolerated procedure well    Post vital signs: stable    Level of consciousness: awake, alert and oriented    Nausea/Vomiting: no nausea/vomiting    Complications: none    Transfer of care protocol was followed      Last vitals:   Visit Vitals  /86   Pulse 79   Temp 36.9 °C (98.5 °F) (Oral)   Resp 20   Ht 5' 4" (1.626 m)   Wt 102 kg (224 lb 13.9 oz)   SpO2 96%   Breastfeeding? No   BMI 38.60 kg/m²     "

## 2019-02-19 NOTE — PLAN OF CARE
Problem: Physical Therapy Goal  Goal: Physical Therapy Goal  Goals to be met by: 3/17/2019     Patient will increase functional independence with mobility by performin. Supine to sit with Modified [place]  2. Sit to stand transfer with Modified [place]  3. Bed to chair transfer with Modified [place] using Rolling Walker  4. Gait  x 150 feet with Modified [place] using Rolling Walker.      Outcome: Ongoing (interventions implemented as appropriate)  Pt would benefit from skilled PT to progress functional mobility.

## 2019-02-19 NOTE — PROGRESS NOTES
[facility] Medicine Progress Note       Subjective:     Patient denies any black or bloody bowel movements. Denies chest pain, shortness of breath.   Endorses minimal weakness       Objective:   Last 24 Hour Vital Signs:  BP  Min: 133/86  Max: 221/91  Temp  Av.3 °F (36.8 °C)  Min: 97.9 °F (36.6 °C)  Max: 98.7 °F (37.1 °C)  Pulse  Av.3  Min: 58  Max: 87  Resp  Av.7  Min: 16  Max: 26  SpO2  Av.6 %  Min: 92 %  Max: 98 %  Weight  Av.7 kg (210 lb 14.4 oz)  Min: 95.7 kg (210 lb 14.4 oz)  Max: 95.7 kg (210 lb 14.4 oz)  I/O last 3 completed shifts:  In: 4165 [P.O.:3865; I.V.:300]  Out: 604 [Urine:600; Stool:4]    Physical Examination:     General appearance: alert, appears stated age and cooperative  Head: Normocephalic, without obvious abnormality, atraumatic  Eyes: PERRLA. Clear conjunctiva. No scleral icterus  Neck: no adenopathy and carotid bruit, bilaterally, that decreases as one auscultates up the neck.   Lungs: clear to auscultation bilaterally  Heart: RRR with no rubs or murmrus. Systolic murmur appreciated   Abdomen: soft, non-tender; bowel sounds normal; no masses,  no organomegaly  Extremities: extremities normal, atraumatic, no cyanosis or edema  Skin: Skin color, texture, turgor normal. No rashes or lesions except for buttocks which was erythremic and raw appearing.   Neuro: AAOx3.         Laboratory:    Trended Lab Data:  Recent Labs   Lab 19  0343 19  1607 19  1916 19  0402 19  0308   WBC 8.02 6.83  --  5.84 11.20   HGB 8.1* 8.1* 8.5* 8.0* 10.1*   HCT 25.5* 25.8*  --  25.7* 31.4*    182  --  178 266   MCV 87 90  --  88 88   RDW 14.8* 15.2*  --  15.2* 15.3*     --   --  144 140   K 4.2  --   --  3.9 3.7     --   --  108 104   CO2 26  --   --  32* 27   BUN 29*  --   --  16 12   CREATININE 0.8  --   --  0.7 0.7   *  --   --  107 116*   PROT 5.2*  --   --  5.1* 6.2   ALBUMIN 2.9*  --   --  2.8* 3.2*   BILITOT 1.3*  --   --  0.7  0.9   AST 20  --   --  17 19   ALKPHOS 34*  --   --  33* 44*   ALT 12  --   --  10 12       Microbiology Data:  Bcx: [id number]    Other Results:  EKG (my interpretation): sinus doni    Radiology:  Imaging Results          X-Ray Chest 1 View (Final result)  Result time 02/16/19 11:47:18    Final result by Rolando Gómez DO (02/16/19 11:47:18)                 Impression:      No acute cardiopulmonary process.      Electronically signed by: Rolando Gómez DO  Date:    02/16/2019  Time:    11:47             Narrative:    EXAMINATION:  XR CHEST 1 VIEW    CLINICAL HISTORY:  bradycardia;    TECHNIQUE:  Single frontal view of the chest was performed.    COMPARISON:  04/23/2018    FINDINGS:  No infiltrates or pleural effusions.  The heart appears to be at the upper limits of normal in size.  There is mild tortuosity of the descending thoracic aorta with calcification of the aortic knob..                                Current Medications:     Infusions:       Scheduled:   amLODIPine  5 mg Oral Daily    aspirin  81 mg Oral Daily    atorvastatin  40 mg Oral Daily    hydrALAZINE  50 mg Oral Q8H    lisinopril  40 mg Oral Daily    oxybutynin  15 mg Oral Daily        PRN:  sodium chloride, dextrose 50%, dextrose 50%, glucagon (human recombinant), glucose, glucose, ondansetron, sodium chloride 0.9%    Antibiotics and Day Number of Therapy:  None    Lines and Day Number of Therapy:  piv         Assessment:     Makenzie Haile is a 83 y.o. female with:  Patient Active Problem List    Diagnosis Date Noted    Gastrointestinal hemorrhage with melena 02/17/2019    RANDELL (acute kidney injury)     Symptomatic anemia 02/16/2019    Bradycardia 02/16/2019    Overflow incontinence of urine 02/16/2019    Acute blood loss anemia     Acute upper GI bleed     Normocytic anemia 02/13/2019    HLD (hyperlipidemia) 02/13/2019    Skin breakdown 02/13/2019    History of GI bleed 02/13/2019    Iron deficiency anemia due to chronic blood  loss 02/13/2019    Coronary artery disease involving native coronary artery of native heart without angina pectoris 04/19/2018    Neuropathy 03/27/2018    Acute myocardial infarction involving left circumflex coronary artery 03/19/2018    GERD (gastroesophageal reflux disease) 03/19/2018    Type 2 diabetes mellitus without complication, without long-term current use of insulin 03/19/2018    Essential hypertension 03/19/2018    Aortic stenosis 03/19/2018    Class 3 severe obesity due to excess calories with serious comorbidity and body mass index (BMI) of 40.0 to 44.9 in adult 03/19/2018    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   - Patient with 2 episodes of dark colored/tarry stool since discharge yesterday  - 2/14 CTA/IR embolized with 2mm coil placed in area of bleed on last admission ([facility]), also required 1U PRBCs  - H/H of previous admission H/H :10.5/33.1 MCV: 85  - Iron Panel: 49 Iron, TIBC 336, Trans 227, Ferritin 37, Folate 15.4, B12 284  - H/H on morning of admit: 7.2/23.0  INR 1.0/PT 10.5/PTT 21.1  - Transfuse 2U PRBCs today and repeat H/H  - Given 1L NS in the ED  Continue LR@100cc/hr  - Will hold ASA/Plavix with concerns of bleeding  Consider starting Protonix 40mg IV BID  - Consulted GI, appreciate their recommendations, possible scope.  2/17: Patient fevered after first unit; patient administered benadryl and tylenol. H/H: 8.1/25.5. Awaiting GI recs   2/19: Diffuse diverticulosis. Holding ASA for 48 hrs; holding plavix for now. Hgb raises to 10.1    RANDELL, reosolved  - Admit: BUN 33, Cr 1.1  Baseline Cr of her previous admission 0.7-0.8  - Patient volume down on exam, dry mucus membranes and dry skin, wanting to drink water  - Concern for pre-renal  Given 1L bolus in ED, Will keep her on maintenance fluid 100cc/hr   -  resolved cr 0.7       Essential Hypertension  - /49 in triage, has been 130-140s/60s since fluids started  - On Lisinopril 20mg BID and Toprol-XL 25mg previously but started on: Lisinopril 40mg daily and Coreg 25mg BID  - Per patient family, had taken it the morning of admission.  - Holding coreg patient doni and lisinopril tomorrow morning if no longer actively bleeding.  2/17: /70 --> starting lisinopril 40. Will continue to hold reg currently   2/18: Added hydralazine. Will continue to monitor and adjust regiment   2/19: Hydralazine dose increased; amlodipine added. Will likely watch bp today; likely discharge tomorrow.      CAD w/ PMH oh PCI w/ SURAJ to LCX  - PCI in 3/18 - On ASA/Plavix  - Will continue to hold plavix while here in setting of GI bleed. Continuing ASA     DM2  - 2/13/2019 A1c 5.7  - On Metformin 1000mg BID at home  Will hold  - Starting SSI while here     HLD  - 3/2018: Chol 150, HDL 34, LDL 96, Trigly 99  - on atorvastatin 40mg daily at home  Continue while here     Overflow incontinence    - On Oxybutynin 15mg daily at home  Continue     HCM  - a1c 5.7  TSH WNL  - UTD on immunizations and cancer screenings per family        F:  Continue LR@100cc/hr  E: Will replete as necessary  N: Soft  Code: Full  Allergies: Iodine, Sulfa  DVT Prophylaxis: SCDs  Dispo: Lowering patient bp; possible discharge in am if stable      Jacky Hull, DO  [facility] HO-I    [facility] Medicine Hospitalist Pager numbers:   [facility] Hospitalist Medicine Team A (Gilson/Aditya): [phone]  [facility] Hospitalist Medicine Team B (Ginger/Ran):  [phone]

## 2019-02-19 NOTE — PLAN OF CARE
Problem: Adult Inpatient Plan of Care  Goal: Plan of Care Review  Outcome: Ongoing (interventions implemented as appropriate)  Patient on RA, no respiratory distress noted. Ambu bag and mask at the bedside. Will continue to monitor.

## 2019-02-19 NOTE — ANESTHESIA POSTPROCEDURE EVALUATION
"Anesthesia Post Evaluation    Patient: Makenzie Haile    Procedure(s) Performed: Procedure(s) (LRB):  EGD (ESOPHAGOGASTRODUODENOSCOPY) (N/A)  COLONOSCOPY (N/A)    Final Anesthesia Type: MAC  Patient location during evaluation: ICU  Patient participation: Yes- Able to Participate  Level of consciousness: awake and alert and oriented  Post-procedure vital signs: reviewed and stable  Pain management: adequate  Airway patency: patent  PONV status at discharge: No PONV  Anesthetic complications: no      Cardiovascular status: blood pressure returned to baseline  Respiratory status: unassisted  Hydration status: euvolemic  Follow-up not needed.        Visit Vitals  /86   Pulse 87   Temp 36.9 °C (98.5 °F) (Oral)   Resp 20   Ht 5' 4" (1.626 m)   Wt 102 kg (224 lb 13.9 oz)   SpO2 97%   Breastfeeding? No   BMI 38.60 kg/m²       Pain/Lashon Score: Pain Rating Prior to Med Admin: 0 (2/18/2019  9:01 AM)  Pain Rating Post Med Admin: 0 (2/18/2019  9:01 AM)        "

## 2019-02-19 NOTE — ANESTHESIA POSTPROCEDURE EVALUATION
"Anesthesia Post Evaluation    Patient: Makenzie Haile    Procedure(s) Performed: Procedure(s) (LRB):  EGD (ESOPHAGOGASTRODUODENOSCOPY) (N/A)  COLONOSCOPY (N/A)    Final Anesthesia Type: MAC  Patient location during evaluation: GI PACU  Level of consciousness: awake  Post-procedure vital signs: reviewed and stable  Airway patency: patent    Anesthetic complications: no      Cardiovascular status: stable  Respiratory status: spontaneous ventilation  Hydration status: euvolemic  Follow-up not needed.        Visit Vitals  BP (!) 173/69   Pulse 76   Temp 36.8 °C (98.3 °F) (Oral)   Resp 17   Ht 5' 4" (1.626 m)   Wt 95.7 kg (210 lb 14.4 oz)   SpO2 95%   Breastfeeding? No   BMI 36.20 kg/m²       Pain/Lashon Score: Pain Rating Prior to Med Admin: 0 (2/18/2019  9:01 AM)  Pain Rating Post Med Admin: 0 (2/18/2019  9:01 AM)        "

## 2019-02-19 NOTE — PT/OT/SLP PROGRESS
Physical Therapy Treatment    Patient Name:  Makenzie Haile   MRN:  [id number]    Recommendations:     Discharge Recommendations:  (HH OT/PT)   Discharge Equipment Recommendations: walker, rolling, commode   Barriers to discharge: None    Assessment:     Makenzie Haile is a 83 y.o. female admitted with a medical diagnosis of Symptomatic anemia.  She presents with the following impairments/functional limitations:   Pt sit to stand with RW with CG/sup and ambulated 2' from bed to commode.    Rehab Prognosis: Good; patient would benefit from acute skilled PT services to address these deficits and reach maximum level of function.    Recent Surgery: Procedure(s) (LRB):  EGD (ESOPHAGOGASTRODUODENOSCOPY) (N/A)  COLONOSCOPY (N/A) 1 Day Post-Op    Plan:     During this hospitalization, patient to be seen 6 x/week to address the identified rehab impairments via gait training, therapeutic activities, therapeutic exercises and progress toward the following goals:    · Plan of Care Expires:  03/19/19    Subjective     Chief Complaint: needs to use the bathroom  Patient/Family Comments/goals: go home  Pain/Comfort:  ·        Objective:     Communicated with nurse prior to session.  Patient found all lines intact, call button in reach and son present telemetry, peripheral IV, pulse ox (continuous), PureWick, blood pressure cuff  upon PT entry to room.     General Precautions: Standard, fall   Orthopedic Precautions:N/A   Braces:       Functional Mobility:   Pt sit to stand with RW with CG/sup and ambulated 2' from bed to commode.        AM-PAC 6 CLICK MOBILITY  Turning over in bed (including adjusting bedclothes, sheets and blankets)?: 4  Sitting down on and standing up from a chair with arms (e.g., wheelchair, bedside commode, etc.): 3  Moving from lying on back to sitting on the side of the bed?: 3  Moving to and from a bed to a chair (including a wheelchair)?: 3  Need to walk in hospital room?: 3  Climbing 3-5 steps with a  railing?: 3  Basic Mobility Total Score: 19       Therapeutic Activities and Exercises:   Pt went sit to stand x 4 with RW.  Pt performed BLE seated exercises x 15 in all available motions.    Patient left up in chair with call light in reach, son present.    GOALS:   Multidisciplinary Problems     Physical Therapy Goals        Problem: Physical Therapy Goal    Goal Priority Disciplines Outcome Goal Variances Interventions   Physical Therapy Goal     PT, PT/OT Ongoing (interventions implemented as appropriate)     Description:  Goals to be met by: 3/17/2019     Patient will increase functional independence with mobility by performin. Supine to sit with Modified [place]  2. Sit to stand transfer with Modified [place]  3. Bed to chair transfer with Modified [place] using Rolling Walker  4. Gait  x 150 feet with Modified [place] using Rolling Walker.              Problem: Physical Therapy Goal    Goal Priority Disciplines Outcome Goal Variances Interventions   Physical Therapy Goal     PT, PT/OT                      Time Tracking:     PT Received On: 19  PT Start Time: 1141     PT Stop Time: 1204  PT Total Time (min): 23 min     Billable Minutes: Therapeutic Activity 12 and Therapeutic Exercise 11    Treatment Type: Treatment  PT/PTA: PT     PTA Visit Number: 0     Lidia Mcfarland, PT  2019

## 2019-02-19 NOTE — PLAN OF CARE
Problem: Adult Inpatient Plan of Care  Goal: Plan of Care Review  Outcome: Ongoing (interventions implemented as appropriate)  Pt's blood pressure fluctuated throughout the night. Administered PRN hydralazine. Changed linen, pads, and gown after one bowel occurrence. Removed A-line and 20g L AC. Purewick changed. Son at bedside. Safety maintained. Will continue to monitor.

## 2019-02-19 NOTE — PROGRESS NOTES
"[facility]    CC: Diverticular bleed    Interval Hx:   Patient underwent EGD/colonoscopy yesterday, tolerated procedure well. Tolerating PO diet this am. Denies any nausea, vomiting, diarrhea, abdominal pain. Had one BM overnight per nursing, no evidence of blood.     Past Medical History:   Diagnosis Date    Acute myocardial infarction involving left circumflex coronary artery 3/19/2018    Diabetes mellitus     Hyperlipemia     Hypertension          Review of Systems  General ROS: negative for chills, fever or weight loss  Cardiovascular ROS: no chest pain or dyspnea on exertion  Gastrointestinal ROS: no abdominal pain, change in bowel habits, or black/ bloody stools    Physical Examination  BP (!) 173/69   Pulse 76   Temp 98.3 °F (36.8 °C) (Oral)   Resp 17   Ht 5' 4" (1.626 m)   Wt 95.7 kg (210 lb 14.4 oz)   SpO2 95%   Breastfeeding? No   BMI 36.20 kg/m²   General appearance: alert, cooperative, no distress  HENT: Normocephalic, atraumatic, neck symmetrical, no nasal discharge   Lungs: clear to auscultation bilaterally, no dullness to percussion bilaterally  Heart: regular rate and rhythm without rub; no displacement of the PMI   Abdomen: soft, non-tender; bowel sounds normoactive; no organomegaly  Extremities: extremities symmetric; no clubbing, cyanosis, or edema  Neurologic: Alert and oriented X 3, normal strength, normal coordination    Labs:  H/H 10.1/31.4    Imaging:  No new imaging    Assessment:  82 y/o F presenting with LGIB hx of diverticulosis, with recent admission with IR embolization in cecum 2/14. EGD yesterday was normal. Colonoscopy showed extensive colonic diverticulosis which is likely the bleeding source.     Plan:  Hb improved today, no further bleeding per history  Can resume aspirin tomorrow (48 hours post procedure)  Do not resume plavix  Can step down from ICU and advance diet today, can discharge home from GI standpoint if patient does not have any further " bleeding    Case will be discussed with staff, Dr. Meyer. Please call with any questions.     Jing Edmondson MD  [facility] PGY 3  Gastroenterology Service

## 2019-02-19 NOTE — PT/OT/SLP EVAL
Date of Note: 06/29/18


Visit #: 1


Date of Evaluation: 06/29/18


Payer Source: MEDICARE


Surgery Performed?: No


Treatment Diagnosis: cervical pain


History of Condition/Mechanism of Injury:: pt states she has had no definite 

injury.


Prior Level of Function.....Patient was independent with: ADL's, Self Care, 

Ambulation/Mobility, Community Integration/Access


Functional Limitations: Sleep, Reaching, Pushing, Pulling, Lifting, Carrying


Current Subjective/complaints:: pt states her neck started hurting approx 2 

months ago. pt states that it feels like gravel in her neck. pt states she is 

retired since 2009


Treatment Side (optional): N/A


*Precautions: n/a





Medical History


Medical History: Hypertension, Arthritis, Cancer (breast)


Surgical History: Mastectomy (partial mastectomy)


Surgical History Comments:: knee surgery


Smoking Status: Never smoker


Hx Home Medications: carvedilol, buproprion, sertraline, trazadone, vitamin D2, 

Iron, fish oil, calcium, zyrtec, multivitamin, losartan


Patient's Goals: Decrease pain





Pain Assessment





- Pain Description


Pain Location: cervical spine radiating into R shld


Pain Description: Aching, Chronic


Current Pain Intensity: 9


Worst Pain Intensity: 9





Functional Outcome Measure


Neck Disability Index: 25 (50%)





- G Codes & Severity Modifier


G Codes & Modifier: carrying, moving and handling objects current CK.  carrying

, moving and handling objects goal CI


Source of G Code score: neck disability index





Observation





- Observation


Posture: Forward Head, Rounded Shoulders, Increased Thoracic Kyphosis, 

Decreased Lumbar Lordosis, Decreased Cervical Lordosis


Handedness: Right





Gait





- Gait Pattern


General Gait Pattern Observation: No Deviations/Normal





General


Range of Motion: BUE and BLE WFL's


Muscle Strength: BUE and LE grossly 4+/5





- ROM


Cervical Spine Range of Motion Limitations: Soft Tissue Tightness, Muscle 

Weakness, Pain


Comments: Cervical flex WFL's with pain, ext somewhat limited with increased 

pain. Cervical rotation and lat side bending also limited B due to pain





- Strength


Cervical Extension: 3    Fair


Cervical Flexion: 3+   Fair+


Cervical Lateral Flexion: 3-   Fair-


Cervical Rotation: 3-   Fair-





- Special Tests


Foraminal Distraction: Positive





Palpation


Palpation Findings: Tenderness, Trigger Point, Muscle Guarding


Comments:: trigger points noted in B upper trap as well as near cervicothoracic 

junction and medial border of scapula. pt also with hyomobility in cervical 

spine to L.





Sensation





- Sensation


Right Upper Extremity: Intact/Normal


Left Upper Extremity: Intact/Normal


Right Lower Extremity: Intact/Normal


Left Lower Extremity: Intact/Normal





Balance





- Sitting Balance


Static Sitting Balance: Normal


Dynamic Sitting Balance: Normal





- Standing Balance


Static Standing Balance: Good


Dynamic Standing Balance: Good





- Heat/Cryotherapy


Treatment: Hot Pack


Comments:: cervical spine





Interventions





- Exercise/Activities/Manual Therapy


Exercises/Activities: pt performed cervical retraction x 3 reps, lat side 

bending upper trap stretch x 4, scapular retraction x 5


Manual Therapy: gentle trigger point massage/pressure


HOME EXERCISE PROGRAM: pt given written HEP including cervical retraction, 

scapular retraction, corner stretch, upper trap stretch





- Charges


Timed Code Treatment Minutes: 43


Total Treatment Time: 61


Procedures billed for this date of service:: eval med, hot pack





EVALUATION COMPLEXITY LEVEL


EVALUATION COMPLEXITY LEVEL: HISTORY: Medium (OA, HTN, cervical pain), EXAM OF 

BODY SYSTEMS: Medium (pain, strength, ROM, posture), CLINICAL PRESENTATION: 

Medium (evolving), CLINICAL DECISION MAKING: Medium





Assessment


Assessment: pt presents with decreased ROM, pain cervical spine, as well as 

radicular pain into RUE. pt with trigger points and muscle tightness in upper 

trap as well as cervicothoracic junction and medial border of scapula.


Patient Education: Home Exercise Program, Education of Plan of Care


Rehab Potential: Good





Short Term Goals


Goal #1: pt rate pain < 7/10 in cervical spine with no c/o radicular pain in 

RUE.


Goal to be met by: 07/20/18


Goal #2: pt independent with initial HEP


Goal to be met by: 07/20/18


Goal #3: pt demonstrate improved cervical ext and rotation with less pain.


Goal to be met by: 07/20/18





Long Term Goals


Goal #1: pt rate pain <4/10


Goal to be met by: 08/10/18


Goal #2: Cervical ROM WFL's w decreased pain to allow normal activity 

independently


Goal to be met by: 08/10/18


Goal #3: Decreased muscle tightness decreased trigger points noted cervical 

spine


Goal to be met by: 08/10/18





Plan





- Treatment to be Provided


Procedures: Therapeutic Exercises, Therapeutic Activity, Manual Therapy, Massage


Modalities: Cryotherapy, Hot Packs, Mechanical Traction





- Treatment Plan


Frequency: 2-3x a week


Duration: 6 weeks


ORDER # VISITS AND/OR THROUGH DATE: 8/10/18





- Treatment Code


(1) Cervical pain


Code(s): M54.2 - CERVICALGIA   





(2) Muscle tightness


Code(s): M62.89 - OTHER SPECIFIED DISORDERS OF MUSCLE Occupational Therapy   Evaluation    Name: Makenzie Haile  MRN: [id number]  Admitting Diagnosis:  Symptomatic anemia 1 Day Post-Op    Recommendations:     Discharge Recommendations: (Home with HH OT/PT)  Discharge Equipment Recommendations:     Barriers to discharge:  None    Assessment:     Makenzie Haile is a 83 y.o. female with a medical diagnosis of Symptomatic anemia. Performance deficits affecting function: weakness, impaired self care skills, impaired balance, decreased coordination, impaired endurance, impaired functional mobilty, decreased upper extremity function, decreased lower extremity function, impaired cardiopulmonary response to activity.      Rehab Prognosis: Good; patient would benefit from acute skilled OT services to address these deficits and reach maximum level of function.       Plan:     Patient to be seen 5 x/week to address the above listed problems via self-care/home management, therapeutic activities, therapeutic exercises  · Plan of Care Expires: 03/19/19  · Plan of Care Reviewed with: patient, son    Subjective     Chief Complaint: weakness  Patient/Family Comments/goals: return home, maximize independence    Occupational Profile:  Living Environment: lives with spouse in [facility] with no ANIKA; WIS with built-in seat and grab bars   Previous level of function: mod I with ADLs/IADLs; uses straight cane to ambulate at baseline   Roles and Routines: retired  Equipment Used at Home:  bedside commode, walker, rolling, cane, straight  Assistance upon Discharge: spouse, son and daughter live nearby and check on her     Pain/Comfort:  · Pain Rating 1: 0/10    Patients cultural, spiritual, Adventist conflicts given the current situation: no    Objective:     Communicated with: Nurse prior to session.  Patient found HOB elevated with telemetry, peripheral IV, blood pressure cuff, PureWick, pulse ox (continuous) upon OT entry to room.    General Precautions: Standard, fall   Orthopedic  "Precautions:N/A   Braces: N/A     Occupational Performance:    Bed Mobility:    · Patient completed Rolling/Turning to Left with  stand by assistance and contact guard assistance  · Patient completed Supine to Sit with stand by assistance and contact guard assistance    Functional Mobility/Transfers:  · Patient completed Sit <> Stand Transfer with contact guard assistance and minimum assistance  with  no assistive device and rolling walker   · Patient completed Toilet Transfer Step Transfer technique to bedside commode with contact guard assistance and of 2 persons with  rolling walker  · Functional Mobility: Pt able to take several small lateral steps towards HOB with RW and CGA/SBA.   · Pt performed 4x sit<>stand t/fs    Activities of Daily Living:  · Toileting: maximal assistance for perineal hygiene    Cognitive/Visual Perceptual:  AO4    Physical Exam:  Balance:    -       fair+ sitting balance, poor-fair standing balance 2/2 labile BP  Postural examination/scapula alignment:    -       Rounded shoulders  -       Forward head  -       Kyphosis  Upper Extremity Range of Motion:     -       Right Upper Extremity: WFL  -       Left Upper Extremity: WFL  Upper Extremity Strength:    -       Right Upper Extremity: WFL  -       Left Upper Extremity: WFL   Strength:    -       Right Upper Extremity: WFL  -       Left Upper Extremity: WFL  Fine Motor Coordination:    -       Intact    AMPAC 6 Click ADL:  AMPAC Total Score: 21    Treatment & Education:  Pt performed skills as above. Pt and son educated on role of OT/POC. Pt with labile BP throughout session. On first trial of sit<>stand, pt advised she felt she needed to have a BM. Pt sat on bed pan on EOB; BM noted to be loose and maroon in color, RN notified. Pt able to stand at RW for max assist lula hygiene, but reported she was "feeling flush" and "lightheaded" and needed to sit and rest. BP's were stable sitting and post stand.  On third sit<>stand, pt felt " like she needed to have another BM; pt t/f from EOB to BSC. Pt with minimal BM, RN notified. BP was 93/61.  Nurse notified.  Pt stood at RW and bedside chair brought behind her for t/f, pt with uncontrolled descent into chair. Patient will benefit from continued skilled OT to address deficits and improve performance in functional ADL tasks. Cont OT per POC.  Education:    Patient left up in chair with all lines intact, call button in reach, nurse notified and son present    GOALS:   Multidisciplinary Problems     Occupational Therapy Goals        Problem: Occupational Therapy Goal    Goal Priority Disciplines Outcome Interventions   Occupational Therapy Goal     OT, PT/OT Ongoing (interventions implemented as appropriate)    Description:  Goals to be met by: 3/19/2019    Patient will increase functional independence with ADLs by performing:    UE Dressing with Modified [place].  LE Dressing with Modified [place].  Grooming while standing at sink with Supervision.  Toileting from bedside commode with Modified [place] for hygiene and clothing management.   Toilet transfer to bedside commode with Supervision.  Upper extremity exercise program x10 reps per handout, with independence.                      History:     Past Medical History:   Diagnosis Date    Acute myocardial infarction involving left circumflex coronary artery 3/19/2018    Diabetes mellitus     Hyperlipemia     Hypertension        Past Surgical History:   Procedure Laterality Date    COLONOSCOPY N/A 2/18/2019    [facility]    COLONOSCOPY N/A 2/18/2019    [facility]    CORONARY ANGIOPLASTY WITH STENT PLACEMENT  03/19/2018    EGD (ESOPHAGOGASTRODUODENOSCOPY) N/A 2/18/2019    [facility]    EGD (ESOPHAGOGASTRODUODENOSCOPY) N/A 2/18/2019    Performed by Buzz [facility] at [facility]       Time Tracking:     OT Date of  Treatment: 02/19/19  OT Start Time: 1055  OT Stop Time: 1143  OT Total Time (min): 48 min    Billable Minutes:Evaluation 10  Self Care/Home Management 38    WALTER Kwan  2/19/2019     I certify that I was present in the room directing the student in service delivery and guiding them using my skilled judgment. As the co-signing therapist I have reviewed the students documentation and am responsible for the treatment, assessment, and plan.

## 2019-02-20 LAB
ABO + RH BLD: NORMAL
ALBUMIN SERPL BCP-MCNC: 3 G/DL
ALP SERPL-CCNC: 41 U/L
ALT SERPL W/O P-5'-P-CCNC: 11 U/L
ANION GAP SERPL CALC-SCNC: 6 MMOL/L
AST SERPL-CCNC: 16 U/L
BASOPHILS # BLD AUTO: 0.02 K/UL
BASOPHILS NFR BLD: 0.2 %
BILIRUB SERPL-MCNC: 1.1 MG/DL
BLD GP AB SCN CELLS X3 SERPL QL: NORMAL
BLD PROD TYP BPU: NORMAL
BLD PROD TYP BPU: NORMAL
BLOOD UNIT EXPIRATION DATE: NORMAL
BLOOD UNIT EXPIRATION DATE: NORMAL
BLOOD UNIT TYPE CODE: 6200
BLOOD UNIT TYPE CODE: 6200
BLOOD UNIT TYPE: NORMAL
BLOOD UNIT TYPE: NORMAL
BUN SERPL-MCNC: 15 MG/DL
CALCIUM SERPL-MCNC: 9.4 MG/DL
CHLORIDE SERPL-SCNC: 102 MMOL/L
CO2 SERPL-SCNC: 30 MMOL/L
CODING SYSTEM: NORMAL
CODING SYSTEM: NORMAL
CREAT SERPL-MCNC: 0.9 MG/DL
DIFFERENTIAL METHOD: ABNORMAL
DISPENSE STATUS: NORMAL
DISPENSE STATUS: NORMAL
EOSINOPHIL # BLD AUTO: 0.2 K/UL
EOSINOPHIL NFR BLD: 2.3 %
ERYTHROCYTE [DISTWIDTH] IN BLOOD BY AUTOMATED COUNT: 15.4 %
EST. GFR  (AFRICAN AMERICAN): >60 ML/MIN/1.73 M^2
EST. GFR  (NON AFRICAN AMERICAN): >60 ML/MIN/1.73 M^2
GLUCOSE SERPL-MCNC: 113 MG/DL
HCT VFR BLD AUTO: 28.2 %
HCT VFR BLD AUTO: 30.4 %
HGB BLD-MCNC: 9 G/DL
HGB BLD-MCNC: 9.8 G/DL
LYMPHOCYTES # BLD AUTO: 1.5 K/UL
LYMPHOCYTES NFR BLD: 17.7 %
MCH RBC QN AUTO: 28 PG
MCHC RBC AUTO-ENTMCNC: 31.9 G/DL
MCV RBC AUTO: 88 FL
MONOCYTES # BLD AUTO: 0.6 K/UL
MONOCYTES NFR BLD: 7.6 %
NEUTROPHILS # BLD AUTO: 6 K/UL
NEUTROPHILS NFR BLD: 72.1 %
PLATELET # BLD AUTO: 296 K/UL
PMV BLD AUTO: 10.2 FL
POCT GLUCOSE: 118 MG/DL (ref 70–110)
POCT GLUCOSE: 161 MG/DL (ref 70–110)
POCT GLUCOSE: 161 MG/DL (ref 70–110)
POCT GLUCOSE: 172 MG/DL (ref 70–110)
POCT GLUCOSE: 174 MG/DL (ref 70–110)
POTASSIUM SERPL-SCNC: 3.7 MMOL/L
PROT SERPL-MCNC: 5.7 G/DL
RBC # BLD AUTO: 3.22 M/UL
SODIUM SERPL-SCNC: 138 MMOL/L
TRANS ERYTHROCYTES VOL PATIENT: NORMAL ML
TRANS ERYTHROCYTES VOL PATIENT: NORMAL ML
WBC # BLD AUTO: 8.29 K/UL

## 2019-02-20 PROCEDURE — 25000003 PHARM REV CODE 250: Performed by: STUDENT IN AN ORGANIZED HEALTH CARE EDUCATION/TRAINING PROGRAM

## 2019-02-20 PROCEDURE — 85025 COMPLETE CBC W/AUTO DIFF WBC: CPT

## 2019-02-20 PROCEDURE — 97116 GAIT TRAINING THERAPY: CPT

## 2019-02-20 PROCEDURE — 97110 THERAPEUTIC EXERCISES: CPT

## 2019-02-20 PROCEDURE — 36415 COLL VENOUS BLD VENIPUNCTURE: CPT

## 2019-02-20 PROCEDURE — 85018 HEMOGLOBIN: CPT

## 2019-02-20 PROCEDURE — 80053 COMPREHEN METABOLIC PANEL: CPT

## 2019-02-20 PROCEDURE — 85014 HEMATOCRIT: CPT

## 2019-02-20 PROCEDURE — 94761 N-INVAS EAR/PLS OXIMETRY MLT: CPT

## 2019-02-20 PROCEDURE — 11000001 HC ACUTE MED/SURG PRIVATE ROOM

## 2019-02-20 PROCEDURE — 86901 BLOOD TYPING SEROLOGIC RH(D): CPT

## 2019-02-20 RX ORDER — CARVEDILOL 3.12 MG/1
3.12 TABLET ORAL 2 TIMES DAILY
Qty: 60 TABLET | Refills: 11 | Status: SHIPPED | OUTPATIENT
Start: 2019-02-20 | End: 2019-02-21 | Stop reason: HOSPADM

## 2019-02-20 RX ADMIN — OXYBUTYNIN CHLORIDE 15 MG: 5 TABLET, EXTENDED RELEASE ORAL at 08:02

## 2019-02-20 RX ADMIN — AMLODIPINE BESYLATE 5 MG: 5 TABLET ORAL at 08:02

## 2019-02-20 RX ADMIN — ATORVASTATIN CALCIUM 40 MG: 40 TABLET, FILM COATED ORAL at 08:02

## 2019-02-20 RX ADMIN — LISINOPRIL 40 MG: 20 TABLET ORAL at 08:02

## 2019-02-20 NOTE — PLAN OF CARE
Problem: Adult Inpatient Plan of Care  Goal: Plan of Care Review  Outcome: Outcome(s) achieved Date Met: 02/19/19  Pt  AAOx4. Daughter at bedside. Fall risk precautions in place. Pt remained free from fall. Strict I&O in progress. Monitoring for g.i bleeding. BP meds given to manage pressure. Pt denies any discomfort. Pt would like sleep aid, MD ordered meds. Call light within reach. Bed locked in lowest position. Will continue to monitor.

## 2019-02-20 NOTE — PT/OT/SLP PROGRESS
"Physical Therapy Treatment    Patient Name:  Makenzie Haile   MRN:  [id number]    Recommendations:     Discharge Recommendations:  ( OT/PT)   Discharge Equipment Recommendations: commode, walker, rolling   Barriers to discharge: None    Assessment:     aMkenzie Haile is a 83 y.o. female admitted with a medical diagnosis of Diverticulosis of large intestine with hemorrhage.  She presents with the following impairments/functional limitations:  weakness, impaired endurance, impaired self care skills, impaired functional mobilty, gait instability, impaired balance, decreased lower extremity function, decreased upper extremity function, decreased coordination, decreased safety awareness, impaired coordination. Pt able to increase ambulation distance this session. Performed ambulation training by 2 trials with RW and CGA/min A. 1st trial ~8-10 ft fwd and ~8-10 ft bwd, 2nd trial ~30 ft. Pt would benefit from continued PT services to increase out of bed therapeutic activity and exercise addressing all impairments listed above.    Rehab Prognosis: Good; patient would benefit from acute skilled PT services to address these deficits and reach maximum level of function.    Recent Surgery: Procedure(s) (LRB):  EGD (ESOPHAGOGASTRODUODENOSCOPY) (N/A)  COLONOSCOPY (N/A) 2 Days Post-Op    Plan:     During this hospitalization, patient to be seen 6 x/week to address the identified rehab impairments via gait training, therapeutic activities, therapeutic exercises and progress toward the following goals:    · Plan of Care Expires:  03/19/19    Subjective     Chief Complaint: None voiced  Patient/Family Comments/goals: "I want to get out of this bed".  Pain/Comfort:  · Pain Rating 1: 0/10      Objective:     Communicated with  nurse prior to session.  Patient found all lines intact, call button in reach, bed alarm on,  nurse notified and  daughter present peripheral IV, telemetry(Full ICU monitoring)  upon PT entry to room. "     General Precautions: Standard, fall   Orthopedic Precautions:N/A   Braces: N/A     Functional Mobility:  · Bed Mobility:     · Rolling Left:  modified independence  · Scooting: independence and  to EOB  · Supine to Sit: modified independence and supervision  · Transfers:     · Sit to Stand:  supervision and stand by assistance with rolling walker  · Gait:  2 trials with RW and CGA/min A. 1st trial ~8-10 ft fwd and 8-10 ft bwd, 2nd trial ~30 ft.      AM-PAC 6 CLICK MOBILITY  Turning over in bed (including adjusting bedclothes, sheets and blankets)?: 4  Sitting down on and standing up from a chair with arms (e.g., wheelchair, bedside commode, etc.): 3  Moving from lying on back to sitting on the side of the bed?: 3  Moving to and from a bed to a chair (including a wheelchair)?: 3  Need to walk in hospital room?: 3  Climbing 3-5 steps with a railing?: 3  Basic Mobility Total Score: 19       Therapeutic Activities and Exercises:    Pt performed ambulation training by 2 trials both with RW and CGA/min A. 1st trial ~8-10 ft fwd and ~8-10 ft bwd, 2nd trial ~30 ft. Slow yolanda speed throughout. Seated therapeutic exercises BLE's 1 x 10 reps consisting of LAQ's, hip add/abd, hip/knee flexion, and heel and toe raises. Standing therapeutic exercises BLE's consisting of hip/knee flexion (marches in place) 1 x 10 reps each.    Patient left up in chair with all lines intact, call button in reach,  nurse notified and  daughter present..    GOALS:   Multidisciplinary Problems     Physical Therapy Goals        Problem: Physical Therapy Goal    Goal Priority Disciplines Outcome Goal Variances Interventions   Physical Therapy Goal     PT, PT/OT Ongoing (interventions implemented as appropriate)     Description:  Goals to be met by: 3/17/2019     Patient will increase functional independence with mobility by performin. Supine to sit with Modified [place]  2. Sit to stand transfer with Modified [place]  3. Bed to  chair transfer with Modified [place] using Rolling Walker  4. Gait  x 150 feet with Modified [place] using Rolling Walker.              Problem: Physical Therapy Goal    Goal Priority Disciplines Outcome Goal Variances Interventions   Physical Therapy Goal     PT, PT/OT                      Time Tracking:     PT Received On: 02/20/19  PT Start Time: 0915     PT Stop Time: 0954  PT Total Time (min): 39 min     Billable Minutes: Gait Training  24 and Therapeutic Exercise  15    Treatment Type: Treatment  PT/PTA: PTA     PTA Visit Number: 1     Evelina Lees, PTA  02/20/2019

## 2019-02-20 NOTE — PLAN OF CARE
02/20/19 1130   Medicare Message   Important Message from Medicare regarding Discharge Appeal Rights Given to patient/caregiver;Explained to patient/caregiver;Signed/date by patient/caregiver   Date IMM was signed 02/20/19   Time IMM was signed 1139

## 2019-02-20 NOTE — PLAN OF CARE
Problem: Physical Therapy Goal  Goal: Physical Therapy Goal  Goals to be met by: 3/17/2019     Patient will increase functional independence with mobility by performin. Supine to sit with Modified [place]  2. Sit to stand transfer with Modified [place]  3. Bed to chair transfer with Modified [place] using Rolling Walker  4. Gait  x 150 feet with Modified [place] using Rolling Walker.      Outcome: Ongoing (interventions implemented as appropriate)  Pt continues to work and progress toward goals. Able to ambulate today by 2 trials ~8-10 ft fwd/8-10 ft bwd, and 2nd trials ~30 ft both with RW and CGA/min A.

## 2019-02-20 NOTE — PROGRESS NOTES
"[facility]    CC: Diverticular bleed    Interval Hx:   Ms. Haile had another bowel movement yesterday with a little bit of blood but overnight and this morning has not had any. She denies abdominal pain. She is nervous about advancing her diet and says she is OK on clear liquids because she is scared she starts bleeding again.     Past Medical History  CAD  DM  HTN    Review of Systems  General ROS: negative for chills, fever or weight loss  Cardiovascular ROS: no chest pain or dyspnea on exertion  Gastrointestinal ROS: no abdominal pain, change in bowel habits, or black/ bloody stools    Physical Examination  BP (!) 123/59   Pulse 61   Temp 98.1 °F (36.7 °C) (Oral)   Resp (!) 22   Ht 5' 4" (1.626 m)   Wt 96 kg (211 lb 10.3 oz)   SpO2 (!) 93%   Breastfeeding? No   BMI 36.33 kg/m²   General appearance: alert, cooperative, no distress, obese  HENT: Normocephalic, atraumatic, neck symmetrical, no nasal discharge   Lungs: clear to auscultation bilaterally, no dullness to percussion bilaterally  Heart: regular rate and rhythm without rub; no displacement of the PMI   Abdomen: soft, non-tender; bowel sounds normoactive; no organomegaly  Extremities: extremities symmetric; no clubbing, cyanosis, or edema  Neurologic: Alert and oriented, normal strength, normal coordination    Labs:  Hg 9 -> 9.8 both drawn today    Imaging:  CXR 2/16/2019  No acute cardiopulmonary process    I have personally reviewed these images.    Assessment:    84 y/o F presenting with LGIB hx of diverticulosis, with recent admission with IR embolization in cecum 2/14. EGD normal and colonoscopy with extensive colonic diverticulosis which is likely the bleeding source.     Plan:  - continue aspirin  - stop plavix  - OK to discharge from a GI perspective if no further bleeding today    Case will be discussed with staff, Dr. Meyer. Please call with any questions.     Noa Washington MD MPH  [facility] PGY 4  [phone] " cell  [phone] pager

## 2019-02-20 NOTE — PLAN OF CARE
"TN visited pt and daughter; plan for d/c later today if pt tolerates diet and has normal BM.    PT/OT  Recommending  home health:  Pt prefers [facility]; referral sent via [facility] care.    PT/OT also recommending RW and BSC, pt informed Tn she already has both.    TN discussed [facility] follow up as Tn noted pt has been to [facility] in past however pt stated, "It's too far . I want to see my doctor this time."    PCP appt scheduled and GI appt scheduled and placed on avs.    Tn gave pt d/c folder card card and instructed to call for any further needs/questions.  Pt offered pill box but pt stated she already has 2.    Fernanda, RN ICU nurse aware of plan for discharge later today if meets criteria per team       02/20/19 3380   Post-Acute Status   Post-Acute Authorization Home Health/Hospice   Home Health/Hospice Status Referrals Sent             "

## 2019-02-21 VITALS
WEIGHT: 218.94 LBS | HEART RATE: 77 BPM | BODY MASS INDEX: 37.38 KG/M2 | HEIGHT: 64 IN | SYSTOLIC BLOOD PRESSURE: 116 MMHG | OXYGEN SATURATION: 99 % | TEMPERATURE: 97 F | DIASTOLIC BLOOD PRESSURE: 56 MMHG | RESPIRATION RATE: 18 BRPM

## 2019-02-21 LAB
ALBUMIN SERPL BCP-MCNC: 2.9 G/DL
ALP SERPL-CCNC: 43 U/L
ALT SERPL W/O P-5'-P-CCNC: 10 U/L
ANION GAP SERPL CALC-SCNC: 9 MMOL/L
AST SERPL-CCNC: 17 U/L
BASOPHILS # BLD AUTO: 0.04 K/UL
BASOPHILS NFR BLD: 0.5 %
BILIRUB SERPL-MCNC: 1 MG/DL
BUN SERPL-MCNC: 22 MG/DL
CALCIUM SERPL-MCNC: 9.3 MG/DL
CHLORIDE SERPL-SCNC: 100 MMOL/L
CO2 SERPL-SCNC: 25 MMOL/L
CREAT SERPL-MCNC: 0.9 MG/DL
DIFFERENTIAL METHOD: ABNORMAL
EOSINOPHIL # BLD AUTO: 0.2 K/UL
EOSINOPHIL NFR BLD: 3 %
ERYTHROCYTE [DISTWIDTH] IN BLOOD BY AUTOMATED COUNT: 15.1 %
EST. GFR  (AFRICAN AMERICAN): >60 ML/MIN/1.73 M^2
EST. GFR  (NON AFRICAN AMERICAN): >60 ML/MIN/1.73 M^2
GLUCOSE SERPL-MCNC: 111 MG/DL
HCT VFR BLD AUTO: 27.9 %
HGB BLD-MCNC: 8.7 G/DL
LYMPHOCYTES # BLD AUTO: 1.7 K/UL
LYMPHOCYTES NFR BLD: 23.3 %
MCH RBC QN AUTO: 27.4 PG
MCHC RBC AUTO-ENTMCNC: 31.2 G/DL
MCV RBC AUTO: 88 FL
MONOCYTES # BLD AUTO: 0.7 K/UL
MONOCYTES NFR BLD: 8.7 %
NEUTROPHILS # BLD AUTO: 4.8 K/UL
NEUTROPHILS NFR BLD: 64.2 %
PLATELET # BLD AUTO: 249 K/UL
PMV BLD AUTO: 9.9 FL
POCT GLUCOSE: 120 MG/DL (ref 70–110)
POCT GLUCOSE: 208 MG/DL (ref 70–110)
POTASSIUM SERPL-SCNC: 3.5 MMOL/L
PROT SERPL-MCNC: 5.7 G/DL
RBC # BLD AUTO: 3.18 M/UL
SODIUM SERPL-SCNC: 134 MMOL/L
WBC # BLD AUTO: 7.43 K/UL

## 2019-02-21 PROCEDURE — 25000003 PHARM REV CODE 250: Performed by: STUDENT IN AN ORGANIZED HEALTH CARE EDUCATION/TRAINING PROGRAM

## 2019-02-21 PROCEDURE — 36415 COLL VENOUS BLD VENIPUNCTURE: CPT

## 2019-02-21 PROCEDURE — 97530 THERAPEUTIC ACTIVITIES: CPT

## 2019-02-21 PROCEDURE — 63600175 PHARM REV CODE 636 W HCPCS: Performed by: STUDENT IN AN ORGANIZED HEALTH CARE EDUCATION/TRAINING PROGRAM

## 2019-02-21 PROCEDURE — 94761 N-INVAS EAR/PLS OXIMETRY MLT: CPT

## 2019-02-21 PROCEDURE — 80053 COMPREHEN METABOLIC PANEL: CPT

## 2019-02-21 PROCEDURE — 97116 GAIT TRAINING THERAPY: CPT

## 2019-02-21 PROCEDURE — 85025 COMPLETE CBC W/AUTO DIFF WBC: CPT

## 2019-02-21 RX ORDER — HYDRALAZINE HYDROCHLORIDE 25 MG/1
25 TABLET, FILM COATED ORAL EVERY 8 HOURS
Status: DISCONTINUED | OUTPATIENT
Start: 2019-02-21 | End: 2019-02-21

## 2019-02-21 RX ORDER — NAPROXEN SODIUM 220 MG/1
81 TABLET, FILM COATED ORAL DAILY
Status: DISCONTINUED | OUTPATIENT
Start: 2019-02-21 | End: 2019-02-21 | Stop reason: HOSPADM

## 2019-02-21 RX ORDER — CARVEDILOL 3.12 MG/1
3.12 TABLET ORAL 2 TIMES DAILY
Status: DISCONTINUED | OUTPATIENT
Start: 2019-02-21 | End: 2019-02-21

## 2019-02-21 RX ORDER — INSULIN ASPART 100 [IU]/ML
3 INJECTION, SOLUTION INTRAVENOUS; SUBCUTANEOUS
Status: DISCONTINUED | OUTPATIENT
Start: 2019-02-21 | End: 2019-02-21 | Stop reason: HOSPADM

## 2019-02-21 RX ADMIN — ATORVASTATIN CALCIUM 40 MG: 40 TABLET, FILM COATED ORAL at 09:02

## 2019-02-21 RX ADMIN — OXYBUTYNIN CHLORIDE 15 MG: 5 TABLET, EXTENDED RELEASE ORAL at 09:02

## 2019-02-21 RX ADMIN — LISINOPRIL 40 MG: 20 TABLET ORAL at 09:02

## 2019-02-21 RX ADMIN — AMLODIPINE BESYLATE 5 MG: 5 TABLET ORAL at 09:02

## 2019-02-21 RX ADMIN — INSULIN ASPART 3 UNITS: 100 INJECTION, SOLUTION INTRAVENOUS; SUBCUTANEOUS at 01:02

## 2019-02-21 RX ADMIN — ASPIRIN 81 MG 81 MG: 81 TABLET ORAL at 01:02

## 2019-02-21 NOTE — PROGRESS NOTES
"[facility]    CC: Diverticular bleed    Interval Hx:   Ms. Haile is sitting up eating breakfast this morning. States she feels well. Had a BM yesterday, which was brown, no visible blood. She denies any abdominal pain, nausea, vomiting, dizziness. She is still somewhat nervous about being discharged home.     Past Medical History  CAD  DM  HTN    Review of Systems  General ROS: negative for chills, fever or weight loss  Cardiovascular ROS: no chest pain or dyspnea on exertion  Gastrointestinal ROS: no abdominal pain, change in bowel habits, or black/ bloody stools    Physical Examination  BP (!) 163/72 (BP Location: Right arm)   Pulse (!) 58   Temp 97.4 °F (36.3 °C) (Oral)   Resp 18   Ht 5' 4" (1.626 m)   Wt 99.3 kg (218 lb 14.7 oz)   SpO2 99%   Breastfeeding? No   BMI 37.58 kg/m²   General appearance: alert, cooperative, no distress, obese  HENT: Normocephalic, atraumatic, neck symmetrical, no nasal discharge   Lungs: clear to auscultation bilaterally, no dullness to percussion bilaterally  Heart: regular rate and rhythm without rub; no displacement of the PMI   Abdomen: soft, non-tender; bowel sounds normoactive; no organomegaly  Extremities: extremities symmetric; no clubbing, cyanosis, or edema  Neurologic: Alert and oriented, normal strength, normal coordination    Labs:  Hg 8.7    Imaging:  CXR 2/16/2019  No acute cardiopulmonary process    I have personally reviewed these images.    Assessment:    82 y/o F presenting with LGIB hx of diverticulosis, with recent admission with IR embolization in cecum 2/14. EGD normal and colonoscopy with extensive colonic diverticulosis which is likely the bleeding source.     Plan:  - continue aspirin  - stop plavix  -Hb fluctuating, however BM has been brown, suspect bleeding has stopped  - OK to discharge from a GI perspective if no further bleeding today    Case will be discussed with staff, Dr. Meyer. Please call with any questions.     Jing Edmondson " MD  [facility] PGY 3  Gastroenterology Service

## 2019-02-21 NOTE — PLAN OF CARE
Problem: Adult Inpatient Plan of Care  Goal: Plan of Care Review  Outcome: Ongoing (interventions implemented as appropriate)  .Plan of care reviewed with patient. Call light within reach, fall precautions maintained bed alarm set. Patient made aware. Nurse instructed patient to call for assistance. Patient verbalized understanding. Telemetry monitor throughout shift. NAD noted. Will continue to monitor and continue plan of care.

## 2019-02-21 NOTE — PROGRESS NOTES
"[facility] Medicine Progress Note       Subjective:     No more bowel movements yet. No sob, cp, n/v or confusion. No acute complaints. Family awaiting one more BM       Objective:   Last 24 Hour Vital Signs:  BP  Min: 95/53  Max: 163/72  Temp  Av.4 °F (36.3 °C)  Min: 96.2 °F (35.7 °C)  Max: 98.5 °F (36.9 °C)  Pulse  Av.6  Min: 50  Max: 85  Resp  Av.6  Min: 16  Max: 24  SpO2  Av %  Min: 93 %  Max: 99 %  Height  Av' 4" (162.6 cm)  Min: 5' 4" (162.6 cm)  Max: 5' 4" (162.6 cm)  Weight  Av.3 kg (219 lb 0.4 oz)  Min: 99.3 kg (218 lb 14.7 oz)  Max: 99.4 kg (219 lb 2.2 oz)  I/O last 3 completed shifts:  In: 725 [P.O.:725]  Out: 850 [Urine:850]    Physical Examination:   General appearance: alert, appears stated age and cooperative  Head: Normocephalic, without obvious abnormality, atraumatic  Eyes: PERRLA. Clear conjunctiva. No scleral icterus  Neck: no adenopathy and carotid bruit, bilaterally, that decreases as one auscultates up the neck.   Lungs: clear to auscultation bilaterally  Heart: RRR with no rubs or murmrus. Systolic murmur appreciated   Abdomen: soft, non-tender; bowel sounds normal; no masses,  no organomegaly  Extremities: extremities normal, atraumatic, no cyanosis or edema  Skin: Skin color, texture, turgor normal. No rashes or lesions except for buttocks which was erythremic and raw appearing.   Neuro: AAOx3.       Laboratory:    Trended Lab Data:  Recent Labs   Lab 19  0308 19  0317 19  0318 19  1237 19  0517   WBC 11.20  --  8.29  --  7.43   HGB 10.1*  --  9.0* 9.8* 8.7*   HCT 31.4*  --  28.2* 30.4* 27.9*     --  296  --  249   MCV 88  --  88  --  88   RDW 15.3*  --  15.4*  --  15.1*    138  --   --  134*   K 3.7 3.7  --   --  3.5    102  --   --  100   CO2 27 30*  --   --  25   BUN 12 15  --   --  22   CREATININE 0.7 0.9  --   --  0.9   * 113*  --   --  111*   PROT 6.2 5.7*  --   --  5.7*   ALBUMIN 3.2* 3.0*  --   -- "  2.9*   BILITOT 0.9 1.1*  --   --  1.0   AST 19 16  --   --  17   ALKPHOS 44* 41*  --   --  43*   ALT 12 11  --   --  10       Microbiology Data:  Bcx: NGTDx3    Other Results:  EKG (my interpretation): sinus doni    Radiology:  Imaging Results          X-Ray Chest 1 View (Final result)  Result time 02/16/19 11:47:18    Final result by Rolando Gómez DO (02/16/19 11:47:18)                 Impression:      No acute cardiopulmonary process.      Electronically signed by: Rolando Gómez DO  Date:    02/16/2019  Time:    11:47             Narrative:    EXAMINATION:  XR CHEST 1 VIEW    CLINICAL HISTORY:  bradycardia;    TECHNIQUE:  Single frontal view of the chest was performed.    COMPARISON:  04/23/2018    FINDINGS:  No infiltrates or pleural effusions.  The heart appears to be at the upper limits of normal in size.  There is mild tortuosity of the descending thoracic aorta with calcification of the aortic knob..                                Current Medications:     Infusions:       Scheduled:   amLODIPine  5 mg Oral Daily    atorvastatin  40 mg Oral Daily    carvedilol  3.125 mg Oral BID    lisinopril  40 mg Oral Daily    oxybutynin  15 mg Oral Daily        PRN:  sodium chloride, dextrose 50%, dextrose 50%, glucagon (human recombinant), glucose, glucose, ondansetron, ramelteon, sodium chloride 0.9%    Antibiotics and Day Number of Therapy:  None    Lines and Day Number of Therapy:  piv         Assessment:     Makenzie Haile is a 83 y.o. female with:  Patient Active Problem List    Diagnosis Date Noted    Physical deconditioning     Diverticulosis of large intestine with hemorrhage 02/19/2019    Gastrointestinal hemorrhage with melena 02/17/2019    RANDELL (acute kidney injury)     Symptomatic anemia 02/16/2019    Bradycardia 02/16/2019    Overflow incontinence of urine 02/16/2019    Acute blood loss anemia     Acute upper GI bleed     Normocytic anemia 02/13/2019    HLD (hyperlipidemia) 02/13/2019     Skin breakdown 02/13/2019    History of GI bleed 02/13/2019    Iron deficiency anemia due to chronic blood loss 02/13/2019    Coronary artery disease involving native coronary artery of native heart without angina pectoris 04/19/2018    Neuropathy 03/27/2018    Acute myocardial infarction involving left circumflex coronary artery 03/19/2018    GERD (gastroesophageal reflux disease) 03/19/2018    Type 2 diabetes mellitus without complication, without long-term current use of insulin 03/19/2018    Essential hypertension 03/19/2018    Aortic stenosis 03/19/2018    Class 3 severe obesity due to excess calories with serious comorbidity and body mass index (BMI) of 40.0 to 44.9 in adult 03/19/2018    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   - Patient with 2 episodes of dark colored/tarry stool since discharge yesterday  - 2/14 CTA/IR embolized with 2mm coil placed in area of bleed on last admission ([facility]), also required 1U PRBCs  - H/H of previous admission H/H :10.5/33.1 MCV: 85  - Iron Panel: 49 Iron, TIBC 336, Trans 227, Ferritin 37, Folate 15.4, B12 284  - H/H on morning of admit: 7.2/23.0  INR 1.0/PT 10.5/PTT 21.1  - Transfuse 2U PRBCs today and repeat H/H  - Given 1L NS in the ED  Continue LR@100cc/hr  - Will hold ASA/Plavix with concerns of bleeding  Consider starting Protonix 40mg IV BID  - Consulted GI, appreciate their recommendations, possible scope.  2/17: Patient fevered after first unit; patient administered benadryl and tylenol. H/H: 8.1/25.5. Awaiting GI recs   2/19: Diffuse diverticulosis. Holding ASA for 48 hrs; holding plavix for now. Hgb raises to 10.1  2/20: Hgb 9. No recent bloody bowel movement. Spoke with Dr. Washington at bedside: who stated that patient not bleeding is a better sign than a small drop in hgb. Possible dc tomorrow am if  patient has no more bbm.   2/21: Per GI, awaiting one more bm to assure no blood then ok to discharge      Intermittent bradycardia  On admission patient was bradycardic. Held coreg. Recently restarted much lower dose of coreg; 6.125 --> patient still doni. Again, now holding coreg.  2/21: Patient HR still varying; holding coreg still      Essential Hypertension  - /49 in triage, has been 130-140s/60s since fluids started  - On Lisinopril 20mg BID and Toprol-XL 25mg previously but started on: Lisinopril 40mg daily and Coreg 25mg BID  - Per patient family, had taken it the morning of admission.  - Holding coreg patient doni and lisinopril tomorrow morning if no longer actively bleeding.  2/17: /70 --> starting lisinopril 40. Will continue to hold reg currently   2/18: Added hydralazine. Will continue to monitor and adjust regiment   2/19: Hydralazine dose increased; amlodipine added. Will likely watch bp today; likely discharge tomorrow.   2/20: bp currently controlled; holding coreg as above  2/21: On 2/20 stopped hydralazine as patient was hypotensive. Stopped hydralazine.Will monitor bp and adjust as needed. Still holding coreg     CAD w/ PMH oh PCI w/ SURAJ to LCX  - PCI in 3/18 - On ASA/Plavix  - Will continue to hold plavix while here in setting of GI bleed. Continuing ASA     DM2  - 2/13/2019 A1c 5.7  - On Metformin 1000mg BID at home  Will hold  - Aspart 3 tid     HLD  - 3/2018: Chol 150, HDL 34, LDL 96, Trigly 99  - on atorvastatin 40mg daily at home  Continue while here     Overflow incontinence    - On Oxybutynin 15mg daily at home  Continue     HCM  - a1c 5.7  TSH WNL  - UTD on immunizations and cancer screenings per family     RANDELL, reosolved  - Admit: BUN 33, Cr 1.1  Baseline Cr of her previous admission 0.7-0.8  - Patient volume down on exam, dry mucus membranes and dry skin, wanting to drink water  - Concern for pre-renal  Given 1L bolus in ED, Will keep her on maintenance fluid  100cc/hr   -  resolved cr 0.7       F:  none  E: Will replete as necessary  N: Regular  Code: Full  Allergies: Sulfa  DVT Prophylaxis: SCDs  Dispo: awaiting bm then discharge      Jacky Hull, DO  [facility] Internal Medicine HO-I    [facility] Medicine Hospitalist Pager numbers:   [facility] Hospitalist Medicine Team A (Gilson/Aditya): [phone]  [facility] Hospitalist Medicine Team B (Ginger/Ran):  [phone]

## 2019-02-21 NOTE — PLAN OF CARE
Problem: Adult Inpatient Plan of Care  Goal: Plan of Care Review  Outcome: Ongoing (interventions implemented as appropriate)  1925H Patient is a transfer from ICU; awake and orientedx4. Care plan explained and verbalized understanding. VIP care model introduced. iPad offered and refused. On oxygen, O2 saturation 95%. Hooked to heart monitor running Sinus Rhythm 70s. IV site to left hand 20G, flushed and saline locked. Maintained on diabetic diet. Instructed on fall precaution. Fall risk contract signed. Yellow socks on. Up in chair, chair alarm on.    2040H Patient transferred to bed with assist, bedtime care provided. Purewick external urinary catheter placed, right vaginal labia with white small lumps. Bed in lowest position, bed alarms on, call light within reach and instructed to call for help when needed. Son remain at bedside.Will continue  to monitor.

## 2019-02-21 NOTE — PLAN OF CARE
PT with son and spouse at the bedside.  She is ready for discharge.  I called [facility] for [facility], and let him know about pt discharge home today.  He will have home health admit her tomorrow.  All appts are set up.  Her son Esequiel is asking to go home to get her car and come back in one hour to transport her home.  No other needs identified.     Follow up with [facility] [place] [address] [facility]   [address]   [facility] [address]   [phone]    Feb25 Follow up with Farhat Rapp MD   Monday Feb 25, 2019   TIME 9:45       office #: [phone]  [facility]    Mar6 Established Patient Visit with Buzz Meyer MD   Wednesday Mar 6, 2019 2:00 PM  [facility]  [place] [address]   [phone]                        02/21/19 1155   Final Note   Assessment Type Final Discharge Note   Anticipated Discharge Disposition Home-Health   Hospital Follow Up  Appt(s) scheduled? Yes   Discharge plans and expectations educations in teach back method with documentation complete? Yes   Right Care Referral Info   Post Acute Recommendation Home-care

## 2019-02-21 NOTE — PLAN OF CARE
Problem: Fall Injury Risk  Goal: Absence of Fall and Fall-Related Injury  Outcome: Ongoing (interventions implemented as appropriate)  2/22/19 Pt will remain safe and free of falls during this hospitalization.

## 2019-02-21 NOTE — MEDICAL/APP STUDENT
"[facility] Medicine Progress Note    Primary Team: [facility] Hospitalist Team A  Attending Physician: Shey Burgess MD    Subjective:      Patient reports to be doing well today. Tolerating full diet well. Had a small non-bloody BM last night, endorses flatus. Denies nausea/vomiting, dizziness or lightheadedness. BP was in the 90s last night, hydralazine 50mg switched to 25mg TID. Carvedilol held due to bradycardia this morning.      Objective:     Last 24 Hour Vital Signs:  BP  Min: 95/53  Max: 163/72  Temp  Av.4 °F (36.3 °C)  Min: 96.2 °F (35.7 °C)  Max: 98.5 °F (36.9 °C)  Pulse  Av.3  Min: 50  Max: 85  Resp  Av.6  Min: 16  Max: 24  SpO2  Av %  Min: 93 %  Max: 99 %  Height  Av' 4" (162.6 cm)  Min: 5' 4" (162.6 cm)  Max: 5' 4" (162.6 cm)  Weight  Av.3 kg (219 lb 0.4 oz)  Min: 99.3 kg (218 lb 14.7 oz)  Max: 99.4 kg (219 lb 2.2 oz)  I/O last 3 completed shifts:  In: 725 [P.O.:725]  Out: 850 [Urine:850]    Physical Examination:  General appearance: alert, appears stated age and cooperative  Head: Normocephalic, without obvious abnormality, atraumatic  Eyes: PERRLA. Clear conjunctiva. No scleral icterus  Neck: no adenopathy and carotid bruit, bilaterally, that decreases as one auscultates up the neck.   Lungs: clear to auscultation bilaterally  Heart: RRR with no rubs or murmrus. Systolic murmur appreciated   Abdomen: soft, non-tender; bowel sounds normal; no masses,  no organomegaly  Extremities: extremities normal, atraumatic, no cyanosis or edema  Skin: Skin color, texture, turgor normal. No rashes or lesions except for buttocks which was erythremic and raw appearing.   Neuro: AAOx3        Laboratory:    Pertinent Findings:  WBC- 7.43  H/H- 8.7/27.9 (9.8/30.4)  plt - 249    Sodium 134 Abnormally low  mmol/L      Potassium 3.5 mmol/L     Chloride 100 mmol/L     CO2 25 mmol/L     Glucose 111 Abnormally high  mg/dL     BUN, Bld 22 mg/dL     Creatinine 0.9 mg/dL     Calcium 9.3 mg/dL     " Total Protein 5.7 Abnormally low  g/dL     Albumin 2.9 Abnormally low  g/dL     Total Bilirubin 1.0 mg/dL     Alkaline Phosphatase 43 Abnormally low  U/L     AST 17 U/L     ALT 10 U/L     Anion Gap 9 mmol/L          Current Medications:     Infusions:       Scheduled:   amLODIPine  5 mg Oral Daily    atorvastatin  40 mg Oral Daily    hydrALAZINE  25 mg Oral Q8H    insulin aspart U-100  3 Units Subcutaneous TIDWM    lisinopril  40 mg Oral Daily    oxybutynin  15 mg Oral Daily        PRN:  sodium chloride, dextrose 50%, dextrose 50%, glucagon (human recombinant), glucose, glucose, ondansetron, ramelteon, sodium chloride 0.9%    Antibiotics and Day Number of Therapy:  N/A    Lines and Day Number of Therapy:  piv    Assessment:     Makenzie Haile is a 83 y.o.female with  Patient Active Problem List    Diagnosis Date Noted    Physical deconditioning     Diverticulosis of large intestine with hemorrhage 02/19/2019    Gastrointestinal hemorrhage with melena 02/17/2019    RANDELL (acute kidney injury)     Symptomatic anemia 02/16/2019    Bradycardia 02/16/2019    Overflow incontinence of urine 02/16/2019    Acute blood loss anemia     Acute upper GI bleed     Normocytic anemia 02/13/2019    HLD (hyperlipidemia) 02/13/2019    Skin breakdown 02/13/2019    History of GI bleed 02/13/2019    Iron deficiency anemia due to chronic blood loss 02/13/2019    Coronary artery disease involving native coronary artery of native heart without angina pectoris 04/19/2018    Neuropathy 03/27/2018    Acute myocardial infarction involving left circumflex coronary artery 03/19/2018    GERD (gastroesophageal reflux disease) 03/19/2018    Type 2 diabetes mellitus without complication, without long-term current use of insulin 03/19/2018    Essential hypertension 03/19/2018    Aortic stenosis 03/19/2018    Class 3 severe obesity due to excess calories with serious comorbidity and body mass index (BMI) of 40.0 to 44.9 in  adult 03/19/2018    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 ASA held for 48h, restart today. Hold Plavix.  -H/H today: 8.7/27.9 (9.8/30.4 @1230pm 2/20/19)  -patient on regular diet, continue to monitor for bloody stools. If H/H continues to be stable and no more bloody stools, possible discharge today.    Essential Hypertension  -BP at admit: 115/49. BP today: /53-72  -BP currently controlled.  -Continue Amlodipine 5mg, Hydralazine 25mg TID  -2/20 Carvedilol decreased from 6.25->3.125mg. Held due to low HR this morning    CAD  -Restart ASA. Plavix held in context of GI bleed inpatient.     DM2  -Continue SSI. Home Metformin held while here.   -A1C 5.7 (2/13/19)    Dispo: discharge today pending stable H/H, stable BP and no new GI bleed. Set up follow-up with PCP and GI as well as home PT/OT.    Ciro RIVERAU MS-[id number]

## 2019-02-21 NOTE — PLAN OF CARE
VN cued into patients room for DC instructions. Instructions reviewed with pt and family members at bedside. Pt and family verbalized understanding of clinical references, new meds, and follow up appts. Medications delivered bedside. All questions answered. Bedside nurse to remove tele and PIV. All questions answered. Transport requested.

## 2019-02-21 NOTE — PLAN OF CARE
Problem: Physical Therapy Goal  Goal: Physical Therapy Goal  Goals to be met by: 3/17/2019     Patient will increase functional independence with mobility by performin. Supine to sit with Modified [place]  2. Sit to stand transfer with Modified [place]  3. Bed to chair transfer with Modified [place] using Rolling Walker  4. Gait  x 150 feet with Modified [place] using Rolling Walker.      Outcome: Ongoing (interventions implemented as appropriate)  Recommend Home with HH

## 2019-02-21 NOTE — PT/OT/SLP PROGRESS
Physical Therapy Treatment    Patient Name:  Makenzie Haile   MRN:  [id number]    Recommendations:     Discharge Recommendations:  other (see comments)(Home with HH)   Discharge Equipment Recommendations: commode, walker, rolling   Barriers to discharge: None    Assessment:     Makenzie Haile is a 83 y.o. female admitted with a medical diagnosis of Diverticulosis of large intestine with hemorrhage.  She presents with the following impairments/functional limitations:  weakness, gait instability, impaired self care skills, impaired functional mobilty, impaired endurance, impaired balance, decreased lower extremity function . Patient able to ambulate with RW ~ 85 ft and SBA.    Rehab Prognosis: Good; patient would benefit from acute skilled PT services to address these deficits and reach maximum level of function.    Recent Surgery: Procedure(s) (LRB):  EGD (ESOPHAGOGASTRODUODENOSCOPY) (N/A)  COLONOSCOPY (N/A) 3 Days Post-Op    Plan:     During this hospitalization, patient to be seen 6 x/week to address the identified rehab impairments via gait training, therapeutic activities, therapeutic exercises and progress toward the following goals:    · Plan of Care Expires:  03/19/19    Subjective     Chief Complaint: weakness  Patient/Family Comments/goals: go home  Pain/Comfort:  · Pain Rating 1: 0/10  · Pain Rating Post-Intervention 1: 0/10      Objective:     Communicated with primary nurse prior to session.  Patient found all lines intact and call button in reach telemetry, peripheral IV  upon PT entry to room.     General Precautions: Standard, fall   Orthopedic Precautions:N/A   Braces: N/A     Functional Mobility:  · Bed Mobility:     · Supine to Sit: minimum assistance  · Transfers:     · Sit to Stand:  stand by assistance and contact guard assistance with no AD  · Gait: 85 ft with RW and SBA  · Balance: fair with RW      AM-PAC 6 CLICK MOBILITY  Turning over in bed (including adjusting bedclothes, sheets and  blankets)?: 4  Sitting down on and standing up from a chair with arms (e.g., wheelchair, bedside commode, etc.): 4  Moving from lying on back to sitting on the side of the bed?: 3  Moving to and from a bed to a chair (including a wheelchair)?: 3  Need to walk in hospital room?: 3  Climbing 3-5 steps with a railing?: 3  Basic Mobility Total Score: 20       Therapeutic Activities and Exercises:   Reviewed posture and placement of LE with Sit <> stand    Patient left up in chair with all lines intact, call button in reach and family present..    GOALS:   Multidisciplinary Problems     Physical Therapy Goals        Problem: Physical Therapy Goal    Goal Priority Disciplines Outcome Goal Variances Interventions   Physical Therapy Goal     PT, PT/OT Ongoing (interventions implemented as appropriate)     Description:  Goals to be met by: 3/17/2019     Patient will increase functional independence with mobility by performin. Supine to sit with Modified [place]  2. Sit to stand transfer with Modified [place]  3. Bed to chair transfer with Modified [place] using Rolling Walker  4. Gait  x 150 feet with Modified [place] using Rolling Walker.              Problem: Physical Therapy Goal    Goal Priority Disciplines Outcome Goal Variances Interventions   Physical Therapy Goal     PT, PT/OT                      Time Tracking:     PT Received On: 19  PT Start Time: 1035     PT Stop Time: 1110  PT Total Time (min): 35 min     Billable Minutes: Gait Training 25 and Therapeutic Activity 10    Treatment Type: Treatment  PT/PTA: PT     PTA Visit Number: 1     Buzz Amador, PT  2019

## 2019-02-21 NOTE — PT/OT/SLP PROGRESS
Occupational Therapy      Patient Name:  Makenzie Haile   MRN:  [id number]    Patient not seen today secondary to eating lunch and reports she will be discharging home today. Will follow-up at later time.    TAVO Muse  2/21/2019

## 2019-02-21 NOTE — PLAN OF CARE
Problem: Adult Inpatient Plan of Care  Goal: Plan of Care Review  Outcome: Ongoing (interventions implemented as appropriate)  Pt on RA with documented sats.99. Will continue to monitor.

## 2019-02-21 NOTE — PLAN OF CARE
02/21/19 1225   Medicare Message   Important Message from Medicare regarding Discharge Appeal Rights Given to patient/caregiver;Explained to patient/caregiver;Signed/date by patient/caregiver   Date IMM was signed 02/21/19   Time IMM was signed 0900

## 2019-02-21 NOTE — PLAN OF CARE
Problem: Adult Inpatient Plan of Care  Goal: Plan of Care Review  Pt. On RA. Pt with no apparent distress noted. Will continue to monitor

## 2019-02-22 ENCOUNTER — PATIENT OUTREACH (OUTPATIENT)
Dept: ADMINISTRATIVE | Facility: CLINIC | Age: 84
End: 2019-02-22

## 2019-02-22 LAB — BACTERIA BLD CULT: NORMAL

## 2019-02-22 NOTE — PATIENT INSTRUCTIONS

## 2019-02-22 NOTE — DISCHARGE SUMMARY
[facility] Hospital Medicine Discharge Summary    Primary Team: [facility] Hospitalist Team A  Attending Physician: Shey Burgess MD  Resident: Lesli  Intern: Kurtis    Date of Admit: 2/16/2019  Date of Discharge: 2/21/2019    Discharge to: Stable  Condition: Home    Discharge Diagnoses     Patient Active Problem List   Diagnosis    Acute myocardial infarction involving left circumflex coronary artery    GERD (gastroesophageal reflux disease)    Type 2 diabetes mellitus without complication, without long-term current use of insulin    Essential hypertension    Aortic stenosis    Class 3 severe obesity due to excess calories with serious comorbidity and body mass index (BMI) of 40.0 to 44.9 in adult    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    Neuropathy    Coronary artery disease involving native coronary artery of native heart without angina pectoris    Normocytic anemia    HLD (hyperlipidemia)    Skin breakdown    History of GI bleed    Iron deficiency anemia due to chronic blood loss    Acute blood loss anemia    Acute upper GI bleed    Symptomatic anemia    Bradycardia    Overflow incontinence of urine    Gastrointestinal hemorrhage with melena    RANDELL (acute kidney injury)    Diverticulosis of large intestine with hemorrhage       Consultants and Procedures     Consultants:  GI: For endoscopy     Procedures:   EGD 2/18/19  - normal   Colonoscopy 2/18/19  - Long tortuous colon with extensive diverticulosis throughout  - Liquid brown stool mixed with small amounts of red blood   - Extensive diverticulosis throughout the colon  - History of recurrent GI bleeding with report of active bleeding in the cecum/right colon treated by angiogram with embolization - this presentation is most consistent with recurrent diverticular bleeding    Imaging:  CXR: no acute findings    Brief History of Present Illness      Patient was recently discharged from the hospital for rectal bleeding;  underwent an ir procedure to clip SMA. Patient returned to the hospital for a black bowel movement as well as symptomatic anemia. Patient states that she had 2 brbms prior to being admitted  Patient denied fevers, chills, chest pain, sob, and diarrhea.    For the full HPI please refer to the History & Physical from this admission.    Hospital Course By Problem with Pertinent Findings     Symptomatic Anemia 2/2 Acute on Chronic GI Bleed, with GENEVIEVE  - Patient had 2 episodes of dark colored/tarry stool on day prior to admission; received 1 UPRBCs (scheduled for 2; fevered after first prbcs)  - Consulted GI, appreciate their recommendations, possible scope.  Patient fevered after first unit; patient administered benadryl and tylenol. H/H: 8.1/25.5.   Patient underwent scope on 2/18/19 with diffuse diverticulosis but no active bleed  - pt with one bloody BM on 2/19 so patient not comfortable to leave   - no repeat bleeding episodes and H/H remained stable, pt stable for discharge    - follow up with PCP in 1 week to check H/H and monitor for any other episodes of bloody BM     RANDELL, reosolved  - Admit: BUN 33, Cr 1.1  Baseline Cr of her previous admission 0.7-0.8  - Patient volume down on exam, dry mucus membranes and dry skin, wanting to drink water  - Concern for pre-renal  Given 1L bolus in ED, Will keep her on maintenance fluid 100cc/hr   -  resolved cr 0.7      Essential Hypertension  - /49 in triage, had been 130-140s/60s since fluids started  - On Lisinopril 20mg BID and Toprol-XL 25mg previously but started on: Lisinopril 40mg daily and Coreg 25mg BID  - Held coreg as patient was doni  2/17: /70 --> starting lisinopril 40. Held coreg  2/18: Added hydralazine. Will continue to monitor and adjust regiment   - pt to be on lisinopril 20mg and amlodipine 5mg at discharge, discontinue coreg and metoprolol  - follow up with PCP in 1 week for BP check and need to adjust medications further      CAD w/ PMH oh  PCI w/ SURAJ to LCX  - PCI in 3/18 - On ASA/Plavix  - Will continue to hold plavix while here in setting of GI bleed. Continuing ASA  - continue ASA at discharge, do not continue plavix      DM2  - 2/13/2019 A1c 5.7  - On Metformin 1000mg BID at home; held  - SSI with hypoglycemic protocol  - continue metformin at home      HLD  - 3/2018: Chol 150, HDL 34, LDL 96, Trigly 99  - on atorvastatin 40mg daily at home  Continued      Overflow incontinence    - On Oxybutynin 15mg daily at home  Continued     HCM  - a1c 5.7  TSH WNL  - UTD on immunizations and cancer screenings per family       Discharge Medications      Miranda Haile   Home Medication Instructions GUSTAVO:[id number]    Printed on:02/20/19 1013   Medication Information                      amLODIPine (NORVASC) 5 MG tablet  Take 1 tablet (5 mg total) by mouth once daily.             aspirin (ECOTRIN) 81 MG EC tablet  Take 1 tablet (81 mg total) by mouth once daily.             atorvastatin (LIPITOR) 40 MG tablet  Take 1 tablet (40 mg total) by mouth once daily.             docusate sodium (COLACE) 100 MG capsule  Take 1 capsule (100 mg total) by mouth 2 (two) times daily.             ferrous sulfate (FEOSOL) 325 mg (65 mg iron) Tab tablet  Take 1 tablet (325 mg total) by mouth daily with breakfast.             folic acid/multivit-min/lutein (CENTRUM SILVER ORAL)  Take by mouth.             incontinence pad,liner,disp (BLADDER CONTROL PADS EX ABSORB MISC)  bladder control pads             lancets (ACCU-CHEK FASTCLIX MISC)  Accu-Chek FastClix             lisinopril (PRINIVIL,ZESTRIL) 20 MG tablet  Take 2 tablets (40 mg total) by mouth once daily.             metFORMIN (GLUCOPHAGE) 1000 MG tablet  Take 1 tablet (1,000 mg total) by mouth 2 (two) times daily with meals.             oxybutynin (DITROPAN XL) 15 MG TR24  Take 1 tablet (15 mg total) by mouth once daily.                 Discharge Information:   Diet:  Diabetic    Physical Activity:  As tolerated              Instructions:  1. Take all medications as prescribed  2. Keep all follow-up appointments  3. Return to the hospital or call your primary care physicians if any worsening symptoms such as fever, chest pain, shortness of breath, return of symptoms, or any other concerns.    Follow-Up Appointments:  Follow up with PCP in 1 week 2/25 at 9:45am   Follow up with GI 3/6/19 at 2pm     Robinson Rizzo MD  \[facility]\"" Internal Medicine, [facility]-I

## 2019-02-22 NOTE — PROGRESS NOTES
C3 nurse attempted to contact patient. The following occurred:   C3 nurse attempted to contact Makenzie Haile for a TCC post hospital discharge follow up call. The patient is unable to conduct the call @ this time. The patient requested a callback.    The patient has a scheduled HOSFU appointment with Farhat Rapp MD  on 2/25/2019 AT 9:45

## 2019-02-25 NOTE — PHYSICIAN QUERY
"PT Name: Makenzie Haile  MR #: [id number]     Physician Query Form - Documentation Clarification      Silvina Shaw RN, CCDS  Desk # [phone]; tamie # [phone] [email or web address]      This form is a permanent document in the medical record.     Query Date: February 25, 2019    By submitting this query, we are merely seeking further clarification of documentation. Please utilize your independent clinical judgment when addressing the question(s) below.    The Medical record reflects the following:    Supporting Clinical Findings Location in Medical Record   Redness, nonblanchable  Rash to perinium  Low Air Loss specialty bed    buttocks which was erythremic and raw appearing    Skin Breakdown Nsg assessment        2/27 Hosp PN     Active Problem List on DCS                                                                              Doctor, Please specify diagnosis or diagnoses associated with above clinical findings.        Please further specify "skin breakdown".   Multiple part answer.     Provider Use Only      Specify breakdown type: (  ) Moisture associated dermatitis                                          ( x ) Other type - specify: undetermined  Specify Location: __________________    Specify Laterality: (  )right;  (  )left;  ( x )Bilateral;  (  )midline    Specify Present on admit status:  ( x )yes;  (  )no;  (  )Undeterminable                                                                                                               [  ]  Clinically Undetermined               "

## 2019-03-06 ENCOUNTER — OFFICE VISIT (OUTPATIENT)
Dept: NEUROLOGY | Facility: HOSPITAL | Age: 84
End: 2019-03-06
Attending: INTERNAL MEDICINE
Payer: COMMERCIAL

## 2019-03-06 VITALS
WEIGHT: 216.81 LBS | TEMPERATURE: 99 F | DIASTOLIC BLOOD PRESSURE: 80 MMHG | BODY MASS INDEX: 37.22 KG/M2 | HEART RATE: 93 BPM | SYSTOLIC BLOOD PRESSURE: 190 MMHG

## 2019-03-06 DIAGNOSIS — K57.31 DIVERTICULOSIS OF LARGE INTESTINE WITH HEMORRHAGE: Primary | ICD-10-CM

## 2019-03-06 PROCEDURE — 99214 OFFICE O/P EST MOD 30 MIN: CPT | Performed by: INTERNAL MEDICINE

## 2019-03-06 NOTE — PROGRESS NOTES
[facility]    CC: diverticular bleed    HPI 83 y.o. female here with recent, severe, symptomatic diverticular bleed.  She has a history of diverticular bleeds requiring embolization in the past.  Colonoscopy during this most recent hospitalization was significant for pan-diverticulosis with no active bleeding. She was previous on DAPT but is now on ASA alone. Since discharge, no further bleeding and she is feeling well other than episodic loose stools associated with eating ice cream.    Collateral obtained from daughter in the room.       Past Medical History:   Diagnosis Date    Acute myocardial infarction involving left circumflex coronary artery 3/19/2018    Diabetes mellitus     Hyperlipemia     Hypertension          Review of Systems  General ROS: negative for chills, fever or weight loss  Cardiovascular ROS: no chest pain or dyspnea on exertion  Gastrointestinal ROS: no abdominal pain, change in bowel habits, or black/ bloody stools; occasional loose stool    Physical Examination  BP (!) 190/80 (BP Location: Right arm, Patient Position: Sitting, BP Method: Medium (Automatic))   Pulse 93   Temp 98.6 °F (37 °C) (Oral)   Wt 98.4 kg (216 lb 13.2 oz)   BMI 37.22 kg/m²   General appearance: alert, cooperative, no distress  HENT: Normocephalic, atraumatic, neck symmetrical, no nasal discharge   Lungs: clear to auscultation bilaterally, no dullness to percussion bilaterally  Heart: regular rate and rhythm without rub; no displacement of the PMI   Abdomen: soft, non-tender; bowel sounds normoactive; no organomegaly  Extremities: extremities symmetric; no clubbing, cyanosis, or edema  Neurologic: Alert and oriented X 3, normal strength, normal coordination and gait    Labs:  Lab Results   Component Value Date    WBC 7.43 02/21/2019    HGB 8.7 (L) 02/21/2019    HCT 27.9 (L) 02/21/2019    MCV 88 02/21/2019     02/21/2019       Imaging: Previous CTA reviewed with active bleeding in the right  colon    Assessment:   83 year old female with history of severe diverticular bleed s/p right colonic embolization. Her subsequent colonoscopy was significant for pan-diverticulosis.  No bleeding since discharge and she is now on 81 mg ASA alone.  Will continue to monitor for recurrent bleeding and if she were to have multiple episodes, she may need repeat embolization vs. Surgery.     Plan:  1. Continue to monitor for recurrent diverticular bleed  2. Continue 81 mg ASA and avoid plavix  3. No need for repeat colonoscopy    Buzz Meyer MD   [address], [address]   [place] [address] [phone]

## 2019-04-18 ENCOUNTER — OFFICE VISIT (OUTPATIENT)
Dept: CARDIOLOGY | Facility: CLINIC | Age: 84
End: 2019-04-18
Payer: COMMERCIAL

## 2019-04-18 VITALS
SYSTOLIC BLOOD PRESSURE: 142 MMHG | WEIGHT: 218 LBS | DIASTOLIC BLOOD PRESSURE: 78 MMHG | BODY MASS INDEX: 37.42 KG/M2 | HEART RATE: 72 BPM | OXYGEN SATURATION: 94 %

## 2019-04-18 DIAGNOSIS — I35.0 NONRHEUMATIC AORTIC VALVE STENOSIS: Chronic | ICD-10-CM

## 2019-04-18 DIAGNOSIS — D64.9 SYMPTOMATIC ANEMIA: ICD-10-CM

## 2019-04-18 DIAGNOSIS — K92.1 GASTROINTESTINAL HEMORRHAGE WITH MELENA: ICD-10-CM

## 2019-04-18 DIAGNOSIS — D62 ACUTE BLOOD LOSS ANEMIA: ICD-10-CM

## 2019-04-18 DIAGNOSIS — E66.01 CLASS 3 SEVERE OBESITY DUE TO EXCESS CALORIES WITH SERIOUS COMORBIDITY AND BODY MASS INDEX (BMI) OF 40.0 TO 44.9 IN ADULT: ICD-10-CM

## 2019-04-18 DIAGNOSIS — I25.10 CORONARY ARTERY DISEASE INVOLVING NATIVE CORONARY ARTERY OF NATIVE HEART WITHOUT ANGINA PECTORIS: Primary | ICD-10-CM

## 2019-04-18 DIAGNOSIS — K21.9 GASTROESOPHAGEAL REFLUX DISEASE, ESOPHAGITIS PRESENCE NOT SPECIFIED: ICD-10-CM

## 2019-04-18 DIAGNOSIS — I10 ESSENTIAL HYPERTENSION: Chronic | ICD-10-CM

## 2019-04-18 DIAGNOSIS — D50.0 IRON DEFICIENCY ANEMIA DUE TO CHRONIC BLOOD LOSS: ICD-10-CM

## 2019-04-18 DIAGNOSIS — K92.2 ACUTE UPPER GI BLEED: ICD-10-CM

## 2019-04-18 DIAGNOSIS — Z95.5 PRESENCE OF DRUG-ELUTING STENT IN LEFT CIRCUMFLEX CORONARY ARTERY: ICD-10-CM

## 2019-04-18 DIAGNOSIS — K57.31 DIVERTICULOSIS OF LARGE INTESTINE WITH HEMORRHAGE: ICD-10-CM

## 2019-04-18 DIAGNOSIS — E11.9 TYPE 2 DIABETES MELLITUS WITHOUT COMPLICATION, WITHOUT LONG-TERM CURRENT USE OF INSULIN: ICD-10-CM

## 2019-04-18 DIAGNOSIS — I21.21: ICD-10-CM

## 2019-04-18 DIAGNOSIS — E78.2 MIXED HYPERLIPIDEMIA: ICD-10-CM

## 2019-04-18 PROCEDURE — 1101F PR PT FALLS ASSESS DOC 0-1 FALLS W/OUT INJ PAST YR: ICD-10-PCS | Mod: CPTII,S$GLB,, | Performed by: INTERNAL MEDICINE

## 2019-04-18 PROCEDURE — 1101F PT FALLS ASSESS-DOCD LE1/YR: CPT | Mod: CPTII,S$GLB,, | Performed by: INTERNAL MEDICINE

## 2019-04-18 PROCEDURE — 3077F PR MOST RECENT SYSTOLIC BLOOD PRESSURE >= 140 MM HG: ICD-10-PCS | Mod: CPTII,S$GLB,, | Performed by: INTERNAL MEDICINE

## 2019-04-18 PROCEDURE — 99999 PR PBB SHADOW E&M-EST. PATIENT-LVL III: ICD-10-PCS | Mod: PBBFAC,,, | Performed by: INTERNAL MEDICINE

## 2019-04-18 PROCEDURE — 3078F PR MOST RECENT DIASTOLIC BLOOD PRESSURE < 80 MM HG: ICD-10-PCS | Mod: CPTII,S$GLB,, | Performed by: INTERNAL MEDICINE

## 2019-04-18 PROCEDURE — 99214 OFFICE O/P EST MOD 30 MIN: CPT | Mod: S$GLB,,, | Performed by: INTERNAL MEDICINE

## 2019-04-18 PROCEDURE — 3078F DIAST BP <80 MM HG: CPT | Mod: CPTII,S$GLB,, | Performed by: INTERNAL MEDICINE

## 2019-04-18 PROCEDURE — 99214 PR OFFICE/OUTPT VISIT, EST, LEVL IV, 30-39 MIN: ICD-10-PCS | Mod: S$GLB,,, | Performed by: INTERNAL MEDICINE

## 2019-04-18 PROCEDURE — 3077F SYST BP >= 140 MM HG: CPT | Mod: CPTII,S$GLB,, | Performed by: INTERNAL MEDICINE

## 2019-04-18 PROCEDURE — 99999 PR PBB SHADOW E&M-EST. PATIENT-LVL III: CPT | Mod: PBBFAC,,, | Performed by: INTERNAL MEDICINE

## 2019-04-18 NOTE — PROGRESS NOTES
Subjective:    Patient ID:  Makenzie Haile is a 83 y.o. female who presents for follow-up of Coronary Artery Disease      HPI     84 y/o female with hx of CAD s/p NSTEMI with subsequent PCI with SURAJ to LCX, HTN, DM, GERD, obesity, mild AS (EF 55-60%, SERAFIN 1.48, PV 2.08, MG 10) who presents for f/u. Presented initially with epigastric pain she thought was heartburn, presented to ED, had elevated trop 0.77, C with LCX lesion s/p PCI with SURAJ, resolution of pain, and discharged home.   Recent admission for symptomatic anemia from GIB with PRBC transfusion. Plavix stopped and currently on SAPT with ASA. Recent 2DE with normal EF and mild AS, unchanged.  Has done well with no recurrent symptoms. Denies CP, SOB, orthopnea, PND, syncope, palps. Tolerating meds well. Non smoker, no EtOH. Attempting to follow a low salt diet, does not exercise.     Review of Systems   Constitution: Negative for malaise/fatigue.   HENT: Negative for congestion.    Eyes: Negative for blurred vision.   Cardiovascular: Negative for chest pain, claudication, cyanosis, dyspnea on exertion, irregular heartbeat, leg swelling, near-syncope, orthopnea, palpitations, paroxysmal nocturnal dyspnea and syncope.   Respiratory: Negative for shortness of breath.    Endocrine: Negative for polyuria.   Hematologic/Lymphatic: Negative for bleeding problem.   Skin: Negative for itching and rash.   Musculoskeletal: Negative for joint swelling, muscle cramps and muscle weakness.   Gastrointestinal: Negative for abdominal pain, hematemesis, hematochezia, melena, nausea and vomiting.   Genitourinary: Negative for dysuria and hematuria.   Neurological: Negative for dizziness, focal weakness, headaches, light-headedness, loss of balance and weakness.   Psychiatric/Behavioral: Negative for depression. The patient is not nervous/anxious.         Objective:    Physical Exam   Constitutional: She is oriented to person, place, and time. She appears well-developed and  well-nourished.   HENT:   Head: Normocephalic and atraumatic.   Neck: Neck supple. No JVD present.   Cardiovascular: Normal rate and regular rhythm.   Murmur heard.   Systolic murmur is present with a grade of 3/6.  Pulses:       Carotid pulses are 2+ on the right side, and 2+ on the left side.       Radial pulses are 2+ on the right side, and 2+ on the left side.        Femoral pulses are 2+ on the right side, and 2+ on the left side.       Dorsalis pedis pulses are 2+ on the right side, and 2+ on the left side.        Posterior tibial pulses are 2+ on the right side, and 2+ on the left side.   Pulmonary/Chest: Effort normal and breath sounds normal.   Abdominal: Soft. Bowel sounds are normal.   Musculoskeletal: She exhibits no edema.   Neurological: She is alert and oriented to person, place, and time.   Skin: Skin is warm and dry.   Psychiatric: She has a normal mood and affect. Her behavior is normal. Thought content normal.         Assessment:       1. Coronary artery disease involving native coronary artery of native heart without angina pectoris    2. Acute myocardial infarction involving left circumflex coronary artery, unspecified MI type    3. Nonrheumatic aortic valve stenosis    4. Essential hypertension    5. Mixed hyperlipidemia    6. Acute blood loss anemia    7. Iron deficiency anemia due to chronic blood loss    8. Symptomatic anemia    9. Type 2 diabetes mellitus without complication, without long-term current use of insulin    10. Acute upper GI bleed    11. Diverticulosis of large intestine with hemorrhage    12. Gastrointestinal hemorrhage with melena    13. Gastroesophageal reflux disease, esophagitis presence not specified    14. Class 3 severe obesity due to excess calories with serious comorbidity and body mass index (BMI) of 40.0 to 44.9 in adult    15. 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 Doing well from a  cardiac perspective and compensated from a HF perspective. Will check CBC. Discussed the etiology, evaluation, and management of CAD. Discussed the importance of med compliance, heart healthy diet, and regular exercise.        Plan:       -CBC  -f/u in 6 months

## 2019-04-23 ENCOUNTER — LAB VISIT (OUTPATIENT)
Dept: LAB | Facility: HOSPITAL | Age: 84
End: 2019-04-23
Attending: INTERNAL MEDICINE
Payer: COMMERCIAL

## 2019-04-23 DIAGNOSIS — I25.10 CORONARY ARTERY DISEASE INVOLVING NATIVE CORONARY ARTERY OF NATIVE HEART WITHOUT ANGINA PECTORIS: ICD-10-CM

## 2019-04-23 LAB
BASOPHILS # BLD AUTO: 0.05 K/UL (ref 0–0.2)
BASOPHILS NFR BLD: 0.8 % (ref 0–1.9)
DIFFERENTIAL METHOD: ABNORMAL
EOSINOPHIL # BLD AUTO: 0.2 K/UL (ref 0–0.5)
EOSINOPHIL NFR BLD: 3.5 % (ref 0–8)
ERYTHROCYTE [DISTWIDTH] IN BLOOD BY AUTOMATED COUNT: 15.4 % (ref 11.5–14.5)
HCT VFR BLD AUTO: 35.3 % (ref 37–48.5)
HGB BLD-MCNC: 10.4 G/DL (ref 12–16)
LYMPHOCYTES # BLD AUTO: 1.3 K/UL (ref 1–4.8)
LYMPHOCYTES NFR BLD: 20.9 % (ref 18–48)
MCH RBC QN AUTO: 24.6 PG (ref 27–31)
MCHC RBC AUTO-ENTMCNC: 29.5 G/DL (ref 32–36)
MCV RBC AUTO: 84 FL (ref 82–98)
MONOCYTES # BLD AUTO: 0.5 K/UL (ref 0.3–1)
MONOCYTES NFR BLD: 8.5 % (ref 4–15)
NEUTROPHILS # BLD AUTO: 3.9 K/UL (ref 1.8–7.7)
NEUTROPHILS NFR BLD: 66.1 % (ref 38–73)
PLATELET # BLD AUTO: 237 K/UL (ref 150–350)
PMV BLD AUTO: 11 FL (ref 9.2–12.9)
RBC # BLD AUTO: 4.23 M/UL (ref 4–5.4)
WBC # BLD AUTO: 5.97 K/UL (ref 3.9–12.7)

## 2019-04-23 PROCEDURE — 85025 COMPLETE CBC W/AUTO DIFF WBC: CPT | Mod: PO

## 2019-04-23 PROCEDURE — 36415 COLL VENOUS BLD VENIPUNCTURE: CPT | Mod: PO

## 2019-04-26 ENCOUNTER — TELEPHONE (OUTPATIENT)
Dept: CARDIOLOGY | Facility: CLINIC | Age: 84
End: 2019-04-26

## 2019-04-26 NOTE — TELEPHONE ENCOUNTER
----- Message from Neal Ramos sent at 4/26/2019 11:42 AM CDT -----  Contact: [phone]/self  Patient calling to speak with you about her test results.  Please call back to assist at [phone]

## 2019-05-01 ENCOUNTER — TELEPHONE (OUTPATIENT)
Dept: CARDIOLOGY | Facility: CLINIC | Age: 84
End: 2019-05-01

## 2019-05-01 NOTE — TELEPHONE ENCOUNTER
----- Message from More Padilla sent at 5/1/2019  8:59 AM CDT -----  Contact: Self/ [phone]  Patient calling to speak with you about her test results.     Please call.

## 2019-05-01 NOTE — TELEPHONE ENCOUNTER
----- Message from Ciro Moncada MD sent at 4/30/2019  1:55 PM CDT -----  Blood count improved. Continue current medical regimen

## 2019-08-09 ENCOUNTER — HOSPITAL ENCOUNTER (EMERGENCY)
Facility: HOSPITAL | Age: 84
Discharge: HOME | End: 2019-08-10
Attending: EMERGENCY MEDICINE
Payer: COMMERCIAL

## 2019-08-09 DIAGNOSIS — R11.0 NAUSEA: ICD-10-CM

## 2019-08-09 DIAGNOSIS — R42 DIZZINESS: Primary | ICD-10-CM

## 2019-08-09 LAB
ALBUMIN SERPL BCP-MCNC: 4.4 G/DL (ref 3.5–5.2)
ALP SERPL-CCNC: 50 U/L (ref 38–126)
ALT SERPL W/O P-5'-P-CCNC: 17 U/L (ref 10–44)
ANION GAP SERPL CALC-SCNC: 8 MMOL/L (ref 8–16)
AST SERPL-CCNC: 43 U/L (ref 15–46)
BASOPHILS # BLD AUTO: 0.05 K/UL (ref 0–0.2)
BASOPHILS NFR BLD: 0.7 % (ref 0–1.9)
BILIRUB SERPL-MCNC: 1 MG/DL (ref 0.1–1)
BUN SERPL-MCNC: 28 MG/DL (ref 7–17)
CALCIUM SERPL-MCNC: 9.8 MG/DL (ref 8.7–10.5)
CHLORIDE SERPL-SCNC: 104 MMOL/L (ref 95–110)
CO2 SERPL-SCNC: 26 MMOL/L (ref 23–29)
CREAT SERPL-MCNC: 0.59 MG/DL (ref 0.5–1.4)
DIFFERENTIAL METHOD: ABNORMAL
EOSINOPHIL # BLD AUTO: 0.5 K/UL (ref 0–0.5)
EOSINOPHIL NFR BLD: 6.6 % (ref 0–8)
ERYTHROCYTE [DISTWIDTH] IN BLOOD BY AUTOMATED COUNT: 18.1 % (ref 11.5–14.5)
EST. GFR  (AFRICAN AMERICAN): >60 ML/MIN/1.73 M^2
EST. GFR  (NON AFRICAN AMERICAN): >60 ML/MIN/1.73 M^2
GLUCOSE SERPL-MCNC: 183 MG/DL (ref 70–110)
HCT VFR BLD AUTO: 36.4 % (ref 37–48.5)
HGB BLD-MCNC: 11.1 G/DL (ref 12–16)
HYPOCHROMIA BLD QL SMEAR: ABNORMAL
LYMPHOCYTES # BLD AUTO: 1.3 K/UL (ref 1–4.8)
LYMPHOCYTES NFR BLD: 17.9 % (ref 18–48)
MCH RBC QN AUTO: 24.9 PG (ref 27–31)
MCHC RBC AUTO-ENTMCNC: 30.5 G/DL (ref 32–36)
MCV RBC AUTO: 82 FL (ref 82–98)
MONOCYTES # BLD AUTO: 0.4 K/UL (ref 0.3–1)
MONOCYTES NFR BLD: 5.9 % (ref 4–15)
NEUTROPHILS # BLD AUTO: 4.8 K/UL (ref 1.8–7.7)
NEUTROPHILS NFR BLD: 68.9 % (ref 38–73)
PLATELET # BLD AUTO: 130 K/UL (ref 150–350)
PLATELET BLD QL SMEAR: ABNORMAL
PMV BLD AUTO: 11.6 FL (ref 9.2–12.9)
POTASSIUM SERPL-SCNC: 5.1 MMOL/L (ref 3.5–5.1)
PROT SERPL-MCNC: 8.1 G/DL (ref 6–8.4)
RBC # BLD AUTO: 4.45 M/UL (ref 4–5.4)
SODIUM SERPL-SCNC: 138 MMOL/L (ref 136–145)
WBC # BLD AUTO: 6.98 K/UL (ref 3.9–12.7)

## 2019-08-09 PROCEDURE — 25000003 PHARM REV CODE 250: Mod: ER | Performed by: EMERGENCY MEDICINE

## 2019-08-09 PROCEDURE — 96374 THER/PROPH/DIAG INJ IV PUSH: CPT | Mod: ER

## 2019-08-09 PROCEDURE — 80053 COMPREHEN METABOLIC PANEL: CPT | Mod: ER

## 2019-08-09 PROCEDURE — 99284 EMERGENCY DEPT VISIT MOD MDM: CPT | Mod: 25,ER

## 2019-08-09 PROCEDURE — 85025 COMPLETE CBC W/AUTO DIFF WBC: CPT | Mod: ER

## 2019-08-09 RX ORDER — ONDANSETRON 4 MG/1
4 TABLET, ORALLY DISINTEGRATING ORAL
Status: COMPLETED | OUTPATIENT
Start: 2019-08-09 | End: 2019-08-09

## 2019-08-09 RX ORDER — MECLIZINE HCL 12.5 MG 12.5 MG/1
25 TABLET ORAL
Status: COMPLETED | OUTPATIENT
Start: 2019-08-09 | End: 2019-08-09

## 2019-08-09 RX ADMIN — MECLIZINE 25 MG: 12.5 TABLET ORAL at 11:08

## 2019-08-09 RX ADMIN — CLONIDINE HYDROCHLORIDE 0.3 MG: 0.2 TABLET ORAL at 11:08

## 2019-08-09 RX ADMIN — ONDANSETRON 4 MG: 4 TABLET, ORALLY DISINTEGRATING ORAL at 11:08

## 2019-08-10 VITALS
DIASTOLIC BLOOD PRESSURE: 67 MMHG | SYSTOLIC BLOOD PRESSURE: 164 MMHG | WEIGHT: 226 LBS | RESPIRATION RATE: 20 BRPM | HEART RATE: 66 BPM | OXYGEN SATURATION: 99 % | TEMPERATURE: 99 F | BODY MASS INDEX: 37.65 KG/M2 | HEIGHT: 65 IN

## 2019-08-10 LAB
BACTERIA #/AREA URNS AUTO: NORMAL /HPF
BILIRUB UR QL STRIP: NEGATIVE
CLARITY UR REFRACT.AUTO: CLEAR
COLOR UR AUTO: YELLOW
GLUCOSE UR QL STRIP: NEGATIVE
HGB UR QL STRIP: ABNORMAL
HYALINE CASTS UR QL AUTO: 0 /LPF
KETONES UR QL STRIP: NEGATIVE
LEUKOCYTE ESTERASE UR QL STRIP: NEGATIVE
MICROSCOPIC COMMENT: NORMAL
NITRITE UR QL STRIP: NEGATIVE
PH UR STRIP: 8 [PH] (ref 5–8)
PROT UR QL STRIP: ABNORMAL
RBC #/AREA URNS AUTO: 2 /HPF (ref 0–4)
SP GR UR STRIP: 1.01 (ref 1–1.03)
URN SPEC COLLECT METH UR: ABNORMAL
UROBILINOGEN UR STRIP-ACNC: NEGATIVE EU/DL
WBC #/AREA URNS AUTO: 0 /HPF (ref 0–5)

## 2019-08-10 PROCEDURE — 81000 URINALYSIS NONAUTO W/SCOPE: CPT | Mod: ER

## 2019-08-10 PROCEDURE — 63600175 PHARM REV CODE 636 W HCPCS: Mod: ER | Performed by: EMERGENCY MEDICINE

## 2019-08-10 RX ORDER — ONDANSETRON 4 MG/1
4 TABLET, ORALLY DISINTEGRATING ORAL EVERY 6 HOURS PRN
Qty: 12 TABLET | Refills: 0 | Status: SHIPPED | OUTPATIENT
Start: 2019-08-10

## 2019-08-10 RX ORDER — MECLIZINE HYDROCHLORIDE 25 MG/1
25 TABLET ORAL 3 TIMES DAILY PRN
Qty: 20 TABLET | Refills: 0 | Status: SHIPPED | OUTPATIENT
Start: 2019-08-10

## 2019-08-10 RX ORDER — ONDANSETRON 2 MG/ML
4 INJECTION INTRAMUSCULAR; INTRAVENOUS
Status: COMPLETED | OUTPATIENT
Start: 2019-08-10 | End: 2019-08-10

## 2019-08-10 RX ADMIN — ONDANSETRON 4 MG: 2 INJECTION INTRAMUSCULAR; INTRAVENOUS at 12:08

## 2019-08-10 NOTE — ED NOTES
Allowed pt to slowly dangle feet at bedside.   Educate pt on relaxing, slow deep breathes, and keeping eyes up when standing.  Pt verbalized understanding and return demonstration

## 2019-08-10 NOTE — ED TRIAGE NOTES
pt reports vertigo/dizziness that began tonight with nausea; denies fever. Pts family reports lethargy. Pt reports frequent urination; denies dysuria. Pt reports feeling tired.

## 2019-08-10 NOTE — ED NOTES
Pt tolerated PO medications well. Pt repositioned on stretcher for comfort, nadn, resp e/u. Pt provided with warm blanket and lights dimmed for comfort, Sr up x2, bed low and locked, call light within reach, family at the bedside, will ctm

## 2019-08-10 NOTE — ED PROVIDER NOTES
Encounter Date: 8/9/2019       History     Chief Complaint   Patient presents with    Dizziness     pt reports vertigo/dizziness that began tonight with nausea; denies fever. Pts family reports lethargy. Pt reports frequent urination; denies dysuria. No neuro deficits noted. Pt awake, alert, and oriented.    Nausea     The history is provided by the patient.   General Illness    The current episode started 3 to 5 hours ago. The problem occurs frequently. The problem has been unchanged. Nothing relieves the symptoms. Nothing aggravates the symptoms. Associated symptoms include nausea. Pertinent negatives include no fever, no photophobia, no rhinorrhea, no muscle aches, no neck stiffness, no cough, no shortness of breath, no URI and no wheezing.     Review of patient's allergies indicates:   Allergen Reactions    Iodine and iodide containing products     Sulfa (sulfonamide antibiotics) Hives     Past Medical History:   Diagnosis Date    Acute myocardial infarction involving left circumflex coronary artery 3/19/2018    Diabetes mellitus     Hyperlipemia     Hypertension      Past Surgical History:   Procedure Laterality Date    COLONOSCOPY N/A 2/18/2019    [facility]    COLONOSCOPY N/A 2/18/2019    [facility]    CORONARY ANGIOPLASTY WITH STENT PLACEMENT  03/19/2018    EGD (ESOPHAGOGASTRODUODENOSCOPY) N/A 2/18/2019    [facility]    EGD (ESOPHAGOGASTRODUODENOSCOPY) N/A 2/18/2019    [facility]     Family History   Problem Relation Age of Onset    No Known Problems Mother     Heart disease Father      Social History     Tobacco Use    Smoking status: Never Smoker   Substance Use Topics    Alcohol use: No    Drug use: No     Review of Systems   Constitutional: Negative for fever.   HENT: Negative for rhinorrhea.    Eyes: Negative for photophobia.   Respiratory:  Negative for cough, shortness of breath and wheezing.    Gastrointestinal: Positive for nausea.   Neurological: Positive for dizziness. Negative for seizures, speech difficulty and weakness.   All other systems reviewed and are negative.      Physical Exam     Initial Vitals [08/09/19 2143]   BP Pulse Resp Temp SpO2   (!) 200/80 93 (!) 22 98.4 °F (36.9 °C) (!) 93 %      MAP       --         Physical Exam    Nursing note and vitals reviewed.  Constitutional: She appears well-developed and well-nourished.   HENT:   Head: Normocephalic and atraumatic.   Right Ear: Hearing, tympanic membrane, external ear and ear canal normal.   Left Ear: Hearing, tympanic membrane, external ear and ear canal normal.   Eyes: Conjunctivae and EOM are normal.   Neck: Normal range of motion. Neck supple.   Cardiovascular: Normal rate, regular rhythm and normal heart sounds.   Pulmonary/Chest: Breath sounds normal. No respiratory distress. She has no wheezes. She has no rhonchi. She has no rales.   Abdominal: Soft. She exhibits no distension. There is no tenderness. There is no rebound and no guarding.   Musculoskeletal: Normal range of motion.   Neurological: She is alert and oriented to person, place, and time. GCS score is 15. GCS eye subscore is 4. GCS verbal subscore is 5. GCS motor subscore is 6.   Skin: Skin is warm and dry. Capillary refill takes less than 2 seconds.   Psychiatric: She has a normal mood and affect. Her behavior is normal. Judgment and thought content normal.         ED Course   Procedures  Labs Reviewed   CBC W/ AUTO DIFFERENTIAL - Abnormal; Notable for the following components:       Result Value    Hemoglobin 11.1 (*)     Hematocrit 36.4 (*)     Mean Corpuscular Hemoglobin 24.9 (*)     Mean Corpuscular Hemoglobin Conc 30.5 (*)     RDW 18.1 (*)     Platelets 130 (*)     Lymph% 17.9 (*)     Platelet Estimate Clumped (*)     All other components within normal limits   COMPREHENSIVE METABOLIC PANEL - Abnormal; Notable  for the following components:    Glucose 183 (*)     BUN, Bld 28 (*)     All other components within normal limits   URINALYSIS, REFLEX TO URINE CULTURE - Abnormal; Notable for the following components:    Protein, UA 1+ (*)     Occult Blood UA Trace (*)     All other components within normal limits    Narrative:     Preferred Collection Type->Urine, Clean Catch   URINALYSIS MICROSCOPIC    Narrative:     Preferred Collection Type->Urine, Clean Catch          Imaging Results    None          Medical Decision Making:   Clinical Tests:   Lab Tests: Ordered and Reviewed                      Clinical Impression:       ICD-10-CM ICD-9-CM   1. Dizziness R42 780.4   2. Nausea R11.0 787.02         Disposition:   Disposition: Discharged  Condition: Stable                        Wendie Cohen MD  08/10/19 0146

## 2019-08-10 NOTE — ED NOTES
No s/s of distress noted. Pt visualized, respirations even and unlabored, side rails up x 2, bed locked and in low position. Call bell with in reach. Pt up to date of plan of care and all needs currently addressed and met.   Continuous cardiac monitoring in progress.

## 2019-08-10 NOTE — ED NOTES
"Medication dose provided on "Zofran IV"  Discussed dose, route, and monitoring of medications.  Discussed potential side-effects of medication.  Instruct to call with problems, needs, or concerns.  Pt verbalized understanding.   Will continue to monitor.    "

## 2019-09-25 NOTE — PROGRESS NOTES
"Subjective:    Patient ID:  Makenzie Haile is a 82 y.o. female who presents for follow-up of Coronary Artery Disease      HPI  81 y/o female with hx of CAD s/p NSTEMI with subsequent PCI with SURAJ to LCX, HTN, DM, GERD, obesity, mild AS (EF 55-60%, SERAFIN 1.48, PV 2.08, MG 10) who presents for f/u. Presented original with epigastric pain she thought was heartburn, presented to ED, had elevated trop 0.77, [facility] with LCX lesion s/p PCI with SURAJ, resolution of pain, and discharged home.   Has done well with no recurrent symptoms. Denies CP, SOB, orthopnea, PND, syncope, palps. Tolerating meds well. Non smoker, no EtOH. BP elevated today in clinic because she did not take her meds bc she "was running late."    Review of Systems   Constitution: Negative for weakness and malaise/fatigue.   HENT: Negative for congestion.    Eyes: Negative for blurred vision.   Cardiovascular: Positive for dyspnea on exertion. Negative for chest pain, claudication, cyanosis, irregular heartbeat, leg swelling, near-syncope, orthopnea, palpitations, paroxysmal nocturnal dyspnea and syncope.   Respiratory: Negative for shortness of breath.    Endocrine: Negative for polyuria.   Hematologic/Lymphatic: Negative for bleeding problem.   Skin: Negative for itching and rash.   Musculoskeletal: Negative for joint swelling, muscle cramps and muscle weakness.   Gastrointestinal: Negative for abdominal pain, hematemesis, hematochezia, melena, nausea and vomiting.   Genitourinary: Negative for dysuria and hematuria.   Neurological: Negative for dizziness, focal weakness, headaches, light-headedness and loss of balance.   Psychiatric/Behavioral: Negative for depression. The patient is not nervous/anxious.         Objective:    Physical Exam   Constitutional: She is oriented to person, place, and time. She appears well-developed and well-nourished.   HENT:   Head: Normocephalic and atraumatic.   Neck: Neck supple. No JVD present.   Cardiovascular: Normal rate " SPD arrived   and regular rhythm.   Murmur heard.   Harsh midsystolic murmur is present with a grade of 3/6 at the upper right sternal border radiating to the neck.  Pulses:       Carotid pulses are 2+ on the right side, and 2+ on the left side.       Radial pulses are 2+ on the right side, and 2+ on the left side.        Femoral pulses are 2+ on the right side, and 2+ on the left side.       Dorsalis pedis pulses are 2+ on the right side, and 2+ on the left side.        Posterior tibial pulses are 1+ on the right side, and 1+ on the left side.   Pulmonary/Chest: Effort normal and breath sounds normal.   Abdominal: Soft. Bowel sounds are normal.   Musculoskeletal: She exhibits no edema.   Neurological: She is alert and oriented to person, place, and time.   Skin: Skin is warm and dry.   Psychiatric: She has a normal mood and affect. Her behavior is normal. Thought content normal.         Assessment:       1. Coronary artery disease involving native coronary artery of native heart without angina pectoris    2. 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 Acute myocardial infarction involving left circumflex coronary artery, unspecified MI type    4. Nonrheumatic aortic valve stenosis    5. Essential hypertension    6. Type 2 diabetes mellitus without complication, without long-term current use of insulin    7. Gastroesophageal reflux disease, esophagitis presence not specified    8. Class 3 severe obesity due to excess calories with serious comorbidity and body mass index (BMI) of 40.0 to 44.9 in adult    9. Neuropathy      81 y/o pt with hx and presentation as above. Doing well from a cardiac perspective and compensated from a HF perspective. We discussed the importance of med compliance and optimal BP management. She states at home her SBP usually in the 130's. BP log and return in 1 month with cuff to compare. Will then enroll in digital HTN clinic.          Plan:       -BP log  -f/u in 1 month

## 2020-03-11 ENCOUNTER — OFFICE VISIT (OUTPATIENT)
Dept: CARDIOLOGY | Facility: CLINIC | Age: 85
End: 2020-03-11
Payer: COMMERCIAL

## 2020-03-11 VITALS
HEART RATE: 74 BPM | DIASTOLIC BLOOD PRESSURE: 72 MMHG | HEIGHT: 64 IN | SYSTOLIC BLOOD PRESSURE: 179 MMHG | OXYGEN SATURATION: 95 % | WEIGHT: 235.69 LBS | BODY MASS INDEX: 40.24 KG/M2

## 2020-03-11 DIAGNOSIS — I35.0 NONRHEUMATIC AORTIC VALVE STENOSIS: Chronic | ICD-10-CM

## 2020-03-11 DIAGNOSIS — I10 ESSENTIAL HYPERTENSION: Primary | Chronic | ICD-10-CM

## 2020-03-11 DIAGNOSIS — I25.10 CORONARY ARTERY DISEASE INVOLVING NATIVE CORONARY ARTERY OF NATIVE HEART WITHOUT ANGINA PECTORIS: ICD-10-CM

## 2020-03-11 DIAGNOSIS — E78.2 MIXED HYPERLIPIDEMIA: ICD-10-CM

## 2020-03-11 PROCEDURE — 3077F SYST BP >= 140 MM HG: CPT | Mod: CPTII,S$GLB,, | Performed by: INTERNAL MEDICINE

## 2020-03-11 PROCEDURE — 1101F PR PT FALLS ASSESS DOC 0-1 FALLS W/OUT INJ PAST YR: ICD-10-PCS | Mod: CPTII,S$GLB,, | Performed by: INTERNAL MEDICINE

## 2020-03-11 PROCEDURE — 99214 PR OFFICE/OUTPT VISIT, EST, LEVL IV, 30-39 MIN: ICD-10-PCS | Mod: S$GLB,,, | Performed by: INTERNAL MEDICINE

## 2020-03-11 PROCEDURE — 3078F DIAST BP <80 MM HG: CPT | Mod: CPTII,S$GLB,, | Performed by: INTERNAL MEDICINE

## 2020-03-11 PROCEDURE — 1159F PR MEDICATION LIST DOCUMENTED IN MEDICAL RECORD: ICD-10-PCS | Mod: S$GLB,,, | Performed by: INTERNAL MEDICINE

## 2020-03-11 PROCEDURE — 3077F PR MOST RECENT SYSTOLIC BLOOD PRESSURE >= 140 MM HG: ICD-10-PCS | Mod: CPTII,S$GLB,, | Performed by: INTERNAL MEDICINE

## 2020-03-11 PROCEDURE — 1126F AMNT PAIN NOTED NONE PRSNT: CPT | Mod: S$GLB,,, | Performed by: INTERNAL MEDICINE

## 2020-03-11 PROCEDURE — 1101F PT FALLS ASSESS-DOCD LE1/YR: CPT | Mod: CPTII,S$GLB,, | Performed by: INTERNAL MEDICINE

## 2020-03-11 PROCEDURE — 99214 OFFICE O/P EST MOD 30 MIN: CPT | Mod: S$GLB,,, | Performed by: INTERNAL MEDICINE

## 2020-03-11 PROCEDURE — 1159F MED LIST DOCD IN RCRD: CPT | Mod: S$GLB,,, | Performed by: INTERNAL MEDICINE

## 2020-03-11 PROCEDURE — 1126F PR PAIN SEVERITY QUANTIFIED, NO PAIN PRESENT: ICD-10-PCS | Mod: S$GLB,,, | Performed by: INTERNAL MEDICINE

## 2020-03-11 PROCEDURE — 3078F PR MOST RECENT DIASTOLIC BLOOD PRESSURE < 80 MM HG: ICD-10-PCS | Mod: CPTII,S$GLB,, | Performed by: INTERNAL MEDICINE

## 2020-03-11 RX ORDER — METHOCARBAMOL 750 MG/1
500 TABLET, FILM COATED ORAL 4 TIMES DAILY
COMMUNITY

## 2020-03-11 RX ORDER — CARVEDILOL 6.25 MG/1
6.25 TABLET ORAL 2 TIMES DAILY WITH MEALS
Qty: 60 TABLET | Refills: 11 | Status: SHIPPED | OUTPATIENT
Start: 2020-03-11 | End: 2020-03-11

## 2020-03-11 RX ORDER — FUROSEMIDE 20 MG/1
20 TABLET ORAL 2 TIMES DAILY
COMMUNITY

## 2020-03-11 RX ORDER — NIFEDIPINE 60 MG/1
60 TABLET, EXTENDED RELEASE ORAL DAILY
Qty: 30 TABLET | Refills: 11 | Status: SHIPPED | OUTPATIENT
Start: 2020-03-11 | End: 2023-05-25

## 2020-03-11 RX ORDER — NITROGLYCERIN 0.4 MG/1
0.4 TABLET SUBLINGUAL EVERY 5 MIN PRN
COMMUNITY

## 2020-03-11 RX ORDER — CARVEDILOL 3.12 MG/1
3.12 TABLET ORAL 2 TIMES DAILY WITH MEALS
Qty: 60 TABLET | Refills: 11 | Status: SHIPPED | OUTPATIENT
Start: 2020-03-11 | End: 2023-05-25

## 2020-03-11 RX ORDER — ALPRAZOLAM 0.25 MG/1
TABLET ORAL 3 TIMES DAILY
COMMUNITY

## 2020-03-11 NOTE — PATIENT INSTRUCTIONS
Established High Blood Pressure    High blood pressure (hypertension) is a chronic disease. Often, healthcare providers dont know what causes it. But it can be caused by certain health conditions and medicines.  If you have high blood pressure, you may not have any symptoms. If you do have symptoms, they may include headache, dizziness, changes in your vision, chest pain, and shortness of breath. But even without symptoms, high blood pressure thats not treated raises your risk for heart attack and stroke. High blood pressure is a serious health risk and shouldnt be ignored.  A blood pressure reading is made up of two numbers: a higher number over a lower number. The top number is the systolic pressure. The bottom number is the diastolic pressure. A normal blood pressure is a systolic pressure of  less than 120 over a diastolic pressure of less than 80. You will see your blood pressure readings written together. For example, a person with a systolic pressure of 188 and a diastolic pressure of 78 will have 118/78 written in the medical record.  High blood pressure is when either the top number is 140 or higher, or the bottom number is 90 or higher. This must be the result when taking your blood pressure a number of times. The blood pressures between normal and high are called prehypertension.  Home care  If you have high blood pressure, you should do what is listed below to lower your blood pressure. If you are taking medicines for high blood pressure, these methods may reduce or end your need for medicines in the future.  · Begin a weight-loss program if you are overweight.  · Cut back on how much salt you get in your diet. Heres how to do this:  ¨ Dont eat foods that have a lot of salt. These include olives, pickles, smoked meats, and salted potato chips.  ¨ Dont add salt to your food at the table.  ¨ Use only small amounts of salt when cooking.  · Start an exercise program. Talk with your healthcare  provider about the type of exercise program that would be best for you. It doesn't have to be hard. Even brisk walking for 20 minutes 3 times a week is a good form of exercise.  · Dont take medicines that stimulate the heart. This includes many over-the-counter cold and sinus decongestant pills and sprays, as well as diet pills. Check the warnings about hypertension on the label. Before buying any over-the-counter medicines or supplements, always ask the pharmacist about the product's potential interaction with your high blood pressure and your high blood pressure medicines.  · Stimulants such as amphetamine or cocaine could be deadly for someone with high blood pressure. Never take these.  · Limit how much caffeine you get in your diet. Switch to caffeine-free products.  · Stop smoking. If you are a long-time smoker, this can be hard. Talk to your healthcare provider about medicines and nicotine replacement options to help you. Also, enroll in a stop-smoking program to make it more likely that you will quit for good.  · Learn how to handle stress. This is an important part of any program to lower blood pressure. Learn about relaxation methods like meditation, yoga, or biofeedback.  · If your provider prescribed medicines, take them exactly as directed. Missing doses may cause your blood pressure get out of control.  · If you miss a dose or doses, check with your healthcare provider or pharmacist about what to do.  · Consider buying an automatic blood pressure machine. Ask your provider for a recommendation. You can get one of these at most pharmacies.     The American Heart Association recommends the following guidelines for home blood pressure monitoring:  · Don't smoke or drink coffee for 30 minutes before taking your blood pressure.  · Go to the bathroom before the test.  · Relax for 5 minutes before taking the measurement.  · Sit with your back supported (don't sit on a couch or soft chair); keep your feet on  the floor uncrossed. Place your arm on a solid flat surface (like a table) with the upper part of the arm at heart level. Place the middle of the cuff directly above the eye of the elbow. Check the monitor's instruction manual for an illustration.  · Take multiple readings. When you measure, take 2 to 3 readings one minute apart and record all of the results.  · Take your blood pressure at the same time every day, or as your healthcare provider recommends.  · Record the date, time, and blood pressure reading.  · Take the record with you to your next medical appointment. If your blood pressure monitor has a built-in memory, simply take the monitor with you to your next appointment.  · Call your provider if you have several high readings. Don't be frightened by a single high blood pressure reading, but if you get several high readings, check in with your healthcare provider.  · Note: When blood pressure reaches a systolic (top number) of 180 or higher OR diastolic (bottom number) of 110 or higher, seek emergency medical treatment.  Follow-up care  You will need to see your healthcare provider regularly. This is to check your blood pressure and to make changes to your medicines. Make a follow-up appointment as directed. Bring the record of your home blood pressure readings to the appointment.  When to seek medical advice  Call your healthcare provider right away if any of these occur:  · Blood pressure reaches a systolic (upper number) of 180 or higher OR a diastolic (bottom number) of 110 or higher  · Chest pain or shortness of breath  · Severe headache  · Throbbing or rushing sound in the ears  · Nosebleed  · Sudden severe pain in your belly (abdomen)  · Extreme drowsiness, confusion, or fainting  · Dizziness or spinning sensation (vertigo)  · Weakness of an arm or leg or one side of the face  · You have problems speaking or seeing   Date Last Reviewed: 12/1/2016  © 4508-8057 [employer]. [address] [place] [address]  [place] [address]. All rights reserved. This information is not intended as a substitute for professional medical care. Always follow your healthcare professional's instructions.          Understanding Coronary Artery Disease (CAD)    To understand coronary artery disease (CAD), you need to know how your heart works. Your heart is a muscle that pumps blood throughout your body. To work right, your heart needs a steady supply of oxygen. It gets this oxygen from blood supplied by the coronary arteries.     Healthy artery   Healthy artery. When a coronary artery is healthy and has no blockages, blood flows through easily. Healthy arteries can easily supply the oxygen-rich blood your heart needs.     Damaged artery   Damaged artery. Coronary artery disease begins when damage to the artery lining leads to the buildup of fat-like substances and cholesterol along the artery wall. This is called plaque. This damage could be caused by things like high blood pressure or smoking. This plaque buildup begins to narrow the arteries carrying blood to the heart. This is called atherosclerosis,     Narrowed artery   Narrowed artery. As more plaque builds up, your artery has trouble supplying blood to your heart muscle when it needs it most, such as during exercise. You may not feel any symptoms when this happens. Or you may feel angina--pressure, tightness, achiness, or pain in your chest, jaw, neck, back, or arm.     Blocked artery   Blocked artery. A piece of plaque may break off and completely block the artery. Or a blood clot may plug the narrowed artery. When this happens, blood flow is blocked from reaching the heart. Without oxygen-rich blood, part of the heart muscle becomes damaged and stops working. You may feel crushing pressure or pain in or around your chest. This is a heart attack (acute myocardial infarction, or AMI) and is a medical emergency.     Date Last Reviewed: 3/28/2016  © 7704-3801 [employer]. [address] [place] [address], [place] [address]. All rights reserved. This information is not intended as a substitute for professional medical care. Always follow your healthcare professional's instructions.          Eating Heart-Healthy Foods  Eating has a big impact on your heart health. In fact, eating healthier can improve several of your heart risks at once. For instance, it helps you manage weight, cholesterol, and blood pressure. Here are ideas to help you make heart-healthy changes without giving up all the foods and flavors you love.  Getting started  · Talk with your health care provider about eating plans, such as the DASH or Mediterranean diet. You may also be referred to a dietitian.  · Change a few things at a time. Give yourself time to get used to a few eating changes before adding more.  · Work to create a tasty, healthy eating plan that you can stick to for the rest of your life.    Goals for healthy eating  Below are some tips to improve your eating habits:  · Limit saturated fats and trans fats. Saturated fats raise your levels of cholesterol, so keep these fats to a minimum. They are found in foods such as fatty meats, whole milk, cheese, and palm and coconut oils. Avoid trans fats because they lower good cholesterol as well as raise bad cholesterol. Trans fats are most often found in processed foods.  · Reduce sodium (salt) intake. Eating too much salt may increase your blood pressure. Limit your sodium intake to 2,300 milligrams (mg) per day, or less if your health care provider recommends it. Dining out less often and eating fewer processed foods are two great ways to decrease the amount of salt you consume.  · Managing calories. A calorie is a unit of energy. Your body burns calories for fuel, but if you eat more calories than your body burns, the extras are stored as fat. Your health care provider can help you create a diet plan to manage your calories. This will likely include  eating healthier foods as well as exercising regularly. To help you track your progress, keep a diary to record what you eat and how often you exercise.  Choose the right foods  Aim to make these foods staples of your diet. If you have diabetes, you may have different recommendations than what is listed here:  · Fruits and vegetable provide plenty of nutrients without a lot of calories. At meals, fill half your plate with these foods. Split the other half of your plate between whole grains and lean protein.  · Whole grains are high in fiber and rich in vitamins and nutrients. Good choices include whole-wheat bread, pasta, and brown rice.  · Lean proteins give you nutrition with less fat. Good choices include fish, skinless chicken, and beans.  · Low-fat or nonfat dairy provides nutrients without a lot of fat. Try low-fat or nonfat milk, cheese, or yogurt.  · Healthy fats can be good for you in small amounts. These are unsaturated fats, such as olive oil, nuts, and fish. Try to have at least 2 servings per week of fatty fish such as salmon, sardines, mackerel, rainbow trout, and albacore tuna. These contain omega-3 fatty acids, which are good for your heart. Flaxseed is another source of a heart-healthy fat.  More on heart healthy eating    Read food labels  Healthy eating starts at the grocery store. Be sure to pay attention to food labels on packaged foods. Look for products that are high in fiber and protein, and low in saturated fat, cholesterol, and sodium. Avoid products that contain trans fat. And pay close attention to serving size. For instance, if you plan to eat two servings, double all the numbers on the label.  Prepare food right  A key part of healthy cooking is cutting down on added fat and salt. Look on the internet for lower-fat, lower-sodium recipes. Also, try these tips:  · Remove fat from meat and skin from poultry before cooking.  · Skim fat from the surface of soups and sauces.  · Broilfidencioil,  bake, steam, grill, and microwave food without added fats.  · Choose ingredients that spice up your food without adding calories, fat, or sodium. Try these items: horseradish, hot sauce, lemon, mustard, nonfat salad dressings, and vinegar. For salt-free herbs and spices, try basil, cilantro, cinnamon, pepper, and rosemary.  Date Last Reviewed: 6/25/2015 © 2000-2017 [employer]. [address] [place] [address], [place] [address]. All rights reserved. This information is not intended as a substitute for professional medical care. Always follow your healthcare professional's instructions.          Aerobic Exercise for a Healthy Heart  Exercise is a lot more than an energy booster and a stress reliever. It also strengthens your heart muscle, lowers your blood pressure and cholesterol, and burns calories. It can also improve your resting muscle tone, and your mood.     Remember, some activity is better than none.    Choose an aerobic activity  Choose an activity that makes your heart and lungs work harder than they do when you rest or walk normally. This aerobic exercise can improve the way your heart and other muscles use oxygen. Make it fun by exercising with a friend and choosing an activity you enjoy. Here are some ideas:  · Walking  · Swimming  · Bicycling  · Stair climbing  · Dancing  · Jogging  · Gardening  Exercise regularly  If you havent been exercising regularly,  get your doctors OK first. Then start slowly.  Here are some tips:  · Begin exercising 3 times a week for 5 to 10 minutes at a time.  · When you feel comfortable, add a few minutes each session.  · Slowly build up to exercising 3 to 4 times each week. Each session should last for 40 minutes, on average, and involve moderate- to vigorous-intensity physical activity.  · If you have been given nitroglycerin, be sure to carry it when you exercise.  · If you get chest pain (angina) when youre exercising, stop what youre doing, take your  nitroglycerin, and call your doctor.  Date Last Reviewed: 6/2/2016  © 4515-5498 [employer]. [address] [place] [address], [place] [address]. All rights reserved. This information is not intended as a substitute for professional medical care. Always follow your healthcare professional's instructions.

## 2020-03-11 NOTE — PROGRESS NOTES
Subjective:   @Patient ID:  Makenzie Haile is a 84 y.o. female who presents for follow-up of Follow-up  CAD     HPI:     Patient is here for follow up  She stated that she is doing ok.  Some days she feels tired and fatigue and some days she is energetic.   No chest pina, no palpitation  BP is elevated today. Some element of white coat.     She is independent in daily activities  Unsure about her medication list.     CAD s/p NSTEMI with subsequent PCI with SURAJ to LCX, HTN, DM, GERD, obesity, mild AS (EF 55-60%, SERAFIN 1.48, PV 2.08, MG 10) Presented initially with epigastric pain she thought was heartburn, LHC with LCX lesion s/p PCI with SURAJ, resolution of pain,  GIB with PRBC transfusion. Mild AS    Prior cardiovascular  Hx  --------------------------------    - Heart Catheterization  3/2018 Proximal LCX: Stent [facility] Rx 3.5 X 18 (SURAJ) and [facility] Rx 3.5 X 15.       · ECHO 2/2019 Concentric left ventricular remodeling.  · Normal left ventricular systolic function. The estimated ejection fraction is 55%  · Grade I (mild) left ventricular diastolic dysfunction consistent with impaired relaxation.  · Normal right ventricular systolic function.  · Normal left atrial pressure.  · Moderate left atrial enlargement.  · Mild aortic valve stenosis.  · Aortic valve area is 1.86 cm2; peak velocity is 2.08 m/s; mean gradient is 11.31 mmHg.  Normal central venous pressure (3 mm Hg).             Patient Active Problem List    Diagnosis Date Noted    Physical deconditioning     Diverticulosis of large intestine with hemorrhage 02/19/2019    Gastrointestinal hemorrhage with melena 02/17/2019    RANDELL (acute kidney injury)     Symptomatic anemia 02/16/2019    Bradycardia 02/16/2019    Overflow incontinence of urine 02/16/2019    Acute blood loss anemia     Acute upper GI bleed     Normocytic anemia 02/13/2019    HLD (hyperlipidemia) 02/13/2019    Skin breakdown 02/13/2019     On buttocks      History of GI  bleed 02/13/2019    Iron deficiency anemia due to chronic blood loss 02/13/2019    Coronary artery disease involving native coronary artery of native heart without angina pectoris 04/19/2018    Neuropathy 03/27/2018    Acute myocardial infarction involving left circumflex coronary artery 03/19/2018    GERD (gastroesophageal reflux disease) 03/19/2018    Type 2 diabetes mellitus without complication, without long-term current use of insulin 03/19/2018    Essential hypertension 03/19/2018    Aortic stenosis 03/19/2018    Class 3 severe obesity due to excess calories with serious comorbidity and body mass index (BMI) of 40.0 to 44.9 in adult 03/19/2018    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 03/19/2018                    LAST HbA1c  Lab Results   Component Value Date    HGBA1C 5.7 (H) 02/13/2019       Lipid panel  Lab Results   Component Value Date    CHOL 150 03/19/2018     Lab Results   Component Value Date    HDL 34 (L) 03/19/2018     Lab Results   Component Value Date    LDLCALC 96.2 03/19/2018     Lab Results   Component Value Date    TRIG 99 03/19/2018     Lab Results   Component Value Date    CHOLHDL 22.7 03/19/2018            Review of Systems   Constitution: Positive for malaise/fatigue. Negative for chills and fever.   HENT: Negative for hearing loss and nosebleeds.    Eyes: Negative for blurred vision.   Cardiovascular: Negative for chest pain and palpitations.   Respiratory: Negative for hemoptysis and shortness of breath.    Hematologic/Lymphatic: Negative for bleeding problem.   Skin: Negative for itching.   Musculoskeletal: Negative for falls.   Gastrointestinal: Negative for abdominal pain and hematochezia.   Genitourinary: Negative for hematuria.   Neurological: Negative for dizziness and loss of balance.   Psychiatric/Behavioral: Negative for altered mental status and depression.       Objective:   Physical Exam   Constitutional: She is oriented to person, place,  and time. She appears well-developed and well-nourished.   HENT:   Head: Normocephalic and atraumatic.   Eyes: Conjunctivae are normal.   Neck: Neck supple. Carotid bruit is not present.   Cardiovascular: Normal rate and regular rhythm. Exam reveals no gallop and no friction rub.   Murmur (grade II systolic) heard.  Pulmonary/Chest: Effort normal and breath sounds normal. No stridor. No respiratory distress. She has no wheezes.   Musculoskeletal: She exhibits edema (trace to 1+).   Neurological: She is alert and oriented to person, place, and time.   Skin: Skin is warm and dry.   Psychiatric: She has a normal mood and affect. Her behavior is normal.       Assessment:     1. Essential hypertension    2. Nonrheumatic aortic valve stenosis    3. Coronary artery disease involving native coronary artery of native heart without angina pectoris    4. Mixed hyperlipidemia        Plan:     . Stable CAD.  . Continue ASA/Statin  . D/C amlodipine and start Nifedipine 60 mg.   . Come back next week for BP check calibration  . Update medication list.   . Low salt diet and heart healthy diet     Continue with current medical plan and lifestyle changes.  Return sooner for concerns or questions. If symptoms persist go to the ED  I have reviewed all pertinent data including patient's medical history in detail and updated the computerized patient record.     No orders of the defined types were placed in this encounter.      Follow up as scheduled.     She expressed verbal understanding and agreed with the plan    Patient's Medications   New Prescriptions    CARVEDILOL (COREG) 6.25 MG TABLET    Take 1 tablet (6.25 mg total) by mouth 2 (two) times daily with meals.   Previous Medications    AMLODIPINE (NORVASC) 5 MG TABLET    Take 1 tablet (5 mg total) by mouth once daily.    ASPIRIN (ECOTRIN) 81 MG EC TABLET    Take 1 tablet (81 mg total) by mouth once daily.    ATORVASTATIN (LIPITOR) 40 MG TABLET    Take 1 tablet (40 mg total) by  mouth once daily.    FERROUS SULFATE (FEOSOL) 325 MG (65 MG IRON) TAB TABLET    Take 1 tablet (325 mg total) by mouth daily with breakfast.    FOLIC ACID/MULTIVIT-MIN/LUTEIN (CENTRUM SILVER ORAL)    Take by mouth.    INCONTINENCE PAD,LINER,DISP (BLADDER CONTROL PADS EX ABSORB MISC)    bladder control pads    LANCETS (ACCU-CHEK FASTCLIX MISC)    Accu-Chek FastClix    LISINOPRIL (PRINIVIL,ZESTRIL) 20 MG TABLET    Take 2 tablets (40 mg total) by mouth once daily.    MECLIZINE (ANTIVERT) 25 MG TABLET    Take 1 tablet (25 mg total) by mouth 3 (three) times daily as needed.    METFORMIN (GLUCOPHAGE) 1000 MG TABLET    Take 1 tablet (1,000 mg total) by mouth 2 (two) times daily with meals.    ONDANSETRON (ZOFRAN-ODT) 4 MG TBDL    Take 1 tablet (4 mg total) by mouth every 6 (six) hours as needed.    OXYBUTYNIN (DITROPAN XL) 15 MG TR24    Take 1 tablet (15 mg total) by mouth once daily.    SITAGLIPTIN (JANUVIA) 100 MG TAB    Take 100 mg by mouth once daily.   Modified Medications    No medications on file   Discontinued Medications    No medications on file

## 2020-06-25 NOTE — TELEPHONE ENCOUNTER
Spoke to patient and per Dr. Donohue stated that she can discontinue taking the losartan. She is to continue taking the metoprolol. Patient will call us back if the urinating and light headedness continues.    no

## 2020-06-30 NOTE — NURSING
A-line has been difficult to flush with dampened waveform. Dr. Ortega gave the OK to remove A-line.   
Dr. RAY Burgess on bedside giving patient and daughter discharge instructions.   
Informed Dr. Gandhi that pt is currently on mechanical diet, but pt has no problem swallowing.  Referred that pt was admitted recently, she was on cardiac/dm diet.  MD will review the chart and change the diet.  
Jessy very positional.Had an arm board on her wrist and appeard to make the BP higher. Notified DR. Burgess. They  Will order additional dose of Hydralizine and double her routine dose   
Notified DR. Burgess of A.line pressures better than cuff pressures.MDaware and weare relying on the A line pressures  
Pt  Transported to the room from ED via stretcher. Family at bedside. Pt oriented to staff and the room.Pt familiar to staff. Fall precautions discussed and implimented. Bed alarm and call light within reach.  
Pt IV and tele removed. Pt discharge orders gone over with VN. Transport called for discharge,   
Pt taken to get EGD done  
Received patient upon rounds at [facility], patient seen in bed on semi-tam's position,  conscious , coherent to time, date, place, person and situation, very pleasant lady. GCS 15, No subjective complaint of any pain.With Saline lock on the left AC gauge 20, and left hand gauge 20 flushed patent, with clean  and dry dressing. Golytely consumed 3500ml. Bowel output clearing from dark red to dark brown. Blood pressure on the high side asymptomatic,  MD informed, started hydralazine 25 mg PO.  Advised patient to call for any assistance. CAll bell within reach. Bed alarm ON. Telemetry sinus bradycardic 50's. Oxygen saturation 98% on room air, SCD ON. Safety fall precaution maintained.Will continue to monitor patient.         
Spoke to Dr. Gandhi about pt is having fever, temperature 101.4.  Informed that pt is supposed to have a second unit of blood, the first unit ended 2250.  Asked if MD wants pt to have tylenol and benadryl, then have the second unit of blood or hold it.  MD stated he will order both tylenol and benadrly, then hold the blood.  
syncope

## 2021-02-17 ENCOUNTER — TELEPHONE (OUTPATIENT)
Dept: CARDIOLOGY | Facility: CLINIC | Age: 86
End: 2021-02-17

## 2021-02-18 ENCOUNTER — OFFICE VISIT (OUTPATIENT)
Dept: CARDIOLOGY | Facility: CLINIC | Age: 86
End: 2021-02-18
Payer: COMMERCIAL

## 2021-02-18 VITALS
BODY MASS INDEX: 42.03 KG/M2 | SYSTOLIC BLOOD PRESSURE: 151 MMHG | WEIGHT: 246.19 LBS | DIASTOLIC BLOOD PRESSURE: 70 MMHG | OXYGEN SATURATION: 96 % | HEART RATE: 62 BPM | HEIGHT: 64 IN

## 2021-02-18 DIAGNOSIS — Z95.5 HISTORY OF CORONARY ARTERY STENT PLACEMENT: ICD-10-CM

## 2021-02-18 DIAGNOSIS — E66.01 CLASS 3 SEVERE OBESITY DUE TO EXCESS CALORIES WITH SERIOUS COMORBIDITY AND BODY MASS INDEX (BMI) OF 40.0 TO 44.9 IN ADULT: ICD-10-CM

## 2021-02-18 DIAGNOSIS — D64.9 NORMOCYTIC ANEMIA: ICD-10-CM

## 2021-02-18 DIAGNOSIS — I10 ESSENTIAL HYPERTENSION: Chronic | ICD-10-CM

## 2021-02-18 DIAGNOSIS — R07.9 CHEST PAIN, UNSPECIFIED TYPE: ICD-10-CM

## 2021-02-18 DIAGNOSIS — E11.9 TYPE 2 DIABETES MELLITUS WITHOUT COMPLICATION, WITHOUT LONG-TERM CURRENT USE OF INSULIN: ICD-10-CM

## 2021-02-18 DIAGNOSIS — K21.9 GASTROESOPHAGEAL REFLUX DISEASE, UNSPECIFIED WHETHER ESOPHAGITIS PRESENT: ICD-10-CM

## 2021-02-18 DIAGNOSIS — R53.81 PHYSICAL DECONDITIONING: ICD-10-CM

## 2021-02-18 DIAGNOSIS — D50.0 IRON DEFICIENCY ANEMIA DUE TO CHRONIC BLOOD LOSS: ICD-10-CM

## 2021-02-18 DIAGNOSIS — I25.10 CORONARY ARTERY DISEASE INVOLVING NATIVE CORONARY ARTERY OF NATIVE HEART WITHOUT ANGINA PECTORIS: Primary | ICD-10-CM

## 2021-02-18 DIAGNOSIS — D62 ACUTE BLOOD LOSS ANEMIA: ICD-10-CM

## 2021-02-18 DIAGNOSIS — E78.2 MIXED HYPERLIPIDEMIA: ICD-10-CM

## 2021-02-18 DIAGNOSIS — I35.0 NONRHEUMATIC AORTIC VALVE STENOSIS: Chronic | ICD-10-CM

## 2021-02-18 DIAGNOSIS — N17.9 AKI (ACUTE KIDNEY INJURY): ICD-10-CM

## 2021-02-18 PROCEDURE — 1159F MED LIST DOCD IN RCRD: CPT | Mod: S$GLB,,, | Performed by: INTERNAL MEDICINE

## 2021-02-18 PROCEDURE — 1100F PTFALLS ASSESS-DOCD GE2>/YR: CPT | Mod: CPTII,S$GLB,, | Performed by: INTERNAL MEDICINE

## 2021-02-18 PROCEDURE — 3077F PR MOST RECENT SYSTOLIC BLOOD PRESSURE >= 140 MM HG: ICD-10-PCS | Mod: CPTII,S$GLB,, | Performed by: INTERNAL MEDICINE

## 2021-02-18 PROCEDURE — 99999 PR PBB SHADOW E&M-EST. PATIENT-LVL IV: CPT | Mod: PBBFAC,,, | Performed by: INTERNAL MEDICINE

## 2021-02-18 PROCEDURE — 1126F AMNT PAIN NOTED NONE PRSNT: CPT | Mod: S$GLB,,, | Performed by: INTERNAL MEDICINE

## 2021-02-18 PROCEDURE — 3288F FALL RISK ASSESSMENT DOCD: CPT | Mod: CPTII,S$GLB,, | Performed by: INTERNAL MEDICINE

## 2021-02-18 PROCEDURE — 3078F PR MOST RECENT DIASTOLIC BLOOD PRESSURE < 80 MM HG: ICD-10-PCS | Mod: CPTII,S$GLB,, | Performed by: INTERNAL MEDICINE

## 2021-02-18 PROCEDURE — 3077F SYST BP >= 140 MM HG: CPT | Mod: CPTII,S$GLB,, | Performed by: INTERNAL MEDICINE

## 2021-02-18 PROCEDURE — 1126F PR PAIN SEVERITY QUANTIFIED, NO PAIN PRESENT: ICD-10-PCS | Mod: S$GLB,,, | Performed by: INTERNAL MEDICINE

## 2021-02-18 PROCEDURE — 99215 OFFICE O/P EST HI 40 MIN: CPT | Mod: S$GLB,,, | Performed by: INTERNAL MEDICINE

## 2021-02-18 PROCEDURE — 3078F DIAST BP <80 MM HG: CPT | Mod: CPTII,S$GLB,, | Performed by: INTERNAL MEDICINE

## 2021-02-18 PROCEDURE — 1159F PR MEDICATION LIST DOCUMENTED IN MEDICAL RECORD: ICD-10-PCS | Mod: S$GLB,,, | Performed by: INTERNAL MEDICINE

## 2021-02-18 PROCEDURE — 99215 PR OFFICE/OUTPT VISIT, EST, LEVL V, 40-54 MIN: ICD-10-PCS | Mod: S$GLB,,, | Performed by: INTERNAL MEDICINE

## 2021-02-18 PROCEDURE — 1100F PR PT FALLS ASSESS DOC 2+ FALLS/FALL W/INJURY/YR: ICD-10-PCS | Mod: CPTII,S$GLB,, | Performed by: INTERNAL MEDICINE

## 2021-02-18 PROCEDURE — 3288F PR FALLS RISK ASSESSMENT DOCUMENTED: ICD-10-PCS | Mod: CPTII,S$GLB,, | Performed by: INTERNAL MEDICINE

## 2021-02-18 PROCEDURE — 99999 PR PBB SHADOW E&M-EST. PATIENT-LVL IV: ICD-10-PCS | Mod: PBBFAC,,, | Performed by: INTERNAL MEDICINE

## 2021-02-18 RX ORDER — IBUPROFEN 100 MG/5ML
1000 SUSPENSION, ORAL (FINAL DOSE FORM) ORAL DAILY
COMMUNITY

## 2021-02-18 RX ORDER — LOSARTAN POTASSIUM 100 MG/1
100 TABLET ORAL DAILY
COMMUNITY

## 2021-02-18 RX ORDER — ZINC GLUCONATE 50 MG
50 TABLET ORAL DAILY
COMMUNITY

## 2021-03-03 ENCOUNTER — HOSPITAL ENCOUNTER (OUTPATIENT)
Dept: CARDIOLOGY | Facility: HOSPITAL | Age: 86
Discharge: HOME | End: 2021-03-03
Attending: INTERNAL MEDICINE
Payer: COMMERCIAL

## 2021-03-03 ENCOUNTER — HOSPITAL ENCOUNTER (OUTPATIENT)
Dept: RADIOLOGY | Facility: HOSPITAL | Age: 86
Discharge: HOME | End: 2021-03-03
Attending: INTERNAL MEDICINE
Payer: COMMERCIAL

## 2021-03-03 VITALS — HEIGHT: 64 IN | BODY MASS INDEX: 40.12 KG/M2 | WEIGHT: 235 LBS

## 2021-03-03 DIAGNOSIS — I25.10 CORONARY ARTERY DISEASE INVOLVING NATIVE CORONARY ARTERY OF NATIVE HEART WITHOUT ANGINA PECTORIS: ICD-10-CM

## 2021-03-03 DIAGNOSIS — R07.9 CHEST PAIN, UNSPECIFIED TYPE: ICD-10-CM

## 2021-03-03 LAB
AORTIC ROOT ANNULUS: 2.9 CM
AORTIC VALVE CUSP SEPERATION: 2 CM
AV INDEX (PROSTH): 0.58
AV MEAN GRADIENT: 8 MMHG
AV PEAK GRADIENT: 12 MMHG
AV REGURGITATION PRESSURE HALF TIME: 966 MS
AV VALVE AREA: 1.82 CM2
AV VELOCITY RATIO: 0.59
BSA FOR ECHO PROCEDURE: 2.19 M2
CV ECHO LV RWT: 0.57 CM
CV PHARM DOSE: 0 MG
CV STRESS BASE HR: 74 BPM
DIASTOLIC BLOOD PRESSURE: 86 MMHG
DOP CALC AO PEAK VEL: 1.7 M/S
DOP CALC AO VTI: 46.6 CM
DOP CALC LVOT AREA: 3.1 CM2
DOP CALC LVOT DIAMETER: 2 CM
DOP CALC LVOT PEAK VEL: 1 M/S
DOP CALC LVOT STROKE VOLUME: 84.78 CM3
DOP CALC MV VTI: 49 CM
DOP CALCLVOT PEAK VEL VTI: 27 CM
E WAVE DECELERATION TIME: 392 MSEC
E/A RATIO: 0.73
E/E' RATIO: 31.43 M/S
ECHO LV POSTERIOR WALL: 1.2 CM (ref 0.6–1.1)
FRACTIONAL SHORTENING: 31 % (ref 28–44)
INTERVENTRICULAR SEPTUM: 1.3 CM (ref 0.6–1.1)
LA MAJOR: 5.3 CM
LA MINOR: 5.4 CM
LA WIDTH: 4 CM
LEFT ATRIUM SIZE: 3.7 CM
LEFT ATRIUM VOLUME INDEX: 32.2 ML/M2
LEFT ATRIUM VOLUME: 67.3 CM3
LEFT INTERNAL DIMENSION IN SYSTOLE: 2.9 CM (ref 2.1–4)
LEFT VENTRICLE DIASTOLIC VOLUME INDEX: 38.76 ML/M2
LEFT VENTRICLE DIASTOLIC VOLUME: 81 ML
LEFT VENTRICLE MASS INDEX: 91 G/M2
LEFT VENTRICLE SYSTOLIC VOLUME INDEX: 15.8 ML/M2
LEFT VENTRICLE SYSTOLIC VOLUME: 33 ML
LEFT VENTRICULAR INTERNAL DIMENSION IN DIASTOLE: 4.2 CM (ref 3.5–6)
LEFT VENTRICULAR MASS: 189.19 G
LV LATERAL E/E' RATIO: 27.5 M/S
LV SEPTAL E/E' RATIO: 36.67 M/S
LVOT MG: 2.38 MMHG
LVOT MV: 0.73 CM/S
MV A" WAVE DURATION": 120 MSEC
MV MEAN GRADIENT: 4 MMHG
MV PEAK A VEL: 1.5 M/S
MV PEAK E VEL: 1.1 M/S
MV PEAK GRADIENT: 13 MMHG
MV STENOSIS PRESSURE HALF TIME: 99 MS
MV VALVE AREA BY CONTINUITY EQUATION: 1.73 CM2
MV VALVE AREA P 1/2 METHOD: 2.22 CM2
OHS CV CPX 1 MINUTE RECOVERY HEART RATE: 85 BPM
OHS CV CPX 85 PERCENT MAX PREDICTED HEART RATE MALE: 113
OHS CV CPX ESTIMATED METS: 1
OHS CV CPX MAX PREDICTED HEART RATE: 133
OHS CV CPX PATIENT IS FEMALE: 1
OHS CV CPX PATIENT IS MALE: 0
OHS CV CPX PEAK DIASTOLIC BLOOD PRESSURE: 74 MMHG
OHS CV CPX PEAK HEAR RATE: 95 BPM
OHS CV CPX PEAK RATE PRESSURE PRODUCT: NORMAL
OHS CV CPX PEAK SYSTOLIC BLOOD PRESSURE: 192 MMHG
OHS CV CPX PERCENT MAX PREDICTED HEART RATE ACHIEVED: 71
OHS CV CPX RATE PRESSURE PRODUCT PRESENTING: NORMAL
PISA TR MAX VEL: 2.76 M/S
PV MEAN GRADIENT: 2 MMHG
PV MV: 0.68 M/S
PV PEAK VELOCITY: 1 CM/S
RA MAJOR: 4.5 CM
RA PRESSURE: 3 MMHG
RA WIDTH: 3.6 CM
RIGHT VENTRICULAR END-DIASTOLIC DIMENSION: 2.7 CM
SINUS: 3 CM
SYSTOLIC BLOOD PRESSURE: 161 MMHG
TDI LATERAL: 0.04 M/S
TDI SEPTAL: 0.03 M/S
TDI: 0.04 M/S
TR MAX PG: 30 MMHG
TV REST PULMONARY ARTERY PRESSURE: 33 MMHG

## 2021-03-03 PROCEDURE — 93018 NUCLEAR STRESS TEST (CUPID ONLY): ICD-10-PCS | Mod: ,,, | Performed by: INTERNAL MEDICINE

## 2021-03-03 PROCEDURE — 93017 CV STRESS TEST TRACING ONLY: CPT | Mod: PO

## 2021-03-03 PROCEDURE — 93018 CV STRESS TEST I&R ONLY: CPT | Mod: ,,, | Performed by: INTERNAL MEDICINE

## 2021-03-03 PROCEDURE — 93016 NUCLEAR STRESS TEST (CUPID ONLY): ICD-10-PCS | Mod: ,,, | Performed by: INTERNAL MEDICINE

## 2021-03-03 PROCEDURE — 78452 HT MUSCLE IMAGE SPECT MULT: CPT | Mod: TC,PO

## 2021-03-03 PROCEDURE — A9502 TC99M TETROFOSMIN: HCPCS | Mod: PO

## 2021-03-03 PROCEDURE — 63600175 PHARM REV CODE 636 W HCPCS: Mod: PO | Performed by: INTERNAL MEDICINE

## 2021-03-03 PROCEDURE — 93016 CV STRESS TEST SUPVJ ONLY: CPT | Mod: ,,, | Performed by: INTERNAL MEDICINE

## 2021-03-03 PROCEDURE — 93306 TTE W/DOPPLER COMPLETE: CPT | Mod: 26,,, | Performed by: INTERNAL MEDICINE

## 2021-03-03 PROCEDURE — 93306 ECHO (CUPID ONLY): ICD-10-PCS | Mod: 26,,, | Performed by: INTERNAL MEDICINE

## 2021-03-03 PROCEDURE — 93306 TTE W/DOPPLER COMPLETE: CPT | Mod: PO

## 2021-03-03 RX ORDER — REGADENOSON 0.08 MG/ML
0.4 INJECTION, SOLUTION INTRAVENOUS ONCE
Status: COMPLETED | OUTPATIENT
Start: 2021-03-03 | End: 2021-03-03

## 2021-03-03 RX ADMIN — REGADENOSON 0.4 MG: 0.08 INJECTION, SOLUTION INTRAVENOUS at 12:03

## 2021-03-04 ENCOUNTER — TELEPHONE (OUTPATIENT)
Dept: CARDIOLOGY | Facility: CLINIC | Age: 86
End: 2021-03-04

## 2021-03-17 NOTE — PROGRESS NOTES
[facility] Medicine Progress Note       Subjective:     Last bloody BM was yesterday AM (19). Denies Any recent bloody or black bowel movements. Patient denies any sob or cp. States that she is feeling well today. Nervous about advancing diet.        Objective:   Last 24 Hour Vital Signs:  BP  Min: 104/53  Max: 182/75  Temp  Av.1 °F (36.7 °C)  Min: 97.7 °F (36.5 °C)  Max: 98.3 °F (36.8 °C)  Pulse  Av.7  Min: 54  Max: 99  Resp  Av.4  Min: 16  Max: 40  SpO2  Av.1 %  Min: 93 %  Max: 99 %  Weight  Av kg (211 lb 10.3 oz)  Min: 96 kg (211 lb 10.3 oz)  Max: 96 kg (211 lb 10.3 oz)  I/O last 3 completed shifts:  In: 380.3 [P.O.:360; I.V.:20.3]  Out: 2100 [Urine:2100]    Physical Examination:   General appearance: alert, appears stated age and cooperative  Head: Normocephalic, without obvious abnormality, atraumatic  Eyes: PERRLA. Clear conjunctiva. No scleral icterus  Neck: no adenopathy and carotid bruit, bilaterally, that decreases as one auscultates up the neck.   Lungs: clear to auscultation bilaterally  Heart: RRR with no rubs or murmrus. Systolic murmur appreciated   Abdomen: soft, non-tender; bowel sounds normal; no masses,  no organomegaly  Extremities: extremities normal, atraumatic, no cyanosis or edema  Skin: Skin color, texture, turgor normal. No rashes or lesions except for buttocks which was erythremic and raw appearing.   Neuro: AAOx3.       Laboratory:    Trended Lab Data:  Recent Labs   Lab 19  0402 19  0308 19  0317 19  0318   WBC 5.84 11.20  --  8.29   HGB 8.0* 10.1*  --  9.0*   HCT 25.7* 31.4*  --  28.2*    266  --  296   MCV 88 88  --  88   RDW 15.2* 15.3*  --  15.4*    140 138  --    K 3.9 3.7 3.7  --     104 102  --    CO2 32* 27 30*  --    BUN 16 12 15  --    CREATININE 0.7 0.7 0.9  --     116* 113*  --    PROT 5.1* 6.2 5.7*  --    ALBUMIN 2.8* 3.2* 3.0*  --    BILITOT 0.7 0.9 1.1*  --    AST 17 19 16  --    ALKPHOS 33*  March 17, 2021       Garry Pope MD  [address] [facility] [address]  Via Mail      Patient: Boo Stallworth   YOB: 1940   Date of Visit: 3/17/2021       Dear Dr. Pope:    Thank you for referring Lakhwinder Stallworth to me for evaluation. Below are my notes for this visit with him.    If you have questions, please do not hesitate to call me. I look forward to following your patient along with you.      Sincerely,        Sky Roach MD        CC: No Recipients  Sky Roach MD  3/17/2021  1:32 PM  Signed  PCP: Garry Pope MD    Chief Complaint   Patient presents with   • Coronary Artery Disease   • Cardiomyopathy   • Congestive Heart Failure   • Hypertension   • Chest Pain     with exertion   • Shortness of Breath     with exertion         [This note used Dragon technology. Transcription errors are not uncommon and may not have been corrected prior to electronically signing the note. Should you find these errors, please consult the clinician for interpretation (or apply common sense adjustment when safe and appropriate).]     ANGELINA Stallworth is a 80 year old White male here today.    He is here today for routine follow-up.    Approximately January 2021 he reduced his regular walking/exercise.  His reasons for doing so were attributed to the pandemic, winter weather, and not feeling as strong.    About 6 weeks ago he began noticing shortness of breath with moderate activity.  There is no PND orthopnea or edema.  He reported the symptoms to Dr. Ortega on his visit February 9, 2021.  He also had edema.  Furosemide was instituted and his symptoms improved.    For the last 3 weeks however despite reduction of his edema he has had shortness of breath on moderate activity.  His walking distances have been  curtailed and he is doing them only every other day.  As before he gets angina that limits his exercise.      He had an episode 1 to 2 weeks ago when he went for a walk and could hardly make it  44* 41*  --    ALT 10 12 11  --        Microbiology Data:  Bcx: [id number]    Other Results:  EKG (my interpretation): sinus doni    Radiology:  Imaging Results          X-Ray Chest 1 View (Final result)  Result time 02/16/19 11:47:18    Final result by Rolando Gómez DO (02/16/19 11:47:18)                 Impression:      No acute cardiopulmonary process.      Electronically signed by: Rolando Gómez DO  Date:    02/16/2019  Time:    11:47             Narrative:    EXAMINATION:  XR CHEST 1 VIEW    CLINICAL HISTORY:  bradycardia;    TECHNIQUE:  Single frontal view of the chest was performed.    COMPARISON:  04/23/2018    FINDINGS:  No infiltrates or pleural effusions.  The heart appears to be at the upper limits of normal in size.  There is mild tortuosity of the descending thoracic aorta with calcification of the aortic knob..                                Current Medications:     Infusions:       Scheduled:   amLODIPine  5 mg Oral Daily    atorvastatin  40 mg Oral Daily    hydrALAZINE  50 mg Oral Q8H    lisinopril  40 mg Oral Daily    oxybutynin  15 mg Oral Daily        PRN:  sodium chloride, dextrose 50%, dextrose 50%, glucagon (human recombinant), glucose, glucose, ondansetron, ramelteon, sodium chloride 0.9%    Antibiotics and Day Number of Therapy:  None    Lines and Day Number of Therapy:  piv         Assessment:     Makenzie Haile is a 83 y.o. female with:  Patient Active Problem List    Diagnosis Date Noted    Diverticulosis of large intestine with hemorrhage 02/19/2019    Gastrointestinal hemorrhage with melena 02/17/2019    RANDELL (acute kidney injury)     Symptomatic anemia 02/16/2019    Bradycardia 02/16/2019    Overflow incontinence of urine 02/16/2019    Acute blood loss anemia     Acute upper GI bleed     Normocytic anemia 02/13/2019    HLD (hyperlipidemia) 02/13/2019    Skin breakdown 02/13/2019    History of GI bleed 02/13/2019    Iron deficiency anemia due to chronic blood loss  back home because of severe shortness of breath and fatigue.    He is tolerating his medicines.    No dizzy spells fainting episodes or symptoms of cerebrovascular insufficiency.  No palpitation      Past Medical History  Past Medical History:   Diagnosis Date   • Angina pectoris (CMS/[facility])    • Atherosclerosis     subclavian   • Atherosclerosis of arteries of extremities (CMS/[facility])     with claudication   • Atherosclerosis of autologous artery coronary artery bypass graft with stable angina (CMS/[facility]) 6/7/2006   • CAD (coronary artery disease) 7/15/2019   • Cardiomyopathy, ischemic    • Carotid artery occlusion    • Chest pain at rest 11/11/2019   • Chronic kidney disease     renal artery stenosis, renal insufficiency   • Claudication (CMS/[facility])    • Congestive cardiac failure (CMS/[facility])     systolic   • COPD, mild (CMS/[facility]) 11/11/2019   • Coronary artery disease     CABG 1995, CRISTÓBAL RCA 12/08   • Dizzinesses 11/11/2019   • Elevated Lp(a) 8/30/2004   • High cholesterol    • Hx of CABG 1/7/2009   • Hyperlipoproteinemia    • Hypertension    • PVD (peripheral vascular disease) (CMS/[facility]) 4/8/2020   • Renal artery stenosis (CMS/[facility]) 12/5/2002   • Subclavian artery stenosis (CMS/[facility]) 11/11/2019   • Thrombocytopenia (CMS/[facility])    • Thyroid disease     hypothyroidism       Past Surgical History  Past Surgical History:   Procedure Laterality Date   • Cardiac catherization Bilateral 02/27/2020   • Cardiac surgery  1995    cabg   • Coronary artery bypass graft  1995   • Coronary vascu brachytherapy  08/2003    PTCA-brachytherapy RCA   • Lower extremity angiogram/possible pta - cv  09/15/2020    Shockwave lithotripsy of the right ostial SFA, balloon angioplasty of the same lesion, angioplasty of the distal right SFA   • Pta  12/2002    L subclavian   • Ptca  11/2002    SVG Cx plus RCA   • Ptca with stent  12/2008    RCA   • Ptca with stent  01/2006    RCa       Family History  Family History   Problem Relation Age of Onset   •  02/13/2019    Coronary artery disease involving native coronary artery of native heart without angina pectoris 04/19/2018    Neuropathy 03/27/2018    Acute myocardial infarction involving left circumflex coronary artery 03/19/2018    GERD (gastroesophageal reflux disease) 03/19/2018    Type 2 diabetes mellitus without complication, without long-term current use of insulin 03/19/2018    Essential hypertension 03/19/2018    Aortic stenosis 03/19/2018    Class 3 severe obesity due to excess calories with serious comorbidity and body mass index (BMI) of 40.0 to 44.9 in adult 03/19/2018    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   - Patient with 2 episodes of dark colored/tarry stool since discharge yesterday  - 2/14 CTA/IR embolized with 2mm coil placed in area of bleed on last admission ([facility]), also required 1U PRBCs  - H/H of previous admission H/H :10.5/33.1 MCV: 85  - Iron Panel: 49 Iron, TIBC 336, Trans 227, Ferritin 37, Folate 15.4, B12 284  - H/H on morning of admit: 7.2/23.0  INR 1.0/PT 10.5/PTT 21.1  - Transfuse 2U PRBCs today and repeat H/H  - Given 1L NS in the ED  Continue LR@100cc/hr  - Will hold ASA/Plavix with concerns of bleeding  Consider starting Protonix 40mg IV BID  - Consulted GI, appreciate their recommendations, possible scope.  2/17: Patient fevered after first unit; patient administered benadryl and tylenol. H/H: 8.1/25.5. Awaiting GI recs   2/19: Diffuse diverticulosis. Holding ASA for 48 hrs; holding plavix for now. Hgb raises to 10.1  2/20: Hgb 9. No recent bloody bowel movement. Spoke with Dr. Washington at bedside: who stated that patient not bleeding is a better sign than a small drop in hgb. Possible dc tomorrow am if patient has no more bbm.       Intermittent bradycardia  On admission patient was bradycardic. Held coreg. Recently  Coronary Artery Disease Neg Hx    • Aneurysm Neg Hx        Social History  Social History     Tobacco Use   • Smoking status: Former Smoker     Packs/day: 0.00   • Smokeless tobacco: Never Used   • Tobacco comment: Former tobacco use. used to smoke but quit 2011   Substance Use Topics   • Alcohol use: Yes     Comment: 1 drink daily   • Drug use: Never        Allergies  ALLERGIES:   Allergen Reactions   • Abciximab Other (See Comments)     thrombocytopenia   • Amoxicillin DIARRHEA   • Clindamycin GI UPSET and Other (See Comments)     Stomach pain   • Cortisone Other (See Comments)     FAINTING   • Erythromycin Other (See Comments)   • Lansoprazole Other (See Comments)   • Procaine Other (See Comments)   • Sulfa Antibiotics Other (See Comments)     Stomach pain  DIARRHEA, SEVERE STOMACH CRAMPS     • Tetracycline Other (See Comments)     Stomach pain     • Thsc Isosorbide Dinitrate Palpitations     Fatigue, head rush       Presenting Medications  Current Medications    ALFUZOSIN (UROXATRAL) 10 MG 24 HR TABLET    Take 10 mg by mouth daily.    AMLODIPINE (NORVASC) 2.5 MG TABLET    Take 1 tablet by mouth every morning.    AMLODIPINE (NORVASC) 5 MG TABLET    Take 1 tablet by mouth every evening.    ASCORBIC ACID (VITAMIN C) 500 MG TABLET    Take 500 mg by mouth daily.     ASPIRIN 81 MG TABLET    Take 81 mg by mouth daily.    ATORVASTATIN (LIPITOR) 80 MG TABLET    Take 1 tablet by mouth daily.    CLOPIDOGREL (PLAVIX) 75 MG TABLET    TAKE 1 TABLET BY MOUTH EVERY DAY    FLUTICASONE (FLONASE) 50 MCG/ACT NASAL SPRAY    Spray 1 spray in each nostril daily as needed.     FOSINOPRIL (MONOPRIL) 20 MG TABLET    TAKE 1 TABLET BY MOUTH EVERY DAY    FUROSEMIDE (LASIX) 40 MG TABLET    Take 1 tablet by mouth daily for 7 days, THEN 0.5 tablets daily.    FUROSEMIDE (LASIX) 40 MG TABLET    Take 20 mg by mouth daily.    HALOBETASOL (ULTRAVATE) 0.05 % CREAM    APPLY THINLY TO AFFECTED AREA ON HIS LEGS, ABDOMEN AND LOWER BACK TWICE DAILY FOR 2  WEEKS    HYDROCORTISONE (CORTIZONE) 2.5 % OINTMENT        LEVOTHYROXINE (SYNTHROID, LEVOTHROID) 112 MCG TABLET    Take 112 mcg by mouth daily.    LOPERAMIDE (IMODIUM A-D) 2 MG CAPSULE    Take 2 mg by mouth 4 times daily as needed for Diarrhea.    METOPROLOL SUCCINATE (TOPROL-XL) 50 MG 24 HR TABLET    TAKE 1 TABLET BY MOUTH TWICE A DAY    MULTIPLE VITAMINS-MINERALS (MULTIVITAMIN PO)    Take 1 tablet by mouth daily. 1 by mouth daily     NITROGLYCERIN (NITROSTAT) 0.4 MG SUBLINGUAL TABLET    PLACE 1 TAB UNDER THE TONGUE EVERY 5 MINUTES FOR UP TO 3 DOSES AS NEEDED FOR CHEST PAIN    NYSTATIN-TRIAMCINOLONE (MYCOLOG II) 463771-2.1 UNIT/GM-% OINTMENT        PROBIOTIC PRODUCT (PROBIOTIC-10 PO)    Take 1 tablet by mouth daily.     RESTASIS 0.05 % OPHTHALMIC EMULSION    Apply 1 drop to eye 2 times daily. INTO INFECTED EYE(S)    SELENIUM 200 MCG CAP    Take 200 mcg by mouth daily.     VITAMIN D, CHOLECALCIFEROL, PO    Take 1,000 Units by mouth daily.       Review of Systems  ROS    Physical Exam  Visit Vitals  /56 (BP Location: RUE - Right upper extremity, Patient Position: Sitting, Cuff Size: Small Adult)   Pulse 60   Resp 16   Ht 5' 7\" (1.702 m)   Wt 69.9 kg (154 lb)   BMI 24.12 kg/m²     Vital signs were reviewed today.    Physical Exam   Constitutional: No distress. He appears chronically ill.   HENT:   Omitted due to the coronavirus pandemic.   Neck: JVD (2 cm at 90 degrees with a positive HJR) present.   Cardiovascular: Regular rhythm and normal heart sounds.   No carotid bruits   Pulmonary/Chest: Effort normal and breath sounds normal.   Abdominal: Soft. Bowel sounds are normal.   No bruits   Musculoskeletal:         General: No edema.      Comments: Gait adequate for exercise testing   Neurological: He is alert and oriented to person, place, and time.   Skin: Skin is dry.       Diagnostic Testing:   Echocardiogram 3x21 shows:  EF 20%  Grade 3 diastolic dysfunction  PAP 47  Moderate THIERNO  Mild MR  Compared to echo  restarted much lower dose of coreg; 6.125 --> patient still doni. Again, now holding coreg.      Essential Hypertension  - /49 in triage, has been 130-140s/60s since fluids started  - On Lisinopril 20mg BID and Toprol-XL 25mg previously but started on: Lisinopril 40mg daily and Coreg 25mg BID  - Per patient family, had taken it the morning of admission.  - Holding coreg patient doni and lisinopril tomorrow morning if no longer actively bleeding.  2/17: /70 --> starting lisinopril 40. Will continue to hold reg currently   2/18: Added hydralazine. Will continue to monitor and adjust regiment   2/19: Hydralazine dose increased; amlodipine added. Will likely watch bp today; likely discharge tomorrow.   2/20: bp currently controlled; holding coreg as above     CAD w/ PMH oh PCI w/ SURAJ to LCX  - PCI in 3/18 - On ASA/Plavix  - Will continue to hold plavix while here in setting of GI bleed. Continuing ASA     DM2  - 2/13/2019 A1c 5.7  - On Metformin 1000mg BID at home  Will hold  - Starting SSI while here     HLD  - 3/2018: Chol 150, HDL 34, LDL 96, Trigly 99  - on atorvastatin 40mg daily at home  Continue while here     Overflow incontinence    - On Oxybutynin 15mg daily at home  Continue     HCM  - a1c 5.7  TSH WNL  - UTD on immunizations and cancer screenings per family     RANDELL, reosolved  - Admit: BUN 33, Cr 1.1  Baseline Cr of her previous admission 0.7-0.8  - Patient volume down on exam, dry mucus membranes and dry skin, wanting to drink water  - Concern for pre-renal  Given 1L bolus in ED, Will keep her on maintenance fluid 100cc/hr   -  resolved cr 0.7       F:  Continue LR@100cc/hr  E: Will replete as necessary  N: Soft  Code: Full  Allergies: Iodine, Sulfa  DVT Prophylaxis: SCDs  Dispo: Lowering patient bp; possible discharge in am if stable      Jacky Hull, DO  [facility]-I    [facility] Medicine Hospitalist Pager numbers:   [facility] Hospitalist Medicine Team A  (Gilson/Aditya): [phone]  [facility] Hospitalist Medicine Team B (Ginger/Ran):  [phone]                   11/19 LV dysfunction and pulmonary hypertension have developed.    Blood work from 2/17/2021 shows:  Sodium 133  Renal function normal  Mild elevation of LFTs  Hemoglobin 13.7 WBC 5.7      Assessment:   Severe LV dysfunction with pulmonary hypertension on echo 3/21.  -Symptomatic but significantly improved with the addition of furosemide.  Functional class I-II  -He is on neurohormonal therapy.  -The pressing question is the etiology of his new ventricular dysfunction    CAD with angina - stable    Dyslipidemia on pharmacologic therapy status uncertain    Asymptomatic carotid atherosclerosis with less than 70% narrowing 8/20    Hypertension a whitecoat component well-controlled    Renal insufficiency improved    Recommendation:  My major concern is that a significant change in his coronary anatomy has occured that is producing the LV dysfunction.  If this is a case his ventricular dysfunction may be reversible with revascularization.  Based on his prior coronary anatomy it would seem likely that revascularization would probably require surgery.    He has raised the question as to whether or not he should go to the [facility] for any surgical procedure.      Plan:   Cardiac catheterization and then decisions regarding optimal management.    In the meantime he will stay on his current therapy.    Post-cath we will get the heart failure service involved.    A copy of this note will be sent to his primary care physician    Sky Roach MD

## 2021-04-01 ENCOUNTER — OFFICE VISIT (OUTPATIENT)
Dept: CARDIOLOGY | Facility: CLINIC | Age: 86
End: 2021-04-01
Payer: COMMERCIAL

## 2021-04-01 VITALS
WEIGHT: 243.69 LBS | SYSTOLIC BLOOD PRESSURE: 141 MMHG | DIASTOLIC BLOOD PRESSURE: 65 MMHG | OXYGEN SATURATION: 94 % | BODY MASS INDEX: 41.6 KG/M2 | HEART RATE: 65 BPM | HEIGHT: 64 IN

## 2021-04-01 DIAGNOSIS — E66.01 CLASS 3 SEVERE OBESITY DUE TO EXCESS CALORIES WITH SERIOUS COMORBIDITY AND BODY MASS INDEX (BMI) OF 40.0 TO 44.9 IN ADULT: ICD-10-CM

## 2021-04-01 DIAGNOSIS — R00.1 BRADYCARDIA: ICD-10-CM

## 2021-04-01 DIAGNOSIS — I10 ESSENTIAL HYPERTENSION: Chronic | ICD-10-CM

## 2021-04-01 DIAGNOSIS — E11.9 TYPE 2 DIABETES MELLITUS WITHOUT COMPLICATION, WITHOUT LONG-TERM CURRENT USE OF INSULIN: ICD-10-CM

## 2021-04-01 DIAGNOSIS — I25.10 CORONARY ARTERY DISEASE INVOLVING NATIVE CORONARY ARTERY OF NATIVE HEART WITHOUT ANGINA PECTORIS: Primary | ICD-10-CM

## 2021-04-01 DIAGNOSIS — I35.0 NONRHEUMATIC AORTIC VALVE STENOSIS: Chronic | ICD-10-CM

## 2021-04-01 DIAGNOSIS — Z95.5 HISTORY OF CORONARY ARTERY STENT PLACEMENT: ICD-10-CM

## 2021-04-01 DIAGNOSIS — E78.2 MIXED HYPERLIPIDEMIA: ICD-10-CM

## 2021-04-01 PROCEDURE — 1126F AMNT PAIN NOTED NONE PRSNT: CPT | Mod: S$GLB,,, | Performed by: INTERNAL MEDICINE

## 2021-04-01 PROCEDURE — 3077F SYST BP >= 140 MM HG: CPT | Mod: CPTII,S$GLB,, | Performed by: INTERNAL MEDICINE

## 2021-04-01 PROCEDURE — 3288F FALL RISK ASSESSMENT DOCD: CPT | Mod: CPTII,S$GLB,, | Performed by: INTERNAL MEDICINE

## 2021-04-01 PROCEDURE — 1159F MED LIST DOCD IN RCRD: CPT | Mod: S$GLB,,, | Performed by: INTERNAL MEDICINE

## 2021-04-01 PROCEDURE — 3078F PR MOST RECENT DIASTOLIC BLOOD PRESSURE < 80 MM HG: ICD-10-PCS | Mod: CPTII,S$GLB,, | Performed by: INTERNAL MEDICINE

## 2021-04-01 PROCEDURE — 1101F PT FALLS ASSESS-DOCD LE1/YR: CPT | Mod: CPTII,S$GLB,, | Performed by: INTERNAL MEDICINE

## 2021-04-01 PROCEDURE — 99214 OFFICE O/P EST MOD 30 MIN: CPT | Mod: S$GLB,,, | Performed by: INTERNAL MEDICINE

## 2021-04-01 PROCEDURE — 1101F PR PT FALLS ASSESS DOC 0-1 FALLS W/OUT INJ PAST YR: ICD-10-PCS | Mod: CPTII,S$GLB,, | Performed by: INTERNAL MEDICINE

## 2021-04-01 PROCEDURE — 3288F PR FALLS RISK ASSESSMENT DOCUMENTED: ICD-10-PCS | Mod: CPTII,S$GLB,, | Performed by: INTERNAL MEDICINE

## 2021-04-01 PROCEDURE — 99999 PR PBB SHADOW E&M-EST. PATIENT-LVL IV: CPT | Mod: PBBFAC,,, | Performed by: INTERNAL MEDICINE

## 2021-04-01 PROCEDURE — 99214 PR OFFICE/OUTPT VISIT, EST, LEVL IV, 30-39 MIN: ICD-10-PCS | Mod: S$GLB,,, | Performed by: INTERNAL MEDICINE

## 2021-04-01 PROCEDURE — 3077F PR MOST RECENT SYSTOLIC BLOOD PRESSURE >= 140 MM HG: ICD-10-PCS | Mod: CPTII,S$GLB,, | Performed by: INTERNAL MEDICINE

## 2021-04-01 PROCEDURE — 1159F PR MEDICATION LIST DOCUMENTED IN MEDICAL RECORD: ICD-10-PCS | Mod: S$GLB,,, | Performed by: INTERNAL MEDICINE

## 2021-04-01 PROCEDURE — 3078F DIAST BP <80 MM HG: CPT | Mod: CPTII,S$GLB,, | Performed by: INTERNAL MEDICINE

## 2021-04-01 PROCEDURE — 1126F PR PAIN SEVERITY QUANTIFIED, NO PAIN PRESENT: ICD-10-PCS | Mod: S$GLB,,, | Performed by: INTERNAL MEDICINE

## 2021-04-01 PROCEDURE — 99999 PR PBB SHADOW E&M-EST. PATIENT-LVL IV: ICD-10-PCS | Mod: PBBFAC,,, | Performed by: INTERNAL MEDICINE

## 2022-02-04 ENCOUNTER — OUTSIDE PLACE OF SERVICE (OUTPATIENT)
Dept: CARDIOLOGY | Facility: CLINIC | Age: 87
End: 2022-02-04
Payer: COMMERCIAL

## 2022-02-04 PROCEDURE — 93010 ELECTROCARDIOGRAM REPORT: CPT | Mod: ,,, | Performed by: INTERNAL MEDICINE

## 2022-02-04 PROCEDURE — 93010 PR ELECTROCARDIOGRAM REPORT: ICD-10-PCS | Mod: ,,, | Performed by: INTERNAL MEDICINE

## 2022-02-21 ENCOUNTER — TELEPHONE (OUTPATIENT)
Dept: CARDIOLOGY | Facility: CLINIC | Age: 87
End: 2022-02-21
Payer: COMMERCIAL

## 2022-02-21 NOTE — TELEPHONE ENCOUNTER
----- Message from Shanice Silvestre sent at 2/21/2022 12:40 PM CST -----  Type:  Facility requesting call back    Who Called:Vicki of [facility]  Would the patient rather a call back or a response via [facility]? Call back  Best Call Back Number:[phone] or [phone]  Additional Information: Facility requesting call back regarding edema of lower extremities swelling and lasix increased to 40 mg after complaining of edema on Friday and concerned of cellulitis.

## 2022-03-24 ENCOUNTER — OFFICE VISIT (OUTPATIENT)
Dept: CARDIOLOGY | Facility: CLINIC | Age: 87
End: 2022-03-24
Payer: COMMERCIAL

## 2022-03-24 VITALS
HEART RATE: 64 BPM | DIASTOLIC BLOOD PRESSURE: 73 MMHG | WEIGHT: 227.63 LBS | BODY MASS INDEX: 38.86 KG/M2 | SYSTOLIC BLOOD PRESSURE: 151 MMHG | HEIGHT: 64 IN

## 2022-03-24 DIAGNOSIS — I35.0 AORTIC VALVE STENOSIS, ETIOLOGY OF CARDIAC VALVE DISEASE UNSPECIFIED: Chronic | ICD-10-CM

## 2022-03-24 DIAGNOSIS — E11.9 TYPE 2 DIABETES MELLITUS WITHOUT COMPLICATION, WITHOUT LONG-TERM CURRENT USE OF INSULIN: ICD-10-CM

## 2022-03-24 DIAGNOSIS — E66.01 CLASS 3 SEVERE OBESITY DUE TO EXCESS CALORIES WITH SERIOUS COMORBIDITY AND BODY MASS INDEX (BMI) OF 40.0 TO 44.9 IN ADULT: ICD-10-CM

## 2022-03-24 DIAGNOSIS — Z95.5 HISTORY OF CORONARY ARTERY STENT PLACEMENT: ICD-10-CM

## 2022-03-24 DIAGNOSIS — I21.21: ICD-10-CM

## 2022-03-24 DIAGNOSIS — I25.10 CORONARY ARTERY DISEASE INVOLVING NATIVE CORONARY ARTERY OF NATIVE HEART WITHOUT ANGINA PECTORIS: Primary | ICD-10-CM

## 2022-03-24 PROCEDURE — 1159F MED LIST DOCD IN RCRD: CPT | Mod: CPTII,S$GLB,, | Performed by: INTERNAL MEDICINE

## 2022-03-24 PROCEDURE — 3077F PR MOST RECENT SYSTOLIC BLOOD PRESSURE >= 140 MM HG: ICD-10-PCS | Mod: CPTII,S$GLB,, | Performed by: INTERNAL MEDICINE

## 2022-03-24 PROCEDURE — 3078F PR MOST RECENT DIASTOLIC BLOOD PRESSURE < 80 MM HG: ICD-10-PCS | Mod: CPTII,S$GLB,, | Performed by: INTERNAL MEDICINE

## 2022-03-24 PROCEDURE — 1159F PR MEDICATION LIST DOCUMENTED IN MEDICAL RECORD: ICD-10-PCS | Mod: CPTII,S$GLB,, | Performed by: INTERNAL MEDICINE

## 2022-03-24 PROCEDURE — 3288F PR FALLS RISK ASSESSMENT DOCUMENTED: ICD-10-PCS | Mod: CPTII,S$GLB,, | Performed by: INTERNAL MEDICINE

## 2022-03-24 PROCEDURE — 3288F FALL RISK ASSESSMENT DOCD: CPT | Mod: CPTII,S$GLB,, | Performed by: INTERNAL MEDICINE

## 2022-03-24 PROCEDURE — 1101F PT FALLS ASSESS-DOCD LE1/YR: CPT | Mod: CPTII,S$GLB,, | Performed by: INTERNAL MEDICINE

## 2022-03-24 PROCEDURE — 1126F PR PAIN SEVERITY QUANTIFIED, NO PAIN PRESENT: ICD-10-PCS | Mod: CPTII,S$GLB,, | Performed by: INTERNAL MEDICINE

## 2022-03-24 PROCEDURE — 99999 PR PBB SHADOW E&M-EST. PATIENT-LVL V: CPT | Mod: PBBFAC,,, | Performed by: INTERNAL MEDICINE

## 2022-03-24 PROCEDURE — 99214 OFFICE O/P EST MOD 30 MIN: CPT | Mod: S$GLB,,, | Performed by: INTERNAL MEDICINE

## 2022-03-24 PROCEDURE — 1101F PR PT FALLS ASSESS DOC 0-1 FALLS W/OUT INJ PAST YR: ICD-10-PCS | Mod: CPTII,S$GLB,, | Performed by: INTERNAL MEDICINE

## 2022-03-24 PROCEDURE — 3078F DIAST BP <80 MM HG: CPT | Mod: CPTII,S$GLB,, | Performed by: INTERNAL MEDICINE

## 2022-03-24 PROCEDURE — 3077F SYST BP >= 140 MM HG: CPT | Mod: CPTII,S$GLB,, | Performed by: INTERNAL MEDICINE

## 2022-03-24 PROCEDURE — 99999 PR PBB SHADOW E&M-EST. PATIENT-LVL V: ICD-10-PCS | Mod: PBBFAC,,, | Performed by: INTERNAL MEDICINE

## 2022-03-24 PROCEDURE — 99214 PR OFFICE/OUTPT VISIT, EST, LEVL IV, 30-39 MIN: ICD-10-PCS | Mod: S$GLB,,, | Performed by: INTERNAL MEDICINE

## 2022-03-24 PROCEDURE — 1126F AMNT PAIN NOTED NONE PRSNT: CPT | Mod: CPTII,S$GLB,, | Performed by: INTERNAL MEDICINE

## 2022-03-24 NOTE — PROGRESS NOTES
Subjective:    Patient ID:  Makenzie Haile is a 86 y.o. female who presents for follow-up of Coronary Artery Disease      HPI    85 y/o female with hx of CAD s/p NSTEMI with subsequent PCI with SURAJ to LCX, HTN, DM, GERD, obesity, mild AS (EF 55-60%, SERAFIN 1.48, PV 2.08, MG 10 from 2DE 2019) who presents for f/u. Presented initially with epigastric pain she thought was heartburn, presented to ED, had elevated trop 0.77, C with LCX lesion s/p PCI with SURAJ, resolution of pain, and discharged home. Subsequent admission for symptomatic anemia from GIB with PRBC transfusion. Plavix stopped and placed on SAPT with ASA.   Last visit had developed left sided chest pressure, SPECT without ischemia (lateral wall infarct/fixed defect), 2DE with normal EF (60%) and AV unable to be evaluated due to poor windows.   Had Covid PNA about 6 weeks ago with full recovery. Had possible flu before that (Covid?).   Has chronic bilateral LE edema which improves with intermittent lasix use. Recently has had worsening bilateral LE edema. Denies SOB, orthopnea, PND, syncope, palps. Tolerating meds well. Non smoker, no EtOH. Has been sedentary bc of pandemic. Stopped taking atorvastatin bc she has been having body and joint aches (chronic) and thought they might be due to statin. Aches have persisted.     Review of Systems   Constitutional: Positive for malaise/fatigue.   HENT: Negative for congestion.    Eyes: Negative for blurred vision.   Cardiovascular: Positive for leg swelling. Negative for chest pain, claudication, cyanosis, dyspnea on exertion, irregular heartbeat, near-syncope, orthopnea, palpitations, paroxysmal nocturnal dyspnea and syncope.   Respiratory: Negative for shortness of breath.    Endocrine: Negative for polyuria.   Hematologic/Lymphatic: Negative for bleeding problem.   Skin: Negative for itching and rash.   Musculoskeletal: Positive for muscle weakness. Negative for joint swelling and muscle cramps.    Gastrointestinal: Negative for abdominal pain, hematemesis, hematochezia, melena, nausea and vomiting.   Genitourinary: Negative for dysuria and hematuria.   Neurological: Negative for dizziness, focal weakness, headaches, light-headedness, loss of balance and weakness.   Psychiatric/Behavioral: Negative for depression. The patient is not nervous/anxious.         Objective:    Physical Exam  Constitutional:       Appearance: She is well-developed.   HENT:      Head: Normocephalic and atraumatic.   Neck:      Vascular: No JVD.   Cardiovascular:      Rate and Rhythm: Normal rate and regular rhythm.      Pulses:           Carotid pulses are 2+ on the right side and 2+ on the left side.       Radial pulses are 2+ on the right side and 2+ on the left side.        Femoral pulses are 2+ on the right side and 2+ on the left side.       Dorsalis pedis pulses are 2+ on the right side and 2+ on the left side.        Posterior tibial pulses are 1+ on the right side and 1+ on the left side.      Heart sounds: Murmur heard.    Harsh midsystolic murmur is present with a grade of 2/6 at the upper right sternal border radiating to the neck.  Pulmonary:      Effort: Pulmonary effort is normal.      Breath sounds: Normal breath sounds.   Abdominal:      General: Bowel sounds are normal.      Palpations: Abdomen is soft.   Musculoskeletal:      Cervical back: Neck supple.   Skin:     General: Skin is warm and dry.   Neurological:      Mental Status: She is alert and oriented to person, place, and time.   Psychiatric:         Behavior: Behavior normal.         Thought Content: Thought content normal.           Assessment:       1. Coronary artery disease involving native coronary artery of native heart without angina pectoris    2. History of coronary artery stent placement    3. Aortic valve stenosis, etiology of cardiac valve disease unspecified    4. Acute myocardial infarction involving left circumflex coronary artery, unspecified  MI type    5. Type 2 diabetes mellitus without complication, without long-term current use of insulin    6. Class 3 severe obesity due to excess calories with serious comorbidity and body mass index (BMI) of 40.0 to 44.9 in adult      86 year old patient with hx and presentation as above. Doing well from a cardiac perspective and compensated from a HF perspective. Will increase lasix to BID and f/u in 2 weeks with NP. Needs to exercise and lose weight. Discussed the etiology, evaluation, and management of CAD, PCI, CP, AS, HTN, HLD, GENEVIEVE, obesity, DM. Discussed the importance of med compliance, heart healthy diet, and regular exercise.      Plan:       -Increase lasix to BID  -Lipid panel prior to next visit and will discuss resumption of statin  -f/u with NP in 2 weeks

## 2022-03-29 ENCOUNTER — LAB VISIT (OUTPATIENT)
Dept: LAB | Facility: HOSPITAL | Age: 87
End: 2022-03-29
Attending: INTERNAL MEDICINE
Payer: COMMERCIAL

## 2022-03-29 DIAGNOSIS — I25.10 CORONARY ARTERY DISEASE INVOLVING NATIVE CORONARY ARTERY OF NATIVE HEART WITHOUT ANGINA PECTORIS: ICD-10-CM

## 2022-03-29 LAB
CHOLEST SERPL-MCNC: 211 MG/DL (ref 120–199)
CHOLEST/HDLC SERPL: 4.8 {RATIO} (ref 2–5)
HDLC SERPL-MCNC: 44 MG/DL (ref 40–75)
HDLC SERPL: 20.9 % (ref 20–50)
LDLC SERPL CALC-MCNC: 139.8 MG/DL (ref 63–159)
NONHDLC SERPL-MCNC: 167 MG/DL
TRIGL SERPL-MCNC: 136 MG/DL (ref 30–150)

## 2022-03-29 PROCEDURE — 36415 COLL VENOUS BLD VENIPUNCTURE: CPT | Mod: PO | Performed by: INTERNAL MEDICINE

## 2022-03-29 PROCEDURE — 80061 LIPID PANEL: CPT | Performed by: INTERNAL MEDICINE

## 2022-04-07 ENCOUNTER — OFFICE VISIT (OUTPATIENT)
Dept: CARDIOLOGY | Facility: CLINIC | Age: 87
End: 2022-04-07
Payer: COMMERCIAL

## 2022-04-07 VITALS
BODY MASS INDEX: 39.44 KG/M2 | HEIGHT: 64 IN | SYSTOLIC BLOOD PRESSURE: 128 MMHG | DIASTOLIC BLOOD PRESSURE: 68 MMHG | WEIGHT: 231 LBS | HEART RATE: 65 BPM | OXYGEN SATURATION: 95 %

## 2022-04-07 DIAGNOSIS — Z79.899 LONG TERM CURRENT USE OF DIURETIC: Primary | ICD-10-CM

## 2022-04-07 PROCEDURE — 3078F PR MOST RECENT DIASTOLIC BLOOD PRESSURE < 80 MM HG: ICD-10-PCS | Mod: CPTII,S$GLB,, | Performed by: NURSE PRACTITIONER

## 2022-04-07 PROCEDURE — 1159F MED LIST DOCD IN RCRD: CPT | Mod: CPTII,S$GLB,, | Performed by: NURSE PRACTITIONER

## 2022-04-07 PROCEDURE — 1126F PR PAIN SEVERITY QUANTIFIED, NO PAIN PRESENT: ICD-10-PCS | Mod: CPTII,S$GLB,, | Performed by: NURSE PRACTITIONER

## 2022-04-07 PROCEDURE — 3074F PR MOST RECENT SYSTOLIC BLOOD PRESSURE < 130 MM HG: ICD-10-PCS | Mod: CPTII,S$GLB,, | Performed by: NURSE PRACTITIONER

## 2022-04-07 PROCEDURE — 1126F AMNT PAIN NOTED NONE PRSNT: CPT | Mod: CPTII,S$GLB,, | Performed by: NURSE PRACTITIONER

## 2022-04-07 PROCEDURE — 3074F SYST BP LT 130 MM HG: CPT | Mod: CPTII,S$GLB,, | Performed by: NURSE PRACTITIONER

## 2022-04-07 PROCEDURE — 1101F PR PT FALLS ASSESS DOC 0-1 FALLS W/OUT INJ PAST YR: ICD-10-PCS | Mod: CPTII,S$GLB,, | Performed by: NURSE PRACTITIONER

## 2022-04-07 PROCEDURE — 3078F DIAST BP <80 MM HG: CPT | Mod: CPTII,S$GLB,, | Performed by: NURSE PRACTITIONER

## 2022-04-07 PROCEDURE — 1101F PT FALLS ASSESS-DOCD LE1/YR: CPT | Mod: CPTII,S$GLB,, | Performed by: NURSE PRACTITIONER

## 2022-04-07 PROCEDURE — 99999 PR PBB SHADOW E&M-EST. PATIENT-LVL V: CPT | Mod: PBBFAC,,, | Performed by: NURSE PRACTITIONER

## 2022-04-07 PROCEDURE — 1159F PR MEDICATION LIST DOCUMENTED IN MEDICAL RECORD: ICD-10-PCS | Mod: CPTII,S$GLB,, | Performed by: NURSE PRACTITIONER

## 2022-04-07 PROCEDURE — 99214 PR OFFICE/OUTPT VISIT, EST, LEVL IV, 30-39 MIN: ICD-10-PCS | Mod: S$GLB,,, | Performed by: NURSE PRACTITIONER

## 2022-04-07 PROCEDURE — 99999 PR PBB SHADOW E&M-EST. PATIENT-LVL V: ICD-10-PCS | Mod: PBBFAC,,, | Performed by: NURSE PRACTITIONER

## 2022-04-07 PROCEDURE — 3288F FALL RISK ASSESSMENT DOCD: CPT | Mod: CPTII,S$GLB,, | Performed by: NURSE PRACTITIONER

## 2022-04-07 PROCEDURE — 99214 OFFICE O/P EST MOD 30 MIN: CPT | Mod: S$GLB,,, | Performed by: NURSE PRACTITIONER

## 2022-04-07 PROCEDURE — 3288F PR FALLS RISK ASSESSMENT DOCUMENTED: ICD-10-PCS | Mod: CPTII,S$GLB,, | Performed by: NURSE PRACTITIONER

## 2022-04-07 NOTE — PROGRESS NOTES
Cardiology Clinic note    Subjective:   Patient ID:  Makenzie Haile is a 86 y.o. female who presents for follow-up of CAD s/p NSTEMI w/ PCI to LCX, HTN, DM (A1C 5.7 2019), GERD, obesity, mild AS (normal EF, SERAFIN 1.48, PV 2.8, MG 10 from 2DE 2019) who presents for follow up of chronic bilateral leg edema and hyperlipidemia.    She was seen by Dr. Moncada 3/24/2022 with Lasix increased from 20mg qd to BID. She reports taking 40mg qd and states BLE swelling has improved since the increase. She denies SOB/JEFFRIES, palpitations, orthopnea, PND, CP. She also stopped taking her station due to body aches and joint aches (chronic) and thought they might be due to statin use. She reports today, aches continue. Lipid panel from last visit repeated with Chol 211, HDL 44, .8, Tri 136.    She is here today for follow up of the above. She denies any other cardiac concerns.     HPI:   Makenzie Haile  has a past medical history of Acute myocardial infarction involving left circumflex coronary artery (3/19/2018), Diabetes mellitus, Hyperlipemia, and Hypertension.          Patient Active Problem List    Diagnosis Date Noted    Physical deconditioning     Diverticulosis of large intestine with hemorrhage 02/19/2019    Gastrointestinal hemorrhage with melena 02/17/2019    RANDELL (acute kidney injury)     Symptomatic anemia 02/16/2019    Bradycardia 02/16/2019    Overflow incontinence of urine 02/16/2019    Acute blood loss anemia     Acute upper GI bleed     Normocytic anemia 02/13/2019    HLD (hyperlipidemia) 02/13/2019    Skin breakdown 02/13/2019     On buttocks      History of GI bleed 02/13/2019    Iron deficiency anemia due to chronic blood loss 02/13/2019    Coronary artery disease involving native coronary artery of native heart without angina pectoris 04/19/2018    Neuropathy 03/27/2018    Acute myocardial infarction involving left circumflex coronary artery 03/19/2018    GERD (gastroesophageal reflux  disease) 03/19/2018    Type 2 diabetes mellitus without complication, without long-term current use of insulin 03/19/2018    Essential hypertension 03/19/2018    Aortic stenosis 03/19/2018    Class 3 severe obesity due to excess calories with serious comorbidity and body mass index (BMI) of 40.0 to 44.9 in adult 03/19/2018    History of coronary artery stent placement 03/19/2018       Patient's Medications   New Prescriptions    No medications on file   Previous Medications    ALPRAZOLAM (XANAX) 0.25 MG TABLET    Take by mouth 3 (three) times daily.    ASCORBIC ACID, VITAMIN C, (VITAMIN C) 1000 MG TABLET    Take 1,000 mg by mouth once daily.    ASPIRIN (ECOTRIN) 81 MG EC TABLET    Take 1 tablet (81 mg total) by mouth once daily.    ATORVASTATIN (LIPITOR) 40 MG TABLET    Take 1 tablet (40 mg total) by mouth once daily.    CARVEDILOL (COREG) 3.125 MG TABLET    Take 1 tablet (3.125 mg total) by mouth 2 (two) times daily with meals.    FERROUS SULFATE (FEOSOL) 325 MG (65 MG IRON) TAB TABLET    Take 1 tablet (325 mg total) by mouth daily with breakfast.    FOLIC ACID/MULTIVIT-MIN/LUTEIN (CENTRUM SILVER ORAL)    Take by mouth.    FUROSEMIDE (LASIX) 20 MG TABLET    Take 20 mg by mouth 2 (two) times daily.    INCONTINENCE PAD,LINER,DISP (BLADDER CONTROL PADS EX ABSORB MISC)    bladder control pads    LANCETS (ACCU-CHEK FASTCLIX MISC)    Accu-Chek FastClix    LISINOPRIL (PRINIVIL,ZESTRIL) 20 MG TABLET    Take 2 tablets (40 mg total) by mouth once daily.    LOSARTAN (COZAAR) 100 MG TABLET    Take 100 mg by mouth once daily.    MECLIZINE (ANTIVERT) 25 MG TABLET    Take 1 tablet (25 mg total) by mouth 3 (three) times daily as needed.    METFORMIN (GLUCOPHAGE) 1000 MG TABLET    Take 1 tablet (1,000 mg total) by mouth 2 (two) times daily with meals.    METHOCARBAMOL (ROBAXIN) 750 MG TAB    Take 500 mg by mouth 4 (four) times daily.    NIFEDIPINE (PROCARDIA-XL) 60 MG (OSM) 24 HR TABLET    Take 1 tablet (60 mg total) by mouth  "once daily.    NITROGLYCERIN (NITROSTAT) 0.4 MG SL TABLET    Place 0.4 mg under the tongue every 5 (five) minutes as needed for Chest pain.    ONDANSETRON (ZOFRAN-ODT) 4 MG TBDL    Take 1 tablet (4 mg total) by mouth every 6 (six) hours as needed.    OXYBUTYNIN (DITROPAN XL) 15 MG TR24    Take 1 tablet (15 mg total) by mouth once daily.    SITAGLIPTIN (JANUVIA) 100 MG TAB    Take 100 mg by mouth once daily.    ZINC GLUCONATE 50 MG TABLET    Take 50 mg by mouth once daily.   Modified Medications    No medications on file   Discontinued Medications    No medications on file        Review of Systems   Constitutional: Negative.   Cardiovascular: Positive for leg swelling. Negative for chest pain, claudication, cyanosis, dyspnea on exertion, irregular heartbeat, near-syncope, orthopnea, palpitations, paroxysmal nocturnal dyspnea and syncope.   Skin: Positive for color change.   Musculoskeletal: Positive for joint pain and muscle weakness.   Gastrointestinal: Negative.    Psychiatric/Behavioral: Negative.          Objective:   Vitals  Vitals:    22 0958 22 1004   BP: 130/62 128/68   Pulse: 65 65   SpO2: 95% 95%   Weight: 104.8 kg (231 lb) 104.8 kg (231 lb)   Height: 5' 4" (1.626 m) 5' 4" (1.626 m)     Right Arm BP - Sittin/62  Left Arm BP - Sittin/68    Physical Exam  Constitutional:       Appearance: She is obese.   HENT:      Head: Normocephalic.   Cardiovascular:      Rate and Rhythm: Normal rate and regular rhythm.      Heart sounds: Murmur heard.   Pulmonary:      Effort: Pulmonary effort is normal.      Breath sounds: Normal breath sounds.   Musculoskeletal:         General: Swelling present.      Cervical back: Neck supple.      Right lower leg: Edema present.      Left lower leg: Edema present.   Skin:     General: Skin is warm and dry.   Neurological:      Mental Status: She is alert and oriented to person, place, and time.           Lab Results    Lab Results   Component Value Date    WBC " "6.98 08/09/2019    HGB 11.1 (L) 08/09/2019    HCT 36.4 (L) 08/09/2019    MCV 82 08/09/2019       Lab Results   Component Value Date     (L) 08/09/2019    INR 1.0 02/16/2019       Lab Results   Component Value Date    K 5.1 08/09/2019    MG 1.5 (L) 03/19/2018    BUN 28 (H) 08/09/2019    CREATININE 0.59 08/09/2019       Lab Results   Component Value Date     (H) 08/09/2019    HGBA1C 5.7 (H) 02/13/2019       Lab Results   Component Value Date    AST 43 08/09/2019    ALT 17 08/09/2019    ALBUMIN 4.4 08/09/2019    PROT 8.1 08/09/2019       Lab Results   Component Value Date    CHOL 211 (H) 03/29/2022    HDL 44 03/29/2022    LDLCALC 139.8 03/29/2022    TRIG 136 03/29/2022       Lab Results   Component Value Date     (H) 04/23/2018       Cardiac Studies  Significant Imaging: Echocardiogram:   Transthoracic echo (TTE) complete (Cupid Only):   Results for orders placed or performed during the hospital encounter of 03/03/21   Echo Color Flow Doppler? Yes   Result Value Ref Range    BSA 2.19 m2    Right Atrial Pressure (from IVC) 3 mmHg    AORTIC VALVE CUSP SEPERATION 2 cm    PV PEAK VELOCITY 1.00 cm/s    LVIDd 4.20 3.5 - 6.0 cm    IVS 1.30 0.6 - 1.1 cm    Posterior Wall 1.20 0.6 - 1.1 cm    Ao root annulus 2.90 cm    LVIDs 2.90 2.1 - 4.0 cm    FS 31 28 - 44 %    LV mass 189.19 g    LA size 3.70 cm    RVDD 2.70 cm    Left Ventricle Relative Wall Thickness 0.57 cm    AV regurgitation pressure 1/2 time 966 ms    AV valve area 1.82 cm2    AV Velocity Ratio 0.59     AV index (prosthetic) 0.58     E wave deceleration time 392.00 msec    MV "A" wave duration 120.00 msec    LVOT diameter 2.00 cm    LVOT area 3.1 cm2    LVOT peak dean 1.0 m/s    LVOT peak VTI 27.00 cm    Ao peak dean 1.7 m/s    Ao VTI 46.60 cm    LVOT stroke volume 84.78 cm3    AV peak gradient 12 mmHg    LV Mass Index 91 g/m2    RA Major Axis 4.50 cm    Left Atrium Major Axis 5.30 cm    TDI SEPTAL 0.03 m/s    LV LATERAL E/E' RATIO 27.50 m/s    LV " SEPTAL E/E' RATIO 36.67 m/s    LA WIDTH 4.00 cm    Left Ventricular Outflow Tract Mean Velocity 0.73 cm/s    Left Ventricular Outflow Tract Mean Gradient 2.38 mmHg    Pulmonary Valve Mean Velocity 0.68 m/s    TDI LATERAL 0.04 m/s    LA volume 67.30 cm3    Sinus 3.00 cm    AV mean gradient 8 mmHg    MV mean gradient 4 mmHg    MV valve area p 1/2 method 2.22 cm2    MV valve area by continuity eq 1.73 cm2    E/A ratio 0.73     Mean e' 0.04 m/s    MV peak gradient 13 mmHg    TV rest pulmonary artery pressure 33 mmHg    PV mean gradient 2 mmHg    E/E' ratio 31.43 m/s    MV Peak E Delfino 1.10 m/s    TR Max Delfino 2.76 m/s    MV VTI 49 cm    MV stenosis pressure 1/2 time 99 ms    MV Peak A Delfino 1.50 m/s    LV Systolic Volume 33.00 mL    LV Systolic Volume Index 15.8 mL/m2    LV Diastolic Volume 81.00 mL    LV Diastolic Volume Index 38.76 mL/m2    LA Volume Index 32.2 mL/m2    Left Atrium Minor Axis 5.40 cm    Triscuspid Valve Regurgitation Peak Gradient 30 mmHg    RA Width 3.60 cm    Narrative    · Concentric remodeling and normal systolic function. The estimated   ejection fraction is 60%  · Indeterminate left ventricular diastolic function.  · Normal right ventricular size with normal right ventricular systolic   function.  · The estimated PA systolic pressure is 33 mmHg.  · Normal central venous pressure (3 mmHg).          Assessment:     No diagnosis found.    Plan:     Resume Lasix 20mg daily; ok for 40mg as needed for increased swelling  BMP today  Otherwise, continue with current medical plan and lifestyle changes.    Follow up in 12 months  Return sooner for concerns or questions. If symptoms persist go to the ED    She expressed verbal understanding and agreed with the plan    Thank you for the opportunity to care for this patient. Will be available for questions if needed.     Total duration of face to face visit time 30 minutes.  Total time spent counseling greater than fifty percent of total visit time.  Counseling  included discussion regarding imaging findings, diagnosis, possibilities, treatment options, risks and benefits.    Malissa Mata, JOYCEP-C  Cardiology Clinic  [facility]

## 2022-04-11 ENCOUNTER — LAB VISIT (OUTPATIENT)
Dept: LAB | Facility: HOSPITAL | Age: 87
End: 2022-04-11
Attending: NURSE PRACTITIONER
Payer: COMMERCIAL

## 2022-04-11 DIAGNOSIS — Z79.899 LONG TERM CURRENT USE OF DIURETIC: ICD-10-CM

## 2022-04-11 LAB
ANION GAP SERPL CALC-SCNC: 7 MMOL/L (ref 8–16)
CALCIUM SERPL-MCNC: 9.8 MG/DL (ref 8.7–10.5)
CHLORIDE SERPL-SCNC: 105 MMOL/L (ref 95–110)
CO2 SERPL-SCNC: 30 MMOL/L (ref 23–29)
CREAT SERPL-MCNC: 0.84 MG/DL (ref 0.5–1.4)
EST. GFR  (AFRICAN AMERICAN): >60 ML/MIN/1.73 M^2
EST. GFR  (NON AFRICAN AMERICAN): >60 ML/MIN/1.73 M^2
GLUCOSE SERPL-MCNC: 90 MG/DL (ref 70–110)
POTASSIUM SERPL-SCNC: 4.7 MMOL/L (ref 3.5–5.1)
SODIUM SERPL-SCNC: 142 MMOL/L (ref 136–145)
UUN UR-MCNC: 28 MG/DL (ref 7–17)

## 2022-04-11 PROCEDURE — 36415 COLL VENOUS BLD VENIPUNCTURE: CPT | Mod: PO | Performed by: NURSE PRACTITIONER

## 2022-04-11 PROCEDURE — 80048 BASIC METABOLIC PNL TOTAL CA: CPT | Mod: PO | Performed by: NURSE PRACTITIONER

## 2022-12-09 NOTE — PLAN OF CARE
Follow-up With  Details  Why  Contact Info   Ciro Moncada MD  Go on 4/19/2018  @ 4pm  [address]  [address]  [place] [id number]  [phone]   Nicole Donohue MD  Go on 3/27/2018  @ 1pm  [address]  [address]  [place] [id number] [phone]     Accompanied by son Esequiel and  Brenden, expecting DC home today and for meds to be delivered bedside.     03/20/18 1028   Final Note   Assessment Type Final Discharge Note   Discharge Disposition Home   What phone number can be called within the next 1-3 days to see how you are doing after discharge? [phone]   Hospital Follow Up  Appt(s) scheduled? Yes   Discharge plans and expectations educations in teach back method with documentation complete? Yes   Right Care Referral Info   Post Acute Recommendation No Care     
Patient transferred to [facility] after Cath lab for [facility].     Patient AA0x3. Patient of Dr Sanders.   .   03/19/18 0233   Discharge Assessment   Assessment Type Discharge Planning Assessment   Confirmed/corrected address and phone number on facesheet? Yes   Assessment information obtained from? Patient;Caregiver;Medical Record   Communicated expected length of stay with patient/caregiver yes   Prior to hospitilization cognitive status: Alert/Oriented   Prior to hospitalization functional status: Assistive Equipment   Current cognitive status: Alert/Oriented   Current Functional Status: Assistive Equipment   Lives With spouse   Able to Return to Prior Arrangements unable to determine at this time (comments)   Is patient able to care for self after discharge? Unable to determine at this time (comments)   Who are your caregiver(s) and their phone number(s)? spouse   Patient's perception of discharge disposition home or selfcare   Readmission Within The Last 30 Days no previous admission in last 30 days   Patient currently being followed by outpatient case management? No   Patient currently receives any other outside agency services? No   Equipment Currently Used at Home cane, straight   Do you have any problems affording any of your prescribed medications? TBD   Is the patient taking medications as prescribed? yes   Does the patient have transportation home? No   Transportation Available family or friend will provide   Does the patient receive services at the Coumadin Clinic? No   Discharge Plan A Home with family;Home Health   Discharge Plan B Home with family   Patient/Family In Agreement With Plan yes     
Problem: Patient Care Overview  Goal: Plan of Care Review  No distress noted      
Problem: Patient Care Overview  Goal: Plan of Care Review  Outcome: Ongoing (interventions implemented as appropriate)  Pt is AAOx3. No complaints of nausea, vomiting, or diarrhea. No complaints of chest pain. Ambulated with assist. Cardiac diabetic 2000 calorie diet. On tele 8702 and running normal sinus rhythm. ACHS and last bs was 134. Safety precautions maintained. Tolerated all medications well.       
Problem: Patient Care Overview  Goal: Plan of Care Review  Outcome: Ongoing (interventions implemented as appropriate)  Pt on RA with sats of 94%. Will continue to monitor.      
Problem: Patient Care Overview  Goal: Plan of Care Review  Outcome: Ongoing (interventions implemented as appropriate)  Pt's SpO2 94% on room air. No respiratory distress noted. Will continue to monitor SpO2.       
Problem: Patient Care Overview  Goal: Plan of Care Review  Outcome: Revised  No distress noted. Pt remained SR on tele. No ectopy or red alarms noted.Pt denied CP. Pt ambulated to restroom with assistance. NO bleeding or hematoma noted to right radial. Pulses +2 bilaterally. IV CDI. Blood glucose monitored. Fall precautions maintained. Daughter at the bedside.      
fall

## 2023-03-23 ENCOUNTER — OFFICE VISIT (OUTPATIENT)
Dept: CARDIOLOGY | Facility: CLINIC | Age: 88
End: 2023-03-23
Payer: COMMERCIAL

## 2023-03-23 VITALS
SYSTOLIC BLOOD PRESSURE: 123 MMHG | BODY MASS INDEX: 43.05 KG/M2 | WEIGHT: 243 LBS | DIASTOLIC BLOOD PRESSURE: 63 MMHG | HEIGHT: 63 IN | HEART RATE: 65 BPM | OXYGEN SATURATION: 98 %

## 2023-03-23 DIAGNOSIS — I25.10 CORONARY ARTERY DISEASE INVOLVING NATIVE CORONARY ARTERY OF NATIVE HEART WITHOUT ANGINA PECTORIS: Primary | ICD-10-CM

## 2023-03-23 DIAGNOSIS — E78.5 HYPERLIPIDEMIA, UNSPECIFIED HYPERLIPIDEMIA TYPE: ICD-10-CM

## 2023-03-23 DIAGNOSIS — K21.00 GASTROESOPHAGEAL REFLUX DISEASE WITH ESOPHAGITIS WITHOUT HEMORRHAGE: ICD-10-CM

## 2023-03-23 DIAGNOSIS — Z95.5 HISTORY OF CORONARY ARTERY STENT PLACEMENT: ICD-10-CM

## 2023-03-23 DIAGNOSIS — I35.0 AORTIC VALVE STENOSIS, ETIOLOGY OF CARDIAC VALVE DISEASE UNSPECIFIED: Chronic | ICD-10-CM

## 2023-03-23 DIAGNOSIS — I21.21: ICD-10-CM

## 2023-03-23 DIAGNOSIS — E66.01 CLASS 3 SEVERE OBESITY DUE TO EXCESS CALORIES WITH SERIOUS COMORBIDITY AND BODY MASS INDEX (BMI) OF 40.0 TO 44.9 IN ADULT: ICD-10-CM

## 2023-03-23 DIAGNOSIS — Z87.19 HISTORY OF GI BLEED: ICD-10-CM

## 2023-03-23 DIAGNOSIS — I10 ESSENTIAL HYPERTENSION: Chronic | ICD-10-CM

## 2023-03-23 DIAGNOSIS — R00.1 BRADYCARDIA: ICD-10-CM

## 2023-03-23 DIAGNOSIS — E11.9 TYPE 2 DIABETES MELLITUS WITHOUT COMPLICATION, WITHOUT LONG-TERM CURRENT USE OF INSULIN: ICD-10-CM

## 2023-03-23 PROCEDURE — 1160F RVW MEDS BY RX/DR IN RCRD: CPT | Mod: CPTII,S$GLB,, | Performed by: INTERNAL MEDICINE

## 2023-03-23 PROCEDURE — 3288F PR FALLS RISK ASSESSMENT DOCUMENTED: ICD-10-PCS | Mod: CPTII,S$GLB,, | Performed by: INTERNAL MEDICINE

## 2023-03-23 PROCEDURE — 99999 PR PBB SHADOW E&M-EST. PATIENT-LVL V: ICD-10-PCS | Mod: PBBFAC,,, | Performed by: INTERNAL MEDICINE

## 2023-03-23 PROCEDURE — 1101F PR PT FALLS ASSESS DOC 0-1 FALLS W/OUT INJ PAST YR: ICD-10-PCS | Mod: CPTII,S$GLB,, | Performed by: INTERNAL MEDICINE

## 2023-03-23 PROCEDURE — 1159F MED LIST DOCD IN RCRD: CPT | Mod: CPTII,S$GLB,, | Performed by: INTERNAL MEDICINE

## 2023-03-23 PROCEDURE — 3074F SYST BP LT 130 MM HG: CPT | Mod: CPTII,S$GLB,, | Performed by: INTERNAL MEDICINE

## 2023-03-23 PROCEDURE — 3078F DIAST BP <80 MM HG: CPT | Mod: CPTII,S$GLB,, | Performed by: INTERNAL MEDICINE

## 2023-03-23 PROCEDURE — 99214 OFFICE O/P EST MOD 30 MIN: CPT | Mod: S$GLB,,, | Performed by: INTERNAL MEDICINE

## 2023-03-23 PROCEDURE — 99214 PR OFFICE/OUTPT VISIT, EST, LEVL IV, 30-39 MIN: ICD-10-PCS | Mod: S$GLB,,, | Performed by: INTERNAL MEDICINE

## 2023-03-23 PROCEDURE — 1126F AMNT PAIN NOTED NONE PRSNT: CPT | Mod: CPTII,S$GLB,, | Performed by: INTERNAL MEDICINE

## 2023-03-23 PROCEDURE — 3074F PR MOST RECENT SYSTOLIC BLOOD PRESSURE < 130 MM HG: ICD-10-PCS | Mod: CPTII,S$GLB,, | Performed by: INTERNAL MEDICINE

## 2023-03-23 PROCEDURE — 99999 PR PBB SHADOW E&M-EST. PATIENT-LVL V: CPT | Mod: PBBFAC,,, | Performed by: INTERNAL MEDICINE

## 2023-03-23 PROCEDURE — 3288F FALL RISK ASSESSMENT DOCD: CPT | Mod: CPTII,S$GLB,, | Performed by: INTERNAL MEDICINE

## 2023-03-23 PROCEDURE — 1160F PR REVIEW ALL MEDS BY PRESCRIBER/CLIN PHARMACIST DOCUMENTED: ICD-10-PCS | Mod: CPTII,S$GLB,, | Performed by: INTERNAL MEDICINE

## 2023-03-23 PROCEDURE — 1126F PR PAIN SEVERITY QUANTIFIED, NO PAIN PRESENT: ICD-10-PCS | Mod: CPTII,S$GLB,, | Performed by: INTERNAL MEDICINE

## 2023-03-23 PROCEDURE — 1101F PT FALLS ASSESS-DOCD LE1/YR: CPT | Mod: CPTII,S$GLB,, | Performed by: INTERNAL MEDICINE

## 2023-03-23 PROCEDURE — 1159F PR MEDICATION LIST DOCUMENTED IN MEDICAL RECORD: ICD-10-PCS | Mod: CPTII,S$GLB,, | Performed by: INTERNAL MEDICINE

## 2023-03-23 PROCEDURE — 3078F PR MOST RECENT DIASTOLIC BLOOD PRESSURE < 80 MM HG: ICD-10-PCS | Mod: CPTII,S$GLB,, | Performed by: INTERNAL MEDICINE

## 2023-03-23 NOTE — PROGRESS NOTES
"Subjective:    Patient ID:  Makenzie Haile is a 87 y.o. female who presents for follow-up of Coronary Artery Disease      HPI    88 y/o female with hx of CAD s/p NSTEMI with subsequent PCI with SURAJ to LCX, HTN, DM, GERD, obesity, mild AS (EF 55-60%, SERAFIN 1.48, PV 2.08, MG 10 from 2DE 2019) who presents for f/u. Presented initially with epigastric pain she thought was heartburn, presented to ED, had elevated trop 0.77, [facility] with LCX lesion s/p PCI with SURAJ, resolution of pain, and discharged home. Subsequent admission for symptomatic anemia from GIB with PRBC transfusion. Plavix stopped and placed on SAPT with ASA.   Last visit had developed left sided chest pressure, SPECT without ischemia (lateral wall infarct/fixed defect), 2DE with normal EF (60%) and AV unable to be evaluated due to poor windows.   Had Covid PNA about 6 weeks ago with full recovery. Had possible flu before that (Covid?).   Has chronic bilateral LE edema which improves with intermittent lasix use. Recently has had worsening bilateral LE edema. Denies SOB, orthopnea, PND, syncope, palps. Tolerating meds well. Non smoker, no EtOH. Has been sedentary bc of pandemic. Stopped taking atorvastatin bc she has been having body and joint aches (chronic) and thought they might be due to statin. Aches have persisted.     3/23/2023:  Had Covid and Covid PNA. Has residual SOB/JEFFRIES, fatigue. Has gained weight, has been less active, and is more sedentary. Was told she has a "leaky valve." 2DE from 2019 reviewed and has mild AS. Denies syncope, acute CHF symptoms, and angina. Has chronic, bilateral LE edema, unchanged. Back on statin and tolerating well.     Review of Systems   Constitutional: Positive for malaise/fatigue.   HENT:  Negative for congestion.    Eyes:  Negative for blurred vision.   Cardiovascular:  Positive for leg swelling. Negative for chest pain, claudication, cyanosis, dyspnea on exertion, irregular heartbeat, near-syncope, orthopnea, " palpitations, paroxysmal nocturnal dyspnea and syncope.   Respiratory:  Negative for shortness of breath.    Endocrine: Negative for polyuria.   Hematologic/Lymphatic: Negative for bleeding problem.   Skin:  Negative for itching and rash.   Musculoskeletal:  Positive for muscle weakness. Negative for joint swelling and muscle cramps.   Gastrointestinal:  Negative for abdominal pain, hematemesis, hematochezia, melena, nausea and vomiting.   Genitourinary:  Negative for dysuria and hematuria.   Neurological:  Negative for dizziness, focal weakness, headaches, light-headedness, loss of balance and weakness.   Psychiatric/Behavioral:  Negative for depression. The patient is not nervous/anxious.       Objective:    Physical Exam  Constitutional:       Appearance: She is well-developed.   HENT:      Head: Normocephalic and atraumatic.   Neck:      Vascular: No JVD.   Cardiovascular:      Rate and Rhythm: Normal rate and regular rhythm.      Pulses:           Carotid pulses are 2+ on the right side and 2+ on the left side.       Radial pulses are 2+ on the right side and 2+ on the left side.        Femoral pulses are 2+ on the right side and 2+ on the left side.       Dorsalis pedis pulses are 2+ on the right side and 2+ on the left side.        Posterior tibial pulses are 1+ on the right side and 1+ on the left side.      Heart sounds: Murmur heard.   Harsh midsystolic murmur is present with a grade of 2/6 at the upper right sternal border radiating to the neck.   Pulmonary:      Effort: Pulmonary effort is normal.      Breath sounds: Normal breath sounds.   Abdominal:      General: Bowel sounds are normal.      Palpations: Abdomen is soft.   Musculoskeletal:      Cervical back: Neck supple.   Skin:     General: Skin is warm and dry.   Neurological:      Mental Status: She is alert and oriented to person, place, and time.   Psychiatric:         Behavior: Behavior normal.         Thought Content: Thought content normal.          Assessment:       1. Coronary artery disease involving native coronary artery of native heart without angina pectoris    2. History of coronary artery stent placement    3. Essential hypertension    4. Hyperlipidemia, unspecified hyperlipidemia type    5. Bradycardia    6. Aortic valve stenosis, etiology of cardiac valve disease unspecified    7. Acute myocardial infarction involving left circumflex coronary artery, unspecified MI type    8. Type 2 diabetes mellitus without complication, without long-term current use of insulin    9. Class 3 severe obesity due to excess calories with serious comorbidity and body mass index (BMI) of 40.0 to 44.9 in adult    10. History of GI bleed    11. Gastroesophageal reflux disease with esophagitis without hemorrhage      87 year old patient with hx and presentation as above. Doing well from a cardiac perspective and compensated from a HF perspective. Needs updated 2DE. Symptoms may be multi-factorial including obesity, depression, long Covid. Needs to exercise and lose weight. Discussed the etiology, evaluation, and management of CAD, PCI, CP, AS, HTN, HLD, GENEVIEVE, obesity, DM. Discussed the importance of med compliance, heart healthy diet, and regular exercise.      Plan:       -Continue current medical management  -2DE  -Needs to walk more and be more active - weight loss  -f/u in 1 month

## 2023-03-29 ENCOUNTER — HOSPITAL ENCOUNTER (OUTPATIENT)
Dept: CARDIOLOGY | Facility: HOSPITAL | Age: 88
Discharge: HOME | End: 2023-03-29
Attending: INTERNAL MEDICINE
Payer: COMMERCIAL

## 2023-03-29 VITALS — WEIGHT: 231 LBS | HEIGHT: 63 IN | BODY MASS INDEX: 40.93 KG/M2

## 2023-03-29 DIAGNOSIS — I25.10 CORONARY ARTERY DISEASE INVOLVING NATIVE CORONARY ARTERY OF NATIVE HEART WITHOUT ANGINA PECTORIS: ICD-10-CM

## 2023-03-29 DIAGNOSIS — I35.0 AORTIC VALVE STENOSIS, ETIOLOGY OF CARDIAC VALVE DISEASE UNSPECIFIED: ICD-10-CM

## 2023-03-29 LAB
AV INDEX (PROSTH): 0.46
AV MEAN GRADIENT: 19 MMHG
AV PEAK GRADIENT: 30 MMHG
AV REGURGITATION PRESSURE HALF TIME: 502.36 MS
AV VALVE AREA: 1.74 CM2
AV VELOCITY RATIO: 0.39
BSA FOR ECHO PROCEDURE: 2.16 M2
CV ECHO LV RWT: 0.33 CM
DOP CALC AO PEAK VEL: 2.73 M/S
DOP CALC AO VTI: 73.7 CM
DOP CALC LVOT AREA: 3.8 CM2
DOP CALC LVOT DIAMETER: 2.2 CM
DOP CALC LVOT PEAK VEL: 1.07 M/S
DOP CALC LVOT STROKE VOLUME: 128.04 CM3
DOP CALC MV VTI: 57.4 CM
DOP CALCLVOT PEAK VEL VTI: 33.7 CM
E WAVE DECELERATION TIME: 351.55 MSEC
E/A RATIO: 0.82
E/E' RATIO: 20.5 M/S
ECHO LV POSTERIOR WALL: 0.92 CM (ref 0.6–1.1)
EJECTION FRACTION: 60 %
FRACTIONAL SHORTENING: 40 % (ref 28–44)
INTERVENTRICULAR SEPTUM: 0.86 CM (ref 0.6–1.1)
IVC DIAMETER: 1.55 CM
IVRT: 83.04 MSEC
LA MAJOR: 5.12 CM
LA MINOR: 5.07 CM
LA WIDTH: 3.6 CM
LEFT ATRIUM SIZE: 4.07 CM
LEFT ATRIUM VOLUME INDEX: 30.8 ML/M2
LEFT ATRIUM VOLUME: 63.45 CM3
LEFT INTERNAL DIMENSION IN SYSTOLE: 3.33 CM (ref 2.1–4)
LEFT VENTRICLE DIASTOLIC VOLUME INDEX: 72.64 ML/M2
LEFT VENTRICLE DIASTOLIC VOLUME: 149.63 ML
LEFT VENTRICLE MASS INDEX: 90 G/M2
LEFT VENTRICLE SYSTOLIC VOLUME INDEX: 21.9 ML/M2
LEFT VENTRICLE SYSTOLIC VOLUME: 45.13 ML
LEFT VENTRICULAR INTERNAL DIMENSION IN DIASTOLE: 5.54 CM (ref 3.5–6)
LEFT VENTRICULAR MASS: 185.46 G
LV LATERAL E/E' RATIO: 20.5 M/S
LV SEPTAL E/E' RATIO: 20.5 M/S
LVOT MG: 2.3 MMHG
LVOT MV: 0.72 CM/S
MV A" WAVE DURATION": 115.34 MSEC
MV MEAN GRADIENT: 3 MMHG
MV PEAK A VEL: 1.5 M/S
MV PEAK E VEL: 1.23 M/S
MV PEAK GRADIENT: 9 MMHG
MV STENOSIS PRESSURE HALF TIME: 101.95 MS
MV VALVE AREA BY CONTINUITY EQUATION: 2.23 CM2
MV VALVE AREA P 1/2 METHOD: 2.16 CM2
PISA AR MAX VEL: 3.08 M/S
PISA MRMAX VEL: 5.04 M/S
PISA TR MAX VEL: 2.24 M/S
PV PEAK VELOCITY: 1.08 CM/S
RA MAJOR: 4.35 CM
RA PRESSURE: 3 MMHG
RA WIDTH: 2.8 CM
TDI LATERAL: 0.06 M/S
TDI SEPTAL: 0.06 M/S
TDI: 0.06 M/S
TR MAX PG: 20 MMHG
TV REST PULMONARY ARTERY PRESSURE: 23 MMHG

## 2023-03-29 PROCEDURE — 93306 ECHO (CUPID ONLY): ICD-10-PCS | Mod: 26,,, | Performed by: INTERNAL MEDICINE

## 2023-03-29 PROCEDURE — 93306 TTE W/DOPPLER COMPLETE: CPT | Mod: PO

## 2023-03-29 PROCEDURE — 93306 TTE W/DOPPLER COMPLETE: CPT | Mod: 26,,, | Performed by: INTERNAL MEDICINE

## 2023-04-12 ENCOUNTER — TELEPHONE (OUTPATIENT)
Dept: CARDIOLOGY | Facility: CLINIC | Age: 88
End: 2023-04-12
Payer: COMMERCIAL

## 2023-04-12 NOTE — TELEPHONE ENCOUNTER
Has an appointment with  to discuss   Echo Results.  Pt verbalized understandind & appreciates the call.    Nw                            ----- Message from Hina Ray sent at 4/12/2023 11:10 AM CDT -----  .Type:  Needs Medical Advice    Who Called: pt    Would the patient rather a call back or a response via [facility]? Call back  Best Call Back Number: [phone]  Additional Information:     Pt stated she had taken some tests and never received the results and would like a call back please

## 2023-04-28 NOTE — PROGRESS NOTES
-The pt is at an acceptable risk from a cardiac perspective for upcoming non cardiac procedure (pain procedure, nerve injections).

## 2023-05-03 ENCOUNTER — TELEPHONE (OUTPATIENT)
Dept: CARDIOLOGY | Facility: CLINIC | Age: 88
End: 2023-05-03
Payer: COMMERCIAL

## 2023-05-03 NOTE — TELEPHONE ENCOUNTER
----- Message from Nic Castellanos sent at 5/3/2023  9:14 AM CDT -----  Contact: pt  Type: Requesting to speak with nurse        Who Called: PT  Regarding: would like to speak to Ileana regarding cancelled procedure tomorrow because Dr. Moncada did not send medical clearance to Dr.Du Carrillo  Would the patient rather a call back or a response via [facility]? Call back  Best Call Back Number: [phone]  Additional Information:

## 2023-05-25 ENCOUNTER — OFFICE VISIT (OUTPATIENT)
Dept: CARDIOLOGY | Facility: CLINIC | Age: 88
End: 2023-05-25
Payer: COMMERCIAL

## 2023-05-25 VITALS — SYSTOLIC BLOOD PRESSURE: 132 MMHG | OXYGEN SATURATION: 94 % | HEART RATE: 58 BPM | DIASTOLIC BLOOD PRESSURE: 78 MMHG

## 2023-05-25 DIAGNOSIS — I25.10 CORONARY ARTERY DISEASE INVOLVING NATIVE CORONARY ARTERY OF NATIVE HEART WITHOUT ANGINA PECTORIS: Primary | ICD-10-CM

## 2023-05-25 DIAGNOSIS — I10 ESSENTIAL HYPERTENSION: Chronic | ICD-10-CM

## 2023-05-25 DIAGNOSIS — Z95.5 HISTORY OF CORONARY ARTERY STENT PLACEMENT: ICD-10-CM

## 2023-05-25 DIAGNOSIS — I35.0 AORTIC VALVE STENOSIS, ETIOLOGY OF CARDIAC VALVE DISEASE UNSPECIFIED: Chronic | ICD-10-CM

## 2023-05-25 DIAGNOSIS — E78.5 HYPERLIPIDEMIA, UNSPECIFIED HYPERLIPIDEMIA TYPE: ICD-10-CM

## 2023-05-25 DIAGNOSIS — E66.01 CLASS 3 SEVERE OBESITY DUE TO EXCESS CALORIES WITH SERIOUS COMORBIDITY AND BODY MASS INDEX (BMI) OF 40.0 TO 44.9 IN ADULT: ICD-10-CM

## 2023-05-25 DIAGNOSIS — E11.9 TYPE 2 DIABETES MELLITUS WITHOUT COMPLICATION, WITHOUT LONG-TERM CURRENT USE OF INSULIN: ICD-10-CM

## 2023-05-25 PROCEDURE — 1126F AMNT PAIN NOTED NONE PRSNT: CPT | Mod: CPTII,S$GLB,, | Performed by: INTERNAL MEDICINE

## 2023-05-25 PROCEDURE — 99999 PR PBB SHADOW E&M-EST. PATIENT-LVL IV: ICD-10-PCS | Mod: PBBFAC,,, | Performed by: INTERNAL MEDICINE

## 2023-05-25 PROCEDURE — 3288F PR FALLS RISK ASSESSMENT DOCUMENTED: ICD-10-PCS | Mod: CPTII,S$GLB,, | Performed by: INTERNAL MEDICINE

## 2023-05-25 PROCEDURE — 1126F PR PAIN SEVERITY QUANTIFIED, NO PAIN PRESENT: ICD-10-PCS | Mod: CPTII,S$GLB,, | Performed by: INTERNAL MEDICINE

## 2023-05-25 PROCEDURE — 99214 PR OFFICE/OUTPT VISIT, EST, LEVL IV, 30-39 MIN: ICD-10-PCS | Mod: S$GLB,,, | Performed by: INTERNAL MEDICINE

## 2023-05-25 PROCEDURE — 1101F PR PT FALLS ASSESS DOC 0-1 FALLS W/OUT INJ PAST YR: ICD-10-PCS | Mod: CPTII,S$GLB,, | Performed by: INTERNAL MEDICINE

## 2023-05-25 PROCEDURE — 3078F DIAST BP <80 MM HG: CPT | Mod: CPTII,S$GLB,, | Performed by: INTERNAL MEDICINE

## 2023-05-25 PROCEDURE — 3288F FALL RISK ASSESSMENT DOCD: CPT | Mod: CPTII,S$GLB,, | Performed by: INTERNAL MEDICINE

## 2023-05-25 PROCEDURE — 1101F PT FALLS ASSESS-DOCD LE1/YR: CPT | Mod: CPTII,S$GLB,, | Performed by: INTERNAL MEDICINE

## 2023-05-25 PROCEDURE — 3075F SYST BP GE 130 - 139MM HG: CPT | Mod: CPTII,S$GLB,, | Performed by: INTERNAL MEDICINE

## 2023-05-25 PROCEDURE — 3075F PR MOST RECENT SYSTOLIC BLOOD PRESS GE 130-139MM HG: ICD-10-PCS | Mod: CPTII,S$GLB,, | Performed by: INTERNAL MEDICINE

## 2023-05-25 PROCEDURE — 99999 PR PBB SHADOW E&M-EST. PATIENT-LVL IV: CPT | Mod: PBBFAC,,, | Performed by: INTERNAL MEDICINE

## 2023-05-25 PROCEDURE — 3078F PR MOST RECENT DIASTOLIC BLOOD PRESSURE < 80 MM HG: ICD-10-PCS | Mod: CPTII,S$GLB,, | Performed by: INTERNAL MEDICINE

## 2023-05-25 PROCEDURE — 1159F MED LIST DOCD IN RCRD: CPT | Mod: CPTII,S$GLB,, | Performed by: INTERNAL MEDICINE

## 2023-05-25 PROCEDURE — 1159F PR MEDICATION LIST DOCUMENTED IN MEDICAL RECORD: ICD-10-PCS | Mod: CPTII,S$GLB,, | Performed by: INTERNAL MEDICINE

## 2023-05-25 PROCEDURE — 99214 OFFICE O/P EST MOD 30 MIN: CPT | Mod: S$GLB,,, | Performed by: INTERNAL MEDICINE

## 2023-05-25 RX ORDER — CEPHALEXIN 500 MG/1
500 CAPSULE ORAL 4 TIMES DAILY
COMMUNITY
Start: 2023-05-16

## 2023-05-25 NOTE — PROGRESS NOTES
"Subjective:    Patient ID:  Makenzie Haile is a 87 y.o. female who presents for follow-up of Coronary Artery Disease      HPI    86 y/o female with hx of CAD s/p NSTEMI with subsequent PCI with SURAJ to LCX, HTN, DM, GERD, obesity, mild AS (EF 55-60%, SERAFIN 1.48, PV 2.08, MG 10 from 2DE 2019) who presents for f/u. Presented initially with epigastric pain she thought was heartburn, presented to ED, had elevated trop 0.77, [facility] with LCX lesion s/p PCI with SURAJ, resolution of pain, and discharged home. Subsequent admission for symptomatic anemia from GIB with PRBC transfusion. Plavix stopped and placed on SAPT with ASA.   Last visit had developed left sided chest pressure, SPECT without ischemia (lateral wall infarct/fixed defect), 2DE with normal EF (60%) and AV unable to be evaluated due to poor windows.   Had Covid PNA about 6 weeks ago with full recovery. Had possible flu before that (Covid?).   Has chronic bilateral LE edema which improves with intermittent lasix use. Recently has had worsening bilateral LE edema. Denies SOB, orthopnea, PND, syncope, palps. Tolerating meds well. Non smoker, no EtOH. Has been sedentary bc of pandemic. Stopped taking atorvastatin bc she has been having body and joint aches (chronic) and thought they might be due to statin. Aches have persisted.     3/23/2023:  Had Covid and Covid PNA. Has residual SOB/JEFFRIES, fatigue. Has gained weight, has been less active, and is more sedentary. Was told she has a "leaky valve." 2DE from 2019 reviewed and has mild AS. Denies syncope, acute CHF symptoms, and angina. Has chronic, bilateral LE edema, unchanged. Back on statin and tolerating well.     5/25/2023:  2DE with:  The left ventricle is normal in size with normal systolic function.  The estimated ejection fraction is 60%.  Indeterminate left ventricular diastolic function.  Normal right ventricular size with normal right ventricular systolic function.  There is mild aortic valve stenosis.  Aortic " valve area is 1.74 cm2; peak velocity is 2.73 m/s; mean gradient is 19 mmHg.  Mild aortic regurgitation.  The estimated PA systolic pressure is 23 mmHg.  Normal central venous pressure (3 mmHg).    Main complaints are of bilateral knee pains for which she is receiving injections, however, unable to exercise. No new cardiac symptoms and BP acceptable. Leg wraps helping for edema.    Review of Systems   Constitutional: Positive for malaise/fatigue.   HENT:  Negative for congestion.    Eyes:  Negative for blurred vision.   Cardiovascular:  Positive for leg swelling. Negative for chest pain, claudication, cyanosis, dyspnea on exertion, irregular heartbeat, near-syncope, orthopnea, palpitations, paroxysmal nocturnal dyspnea and syncope.   Respiratory:  Negative for shortness of breath.    Endocrine: Negative for polyuria.   Hematologic/Lymphatic: Negative for bleeding problem.   Skin:  Negative for itching and rash.   Musculoskeletal:  Positive for joint pain, joint swelling and muscle weakness. Negative for muscle cramps.   Gastrointestinal:  Negative for abdominal pain, hematemesis, hematochezia, melena, nausea and vomiting.   Genitourinary:  Negative for dysuria and hematuria.   Neurological:  Negative for dizziness, focal weakness, headaches, light-headedness, loss of balance and weakness.   Psychiatric/Behavioral:  Negative for depression. The patient is not nervous/anxious.       Objective:    Physical Exam  Constitutional:       Appearance: She is well-developed.   HENT:      Head: Normocephalic and atraumatic.   Neck:      Vascular: No JVD.   Cardiovascular:      Rate and Rhythm: Normal rate and regular rhythm.      Pulses:           Carotid pulses are 2+ on the right side and 2+ on the left side.       Radial pulses are 2+ on the right side and 2+ on the left side.        Femoral pulses are 2+ on the right side and 2+ on the left side.       Dorsalis pedis pulses are 2+ on the right side and 2+ on the left side.         Posterior tibial pulses are 1+ on the right side and 1+ on the left side.      Heart sounds: Murmur heard.   Harsh midsystolic murmur is present with a grade of 2/6 at the upper right sternal border radiating to the neck.   Pulmonary:      Effort: Pulmonary effort is normal.      Breath sounds: Normal breath sounds.   Abdominal:      General: Bowel sounds are normal.      Palpations: Abdomen is soft.   Musculoskeletal:      Cervical back: Neck supple.      Right lower leg: Edema present.      Left lower leg: Edema present.   Skin:     General: Skin is warm and dry.   Neurological:      Mental Status: She is alert and oriented to person, place, and time.   Psychiatric:         Behavior: Behavior normal.         Thought Content: Thought content normal.         Assessment:       No diagnosis found.    87 year old patient with hx and presentation as above. Doing well from a cardiac perspective and compensated from a HF perspective. Symptoms may be multi-factorial including obesity, depression, long Covid. Needs to exercise and lose weight. Discussed the etiology, evaluation, and management of CAD, PCI, CP, AS, HTN, HLD, GENEVIEVE, obesity, DM. Discussed the importance of med compliance, heart healthy diet, and regular exercise.      Plan:       -Continue current medical management  -The pt is at an acceptable risk from a cardiac perspective for upcoming non cardiac procedure (pain management/injections). OK to hold ASA lula-procedurally 5 days before and restart after, if necessary.  -Needs to walk more and be more active - weight loss  -f/u in 6 months

## 2023-07-07 ENCOUNTER — TELEPHONE (OUTPATIENT)
Dept: CARDIOLOGY | Facility: CLINIC | Age: 88
End: 2023-07-07
Payer: COMMERCIAL

## 2023-07-07 NOTE — TELEPHONE ENCOUNTER
Patient called asking Dr Moncada for advised on taking pain medication.  Patient was prescribed Toradol 10 mg for knee pain for 5 days only.  She is asking if this this good for her to take being said she could not take antinflamatory meds before.  Please advise.

## 2023-07-07 NOTE — TELEPHONE ENCOUNTER
----- Message from Mayela Xiong sent at 7/7/2023  3:38 PM CDT -----  Type:  Needs Medical Advice    Who Called:  Pt    Would the patient rather a call back or a response via [facility]?  call  Best Call Back Number:  [phone]  Additional Information:  Pt would like a call back regarding her inflammatory pain medication.  Pt has questions and would like a call back as soon as possible.

## 2023-07-10 NOTE — TELEPHONE ENCOUNTER
As per Dr Moncada, there is no contraindication with this medication but he will rather for you to hold on Aspirin while you taking Toradol.  Patient verbalized understanding on instruction given.

## 2023-07-10 NOTE — TELEPHONE ENCOUNTER
----- Message from Noelle Vizcarra MA sent at 7/10/2023  1:03 PM CDT -----  Contact: Pt    ----- Message -----  From: Gaurang Turcios  Sent: 7/10/2023  11:05 AM CDT  To: Anwtan Tanner Staff    .Type:  Needs Medical Advice    Who Called: Pt  Would the patient rather a call back or a response via [facility]? call  Best Call Back Number: [phone]  Additional Information:    Pt stated she received some medication (ketorolac 10 mg  has 5 tablets , 1 a day) by another doctor for her knee pain and she wanted to get advice from the doctor before taking it.  Pt stated she has been in pain an has been calling since last week Friday.

## 2023-10-16 ENCOUNTER — TELEPHONE (OUTPATIENT)
Dept: CARDIOLOGY | Facility: CLINIC | Age: 88
End: 2023-10-16
Payer: MEDICARE

## 2023-10-16 NOTE — TELEPHONE ENCOUNTER
----- Message from Mauro Scott sent at 10/16/2023  9:38 AM CDT -----  Contact: pt  Type:  Appointment Request    Caller is requesting a sooner appointment.  Caller declined first available appointment listed below.  Caller will not accept being placed on the waitlist and is requesting a message be sent to doctor.  Name of Caller:pt   When is the first available appointment?books are closed   Symptoms:6 months in [place]  Would the patient rather a call back or a response via [facility]? call  Best Call Back Number:[phone]  Additional Information:

## 2023-10-16 NOTE — TELEPHONE ENCOUNTER
Patient was contacted and scheduled with Jack Rodriguez for 11/07/23 @ 10:30 am.  Patient will like her next 6 months follow up to be in [place].

## 2024-03-07 DIAGNOSIS — R07.9 CHEST PAIN, UNSPECIFIED TYPE: ICD-10-CM

## 2024-03-07 DIAGNOSIS — E78.5 HYPERLIPIDEMIA, UNSPECIFIED HYPERLIPIDEMIA TYPE: Primary | ICD-10-CM

## 2024-03-07 DIAGNOSIS — R00.1 BRADYCARDIA: ICD-10-CM

## 2024-03-11 ENCOUNTER — OFFICE VISIT (OUTPATIENT)
Dept: CARDIOLOGY | Facility: CLINIC | Age: 89
End: 2024-03-11
Payer: MEDICARE

## 2024-03-11 VITALS
SYSTOLIC BLOOD PRESSURE: 120 MMHG | HEIGHT: 63 IN | BODY MASS INDEX: 41.61 KG/M2 | HEART RATE: 60 BPM | WEIGHT: 234.81 LBS | OXYGEN SATURATION: 96 % | DIASTOLIC BLOOD PRESSURE: 78 MMHG

## 2024-03-11 DIAGNOSIS — I35.0 AORTIC VALVE STENOSIS, ETIOLOGY OF CARDIAC VALVE DISEASE UNSPECIFIED: Primary | Chronic | ICD-10-CM

## 2024-03-11 DIAGNOSIS — E78.5 HYPERLIPIDEMIA, UNSPECIFIED HYPERLIPIDEMIA TYPE: ICD-10-CM

## 2024-03-11 DIAGNOSIS — I10 ESSENTIAL HYPERTENSION: Chronic | ICD-10-CM

## 2024-03-11 DIAGNOSIS — I21.21: ICD-10-CM

## 2024-03-11 DIAGNOSIS — Z95.5 HISTORY OF CORONARY ARTERY STENT PLACEMENT: ICD-10-CM

## 2024-03-11 DIAGNOSIS — E66.01 CLASS 3 SEVERE OBESITY DUE TO EXCESS CALORIES WITH SERIOUS COMORBIDITY AND BODY MASS INDEX (BMI) OF 40.0 TO 44.9 IN ADULT: ICD-10-CM

## 2024-03-11 DIAGNOSIS — I25.10 CORONARY ARTERY DISEASE INVOLVING NATIVE CORONARY ARTERY OF NATIVE HEART WITHOUT ANGINA PECTORIS: ICD-10-CM

## 2024-03-11 DIAGNOSIS — E11.9 TYPE 2 DIABETES MELLITUS WITHOUT COMPLICATION, WITHOUT LONG-TERM CURRENT USE OF INSULIN: ICD-10-CM

## 2024-03-11 DIAGNOSIS — R07.9 CHEST PAIN, UNSPECIFIED TYPE: ICD-10-CM

## 2024-03-11 DIAGNOSIS — R00.1 BRADYCARDIA: ICD-10-CM

## 2024-03-11 PROCEDURE — 93000 ELECTROCARDIOGRAM COMPLETE: CPT | Mod: S$GLB,,, | Performed by: INTERNAL MEDICINE

## 2024-03-11 PROCEDURE — 1125F AMNT PAIN NOTED PAIN PRSNT: CPT | Mod: CPTII,S$GLB,, | Performed by: INTERNAL MEDICINE

## 2024-03-11 PROCEDURE — 1160F RVW MEDS BY RX/DR IN RCRD: CPT | Mod: CPTII,S$GLB,, | Performed by: INTERNAL MEDICINE

## 2024-03-11 PROCEDURE — 99214 OFFICE O/P EST MOD 30 MIN: CPT | Mod: 25,S$GLB,, | Performed by: INTERNAL MEDICINE

## 2024-03-11 PROCEDURE — 1159F MED LIST DOCD IN RCRD: CPT | Mod: CPTII,S$GLB,, | Performed by: INTERNAL MEDICINE

## 2024-03-11 PROCEDURE — 99999 PR PBB SHADOW E&M-EST. PATIENT-LVL V: CPT | Mod: PBBFAC,,, | Performed by: INTERNAL MEDICINE

## 2024-03-11 PROCEDURE — 3288F FALL RISK ASSESSMENT DOCD: CPT | Mod: CPTII,S$GLB,, | Performed by: INTERNAL MEDICINE

## 2024-03-11 PROCEDURE — 1101F PT FALLS ASSESS-DOCD LE1/YR: CPT | Mod: CPTII,S$GLB,, | Performed by: INTERNAL MEDICINE

## 2024-03-11 NOTE — PROGRESS NOTES
"Subjective:    Patient ID:  Makenzie Haile is a 88 y.o. female who presents for follow-up of No chief complaint on file.      HPI    Pt  with hx of CAD s/p NSTEMI with subsequent PCI with SURAJ to LCX, HTN, DM, GERD, obesity, mild AS (EF 55-60%, SERAFIN 1.48, PV 2.08, MG 10 from 2DE 2019) who presents for f/u. Presented initially with epigastric pain she thought was heartburn, presented to ED, had elevated trop 0.77, [facility] with LCX lesion s/p PCI with SURAJ, resolution of pain, and discharged home. Subsequent admission for symptomatic anemia from GIB with PRBC transfusion. Plavix stopped and placed on SAPT with ASA.   Last visit had developed left sided chest pressure, SPECT without ischemia (lateral wall infarct/fixed defect), 2DE with normal EF (60%) and AV unable to be evaluated due to poor windows.   Had Covid PNA about 6 weeks ago with full recovery. Had possible flu before that (Covid?).   Has chronic bilateral LE edema which improves with intermittent lasix use. Recently has had worsening bilateral LE edema. Denies SOB, orthopnea, PND, syncope, palps. Tolerating meds well. Non smoker, no EtOH. Has been sedentary bc of pandemic. Stopped taking atorvastatin bc she has been having body and joint aches (chronic) and thought they might be due to statin. Aches have persisted.     3/23/2023:  Had Covid and Covid PNA. Has residual SOB/JEFFRIES, fatigue. Has gained weight, has been less active, and is more sedentary. Was told she has a "leaky valve." 2DE from 2019 reviewed and has mild AS. Denies syncope, acute CHF symptoms, and angina. Has chronic, bilateral LE edema, unchanged. Back on statin and tolerating well.     5/25/2023:  2DE with:  The left ventricle is normal in size with normal systolic function.  The estimated ejection fraction is 60%.  Indeterminate left ventricular diastolic function.  Normal right ventricular size with normal right ventricular systolic function.  There is mild aortic valve stenosis.  Aortic valve " area is 1.74 cm2; peak velocity is 2.73 m/s; mean gradient is 19 mmHg.  Mild aortic regurgitation.  The estimated PA systolic pressure is 23 mmHg.  Normal central venous pressure (3 mmHg).    Main complaints are of bilateral knee pains for which she is receiving injections, however, unable to exercise. No new cardiac symptoms and BP acceptable. Leg wraps helping for edema.    3/11/2024: Initial visit with me. Former Pt of Dr. Moncada as above.  She is doing well today.  No major cardiovascular complaint.  Compliant with medications.  Chronic lower extremity edema better with leg elevation.  Not very compliant with compression stockings.  LDL above goal however she has not been well compliant with statin        Review of Systems   Constitutional: Negative for chills and fever.   HENT:  Negative for hearing loss and nosebleeds.    Eyes:  Negative for blurred vision.   Cardiovascular:  Positive for leg swelling.        As in HPI   Respiratory:  Negative for hemoptysis and shortness of breath.    Hematologic/Lymphatic: Negative for bleeding problem.   Skin:  Negative for itching.   Musculoskeletal:  Positive for joint pain. Negative for falls.   Gastrointestinal:  Negative for abdominal pain and hematochezia.   Genitourinary:  Negative for hematuria.   Neurological:  Negative for dizziness and loss of balance.   Psychiatric/Behavioral:  Negative for altered mental status and depression.         Objective:    Physical Exam  Constitutional:       Appearance: She is well-developed.   HENT:      Head: Normocephalic and atraumatic.   Eyes:      Conjunctiva/sclera: Conjunctivae normal.   Neck:      Vascular: No JVD.   Cardiovascular:      Rate and Rhythm: Normal rate and regular rhythm.      Pulses:           Carotid pulses are 2+ on the right side and 2+ on the left side.       Radial pulses are 2+ on the right side and 2+ on the left side.        Femoral pulses are 2+ on the right side and 2+ on the left side.        Dorsalis pedis pulses are 2+ on the right side and 2+ on the left side.        Posterior tibial pulses are 1+ on the right side and 1+ on the left side.      Heart sounds: Murmur heard.      Harsh midsystolic murmur is present with a grade of 2/6 at the upper right sternal border radiating to the neck.      No friction rub. No gallop.   Pulmonary:      Effort: Pulmonary effort is normal. No respiratory distress.      Breath sounds: Normal breath sounds. No stridor. No wheezing.   Abdominal:      General: Bowel sounds are normal.      Palpations: Abdomen is soft.   Musculoskeletal:      Cervical back: Neck supple.      Right lower leg: Edema present.      Left lower leg: Edema present.   Skin:     General: Skin is warm and dry.   Neurological:      Mental Status: She is alert and oriented to person, place, and time.   Psychiatric:         Behavior: Behavior normal.         Thought Content: Thought content normal.           Assessment:       1. Aortic valve stenosis, etiology of cardiac valve disease unspecified    2. Acute myocardial infarction involving left circumflex coronary artery, unspecified MI type    3. Coronary artery disease involving native coronary artery of native heart without angina pectoris    4. Essential hypertension    5. History of coronary artery stent placement    6. Hyperlipidemia, unspecified hyperlipidemia type    7. Class 3 severe obesity due to excess calories with serious comorbidity and body mass index (BMI) of 40.0 to 44.9 in adult    8. Type 2 diabetes mellitus without complication, without long-term current use of insulin    9. Bradycardia            Plan:       Continue medical therapy for coronary artery disease.  Repeat lipid panel.  LDL goal lower than 70.  Compliant with the statin discussed.  We will repeat the lipid panel after three-month    Blood pressure well-controlled.  Continue current regimen    Significant lower extremity edema.  Could be venous insufficiency.   Discussed venous ultrasound.  She would like to think about it    Weight loss encouraged    Echocardiogram for aortic stenosis monitoring.  Continue aspirin  Weight loss    Six-month follow-up or sooner p.r.n.

## 2024-03-12 LAB
OHS QRS DURATION: 100 MS
OHS QTC CALCULATION: 392 MS

## 2024-06-11 ENCOUNTER — LAB VISIT (OUTPATIENT)
Dept: LAB | Facility: HOSPITAL | Age: 89
End: 2024-06-11
Attending: INTERNAL MEDICINE
Payer: MEDICARE

## 2024-06-11 DIAGNOSIS — E78.5 HYPERLIPIDEMIA, UNSPECIFIED HYPERLIPIDEMIA TYPE: ICD-10-CM

## 2024-06-11 LAB
CHOLEST SERPL-MCNC: 98 MG/DL (ref 120–199)
CHOLEST/HDLC SERPL: 2.6 {RATIO} (ref 2–5)
HDLC SERPL-MCNC: 38 MG/DL (ref 40–75)
HDLC SERPL: 38.8 % (ref 20–50)
LDLC SERPL CALC-MCNC: 40.8 MG/DL (ref 63–159)
NONHDLC SERPL-MCNC: 60 MG/DL
TRIGL SERPL-MCNC: 96 MG/DL (ref 30–150)

## 2024-06-11 PROCEDURE — 80061 LIPID PANEL: CPT | Performed by: INTERNAL MEDICINE

## 2024-06-11 PROCEDURE — 36415 COLL VENOUS BLD VENIPUNCTURE: CPT | Mod: PN | Performed by: INTERNAL MEDICINE

## 2024-06-22 ENCOUNTER — HOSPITAL ENCOUNTER (INPATIENT)
Facility: HOSPITAL | Age: 89
LOS: 3 days | Discharge: NURSING, REHAB OR LONG-TERM CARE | DRG: 690 | End: 2024-06-26
Attending: EMERGENCY MEDICINE | Admitting: INTERNAL MEDICINE
Payer: MEDICARE

## 2024-06-22 DIAGNOSIS — R07.9 CHEST PAIN: ICD-10-CM

## 2024-06-22 DIAGNOSIS — I87.2 VENOUS STASIS DERMATITIS OF BOTH LOWER EXTREMITIES: ICD-10-CM

## 2024-06-22 DIAGNOSIS — N30.00 ACUTE CYSTITIS WITHOUT HEMATURIA: ICD-10-CM

## 2024-06-22 DIAGNOSIS — L24.A2 IRRITANT CONTACT DERMATITIS DUE TO URINARY INCONTINENCE: ICD-10-CM

## 2024-06-22 DIAGNOSIS — M17.11 OSTEOARTHRITIS OF RIGHT KNEE, UNSPECIFIED OSTEOARTHRITIS TYPE: Primary | ICD-10-CM

## 2024-06-22 DIAGNOSIS — I35.0 MODERATE TO SEVERE AORTIC STENOSIS: ICD-10-CM

## 2024-06-22 DIAGNOSIS — R26.2 AMBULATORY DYSFUNCTION: ICD-10-CM

## 2024-06-22 DIAGNOSIS — R94.31 ABNORMAL FINDING ON EKG: ICD-10-CM

## 2024-06-22 DIAGNOSIS — R53.81 PHYSICAL DECONDITIONING: ICD-10-CM

## 2024-06-22 DIAGNOSIS — T46.6X5A MYALGIA DUE TO STATIN: ICD-10-CM

## 2024-06-22 DIAGNOSIS — M79.10 MYALGIA DUE TO STATIN: ICD-10-CM

## 2024-06-22 DIAGNOSIS — M17.11 TRICOMPARTMENT OSTEOARTHRITIS OF RIGHT KNEE: ICD-10-CM

## 2024-06-22 DIAGNOSIS — D64.9 NORMOCYTIC ANEMIA: ICD-10-CM

## 2024-06-22 DIAGNOSIS — I50.30 (HFPEF) HEART FAILURE WITH PRESERVED EJECTION FRACTION: ICD-10-CM

## 2024-06-22 DIAGNOSIS — R29.898 BILATERAL LEG WEAKNESS: ICD-10-CM

## 2024-06-22 DIAGNOSIS — B37.2 CANDIDA INFECTION OF FLEXURAL SKIN: ICD-10-CM

## 2024-06-22 DIAGNOSIS — R60.0 BILATERAL LEG EDEMA: ICD-10-CM

## 2024-06-22 PROBLEM — R62.7 FAILURE TO THRIVE IN ADULT: Status: ACTIVE | Noted: 2024-06-22

## 2024-06-22 PROBLEM — N39.0 UTI (URINARY TRACT INFECTION): Status: ACTIVE | Noted: 2024-06-22

## 2024-06-22 LAB
ALBUMIN SERPL BCP-MCNC: 3.3 G/DL (ref 3.5–5.2)
ALP SERPL-CCNC: 48 U/L (ref 55–135)
ALT SERPL W/O P-5'-P-CCNC: 12 U/L (ref 10–44)
ANION GAP SERPL CALC-SCNC: 10 MMOL/L (ref 8–16)
AST SERPL-CCNC: 29 U/L (ref 10–40)
BACTERIA #/AREA URNS HPF: ABNORMAL /HPF
BASOPHILS # BLD AUTO: 0.1 K/UL (ref 0–0.2)
BASOPHILS NFR BLD: 1.3 % (ref 0–1.9)
BILIRUB SERPL-MCNC: 0.5 MG/DL (ref 0.1–1)
BILIRUB UR QL STRIP: NEGATIVE
BUN SERPL-MCNC: 31 MG/DL (ref 8–23)
CALCIUM SERPL-MCNC: 10 MG/DL (ref 8.7–10.5)
CHLORIDE SERPL-SCNC: 107 MMOL/L (ref 95–110)
CK SERPL-CCNC: 53 U/L (ref 20–180)
CLARITY UR: ABNORMAL
CO2 SERPL-SCNC: 25 MMOL/L (ref 23–29)
COLOR UR: YELLOW
CREAT SERPL-MCNC: 0.9 MG/DL (ref 0.5–1.4)
DIFFERENTIAL METHOD BLD: ABNORMAL
EOSINOPHIL # BLD AUTO: 0.4 K/UL (ref 0–0.5)
EOSINOPHIL NFR BLD: 4.7 % (ref 0–8)
ERYTHROCYTE [DISTWIDTH] IN BLOOD BY AUTOMATED COUNT: 14.6 % (ref 11.5–14.5)
EST. GFR  (NO RACE VARIABLE): >60 ML/MIN/1.73 M^2
ESTIMATED AVG GLUCOSE: 123 MG/DL (ref 68–131)
FERRITIN SERPL-MCNC: 156 NG/ML (ref 20–300)
FOLATE SERPL-MCNC: 14 NG/ML (ref 4–24)
GLUCOSE SERPL-MCNC: 111 MG/DL (ref 70–110)
GLUCOSE UR QL STRIP: NEGATIVE
HBA1C MFR BLD: 5.9 % (ref 4–5.6)
HCT VFR BLD AUTO: 34.3 % (ref 37–48.5)
HGB BLD-MCNC: 11.2 G/DL (ref 12–16)
HGB UR QL STRIP: NEGATIVE
IMM GRANULOCYTES # BLD AUTO: 0.02 K/UL (ref 0–0.04)
IMM GRANULOCYTES NFR BLD AUTO: 0.3 % (ref 0–0.5)
IRON SERPL-MCNC: 37 UG/DL (ref 30–160)
KETONES UR QL STRIP: NEGATIVE
LEUKOCYTE ESTERASE UR QL STRIP: ABNORMAL
LYMPHOCYTES # BLD AUTO: 1.5 K/UL (ref 1–4.8)
LYMPHOCYTES NFR BLD: 18.8 % (ref 18–48)
MAGNESIUM SERPL-MCNC: 2.2 MG/DL (ref 1.6–2.6)
MCH RBC QN AUTO: 28.4 PG (ref 27–31)
MCHC RBC AUTO-ENTMCNC: 32.7 G/DL (ref 32–36)
MCV RBC AUTO: 87 FL (ref 82–98)
MICROSCOPIC COMMENT: ABNORMAL
MONOCYTES # BLD AUTO: 0.7 K/UL (ref 0.3–1)
MONOCYTES NFR BLD: 9.2 % (ref 4–15)
NEUTROPHILS # BLD AUTO: 5.1 K/UL (ref 1.8–7.7)
NEUTROPHILS NFR BLD: 65.7 % (ref 38–73)
NITRITE UR QL STRIP: POSITIVE
NRBC BLD-RTO: 0 /100 WBC
PH UR STRIP: 7 [PH] (ref 5–8)
PHOSPHATE SERPL-MCNC: 3.1 MG/DL (ref 2.7–4.5)
PLATELET # BLD AUTO: 214 K/UL (ref 150–450)
PMV BLD AUTO: 10.8 FL (ref 9.2–12.9)
POCT GLUCOSE: 103 MG/DL (ref 70–110)
POTASSIUM SERPL-SCNC: 5.3 MMOL/L (ref 3.5–5.1)
PROT SERPL-MCNC: 7.7 G/DL (ref 6–8.4)
PROT UR QL STRIP: NEGATIVE
RBC # BLD AUTO: 3.94 M/UL (ref 4–5.4)
RBC #/AREA URNS HPF: 3 /HPF (ref 0–4)
SATURATED IRON: 12 % (ref 20–50)
SODIUM SERPL-SCNC: 142 MMOL/L (ref 136–145)
SP GR UR STRIP: 1.01 (ref 1–1.03)
TOTAL IRON BINDING CAPACITY: 302 UG/DL (ref 250–450)
TRANSFERRIN SERPL-MCNC: 204 MG/DL (ref 200–375)
TSH SERPL DL<=0.005 MIU/L-ACNC: 0.86 UIU/ML (ref 0.4–4)
URN SPEC COLLECT METH UR: ABNORMAL
UROBILINOGEN UR STRIP-ACNC: NEGATIVE EU/DL
VIT B12 SERPL-MCNC: 560 PG/ML (ref 210–950)
WBC # BLD AUTO: 7.71 K/UL (ref 3.9–12.7)
WBC #/AREA URNS HPF: >100 /HPF (ref 0–5)

## 2024-06-22 PROCEDURE — 85025 COMPLETE CBC W/AUTO DIFF WBC: CPT | Performed by: EMERGENCY MEDICINE

## 2024-06-22 PROCEDURE — 87086 URINE CULTURE/COLONY COUNT: CPT | Performed by: EMERGENCY MEDICINE

## 2024-06-22 PROCEDURE — 96372 THER/PROPH/DIAG INJ SC/IM: CPT

## 2024-06-22 PROCEDURE — 83036 HEMOGLOBIN GLYCOSYLATED A1C: CPT

## 2024-06-22 PROCEDURE — G0378 HOSPITAL OBSERVATION PER HR: HCPCS

## 2024-06-22 PROCEDURE — 83735 ASSAY OF MAGNESIUM: CPT | Performed by: EMERGENCY MEDICINE

## 2024-06-22 PROCEDURE — 87186 SC STD MICRODIL/AGAR DIL: CPT | Performed by: EMERGENCY MEDICINE

## 2024-06-22 PROCEDURE — 97530 THERAPEUTIC ACTIVITIES: CPT

## 2024-06-22 PROCEDURE — 93005 ELECTROCARDIOGRAM TRACING: CPT

## 2024-06-22 PROCEDURE — 81000 URINALYSIS NONAUTO W/SCOPE: CPT | Performed by: EMERGENCY MEDICINE

## 2024-06-22 PROCEDURE — 82728 ASSAY OF FERRITIN: CPT

## 2024-06-22 PROCEDURE — 93010 ELECTROCARDIOGRAM REPORT: CPT | Mod: ,,, | Performed by: INTERNAL MEDICINE

## 2024-06-22 PROCEDURE — 25000003 PHARM REV CODE 250

## 2024-06-22 PROCEDURE — 82746 ASSAY OF FOLIC ACID SERUM: CPT

## 2024-06-22 PROCEDURE — 63600175 PHARM REV CODE 636 W HCPCS

## 2024-06-22 PROCEDURE — 84100 ASSAY OF PHOSPHORUS: CPT

## 2024-06-22 PROCEDURE — 87077 CULTURE AEROBIC IDENTIFY: CPT | Performed by: EMERGENCY MEDICINE

## 2024-06-22 PROCEDURE — 82607 VITAMIN B-12: CPT

## 2024-06-22 PROCEDURE — 97161 PT EVAL LOW COMPLEX 20 MIN: CPT

## 2024-06-22 PROCEDURE — 80053 COMPREHEN METABOLIC PANEL: CPT | Performed by: EMERGENCY MEDICINE

## 2024-06-22 PROCEDURE — 84443 ASSAY THYROID STIM HORMONE: CPT

## 2024-06-22 PROCEDURE — 83540 ASSAY OF IRON: CPT

## 2024-06-22 PROCEDURE — 82550 ASSAY OF CK (CPK): CPT

## 2024-06-22 PROCEDURE — 87088 URINE BACTERIA CULTURE: CPT | Performed by: EMERGENCY MEDICINE

## 2024-06-22 PROCEDURE — 25000003 PHARM REV CODE 250: Performed by: INTERNAL MEDICINE

## 2024-06-22 PROCEDURE — 63600175 PHARM REV CODE 636 W HCPCS: Performed by: EMERGENCY MEDICINE

## 2024-06-22 RX ORDER — SODIUM CHLORIDE 0.9 % (FLUSH) 0.9 %
10 SYRINGE (ML) INJECTION EVERY 12 HOURS PRN
Status: DISCONTINUED | OUTPATIENT
Start: 2024-06-22 | End: 2024-06-26 | Stop reason: HOSPADM

## 2024-06-22 RX ORDER — NIFEDIPINE 60 MG/1
60 TABLET, EXTENDED RELEASE ORAL DAILY
Status: DISCONTINUED | OUTPATIENT
Start: 2024-06-22 | End: 2024-06-26 | Stop reason: HOSPADM

## 2024-06-22 RX ORDER — PNV NO.95/FERROUS FUM/FOLIC AC 28MG-0.8MG
100 TABLET ORAL DAILY
Status: DISCONTINUED | OUTPATIENT
Start: 2024-06-22 | End: 2024-06-26 | Stop reason: HOSPADM

## 2024-06-22 RX ORDER — FUROSEMIDE 10 MG/ML
40 INJECTION INTRAMUSCULAR; INTRAVENOUS
Status: COMPLETED | OUTPATIENT
Start: 2024-06-22 | End: 2024-06-22

## 2024-06-22 RX ORDER — CLOTRIMAZOLE 1 %
CREAM (GRAM) TOPICAL
COMMUNITY
Start: 2024-05-16 | End: 2024-06-22

## 2024-06-22 RX ORDER — TALC
6 POWDER (GRAM) TOPICAL NIGHTLY PRN
Status: DISCONTINUED | OUTPATIENT
Start: 2024-06-22 | End: 2024-06-23

## 2024-06-22 RX ORDER — NYSTATIN 100000 U/G
CREAM TOPICAL 3 TIMES DAILY
COMMUNITY
Start: 2024-01-31 | End: 2024-06-22

## 2024-06-22 RX ORDER — ACETAMINOPHEN 500 MG
500 TABLET ORAL EVERY 6 HOURS PRN
COMMUNITY

## 2024-06-22 RX ORDER — PREGABALIN 75 MG/1
75 CAPSULE ORAL 2 TIMES DAILY
COMMUNITY
Start: 2024-06-17

## 2024-06-22 RX ORDER — ASCORBIC ACID 500 MG
500 TABLET ORAL DAILY
Status: DISCONTINUED | OUTPATIENT
Start: 2024-06-22 | End: 2024-06-26 | Stop reason: HOSPADM

## 2024-06-22 RX ORDER — ACETAMINOPHEN 325 MG/1
650 TABLET ORAL EVERY 4 HOURS PRN
Status: DISCONTINUED | OUTPATIENT
Start: 2024-06-22 | End: 2024-06-23

## 2024-06-22 RX ORDER — MICONAZOLE NITRATE 2 %
POWDER (GRAM) TOPICAL 2 TIMES DAILY
Status: DISCONTINUED | OUTPATIENT
Start: 2024-06-22 | End: 2024-06-26 | Stop reason: HOSPADM

## 2024-06-22 RX ORDER — CARVEDILOL 3.12 MG/1
3.12 TABLET ORAL 2 TIMES DAILY WITH MEALS
Status: DISCONTINUED | OUTPATIENT
Start: 2024-06-22 | End: 2024-06-26 | Stop reason: HOSPADM

## 2024-06-22 RX ORDER — GLUCAGON 1 MG
1 KIT INJECTION
Status: DISCONTINUED | OUTPATIENT
Start: 2024-06-22 | End: 2024-06-26 | Stop reason: HOSPADM

## 2024-06-22 RX ORDER — ASPIRIN 81 MG/1
81 TABLET ORAL DAILY
Status: DISCONTINUED | OUTPATIENT
Start: 2024-06-22 | End: 2024-06-26 | Stop reason: HOSPADM

## 2024-06-22 RX ORDER — IBUPROFEN 200 MG
24 TABLET ORAL
Status: DISCONTINUED | OUTPATIENT
Start: 2024-06-22 | End: 2024-06-26 | Stop reason: HOSPADM

## 2024-06-22 RX ORDER — PREGABALIN 75 MG/1
75 CAPSULE ORAL 2 TIMES DAILY
Status: DISCONTINUED | OUTPATIENT
Start: 2024-06-22 | End: 2024-06-26 | Stop reason: HOSPADM

## 2024-06-22 RX ORDER — MECLIZINE HCL 12.5 MG 12.5 MG/1
TABLET ORAL
COMMUNITY
Start: 2024-05-06 | End: 2024-06-22

## 2024-06-22 RX ORDER — OXYBUTYNIN CHLORIDE 5 MG/1
10 TABLET, EXTENDED RELEASE ORAL DAILY
Status: DISCONTINUED | OUTPATIENT
Start: 2024-06-22 | End: 2024-06-26 | Stop reason: HOSPADM

## 2024-06-22 RX ORDER — TALC
6 POWDER (GRAM) TOPICAL NIGHTLY PRN
Status: DISCONTINUED | OUTPATIENT
Start: 2024-06-22 | End: 2024-06-22

## 2024-06-22 RX ORDER — CAPSAICIN 0.75 MG/G
CREAM TOPICAL 3 TIMES DAILY PRN
Status: DISCONTINUED | OUTPATIENT
Start: 2024-06-22 | End: 2024-06-24

## 2024-06-22 RX ORDER — MULTIVIT,IRON,MINERALS/LUTEIN
1 TABLET ORAL DAILY
COMMUNITY

## 2024-06-22 RX ORDER — LOSARTAN POTASSIUM 50 MG/1
100 TABLET ORAL DAILY
Status: DISCONTINUED | OUTPATIENT
Start: 2024-06-22 | End: 2024-06-26 | Stop reason: HOSPADM

## 2024-06-22 RX ORDER — TRIAMCINOLONE ACETONIDE 1 MG/G
CREAM TOPICAL
COMMUNITY
Start: 2023-12-27 | End: 2024-06-22

## 2024-06-22 RX ORDER — LIDOCAINE 50 MG/G
1 PATCH TOPICAL
COMMUNITY
Start: 2024-03-07 | End: 2024-06-22

## 2024-06-22 RX ORDER — ENOXAPARIN SODIUM 100 MG/ML
40 INJECTION SUBCUTANEOUS EVERY 24 HOURS
Status: DISCONTINUED | OUTPATIENT
Start: 2024-06-22 | End: 2024-06-26 | Stop reason: HOSPADM

## 2024-06-22 RX ORDER — CEFTRIAXONE 1 G/1
1 INJECTION, POWDER, FOR SOLUTION INTRAMUSCULAR; INTRAVENOUS
Status: DISCONTINUED | OUTPATIENT
Start: 2024-06-22 | End: 2024-06-23

## 2024-06-22 RX ORDER — CALCIUM CARBONATE 260MG(650)
100 TABLET,CHEWABLE ORAL DAILY
COMMUNITY

## 2024-06-22 RX ORDER — NAPROXEN 500 MG/1
500 TABLET ORAL 2 TIMES DAILY
COMMUNITY
Start: 2024-03-07 | End: 2024-06-22

## 2024-06-22 RX ORDER — MUPIROCIN 20 MG/G
OINTMENT TOPICAL
COMMUNITY
Start: 2024-05-28 | End: 2024-06-22

## 2024-06-22 RX ORDER — IBUPROFEN 200 MG
16 TABLET ORAL
Status: DISCONTINUED | OUTPATIENT
Start: 2024-06-22 | End: 2024-06-26 | Stop reason: HOSPADM

## 2024-06-22 RX ORDER — CIPROFLOXACIN 500 MG/1
500 TABLET ORAL 2 TIMES DAILY
COMMUNITY
Start: 2024-02-16 | End: 2024-06-22

## 2024-06-22 RX ORDER — OXYBUTYNIN CHLORIDE 10 MG/1
10 TABLET, EXTENDED RELEASE ORAL DAILY
COMMUNITY
Start: 2024-03-19

## 2024-06-22 RX ORDER — FUROSEMIDE 40 MG/1
40 TABLET ORAL
COMMUNITY
Start: 2024-02-03 | End: 2024-06-22

## 2024-06-22 RX ORDER — HYDROXYZINE HYDROCHLORIDE 10 MG/1
10 TABLET, FILM COATED ORAL ONCE AS NEEDED
Status: DISCONTINUED | OUTPATIENT
Start: 2024-06-22 | End: 2024-06-22

## 2024-06-22 RX ORDER — EZETIMIBE 10 MG/1
10 TABLET ORAL DAILY
Status: ON HOLD | COMMUNITY
Start: 2024-02-03 | End: 2024-06-26 | Stop reason: HOSPADM

## 2024-06-22 RX ORDER — FUROSEMIDE 40 MG/1
40 TABLET ORAL DAILY
Status: DISCONTINUED | OUTPATIENT
Start: 2024-06-23 | End: 2024-06-24

## 2024-06-22 RX ORDER — INSULIN ASPART 100 [IU]/ML
0-5 INJECTION, SOLUTION INTRAVENOUS; SUBCUTANEOUS
Status: DISCONTINUED | OUTPATIENT
Start: 2024-06-22 | End: 2024-06-26 | Stop reason: HOSPADM

## 2024-06-22 RX ORDER — PNV NO.95/FERROUS FUM/FOLIC AC 28MG-0.8MG
100 TABLET ORAL DAILY
COMMUNITY

## 2024-06-22 RX ORDER — SULFAMETHOXAZOLE AND TRIMETHOPRIM 800; 160 MG/1; MG/1
TABLET ORAL
COMMUNITY
Start: 2024-03-21 | End: 2024-06-22

## 2024-06-22 RX ADMIN — OXYCODONE HYDROCHLORIDE AND ACETAMINOPHEN 500 MG: 500 TABLET ORAL at 12:06

## 2024-06-22 RX ADMIN — ASPIRIN 81 MG: 81 TABLET, COATED ORAL at 12:06

## 2024-06-22 RX ADMIN — FUROSEMIDE 40 MG: 10 INJECTION, SOLUTION INTRAVENOUS at 08:06

## 2024-06-22 RX ADMIN — LOSARTAN POTASSIUM 100 MG: 50 TABLET, FILM COATED ORAL at 12:06

## 2024-06-22 RX ADMIN — MICONAZOLE NITRATE: 20 POWDER TOPICAL at 09:06

## 2024-06-22 RX ADMIN — CEFTRIAXONE SODIUM 1 G: 1 INJECTION, POWDER, FOR SOLUTION INTRAMUSCULAR; INTRAVENOUS at 12:06

## 2024-06-22 RX ADMIN — OXYBUTYNIN CHLORIDE 10 MG: 5 TABLET, EXTENDED RELEASE ORAL at 12:06

## 2024-06-22 RX ADMIN — CARVEDILOL 3.12 MG: 3.12 TABLET, FILM COATED ORAL at 04:06

## 2024-06-22 RX ADMIN — ENOXAPARIN SODIUM 40 MG: 40 INJECTION SUBCUTANEOUS at 04:06

## 2024-06-22 RX ADMIN — PREGABALIN 75 MG: 75 CAPSULE ORAL at 09:06

## 2024-06-22 RX ADMIN — Medication 100 MCG: at 12:06

## 2024-06-22 RX ADMIN — PREGABALIN 75 MG: 75 CAPSULE ORAL at 12:06

## 2024-06-22 RX ADMIN — Medication 6 MG: at 09:06

## 2024-06-22 RX ADMIN — NIFEDIPINE 60 MG: 60 TABLET, FILM COATED, EXTENDED RELEASE ORAL at 12:06

## 2024-06-22 RX ADMIN — ACETAMINOPHEN 650 MG: 325 TABLET ORAL at 11:06

## 2024-06-22 RX ADMIN — Medication: at 11:06

## 2024-06-22 RX ADMIN — CAPSAICIN: 0.75 CREAM TOPICAL at 09:06

## 2024-06-22 RX ADMIN — MICONAZOLE NITRATE: 20 POWDER TOPICAL at 12:06

## 2024-06-22 RX ADMIN — THERA TABS 1 TABLET: TAB at 12:06

## 2024-06-22 NOTE — Clinical Note
Diagnosis: Ambulatory dysfunction [[id number]]   Future Attending Provider: CHAPINCITO COWART [[id number]]

## 2024-06-22 NOTE — PHARMACY MED REC
"  Admission Medication History     The home medication history was taken by Anuradha Hart CPhT.    Medication history obtained from, Patient and Patient's Daughter Verified     You may go to "Admission" then "Reconcile Home Medications" tabs to review and/or act upon these items.     The home medication list has been updated by the Pharmacy department.   Please read ALL comments highlighted in yellow.   Please address this information as you see fit.    Feel free to contact us if you have any questions or require assistance.      The medications listed below were removed from the home medication list.  Please reorder if appropriate:  Patient reports no longer taking the following medication(s):  Alprazolam 0.25 mg  Bactrim -160mg  Cephalexin 500 mg  Cipro 500 mg  Clotrimazole 1% cream  Ferrous Sulfate 325 mg  Furosemide 20 mg and 40 mg  Lidoderm 5% patch  Lisinopril 20 mg  Meclizine 12.5 mg and 25 mg  Metformin 1000 mg  Methocarbamol 750 mg  Mupirocin 2% ointment   Naproxen 500 mg  Nystatin cream  Triamcinolone 0.1% cream      Anuradha Hart CPhT.  Ext [phone]             .          "

## 2024-06-22 NOTE — ED PROVIDER NOTES
Encounter Date: 6/22/2024       History     Chief Complaint   Patient presents with    Extremity Weakness     C/o BLE weakness. States she lives alone with a family member nearby to assist with needs. Normally uses a walker to ambulate to BR but is essentially bedbound otherwise. States she feels like she is getting weaker and is no longer able to ambulate and care for self. States she is planning to go into hospice on Monday.      88-year-old female past medical history including  CAD s/p NSTEMI s/p stent, HTN, DM, GERD, obesity, mild AS (EF 55-60%) who brought in by EMS from home with ambulatory dysfunction.  Patient says that for the past 3 days she has had gradually worsening inability to walk due to acute on chronic pain in her right knee.  She has he has a history of osteoarthritis in her right knee but can not have surgery due to her comorbidities.  She says that she lives alone at home but is scheduled to have home health starting this upcoming Monday.  She called EMS because she was having a hard time getting up to walk due to her discomfort.  She denies any falls or trauma.  She denies chest pain, shortness of breath, abdominal pain, vomiting, diarrhea, fever, chills, dysuria, hematuria.  She reports that she has chronic swelling in her bilateral lower extremities that is treated with diuretics which she did not take over the past few days because she as having a hard time getting up to void.  EMS reports that patient was able to bear weight, required assistance to transition onto stretcher.    The history is provided by the patient and the EMS personnel.     Review of patient's allergies indicates:   Allergen Reactions    Iodine and iodide containing products     Peanut butter flavor     Sulfa (sulfonamide antibiotics) Hives     Past Medical History:   Diagnosis Date    Acute myocardial infarction involving left circumflex coronary artery 3/19/2018    Diabetes mellitus     Hyperlipemia     Hypertension       Past Surgical History:   Procedure Laterality Date    COLONOSCOPY N/A 2/18/2019    Procedure: COLONOSCOPY;  Surgeon: Buzz Meyer MD;  Location: [facility] ENDO;  Service: Endoscopy;  Laterality: N/A;    COLONOSCOPY N/A 2/18/2019    Procedure: COLONOSCOPY;  Surgeon: Buzz Meyer MD;  Location: [facility] ENDO;  Service: Endoscopy;  Laterality: N/A;    CORONARY ANGIOPLASTY WITH STENT PLACEMENT  03/19/2018    ESOPHAGOGASTRODUODENOSCOPY N/A 2/18/2019    Procedure: EGD (ESOPHAGOGASTRODUODENOSCOPY);  Surgeon: Buzz Meyer MD;  Location: [facility] ENDO;  Service: Endoscopy;  Laterality: N/A;    ESOPHAGOGASTRODUODENOSCOPY N/A 2/18/2019    Procedure: EGD (ESOPHAGOGASTRODUODENOSCOPY);  Surgeon: Buzz Meyer MD;  Location: [facility] END[facility];  Service: Endoscopy;  Laterality: N/A;     Family History   Problem Relation Name Age of Onset    No Known Problems Mother      Heart disease Father       Social History     Tobacco Use    Smoking status: Never   Substance Use Topics    Alcohol use: No    Drug use: No     Review of Systems    Physical Exam     Initial Vitals [06/22/24 0708]   BP Pulse Resp Temp SpO2   (!) 163/62 72 20 98.1 °F (36.7 °C) (!) 94 %      MAP       --         Physical Exam    Nursing note and vitals reviewed.  Constitutional: She appears well-developed. She is not diaphoretic. No distress.   HENT:   Head: Normocephalic and atraumatic.   Eyes: EOM are normal. Pupils are equal, round, and reactive to light.   Neck: Neck supple.   Normal range of motion.  Cardiovascular:  Normal rate.           Pulmonary/Chest: No respiratory distress.   Abdominal: Abdomen is soft. She exhibits no distension. There is no abdominal tenderness.   Genitourinary:    Genitourinary Comments: Moisture lesions over buttocks with small blistering, and dorsal proximal thighs 2/2 urine.      Musculoskeletal:         General: Normal range of motion.      Cervical back: Normal range of motion and neck supple.      Comments: Chronic LE swelling  with overlying venous stasis dermatitis. Mild tenderness overlying R knee. No joint effusion, micromotion pain or overlying erythema.     Neurological: She is alert and oriented to person, place, and time.   Able to range bilateral LE equally, although overall strength diminished. Sensation intact bilaterally.    Skin: Skin is warm and dry.   Psychiatric: She has a normal mood and affect.         ED Course   Procedures  Labs Reviewed   CBC W/ AUTO DIFFERENTIAL - Abnormal; Notable for the following components:       Result Value    RBC 3.94 (*)     Hemoglobin 11.2 (*)     Hematocrit 34.3 (*)     RDW 14.6 (*)     All other components within normal limits   COMPREHENSIVE METABOLIC PANEL - Abnormal; Notable for the following components:    Potassium 5.3 (*)     Glucose 111 (*)     BUN 31 (*)     Albumin 3.3 (*)     Alkaline Phosphatase 48 (*)     All other components within normal limits   URINALYSIS, REFLEX TO URINE CULTURE - Abnormal; Notable for the following components:    Appearance, UA Hazy (*)     Nitrite, UA Positive (*)     Leukocytes, UA 3+ (*)     All other components within normal limits    Narrative:     Specimen Source->Urine   URINALYSIS MICROSCOPIC - Abnormal; Notable for the following components:    WBC, UA >100 (*)     All other components within normal limits    Narrative:     Specimen Source->Urine   CULTURE, URINE   MAGNESIUM          Imaging Results              X-Ray Knee 3 View Right (Final result)  Result time 06/22/24 08:22:49      Final result by Michael Bustamante DO (06/22/24 08:22:49)                   Impression:      No acute fracture or dislocation.    Tricompartmental osteoarthritis as above.      Electronically signed by: Michael Bustamante  Date:    06/22/2024  Time:    08:22               Narrative:    EXAMINATION:  XR KNEE 3 VIEW RIGHT    CLINICAL HISTORY:  Pain in right knee    TECHNIQUE:  AP, lateral, and Merchant views of the right knee were  performed.    COMPARISON:  None    FINDINGS:  There is osteopenia.  There is no evidence of an acute fracture or dislocation of the knee.  Alignment is normal.  There is severe joint space narrowing of the lateral compartment, moderate joint space narrowing of the medial and patellofemoral compartments with marginal osteophytes.  There is a small suprapatellar joint effusion.  There are vascular calcifications.                                       Medications   furosemide injection 40 mg (40 mg Intravenous Given 6/22/24 0809)     Medical Decision Making  88-year-old female with past medical history as above brought in by EMS from home due to ambulatory dysfunction.  Upon arrival patient is afebrile, stable vital signs.  Differential includes but isn't limited to deconditioning, fracture, arthritis, RANDELL, electrolyte abnormality. Pt without any focal neurological deficits.  No acute lab findings.  R knee w/o signs or sxs of infection, Xray shows chronic osteoarthritic changes.  Discussed with [facility] and admitted for further workup and management of ambulatory dysfunction.  UA pending at time of admission.    Amount and/or Complexity of Data Reviewed  Independent Historian: EMS     Details: As documented above   External Data Reviewed: notes.     Details: Seen 3/10/24 by Dr. Mirza for f/u   Labs: ordered. Decision-making details documented in ED Course.  Radiology: ordered and independent interpretation performed.    Risk  OTC drugs.  Prescription drug management.  Decision regarding hospitalization.  Diagnosis or treatment significantly limited by social determinants of health.               ED Course as of 06/22/24 0841   Sat Jun 22, 2024   0744 WBC: 7.71  Normal  [AT]   0744 Hemoglobin(!): 11.2  Stable chronic anemia [AT]   0830 Creatinine: 0.9  Normal  [AT]   0830 Potassium(!): 5.3  Hemolyzed  [AT]   0830 Xray knee right Impression:   No acute fracture or dislocation.   Tricompartmental  osteoarthritis as above.   [AT]   0831 [facility] IM paged for admission [AT]      ED Course User Index  [AT] Elif Walters MD                             Clinical Impression:  Final diagnoses:  [R26.2] Ambulatory dysfunction  [M17.11] Osteoarthritis of right knee, unspecified osteoarthritis type (Primary)          ED Disposition Condition    Observation Stable                Elif Walters MD  06/22/24 0874

## 2024-06-22 NOTE — PLAN OF CARE
Problem: Physical Therapy  Goal: Physical Therapy Goal  Description: Goals to be met by: 2024     Patient will increase functional independence with mobility by performin. Supine to sit with Modified [place]  2. Sit to supine with Modified [place]  3. Sit to stand transfer with Minimal Assistance  4. Gait  x 50 feet with Minimal Assistance using Rolling Walker.   5. Sitting at edge of bed x15 minutes with Modified [place]    Outcome: Progressing     Patient was able to sit EOB with Mod A to pull into a seated position and was able to sit with good control but fatigued quickly. She attempted to stand and at first she was not able to complete the stand and reported her legs felt weaker. The second attempt she was able to stand with Mod A and then she was able to take side steps to the head of the bed with good control with RW. She needed Mod A to return to a supine position and was able to boost herself up in bed. She will benefit from CHUCKIE upon DC, as she is at high risk for progressive weakness as she has steadily been less mobile over the past 6 weeks

## 2024-06-22 NOTE — PT/OT/SLP EVAL
Physical Therapy Evaluation    Patient Name:  Makenzie Haile   MRN:  [id number]    Recommendations:     Discharge Recommendations: Moderate Intensity Therapy   Discharge Equipment Recommendations: walker, rolling   Barriers to discharge:  decreased caregiver support and high risk for progressive debility secondary to increased assist needed    Assessment:     Makenzie Haile is a 88 y.o. female admitted with a medical diagnosis of Failure to thrive in adult.  She presents with the following impairments/functional limitations: weakness, impaired endurance, impaired functional mobility, gait instability, impaired balance, decreased lower extremity function, decreased ROM Patient was able to sit EOB with Mod A to pull into a seated position and was able to sit with good control but fatigued quickly. She attempted to stand and at first she was not able to complete the stand and reported her legs felt weaker. The second attempt she was able to stand with Mod A and then she was able to take side steps to the head of the bed with good control with RW. She needed Mod A to return to a supine position and was able to boost herself up in bed. She will benefit from CHUCKIE upon DC, as she is at high risk for progressive weakness as she has steadily been less mobile over the past 6 weeks.    Rehab Prognosis: Fair; patient would benefit from acute skilled PT services to address these deficits and reach maximum level of function.    Recent Surgery: * No surgery found *      Plan:     During this hospitalization, patient to be seen 4 x/week to address the identified rehab impairments via gait training, therapeutic activities, therapeutic exercises, neuromuscular re-education and progress toward the following goals:    Plan of Care Expires:  07/22/24    Subjective     Chief Complaint: wants to get out of bed  Patient/Family Comments/goals: daughter present and supportive  Pain/Comfort:  Pain Rating 1: 0/10    Patients cultural,  spiritual, Adventist conflicts given the current situation: no    Living Environment:  Lives alone with family 5 minutes away, 1 level home with 1 step to enter  Prior to admission, patients level of function was in need of assist from family.  Equipment used at home: rollator.  DME owned (not currently used): none.  Upon discharge, patient will have assistance from family.    Objective:     Communicated with nsg prior to session.  Patient found supine with peripheral IV, telemetry, bed alarm  upon PT entry to room.    General Precautions: Standard, fall  Orthopedic Precautions:N/A   Braces: N/A  Respiratory Status: Room air    Exams:  Gross Motor Coordination:  WFL  Sensation: -       Impaired  varied in bilateral LEs  Skin Integrity/Edema:  -       swelling present and bilateral wounds on lower legs  RLE ROM: WFL  RLE Strength: WFL  LLE ROM: WFL  LLE Strength: WFL    Patient donned non slip socks and gait belt for OOB mobility    Functional Mobility:  Bed Mobility:  Supine to Sit: moderate assistance  Sit to Supine: moderate assistance  Transfers:  Sit to Stand:  moderate assistance with rolling walker  Gait: able to take 2 steps to head of bed with Min A for control and balance with rolling walker      AM-PAC 6 CLICK MOBILITY  Total Score:11       Treatment & Education:   PT educated patient:  PT plan of care/role of PT  Safety with OOB mobility  Use of RW for household and community ambulation.   Energy conservation techniques   Discharge disposition    Pt  verbalized understanding       Patient left supine with call button in reach, bed alarm on, and RN notified.    GOALS:   Multidisciplinary Problems       Physical Therapy Goals          Problem: Physical Therapy    Goal Priority Disciplines Outcome Goal Variances Interventions   Physical Therapy Goal     PT, PT/OT Progressing     Description: Goals to be met by: 2024     Patient will increase functional independence with mobility by performin.  Supine to sit with Modified [place]  2. Sit to supine with Modified [place]  3. Sit to stand transfer with Minimal Assistance  4. Gait  x 50 feet with Minimal Assistance using Rolling Walker.   5. Sitting at edge of bed x15 minutes with Modified [place]                         History:     Past Medical History:   Diagnosis Date    Acute myocardial infarction involving left circumflex coronary artery 3/19/2018    Diabetes mellitus     Hyperlipemia     Hypertension        Past Surgical History:   Procedure Laterality Date    COLONOSCOPY N/A 2/18/2019    Procedure: COLONOSCOPY;  Surgeon: Buzz Meyer MD;  Location: [facility];  Service: Endoscopy;  Laterality: N/A;    COLONOSCOPY N/A 2/18/2019    Procedure: COLONOSCOPY;  Surgeon: Buzz Meyer MD;  Location: [facility];  Service: Endoscopy;  Laterality: N/A;    CORONARY ANGIOPLASTY WITH STENT PLACEMENT  03/19/2018    ESOPHAGOGASTRODUODENOSCOPY N/A 2/18/2019    Procedure: EGD (ESOPHAGOGASTRODUODENOSCOPY);  Surgeon: Buzz Meyer MD;  Location: [facility];  Service: Endoscopy;  Laterality: N/A;    ESOPHAGOGASTRODUODENOSCOPY N/A 2/18/2019    Procedure: EGD (ESOPHAGOGASTRODUODENOSCOPY);  Surgeon: Buzz Meyer MD;  Location: [facility];  Service: Endoscopy;  Laterality: N/A;       Time Tracking:     PT Received On: 06/22/24  PT Start Time: 1455     PT Stop Time: 1520  PT Total Time (min): 25 min     Billable Minutes: Evaluation 15 and Therapeutic Activity 10      06/22/2024

## 2024-06-22 NOTE — H&P
[facility] Medicine H&P Note     Admitting Team: [facility] Hospitalist Team A  Attending Physician: Shey Burgess MD  Resident: Melania  Intern: Braxton     Date of Admit: 6/22/2024    Chief Complaint     Weakness for 3 days    Subjective:      History of Present Illness:  Makenzie Haile is a 88 y.o. female who  has a past medical history of Acute myocardial infarction involving left circumflex coronary artery (3/19/2018), Diabetes mellitus, Hyperlipemia, and Hypertension. The patient presented to [facility] on 6/22/2024 with a primary complaint of weakness    The patient endorses progressive generalized weakness, most pronounced in the BLE for weeks. She has been ambulating with a cane around the house and lives alone. In the past week, she tried starting Lipitor and Zetia but had severe myalgias int he BLE > BUE. She stopped taking the meds 3 days ago but noticed a severe worsening of her weakness at the same time. She has been essentially bed bound for 3 days. She stopped taking her Lasix because she could not stand up to go to the bathroom due to fear of falling. Denies any falls. She reports urinating on her self due to immobility, but denies any incontinence, paraesthesias, low back pain. Her legs feel heavy and are swollen. No CP, SOB, abdominal pain, n/v/d, fever, chills. She manages her home medications and has been taking all of them except the fluid pill and cholesterol medications. Of note, the patient has home health set up on Monday    Past Medical History:  Past Medical History:   Diagnosis Date    Acute myocardial infarction involving left circumflex coronary artery 3/19/2018    Diabetes mellitus     Hyperlipemia     Hypertension        Past Surgical History:  Past Surgical History:   Procedure Laterality Date    COLONOSCOPY N/A 2/18/2019    Procedure: COLONOSCOPY;  Surgeon: Buzz Meyer MD;  Location: [facility];  Service: Endoscopy;  Laterality: N/A;    COLONOSCOPY N/A  2/18/2019    Procedure: COLONOSCOPY;  Surgeon: Buzz Meyer MD;  Location: [facility];  Service: Endoscopy;  Laterality: N/A;    CORONARY ANGIOPLASTY WITH STENT PLACEMENT  03/19/2018    ESOPHAGOGASTRODUODENOSCOPY N/A 2/18/2019    Procedure: EGD (ESOPHAGOGASTRODUODENOSCOPY);  Surgeon: Buzz Meyer MD;  Location: [facility];  Service: Endoscopy;  Laterality: N/A;    ESOPHAGOGASTRODUODENOSCOPY N/A 2/18/2019    Procedure: EGD (ESOPHAGOGASTRODUODENOSCOPY);  Surgeon: Buzz Meyer MD;  Location: [facility];  Service: Endoscopy;  Laterality: N/A;       Allergies:  Review of patient's allergies indicates:   Allergen Reactions    Iodine and iodide containing products     Peanut butter flavor     Sulfa (sulfonamide antibiotics) Hives       Home Medications:  Current Outpatient Medications   Medication Instructions    acetaminophen (TYLENOL) 500 mg, Oral, Every 6 hours PRN    ascorbic acid (vitamin C) (VITAMIN C) 500 mg, Oral, Daily    aspirin (ECOTRIN) 81 mg, Oral, Daily    atorvastatin (LIPITOR) 40 mg, Oral, Daily    carvediloL (COREG) 3.125 mg, Oral, 2 times daily with meals    cyanocobalamin (VITAMIN B-12) 100 mcg, Oral, Daily    ezetimibe (ZETIA) 10 mg, Oral, Daily    incontinence pad,liner,disp (BLADDER CONTROL PADS EX ABSORB MISC) bladder control pads    lancets (ACCU-CHEK FASTCLIX MISC) Accu-Chek FastClix    losartan (COZAAR) 100 mg, Oral, Daily    magnesium citrate 100 mg, Oral, Daily    multivit-min-iron-FA-vit K-lut (CENTRUM SILVER WOMEN) 8 mg iron-400 mcg-50 mcg Tab 1 tablet, Oral, Daily    NIFEdipine (PROCARDIA-XL) 60 mg, Oral, Daily    nitroGLYCERIN (NITROSTAT) 0.4 mg, Sublingual, Every 5 min PRN    ondansetron (ZOFRAN-ODT) 4 mg, Oral, Every 6 hours PRN    oxybutynin (DITROPAN-XL) 10 mg, Oral, Daily    pregabalin (LYRICA) 75 mg, Oral, 2 times daily    SITagliptin phosphate (JANUVIA) 100 mg, Oral, Daily    zinc gluconate 50 mg, Oral, Daily      Home medications reviewed with the patient. She is no  "longer taking Lipitor or Zetia    Family History:  Family History   Problem Relation Name Age of Onset    No Known Problems Mother      Heart disease Father         Social History:  Social History     Tobacco Use    Smoking status: Never   Substance Use Topics    Alcohol use: No    Drug use: No       Review of Systems:  Positive as noted in the HPI above. All other systems are reviewed and are negative.    Health Maintaince :   Primary Care Physician: Farhat Rapp MD   Cancer Screening:  Colonoscopy: 2019     Objective:   Last 24 Hour Vital Signs:  BP  Min: 154/70  Max: 178/70  Temp  Av.1 °F (36.7 °C)  Min: 98.1 °F (36.7 °C)  Max: 98.1 °F (36.7 °C)  Pulse  Av.4  Min: 64  Max: 79  Resp  Av.7  Min: 20  Max: 28  SpO2  Av %  Min: 94 %  Max: 94 %  Height  Av' 5" (165.1 cm)  Min: 5' 5" (165.1 cm)  Max: 5' 5" (165.1 cm)  Weight  Av.7 kg (228 lb 10.5 oz)  Min: 102.5 kg (225 lb 15.5 oz)  Max: 104.3 kg (230 lb)  Body mass index is 37.6 kg/m².  No intake/output data recorded.    Physical Examination:  Gen: Elderly, NAD, resting comfortably in bed  HEENT: NC/AT, EOMI grossly, normal external ears bilaterally, normal external nose, moist MM  Neck: Supple  CV: Regular rate, regular rhythm, 3/6 systolic murmur and ULSB. 2+ radial pulses bilaterally  Resp: CTAB. No wheezes, rales, rhonchi. Normal work of breathing. Normal effort. Equal chest rise. No respiratory distress  Abd: Soft, suprapubic tenderness noted, nondistended, no rebound/guarding. No CVA tenderness. Erythema long the panus  MSK/Ext: No clubbing, cyanosis, 3+ pitting edema of the BLE. Moves all four extremities spontaneously. No thigh or calf tenderness. No midline or parapinal L spine tenderness  Neuro: GCS 15, alert, speech is normal, face symmetric, no focal motor deficits elicited on exam. 4/5 BLE strength, 5/5 BUE strength  Skin: Multiple crusted skin lesions on the bilateral lower legs  Psych: Normal mood and affect   "     Laboratory:  Most Recent Data:  CBC:   Lab Results   Component Value Date    WBC 7.71 06/22/2024    HGB 11.2 (L) 06/22/2024    HCT 34.3 (L) 06/22/2024     06/22/2024    MCV 87 06/22/2024    RDW 14.6 (H) 06/22/2024     BMP:   Lab Results   Component Value Date     06/22/2024    K 5.3 (H) 06/22/2024     06/22/2024    CO2 25 06/22/2024    BUN 31 (H) 06/22/2024    CREATININE 0.9 06/22/2024     (H) 06/22/2024    CALCIUM 10.0 06/22/2024    MG 2.2 06/22/2024    PHOS 3.1 06/22/2024     LFTs:   Lab Results   Component Value Date    PROT 7.7 06/22/2024    ALBUMIN 3.3 (L) 06/22/2024    BILITOT 0.5 06/22/2024    AST 29 06/22/2024    ALKPHOS 48 (L) 06/22/2024    ALT 12 06/22/2024     FLP:   Lab Results   Component Value Date    CHOL 98 (L) 06/11/2024    HDL 38 (L) 06/11/2024    LDLCALC 40.8 (L) 06/11/2024    TRIG 96 06/11/2024    CHOLHDL 38.8 06/11/2024     Thyroid:   Lab Results   Component Value Date    TSH 0.861 06/22/2024     Urinalysis:   Lab Results   Component Value Date    LABURIN No significant growth 03/19/2018    COLORU Yellow 06/22/2024    SPECGRAV 1.010 06/22/2024    NITRITE Positive (A) 06/22/2024    KETONESU Negative 06/22/2024    UROBILINOGEN Negative 06/22/2024    WBCUA >100 (H) 06/22/2024       Trended Lab Data:  Recent Labs   Lab 06/22/24 0734   WBC 7.71   HGB 11.2*   HCT 34.3*      MCV 87   RDW 14.6*      K 5.3*      CO2 25   BUN 31*   CREATININE 0.9   *   PROT 7.7   ALBUMIN 3.3*   BILITOT 0.5   AST 29   ALKPHOS 48*   ALT 12       Microbiology Data:  Urine Culture IP    Other Results:  EKG (my interpretation): NSR at 65 bpm with LAFB.    Radiology:  X-Ray Chest AP Portable   Final Result      1. Interstitial findings are accentuated by habitus, no large focal consolidation.         Electronically signed by: Eddie Lou MD   Date:    06/22/2024   Time:    12:43      X-Ray Knee 3 View Right   Final Result      No acute fracture or dislocation.       Tricompartmental osteoarthritis as above.         Electronically signed by: Michael Bustamante   Date:    06/22/2024   Time:    08:22            Assessment:     Makenzie Haile is a 88 y.o. female who  has a past medical history of Acute myocardial infarction involving left circumflex coronary artery (3/19/2018), Diabetes mellitus, Hyperlipemia, and Hypertension. The patient presented to [facility] on 6/22/2024 with a primary complaint of weakness. Admitted to [facility] medicine for failure to thrive     Plan:     Failure to thrive  BLE weakness  Statin induced myalgias  R knee osteoarthritis  Worsening BLE weakness over several weeks that significantly worsened 3 days after starting a statin. Home health starting in 2 days  - Admit to medicine  - Hold statin and zetia  - Check CK  - No low back pain or tenderness, hold off on CT L spine  - No cauda equina symptoms, hold off on MRI  - PT/OT evaluation  - R kne xray with tricompartmental OA, Tylenol for pain control. Can continue home Lyrica    Venous stasis dermatitis  Pannus fungal infection  BLE Edema  Bilateral lower leg erythema that is symmetric with occasional wounds. 3+ pitting edema in the setting of holding home lasix due to incontinence  - s/p Lasix IV in the ED  - Strict I&O  - Daily PO lasix 40 mg  - Wound care for leg wounds and pannus infection  - Miconazole powder    Urinary tract infection, POA  UA with pos LE and nitrates, suprapubic tenderness  - Rocephin 1 g daily for 3 days  - No evidence of pyelo    Prerenal azotemia without acute kidney injury  - Elevated BUN/Cr ratio but Cr at baseline  - Encourage PO intake, but suspect fluid overloaded    HTN  - /67  - Continue home Coreg 3.125 mg BID, Losartan 100 mg, nifedipine 60 mg    Type II DM without insulin  On Januvia at home  - Repeat A1c  - SSI  - Hold home PO meds  - Hypoglycemia protocl in place    Normocytic anemia  -Hgb 11.2 with MCV 87  - No active bleeding to explain  weakness  - Ferritin, iron panel, B12, folate    Hyperkalemia  - K 5.3 on arrival, slightly hemolyzed  - No EKG changes  - Trend with CMP    Fluids: Encourage PO intake  Electrolytes: Replete prn  Nutrition: Diabetic  Ppx: Lovenox  Code: Full    Dispo: Pending PT/OT eval for placement vs      Rod Velázquez,   [facility], -II    [facility] Hospitalist Pager numbers:   [facility] Hospitalist Medicine Team A (Gilson/Aditya): [phone]  [facility] Hospitalist Medicine Team B (Ginger/Ran):  [phone]

## 2024-06-22 NOTE — PROGRESS NOTES
VIRTUAL NURSE: Pt arrived to unit. Permission received per patient to turn camera to view patient. VIP model explained; patient informed this VN will be working with bedside nurse and the rest of the care team. Plan of care reviewed with patient.  Educated patient on fall risk and fall risk precautions in place. Call light within reach, side rails up x2. Admission questions completed. Patient instucted to ask staff for assistance. Patient verbalized complete understanding. Patient denies complaints or any needs at this time. Will continue to be available and intervene as needed. Daughter at bedside. Patient has advance directives and daughter states she will bring so we can copy and put in medical record.             06/22/24 1408   Admission   Initial VN Admission Questions Complete   Communication Issues? None   Shift   Virtual Nurse - Rounding Complete   Virtual Nurse - Patient Verbalized Approval Of Camera Use   Safety/Activity   Patient Rounds bed in low position;call light in patient/parent reach;clutter free environment maintained;visualized patient   Safety Promotion/Fall Prevention family to remain at bedside;side rails raised x 2   Positioning   Body Position supine   Head of Bed (HOB) Positioning HOB elevated         Labs, notes, orders, and careplan reviewed.

## 2024-06-23 PROBLEM — B37.2 CANDIDA INFECTION OF FLEXURAL SKIN: Status: ACTIVE | Noted: 2024-06-23

## 2024-06-23 PROBLEM — M79.10 MYALGIA DUE TO STATIN: Status: ACTIVE | Noted: 2024-06-23

## 2024-06-23 PROBLEM — R29.898 BILATERAL LEG WEAKNESS: Status: ACTIVE | Noted: 2024-06-23

## 2024-06-23 PROBLEM — T46.6X5A MYALGIA DUE TO STATIN: Status: ACTIVE | Noted: 2024-06-23

## 2024-06-23 PROBLEM — I87.2 VENOUS STASIS DERMATITIS OF BOTH LOWER EXTREMITIES: Status: ACTIVE | Noted: 2024-06-23

## 2024-06-23 PROBLEM — R60.0 BILATERAL LEG EDEMA: Status: ACTIVE | Noted: 2024-06-23

## 2024-06-23 LAB
ALBUMIN SERPL BCP-MCNC: 2.9 G/DL (ref 3.5–5.2)
ALP SERPL-CCNC: 49 U/L (ref 55–135)
ALT SERPL W/O P-5'-P-CCNC: 10 U/L (ref 10–44)
ANION GAP SERPL CALC-SCNC: 7 MMOL/L (ref 8–16)
AST SERPL-CCNC: 21 U/L (ref 10–40)
BASOPHILS # BLD AUTO: 0.08 K/UL (ref 0–0.2)
BASOPHILS NFR BLD: 1 % (ref 0–1.9)
BILIRUB SERPL-MCNC: 0.4 MG/DL (ref 0.1–1)
BUN SERPL-MCNC: 26 MG/DL (ref 8–23)
CALCIUM SERPL-MCNC: 9.6 MG/DL (ref 8.7–10.5)
CHLORIDE SERPL-SCNC: 107 MMOL/L (ref 95–110)
CO2 SERPL-SCNC: 26 MMOL/L (ref 23–29)
CREAT SERPL-MCNC: 0.8 MG/DL (ref 0.5–1.4)
DIFFERENTIAL METHOD BLD: ABNORMAL
EOSINOPHIL # BLD AUTO: 0.4 K/UL (ref 0–0.5)
EOSINOPHIL NFR BLD: 4.8 % (ref 0–8)
ERYTHROCYTE [DISTWIDTH] IN BLOOD BY AUTOMATED COUNT: 14.5 % (ref 11.5–14.5)
EST. GFR  (NO RACE VARIABLE): >60 ML/MIN/1.73 M^2
GLUCOSE SERPL-MCNC: 102 MG/DL (ref 70–110)
HCT VFR BLD AUTO: 33.9 % (ref 37–48.5)
HGB BLD-MCNC: 11 G/DL (ref 12–16)
IMM GRANULOCYTES # BLD AUTO: 0.04 K/UL (ref 0–0.04)
IMM GRANULOCYTES NFR BLD AUTO: 0.5 % (ref 0–0.5)
LYMPHOCYTES # BLD AUTO: 1.5 K/UL (ref 1–4.8)
LYMPHOCYTES NFR BLD: 20 % (ref 18–48)
MAGNESIUM SERPL-MCNC: 2 MG/DL (ref 1.6–2.6)
MCH RBC QN AUTO: 28.1 PG (ref 27–31)
MCHC RBC AUTO-ENTMCNC: 32.4 G/DL (ref 32–36)
MCV RBC AUTO: 87 FL (ref 82–98)
MONOCYTES # BLD AUTO: 0.7 K/UL (ref 0.3–1)
MONOCYTES NFR BLD: 9.4 % (ref 4–15)
NEUTROPHILS # BLD AUTO: 4.9 K/UL (ref 1.8–7.7)
NEUTROPHILS NFR BLD: 64.3 % (ref 38–73)
NRBC BLD-RTO: 0 /100 WBC
OHS QRS DURATION: 108 MS
OHS QTC CALCULATION: 420 MS
PHOSPHATE SERPL-MCNC: 3.5 MG/DL (ref 2.7–4.5)
PLATELET # BLD AUTO: 206 K/UL (ref 150–450)
PMV BLD AUTO: 11 FL (ref 9.2–12.9)
POCT GLUCOSE: 106 MG/DL (ref 70–110)
POCT GLUCOSE: 115 MG/DL (ref 70–110)
POCT GLUCOSE: 144 MG/DL (ref 70–110)
POCT GLUCOSE: 158 MG/DL (ref 70–110)
POCT GLUCOSE: 160 MG/DL (ref 70–110)
POTASSIUM SERPL-SCNC: 4.4 MMOL/L (ref 3.5–5.1)
PROT SERPL-MCNC: 6.4 G/DL (ref 6–8.4)
RBC # BLD AUTO: 3.91 M/UL (ref 4–5.4)
SODIUM SERPL-SCNC: 140 MMOL/L (ref 136–145)
WBC # BLD AUTO: 7.66 K/UL (ref 3.9–12.7)

## 2024-06-23 PROCEDURE — 80053 COMPREHEN METABOLIC PANEL: CPT

## 2024-06-23 PROCEDURE — 85025 COMPLETE CBC W/AUTO DIFF WBC: CPT

## 2024-06-23 PROCEDURE — 63600175 PHARM REV CODE 636 W HCPCS

## 2024-06-23 PROCEDURE — 83735 ASSAY OF MAGNESIUM: CPT

## 2024-06-23 PROCEDURE — 36415 COLL VENOUS BLD VENIPUNCTURE: CPT

## 2024-06-23 PROCEDURE — 97530 THERAPEUTIC ACTIVITIES: CPT

## 2024-06-23 PROCEDURE — 11000001 HC ACUTE MED/SURG PRIVATE ROOM

## 2024-06-23 PROCEDURE — 84100 ASSAY OF PHOSPHORUS: CPT

## 2024-06-23 PROCEDURE — 25000003 PHARM REV CODE 250

## 2024-06-23 PROCEDURE — 97166 OT EVAL MOD COMPLEX 45 MIN: CPT

## 2024-06-23 RX ORDER — TALC
9 POWDER (GRAM) TOPICAL NIGHTLY
Status: DISCONTINUED | OUTPATIENT
Start: 2024-06-23 | End: 2024-06-26 | Stop reason: HOSPADM

## 2024-06-23 RX ORDER — ACETAMINOPHEN 325 MG/1
650 TABLET ORAL
Status: DISCONTINUED | OUTPATIENT
Start: 2024-06-23 | End: 2024-06-26 | Stop reason: HOSPADM

## 2024-06-23 RX ADMIN — MICONAZOLE NITRATE: 20 POWDER TOPICAL at 08:06

## 2024-06-23 RX ADMIN — Medication 100 MCG: at 08:06

## 2024-06-23 RX ADMIN — ASPIRIN 81 MG: 81 TABLET, COATED ORAL at 08:06

## 2024-06-23 RX ADMIN — PREGABALIN 75 MG: 75 CAPSULE ORAL at 08:06

## 2024-06-23 RX ADMIN — Medication 9 MG: at 08:06

## 2024-06-23 RX ADMIN — CEFTRIAXONE SODIUM 1 G: 1 INJECTION, POWDER, FOR SOLUTION INTRAMUSCULAR; INTRAVENOUS at 11:06

## 2024-06-23 RX ADMIN — OXYBUTYNIN CHLORIDE 10 MG: 5 TABLET, EXTENDED RELEASE ORAL at 08:06

## 2024-06-23 RX ADMIN — OXYCODONE HYDROCHLORIDE AND ACETAMINOPHEN 500 MG: 500 TABLET ORAL at 08:06

## 2024-06-23 RX ADMIN — LOSARTAN POTASSIUM 100 MG: 50 TABLET, FILM COATED ORAL at 08:06

## 2024-06-23 RX ADMIN — MICONAZOLE NITRATE: 20 POWDER TOPICAL at 09:06

## 2024-06-23 RX ADMIN — ACETAMINOPHEN 650 MG: 325 TABLET ORAL at 11:06

## 2024-06-23 RX ADMIN — THERA TABS 1 TABLET: TAB at 08:06

## 2024-06-23 RX ADMIN — ACETAMINOPHEN 650 MG: 325 TABLET ORAL at 08:06

## 2024-06-23 RX ADMIN — ACETAMINOPHEN 650 MG: 325 TABLET ORAL at 04:06

## 2024-06-23 RX ADMIN — ENOXAPARIN SODIUM 40 MG: 40 INJECTION SUBCUTANEOUS at 04:06

## 2024-06-23 RX ADMIN — FUROSEMIDE 40 MG: 40 TABLET ORAL at 08:06

## 2024-06-23 RX ADMIN — NIFEDIPINE 60 MG: 60 TABLET, FILM COATED, EXTENDED RELEASE ORAL at 08:06

## 2024-06-23 NOTE — PLAN OF CARE
Problem: Occupational Therapy  Goal: Occupational Therapy Goal  Description: Goals to be met by: 07/23/2024      Patient will increase functional independence with ADLs by performing:    UE Dressing with Modified [place].  LE Dressing with Modified [place].  Grooming while standing with Modified [place].  Toileting from toilet with Modified [place] for hygiene and clothing management.   Toilet transfer to toilet with Modified [place].  Increased functional strength to WFL for self care.  Upper extremity exercise program x10 reps per handout, with independence.     Outcome: Progressing    Pt would benefit from continued OT to address deficits in self care and functional mobility. Recommending moderate intensity therapy; DME needs TBD pending progress with therapies.

## 2024-06-23 NOTE — PT/OT/SLP EVAL
Occupational Therapy   Evaluation    Name: Makenzie Haile  MRN: [id number]  Admitting Diagnosis: Failure to thrive in adult  Recent Surgery: * No surgery found *      Recommendations:     Discharge Recommendations: Moderate Intensity Therapy  Discharge Equipment Recommendations:   (TBD pending progress with therapies)  Barriers to discharge:  Decreased caregiver support, Inaccessible home environment    Assessment:     Makenzie Haile is a 88 y.o. female with a medical diagnosis of Failure to thrive in adult.  She presents with deconditioning, good improvement of BLE edema as evidenced by wrinkling in skin BLE, endorsed as well by pt. Pt mod/max A to transfer to bedside chair but max A to return to bed from chair. Performance deficits affecting function: weakness, impaired endurance, impaired self care skills, impaired functional mobility, gait instability, impaired balance, decreased lower extremity function, decreased upper extremity function, impaired skin, edema.      Rehab Prognosis: Good; patient would benefit from acute skilled OT services to address these deficits and reach maximum level of function.       Plan:     Patient to be seen 3 x/week to address the above listed problems via self-care/home management, therapeutic activities, therapeutic exercises  Plan of Care Expires: 07/23/24  Plan of Care Reviewed with: patient    Subjective     Chief Complaint: Pt reports knee pain  Patient/Family Comments/goals: To return to PLOF    Occupational Profile:  Living Environment: Pt lives alone [facility], [facility], [facility] with BIB   Previous level of function: Mod I ADLs and functional mobility  Roles and Routines: Caretaker to self and home. Cooks, cleans, drives, daughter assists with grocery shopping 2/2 pt's knee pain. Pt states she enjoys cross word puzzles and mending/sewing.   Equipment Used at Home: rollator  Assistance upon Discharge: Intermittent assistance available from family    Pain/Comfort:  Pain Rating 1:  5/10  Location - Side 1: Right  Location - Orientation 1: generalized  Location 1: knee  Pain Addressed 1: Reposition, Distraction, Cessation of Activity  Pain Rating Post-Intervention 1: 5/10    Patients cultural, spiritual, Roman Catholic conflicts given the current situation:      Objective:     Communicated with: guillermo prior to session.  Patient found HOB elevated with peripheral IV, telemetry, PureWick upon OT entry to room.    General Precautions: Standard, fall  Orthopedic Precautions: N/A  Braces: N/A  Respiratory Status: Room air    Occupational Performance:    Bed Mobility:    Patient completed Rolling/Turning to Left with  moderate assistance  Patient completed Scooting/Bridging with minimum assistance and moderate assistance  Patient completed Supine to Sit with maximal assistance  Patient completed Sit to Supine with maximal assistance    Functional Mobility/Transfers:  Patient completed Sit <> Stand Transfer with moderate assistance and maximal assistance  with  rolling walker   Patient completed Bed <> Chair Transfer using Stand Pivot technique with moderate assistance and maximal assistance with rolling walker  Functional Mobility: Pt with fair dynamic seated and fair-/poor+standing balance.     Activities of Daily Living:  Grooming: set up A to wash face and brush teeth  seated upright in bedside chair  Upper Body Dressing: moderate assistance to don gown as robe seated EOB  Lower Body Dressing: total assistance to don B socks supine in bed    Cognitive/Visual Perceptual:  Cognitive/Psychosocial Skills:     -       Oriented to: Person, Place, Time and Situation   -       Follows Commands/attention:Follows multistep  commands  -       Communication: clear/fluent  -       Memory: No Deficits noted  -       Safety awareness/insight to disability: intact   -       Mood/Affect/Coping skills/emotional control: Appropriate to situation    Physical Exam:  Postural examination/scapula alignment:    -       Rounded  shoulders, forward head  Skin integrity: BLE with blister-like wounds but no open sores noted, BLE redness and appeared to have recent edema but reduced to near-normal per pt report  Motor Planning:    -       WFL  Dominant hand:    -       R handed  Upper Extremity Range of Motion: BUE WFL     Upper Extremity Strength:  BUE grossly 3 to 3+/5   Strength:  B hands 4-/5  Fine Motor Coordination:    -       Intact  Gross motor coordination:   WFL     AMPAC 6 Click ADL:  AMPAC Total Score: 15    Treatment & Education:  Pt educated on role of OT and POC.   Pt performing skills as listed above.     Patient left HOB elevated with all lines intact, call button in reach, and nsg notified    GOALS:   Multidisciplinary Problems       Occupational Therapy Goals          Problem: Occupational Therapy    Goal Priority Disciplines Outcome Interventions   Occupational Therapy Goal     OT, PT/OT Progressing    Description: Goals to be met by: 07/23/2024      Patient will increase functional independence with ADLs by performing:    UE Dressing with Modified [place].  LE Dressing with Modified [place].  Grooming while standing with Modified [place].  Toileting from toilet with Modified [place] for hygiene and clothing management.   Toilet transfer to toilet with Modified [place].  Increased functional strength to WF for self care.  Upper extremity exercise program x10 reps per handout, with independence.                          History:     Past Medical History:   Diagnosis Date    Acute myocardial infarction involving left circumflex coronary artery 3/19/2018    Diabetes mellitus     Hyperlipemia     Hypertension          Past Surgical History:   Procedure Laterality Date    COLONOSCOPY N/A 2/18/2019    Procedure: COLONOSCOPY;  Surgeon: Buzz Meyer MD;  Location: [facility];  Service: Endoscopy;  Laterality: N/A;    COLONOSCOPY N/A 2/18/2019    Procedure: COLONOSCOPY;  Surgeon: Buzz Meyer  MD;  Location: [facility];  Service: Endoscopy;  Laterality: N/A;    CORONARY ANGIOPLASTY WITH STENT PLACEMENT  03/19/2018    ESOPHAGOGASTRODUODENOSCOPY N/A 2/18/2019    Procedure: EGD (ESOPHAGOGASTRODUODENOSCOPY);  Surgeon: Buzz Meyer MD;  Location: [facility];  Service: Endoscopy;  Laterality: N/A;    ESOPHAGOGASTRODUODENOSCOPY N/A 2/18/2019    Procedure: EGD (ESOPHAGOGASTRODUODENOSCOPY);  Surgeon: Buzz Meyer MD;  Location: [facility];  Service: Endoscopy;  Laterality: N/A;       Time Tracking:     OT Date of Treatment: 06/23/24  OT Start Time: 1400  OT Stop Time: 1433 (3491-4233 and 0437-2611)  OT Total Time (min): 33 min    Billable Minutes:Evaluation 10  Therapeutic Activity 23 6/23/2024

## 2024-06-23 NOTE — CARE UPDATE
Inpatient Upgrade Note    Makenzie Haile has warranted treatment spanning two or more midnights of hospital level care for the management of  FTT, UTI, cardiac work up . She continues to require IV antibiotics, daily labs, further testing/imaging, monitoring of vital signs, medication adjustments, and further evaluation by consultants. Her condition is also complicated by the following comorbidities: Coronary Artery Disease, Hypertension, Diabetes, and Obesity.     Noa Limon MD  [facility]  HO-I

## 2024-06-23 NOTE — PROGRESS NOTES
"[facility] Medicine Progress Note    Primary Team: [facility] Hospitalist Team A  Attending Physician: Shey Burgess MD  Resident: Dr. Velázquez  Intern: Dr. Limon    Admission Date:  6/22/2024  Hospital Day: Hospital Day: 2    Chief Complaint: weakness x 3d       Subjective/Interval History      AF. HR 50-60s. VSS, On RA. Patient doing better this morning without major symptoms including no chest discomfort, trouble breathing, abdominal pain. BL leg swelling much improved. UOP ~4L     Review of Systems:  All other systems reviewed and negative.     Objective     Physical Examination:  /62 (Patient Position: Lying)   Pulse (!) 59   Temp 97.9 °F (36.6 °C) (Oral)   Resp 20   Ht 5' 5" (1.651 m)   Wt 102.5 kg (225 lb 15.5 oz)   SpO2 (!) 92%   BMI 37.60 kg/m²    Gen: Elderly, NAD, resting comfortably in bed  HEENT: NC/AT, EOMI grossly, normal external ears bilaterally, normal external nose, moist MM  CV: Regular rate, regular rhythm, 3/6 systolic murmur and ULSB. 2+ radial pulses bilaterally  Resp: CTAB. No wheezes, rales, rhonchi. Normal work of breathing. Normal effort. Equal chest rise. No respiratory distress  Abd: Soft, suprapubic tenderness noted, nondistended, no rebound/guarding. No CVA tenderness. Erythema long the panus  MSK/Ext: No clubbing, cyanosis, much improved pitting edema of the BLE, dry now. Moves all four extremities spontaneously. No thigh or calf tenderness. No midline or parapinal L spine tenderness  Neuro: GCS 15, alert, speech is normal, face symmetric, no focal motor deficits elicited on exam. 4/5 BLE strength, 5/5 BUE strength  Skin: Multiple crusted skin lesions on the bilateral lower legs  Psych: Normal mood and affect    Intake/Output:    Intake/Output Summary (Last 24 hours) at 6/23/2024 0649  Last data filed at 6/23/2024 0602  Gross per 24 hour   Intake --   Output 4050 ml   Net -4050 ml     Net IO Since Admission: -4,050 mL [06/23/24 0649]    Laboratory:  Recent Labs   Lab " "06/22/24  0734 06/22/24  1141 06/23/24  0357   WBC 7.71  --  7.66   HGB 11.2*  --  11.0*     --  206   MCV 87  --  87     --  140   K 5.3*  --  4.4     --  107   CO2 25  --  26   BUN 31*  --  26*   CREATININE 0.9  --  0.8   *  --  102   CALCIUM 10.0  --  9.6   PROT 7.7  --  6.4   ALBUMIN 3.3*  --  2.9*   PHOS  --  3.1 3.5   MG 2.2  --  2.0   AST 29  --  21   ALT 12  --  10   ALKPHOS 48*  --  49*     No results for input(s): "APTT", "INR" in the last 168 hours.    Invalid input(s): "APT"  Lab Results   Component Value Date    COLORU Yellow 06/22/2024    APPEARANCEUA Hazy (A) 06/22/2024    SPECGRAV 1.010 06/22/2024    PHUR 7.0 06/22/2024    PROTEINUA Negative 06/22/2024    KETONESU Negative 06/22/2024    LEUKOCYTESUR 3+ (A) 06/22/2024    NITRITE Positive (A) 06/22/2024    UROBILINOGEN Negative 06/22/2024         All laboratory data reviewed    Microbiology Data: Reviewed  Uclx pending    EKG Data: Reviewed  Results for orders placed or performed during the hospital encounter of 06/22/24   EKG 12-lead    Collection Time: 06/22/24  8:58 AM   Result Value Ref Range    QRS Duration 108 ms    OHS QTC Calculation 420 ms    Narrative    Test Reason : R26.2,    Vent. Rate : 065 BPM     Atrial Rate : 065 BPM     P-R Int : 162 ms          QRS Dur : 108 ms      QT Int : 404 ms       P-R-T Axes : 061 -54 077 degrees     QTc Int : 420 ms    Normal sinus rhythm  Left anterior fascicular block  Abnormal ECG  When compared with ECG of 11-MAR-2024 07:08,  Premature ventricular complexes are no longer Present    Referred By: [email or web address]   SELF           Confirmed By:        Imaging Data: Reviewed  X-Ray Chest AP Portable    Result Date: 6/22/2024  EXAMINATION: XR CHEST AP PORTABLE CLINICAL HISTORY: FTT, weakness, cough; TECHNIQUE: Single frontal view of the chest was performed. COMPARISON: 02/16/2019 FINDINGS: The cardiomediastinal silhouette is not enlarged, magnified by technique.  There is calcification and " tortuosity of the aorta..  There is no pleural effusion.  The trachea is midline.  The lungs are symmetrically expanded bilaterally with coarse interstitial attenuation accentuated by habitus..  No large focal consolidation seen.  There is no pneumothorax.  The osseous structures are remarkable for degenerative change..     1. Interstitial findings are accentuated by habitus, no large focal consolidation. Electronically signed by: Eddie Lou MD Date:    06/22/2024 Time:    12:43    X-Ray Knee 3 View Right    Result Date: 6/22/2024  EXAMINATION: XR KNEE 3 VIEW RIGHT CLINICAL HISTORY: Pain in right knee TECHNIQUE: AP, lateral, and Merchant views of the right knee were performed. COMPARISON: None FINDINGS: There is osteopenia.  There is no evidence of an acute fracture or dislocation of the knee.  Alignment is normal.  There is severe joint space narrowing of the lateral compartment, moderate joint space narrowing of the medial and patellofemoral compartments with marginal osteophytes.  There is a small suprapatellar joint effusion.  There are vascular calcifications.     No acute fracture or dislocation. Tricompartmental osteoarthritis as above. Electronically signed by: Michael Bustamante Date:    06/22/2024 Time:    08:22       Current Medications:     Infusions:       Scheduled:   ascorbic acid (vitamin C)  500 mg Oral Daily    aspirin  81 mg Oral Daily    carvediloL  3.125 mg Oral BID WM    cefTRIAXone  1 g Intravenous Q24H    cyanocobalamin  100 mcg Oral Daily    enoxparin  40 mg Subcutaneous Daily    furosemide  40 mg Oral Daily    losartan  100 mg Oral Daily    miconazole NITRATE 2 %   Topical (Top) BID    multivitamin  1 tablet Oral Daily    NIFEdipine  60 mg Oral Daily    oxybutynin  10 mg Oral Daily    pregabalin  75 mg Oral BID        PRN:    Current Facility-Administered Medications:     acetaminophen, 650 mg, Oral, Q4H PRN    capsicum 0.075%, , Topical (Top), TID PRN    dextrose 10%, 12.5 g, Intravenous,  PRN    dextrose 10%, 25 g, Intravenous, PRN    emollient, , Topical (Top), PRN    glucagon (human recombinant), 1 mg, Intramuscular, PRN    glucose, 16 g, Oral, PRN    glucose, 24 g, Oral, PRN    insulin aspart U-100, 0-5 Units, Subcutaneous, QID (AC + HS) PRN    melatonin, 6 mg, Oral, Nightly PRN    sodium chloride 0.9%, 10 mL, Intravenous, Q12H PRN    Antibiotics and Day Number of Therapy:  Antibiotics (From admission, onward)      Start     Stop Route Frequency Ordered    06/22/24 1200  cefTRIAXone injection 1 g         06/25/24 1159 IV Every 24 hours (non-standard times) 06/22/24 1053             Lines and Day Number of Therapy:  PIV    Assessment/Plan:     Makenzie Haile is a 88 y.o. female who  has a past medical history of Acute myocardial infarction involving left circumflex coronary artery (3/19/2018), Diabetes mellitus, Hyperlipemia, and Hypertension. The patient presented to [facility] on 6/22/2024 with a primary complaint of weakness. Admitted to [facility] medicine for failure to thrive     Failure to thrive  BLE weakness  Statin induced myalgias  R knee osteoarthritis  Worsening BLE weakness over several weeks that significantly worsened 3 days after starting a statin. Home health starting in 2 days  - Admit to medicine  - Hold statin and zetia for now  - CK wnl  - No low back pain or tenderness, hold off on CT L spine  - No cauda equina symptoms, hold off on MRI  - PT/OT evaluation rec'd SNF  - R kne xray with tricompartmental OA, Tylenol for pain control. Can continue home Lyrica     Venous stasis dermatitis  Pannus fungal infection  BLE Edema, improved  Bilateral lower leg erythema that is symmetric with occasional wounds. 3+ pitting edema in the setting of holding home lasix due to incontinence  - s/p Lasix IV in the ED. Diuresis well.  - obtain TTE inp  - Strict I&O  - Daily PO lasix 40 mg  - Wound care for leg wounds and pannus infection  - Miconazole powder     Urinary tract  infection, POA  UA with pos LE and nitrates, suprapubic tenderness  - Rocephin 1 g daily for 3 days  - No evidence of pyelo  - Uclx pending     Prerenal azotemia without acute kidney injury  - Elevated BUN/Cr ratio but Cr at baseline  - Encourage PO intake, but suspect fluid overloaded     HTN  - /67  - Continue home Coreg 3.125 mg BID, Losartan 100 mg, nifedipine 60 mg     Type II DM without insulin  On Januvia at home  - Repeat A1c 5.9  - SSI  - Hold home PO meds  - Hypoglycemia protocl in place     Normocytic anemia  -Hgb 11.2 with MCV 87  - No active bleeding to explain weakness  - Ferritin, iron panel, B12, folate     Hyperkalemia  - K 5.3 on arrival, slightly hemolyzed  - No EKG changes  - Trend with CMP    Hypoalbuminemia  - Boost BID       Fluids: Encourage PO intake  Electrolytes: Replete prn  Nutrition: Diabetic  Ppx: Lovenox  Code: Full     Dispo: Pending [facility]   Estimated LOS: 1-2 days       Noa Limon MD  [facility] Medicine HO-I     [facility] Medicine Hospitalist Pager numbers:   U Hospitalist Medicine Team A (Gilson/Aditya):          [phone]  U Hospitalist Medicine Team B (Ginger/Ran):        [phone]

## 2024-06-24 PROBLEM — L24.A2: Status: ACTIVE | Noted: 2024-06-24

## 2024-06-24 PROBLEM — I35.0 AORTIC STENOSIS: Chronic | Status: RESOLVED | Noted: 2018-03-19 | Resolved: 2024-06-24

## 2024-06-24 PROBLEM — I35.0 MODERATE TO SEVERE AORTIC STENOSIS: Status: ACTIVE | Noted: 2024-06-24

## 2024-06-24 LAB
ALBUMIN SERPL BCP-MCNC: 2.8 G/DL (ref 3.5–5.2)
ALP SERPL-CCNC: 49 U/L (ref 55–135)
ALT SERPL W/O P-5'-P-CCNC: 10 U/L (ref 10–44)
ANION GAP SERPL CALC-SCNC: 9 MMOL/L (ref 8–16)
ASCENDING AORTA: 3 CM
AST SERPL-CCNC: 17 U/L (ref 10–40)
AV INDEX (PROSTH): 0.29
AV MEAN GRADIENT: 41 MMHG
AV PEAK GRADIENT: 55 MMHG
AV REGURGITATION PRESSURE HALF TIME: 595.96 MS
AV VALVE AREA BY VELOCITY RATIO: 1 CM²
AV VALVE AREA: 0.95 CM²
AV VELOCITY RATIO: 0.3
BACTERIA UR CULT: ABNORMAL
BASOPHILS # BLD AUTO: 0.09 K/UL (ref 0–0.2)
BASOPHILS NFR BLD: 1.3 % (ref 0–1.9)
BILIRUB SERPL-MCNC: 0.3 MG/DL (ref 0.1–1)
BSA FOR ECHO PROCEDURE: 2.16 M2
BUN SERPL-MCNC: 33 MG/DL (ref 8–23)
CALCIUM SERPL-MCNC: 9.6 MG/DL (ref 8.7–10.5)
CHLORIDE SERPL-SCNC: 104 MMOL/L (ref 95–110)
CO2 SERPL-SCNC: 26 MMOL/L (ref 23–29)
CREAT SERPL-MCNC: 0.9 MG/DL (ref 0.5–1.4)
CV ECHO LV RWT: 0.46 CM
DIFFERENTIAL METHOD BLD: ABNORMAL
DOP CALC AO PEAK VEL: 3.71 M/S
DOP CALC AO VTI: 86.5 CM
DOP CALC LVOT AREA: 3.3 CM2
DOP CALC LVOT DIAMETER: 2.05 CM
DOP CALC LVOT PEAK VEL: 1.13 M/S
DOP CALC LVOT STROKE VOLUME: 81.81 CM3
DOP CALCLVOT PEAK VEL VTI: 24.8 CM
E WAVE DECELERATION TIME: 309.74 MSEC
E/A RATIO: 0.83
E/E' RATIO: 18.44 M/S
ECHO LV POSTERIOR WALL: 1.09 CM (ref 0.6–1.1)
EOSINOPHIL # BLD AUTO: 0.5 K/UL (ref 0–0.5)
EOSINOPHIL NFR BLD: 7.4 % (ref 0–8)
ERYTHROCYTE [DISTWIDTH] IN BLOOD BY AUTOMATED COUNT: 14.4 % (ref 11.5–14.5)
EST. GFR  (NO RACE VARIABLE): >60 ML/MIN/1.73 M^2
FRACTIONAL SHORTENING: 30 % (ref 28–44)
GLUCOSE SERPL-MCNC: 165 MG/DL (ref 70–110)
HCT VFR BLD AUTO: 33.9 % (ref 37–48.5)
HGB BLD-MCNC: 11 G/DL (ref 12–16)
IMM GRANULOCYTES # BLD AUTO: 0.02 K/UL (ref 0–0.04)
IMM GRANULOCYTES NFR BLD AUTO: 0.3 % (ref 0–0.5)
INTERVENTRICULAR SEPTUM: 1 CM (ref 0.6–1.1)
LA MAJOR: 5.87 CM
LA MINOR: 5.31 CM
LA WIDTH: 4.5 CM
LEFT ATRIUM AREA SYSTOLIC (APICAL 2 CHAMBER): 20.24 CM2
LEFT ATRIUM AREA SYSTOLIC (APICAL 4 CHAMBER): 24.99 CM2
LEFT ATRIUM SIZE: 4.53 CM
LEFT ATRIUM VOLUME INDEX MOD: 35.4 ML/M2
LEFT ATRIUM VOLUME INDEX: 46.5 ML/M2
LEFT ATRIUM VOLUME MOD: 73.67 CM3
LEFT ATRIUM VOLUME: 96.62 CM3
LEFT INTERNAL DIMENSION IN SYSTOLE: 3.28 CM (ref 2.1–4)
LEFT VENTRICLE DIASTOLIC VOLUME INDEX: 49.19 ML/M2
LEFT VENTRICLE DIASTOLIC VOLUME: 102.32 ML
LEFT VENTRICLE END SYSTOLIC VOLUME APICAL 2 CHAMBER: 60.54 ML
LEFT VENTRICLE END SYSTOLIC VOLUME APICAL 4 CHAMBER: 82.07 ML
LEFT VENTRICLE MASS INDEX: 84 G/M2
LEFT VENTRICLE SYSTOLIC VOLUME INDEX: 20.9 ML/M2
LEFT VENTRICLE SYSTOLIC VOLUME: 43.52 ML
LEFT VENTRICULAR INTERNAL DIMENSION IN DIASTOLE: 4.7 CM (ref 3.5–6)
LEFT VENTRICULAR MASS: 174.67 G
LV LATERAL E/E' RATIO: 16.6 M/S
LV SEPTAL E/E' RATIO: 20.75 M/S
LVED V (TEICH): 102.32 ML
LVES V (TEICH): 43.52 ML
LVOT MG: 3.54 MMHG
LVOT MV: 0.92 CM/S
LYMPHOCYTES # BLD AUTO: 1.6 K/UL (ref 1–4.8)
LYMPHOCYTES NFR BLD: 21.9 % (ref 18–48)
MAGNESIUM SERPL-MCNC: 2 MG/DL (ref 1.6–2.6)
MCH RBC QN AUTO: 28.3 PG (ref 27–31)
MCHC RBC AUTO-ENTMCNC: 32.4 G/DL (ref 32–36)
MCV RBC AUTO: 87 FL (ref 82–98)
MONOCYTES # BLD AUTO: 0.7 K/UL (ref 0.3–1)
MONOCYTES NFR BLD: 9.3 % (ref 4–15)
MV PEAK A VEL: 1 M/S
MV PEAK E VEL: 0.83 M/S
MV STENOSIS PRESSURE HALF TIME: 89.82 MS
MV VALVE AREA P 1/2 METHOD: 2.45 CM2
NEUTROPHILS # BLD AUTO: 4.3 K/UL (ref 1.8–7.7)
NEUTROPHILS NFR BLD: 59.8 % (ref 38–73)
NRBC BLD-RTO: 0 /100 WBC
PHOSPHATE SERPL-MCNC: 4.2 MG/DL (ref 2.7–4.5)
PISA AR MAX VEL: 2.35 M/S
PISA TR MAX VEL: 1.88 M/S
PLATELET # BLD AUTO: 219 K/UL (ref 150–450)
PMV BLD AUTO: 10.9 FL (ref 9.2–12.9)
POCT GLUCOSE: 103 MG/DL (ref 70–110)
POCT GLUCOSE: 119 MG/DL (ref 70–110)
POCT GLUCOSE: 123 MG/DL (ref 70–110)
POCT GLUCOSE: 152 MG/DL (ref 70–110)
POTASSIUM SERPL-SCNC: 4.1 MMOL/L (ref 3.5–5.1)
PROT SERPL-MCNC: 6.5 G/DL (ref 6–8.4)
PULM VEIN S/D RATIO: 1.3
PV PEAK D VEL: 0.2 M/S
PV PEAK S VEL: 0.26 M/S
RA MAJOR: 5.39 CM
RA PRESSURE ESTIMATED: 3 MMHG
RA WIDTH: 3.34 CM
RBC # BLD AUTO: 3.89 M/UL (ref 4–5.4)
RV TB RVSP: 5 MMHG
RV TISSUE DOPPLER FREE WALL SYSTOLIC VELOCITY 1 (APICAL 4 CHAMBER VIEW): 17.07 CM/S
SINUS: 3.3 CM
SODIUM SERPL-SCNC: 139 MMOL/L (ref 136–145)
STJ: 2.96 CM
TDI LATERAL: 0.05 M/S
TDI SEPTAL: 0.04 M/S
TDI: 0.05 M/S
TR MAX PG: 14 MMHG
TRICUSPID ANNULAR PLANE SYSTOLIC EXCURSION: 3.13 CM
TV REST PULMONARY ARTERY PRESSURE: 17 MMHG
WBC # BLD AUTO: 7.17 K/UL (ref 3.9–12.7)
Z-SCORE OF LEFT VENTRICULAR DIMENSION IN END DIASTOLE: -3.04
Z-SCORE OF LEFT VENTRICULAR DIMENSION IN END SYSTOLE: -1.36

## 2024-06-24 PROCEDURE — 80053 COMPREHEN METABOLIC PANEL: CPT

## 2024-06-24 PROCEDURE — 84100 ASSAY OF PHOSPHORUS: CPT

## 2024-06-24 PROCEDURE — 25000003 PHARM REV CODE 250

## 2024-06-24 PROCEDURE — 36415 COLL VENOUS BLD VENIPUNCTURE: CPT

## 2024-06-24 PROCEDURE — 11000001 HC ACUTE MED/SURG PRIVATE ROOM

## 2024-06-24 PROCEDURE — 83735 ASSAY OF MAGNESIUM: CPT

## 2024-06-24 PROCEDURE — 25500020 PHARM REV CODE 255: Performed by: INTERNAL MEDICINE

## 2024-06-24 PROCEDURE — 85025 COMPLETE CBC W/AUTO DIFF WBC: CPT

## 2024-06-24 PROCEDURE — 63600175 PHARM REV CODE 636 W HCPCS

## 2024-06-24 RX ORDER — FUROSEMIDE 20 MG/1
20 TABLET ORAL DAILY
Status: DISCONTINUED | OUTPATIENT
Start: 2024-06-25 | End: 2024-06-26 | Stop reason: HOSPADM

## 2024-06-24 RX ADMIN — Medication 100 MCG: at 08:06

## 2024-06-24 RX ADMIN — ACETAMINOPHEN 650 MG: 325 TABLET ORAL at 02:06

## 2024-06-24 RX ADMIN — CARVEDILOL 3.12 MG: 3.12 TABLET, FILM COATED ORAL at 04:06

## 2024-06-24 RX ADMIN — ACETAMINOPHEN 650 MG: 325 TABLET ORAL at 04:06

## 2024-06-24 RX ADMIN — MICONAZOLE NITRATE: 20 POWDER TOPICAL at 09:06

## 2024-06-24 RX ADMIN — ACETAMINOPHEN 650 MG: 325 TABLET ORAL at 08:06

## 2024-06-24 RX ADMIN — CARVEDILOL 3.12 MG: 3.12 TABLET, FILM COATED ORAL at 08:06

## 2024-06-24 RX ADMIN — CEFTRIAXONE SODIUM 1 G: 1 INJECTION, POWDER, FOR SOLUTION INTRAMUSCULAR; INTRAVENOUS at 11:06

## 2024-06-24 RX ADMIN — LOSARTAN POTASSIUM 100 MG: 50 TABLET, FILM COATED ORAL at 08:06

## 2024-06-24 RX ADMIN — Medication 9 MG: at 08:06

## 2024-06-24 RX ADMIN — NIFEDIPINE 60 MG: 60 TABLET, FILM COATED, EXTENDED RELEASE ORAL at 08:06

## 2024-06-24 RX ADMIN — ASPIRIN 81 MG: 81 TABLET, COATED ORAL at 08:06

## 2024-06-24 RX ADMIN — THERA TABS 1 TABLET: TAB at 08:06

## 2024-06-24 RX ADMIN — HUMAN ALBUMIN MICROSPHERES AND PERFLUTREN 0.11 MG: 10; .22 INJECTION, SOLUTION INTRAVENOUS at 02:06

## 2024-06-24 RX ADMIN — Medication: at 08:06

## 2024-06-24 RX ADMIN — PREGABALIN 75 MG: 75 CAPSULE ORAL at 08:06

## 2024-06-24 RX ADMIN — Medication: at 02:06

## 2024-06-24 RX ADMIN — MICONAZOLE NITRATE: 20 POWDER TOPICAL at 08:06

## 2024-06-24 RX ADMIN — OXYBUTYNIN CHLORIDE 10 MG: 5 TABLET, EXTENDED RELEASE ORAL at 08:06

## 2024-06-24 RX ADMIN — FUROSEMIDE 40 MG: 40 TABLET ORAL at 08:06

## 2024-06-24 RX ADMIN — ENOXAPARIN SODIUM 40 MG: 40 INJECTION SUBCUTANEOUS at 04:06

## 2024-06-24 RX ADMIN — ACETAMINOPHEN 650 MG: 325 TABLET ORAL at 11:06

## 2024-06-24 RX ADMIN — OXYCODONE HYDROCHLORIDE AND ACETAMINOPHEN 500 MG: 500 TABLET ORAL at 08:06

## 2024-06-24 NOTE — CONSULTS
[facility] - Telemetry  Wound Care    Patient Name:  Makenzie Haile   MRN:  [id number]  Date: 6/24/2024  Diagnosis: Bilateral leg weakness    History:     Past Medical History:   Diagnosis Date    Acute myocardial infarction involving left circumflex coronary artery 3/19/2018    Diabetes mellitus     Hyperlipemia     Hypertension        Social History     Socioeconomic History    Marital status:    Tobacco Use    Smoking status: Never   Substance and Sexual Activity    Alcohol use: No    Drug use: No     Social Determinants of Health     Financial Resource Strain: Low Risk  (6/24/2024)    Overall Financial Resource Strain (CARDIA)     Difficulty of Paying Living Expenses: Not very hard   Food Insecurity: No Food Insecurity (6/24/2024)    Hunger Vital Sign     Worried About Running Out of Food in the Last Year: Never true     Ran Out of Food in the Last Year: Never true   Transportation Needs: No Transportation Needs (6/24/2024)    TRANSPORTATION NEEDS     Transportation : No   Physical Activity: Insufficiently Active (6/24/2024)    Exercise Vital Sign     Days of Exercise per Week: 3 days     Minutes of Exercise per Session: 30 min   Stress: No Stress Concern Present (6/24/2024)    [employer] - Occupational Stress Questionnaire     Feeling of Stress : Only a little   Housing Stability: Low Risk  (6/24/2024)    Housing Stability Vital Sign     Unable to Pay for Housing in the Last Year: No     Homeless in the Last Year: No       Precautions:     Allergies as of 06/22/2024 - Reviewed 06/22/2024   Allergen Reaction Noted    Iodine and iodide containing products  03/19/2018    Peanut butter flavor  02/18/2021    Sulfa (sulfonamide antibiotics) Hives 03/18/2018       WOC Assessment Details/Treatment     Miconazole powder in use already for groin and breast folds    BLE- dried intact scabs and dried skin  Bilateral heels intact with no redness        Bilateral posterior thighs- improving  rash related to moisture      Gluteal cleft- partial thickness ulcer 1x0.1x0.1cm  L buttock- maroon/purple discolored skin with partial thickness ulcer with flap- appears related to friction , moisture and pressure  Richard score of 18 reported      Recommendations discussed with pt, nurse and Dr. Velázquez:  - Pressure injury prevention interventions- waffle overlay   - Triad ointment to buttocks BID  - Moisturizer to BLE BID  - Continue powder as ordered for skin folds    06/24/2024

## 2024-06-24 NOTE — PT/OT/SLP PROGRESS
Occupational Therapy      Patient Name:  Makenzie Haile   MRN:  [id number]    Attempts made 1:59 and 3:02 PM Patient not seen today secondary to  ([employer] for Echo both attempts). Will follow-up as able.    6/24/2024

## 2024-06-24 NOTE — PT/OT/SLP PROGRESS
Physical Therapy      Patient Name:  Makenzie Haile   MRN:  [id number]    Patient not seen today secondary to Other (Comment) pt off unit in echo study. Will follow-up next tx date.

## 2024-06-24 NOTE — [facility] SECONDARY REVIEW
Denial: Makenzie Haile MRN:[id number]  [employer] Out of State Medicare Advantage GUSTAVO:[id number] not capitated          Denial Questions Response   1. Denied Days Date of denied days   2. Admit date 6/22/2024  7:09 AM    3. Length of Stay (LOS) 1   4. If Medicare Advantage date and time of start of care (starts at date/time of first orderable in ED) Date/time first order:  6/22/24 07:23   5. DC date or still in-house No discharge date for patient encounter.    6. Diagnosis Chest pain [R07.9]  Abnormal finding on EKG [R94.31]  Ambulatory dysfunction [R26.2]  Osteoarthritis of right knee, unspecified osteoarthritis type [M17.11]   7. Plan of Care (POC) What are we doing or did we do for the patient? What care/interventions were hospital only? Hospital only care > What were we doing on days denied causing delays and AVD?   IVABX, placement?   8. Has the patient met IP criteria per MCG or IQ guidelines or is Two [place] rule applicable? 2[place] applicable   9. What did the Admission care Provider use for determination? (if admision care facilitty either WB or BR) N/A at [place]   10. What have you done to help resolve the denial (sent for nurse reconsideration)?    11. Request UM leader if you should send to PA or accept OBS Send to PA or Accept obs?   12. Capitated Plan? Capitated y/n not capitated     06/24/2024 7:10 AM [id number] A Makenzie Haile 1935 MRN: [id number] Attd MD: Shey Burgess MD IP- Inpatient [employer] Out of State Medicare Advantage  FTT, ambulatory dysfunction, 2mn upgrade for UTI, pend echo/pt ot/wd care

## 2024-06-24 NOTE — PLAN OF CARE
SW met with pt at bedside this AM to complete DCA. Pt reported 5/10 pain but was in a pleasant mood with positive affect. Pt stated that she lives at home alone and will use a RW, CANE, BATH BENCH, and BSC to complete her ADL's. Per pt, she has a sitter that will come every Tuesday 8-5pm to clean, cook, run errands, and do laundry. Pt expressed that at time of d/c she would [place] Phone: [phone] , they are reviewing the pt and will contact sw with a determination. If [facility] can not accept pt the family is good with [facility] for SNF. Pt is requesting that pt's son Esequiel [phone] be contacted if there is paperwork that is needed. Pt did not have any additional questions or concerns for sw at this time. White board updated with CM name and contact information.  Discharge brochure provided.  Pt encouraged to call with any questions or concerns.  Cm will continue to follow pt through transitions of care and assist with any discharge needs.    Alek Wolf, BARRIE  [phone]    Future Appointments   Date Time Provider Department Center   9/11/2024 10:00 AM ECHO/HOLTER/FELIPA, [facility]        06/24/24 1216   Discharge Assessment   Assessment Type Discharge Planning Assessment   Confirmed/corrected address, phone number and insurance Yes   Confirmed Demographics Correct on Facesheet   Source of Information patient   When was your last doctors appointment? 05/29/24   Communicated SARAH with patient/caregiver Yes   Reason For Admission Bilateral leg weakness   People in Home alone   Facility Arrived From: home   Do you expect to return to your current living situation? Yes   Do you have help at home or someone to help you manage your care at home? Yes   Who are your caregiver(s) and their phone number(s)? mary ann Iraheta [phone]   Prior to hospitilization cognitive status: Alert/Oriented   Current cognitive status: Alert/Oriented   Walking or Climbing  Stairs Difficulty no   Dressing/Bathing Difficulty no   Home Accessibility wheelchair accessible   Home Layout Able to live on 1st floor   Equipment Currently Used at Home walker, rolling;cane, straight;bath bench;bedside commode   Readmission within 30 days? No   Patient currently being followed by outpatient case management? No   Do you currently have service(s) that help you manage your care at home? No   Do you take prescription medications? Yes   Do you have prescription coverage? Yes   Coverage BCBS MGD Medicare   Do you have any problems affording any of your prescribed medications? No   Is the patient taking medications as prescribed? yes   Who is going to help you get home at discharge? mary ann Iraheta [phone]   How do you get to doctors appointments? family or friend will provide;car, drives self   Are you on dialysis? No   Do you take coumadin? No   Discharge Plan A Skilled Nursing Facility   DME Needed Upon Discharge  none   Discharge Plan discussed with: Patient   Transition of Care Barriers None   Physical Activity   On average, how many days per week do you engage in moderate to strenuous exercise (like a brisk walk)? 3 days   On average, how many minutes do you engage in exercise at this level? 30 min   Financial Resource Strain   How hard is it for you to pay for the very basics like food, housing, medical care, and heating? Not very   Housing Stability   In the last 12 months, was there a time when you were not able to pay the mortgage or rent on time? N   At any time in the past 12 months, were you homeless or living in a shelter (including now)? N   Transportation Needs   Has the lack of transportation kept you from medical appointments, meetings, work or from getting things needed for daily living? No   Food Insecurity   Within the past 12 months, you worried that your food would run out before you got the money to buy more. Never true   Within the past 12 months, the food you bought just didn't  last and you didn't have money to get more. Never true   Stress   Do you feel stress - tense, restless, nervous, or anxious, or unable to sleep at night because your mind is troubled all the time - these days? Only a littl   Social Isolation   How often do you feel lonely or isolated from those around you?  Never   Alcohol Use   Q1: How often do you have a drink containing alcohol? Never   Q2: How many drinks containing alcohol do you have on a typical day when you are drinking? None   Q3: How often do you have six or more drinks on one occasion? Never   Utilities   In the past 12 months has the electric, gas, oil, or water company threatened to shut off services in your home? No   Health Literacy   How often do you need to have someone help you when you read instructions, pamphlets, or other written material from your doctor or pharmacy? Sometimes

## 2024-06-24 NOTE — PROGRESS NOTES
"[facility] Medicine Progress Note    Primary Team: [facility] Hospitalist Team A  Attending Physician: Shey Burgess MD  Resident: Dr. Velázquez  Intern: Dr. Limon    Admission Date:  6/22/2024  Hospital Day: Hospital Day: 3    Chief Complaint: weakness x 3d       Subjective/Interval History      Afebrile, VSS. NAONE. She reports a pruritic rash over her buttocks bilaterally that she believes is from sitting in  her urine. She denies any current urinary symptoms. No CP, SOB. Her swelling has improved.     Review of Systems:  All other systems reviewed and negative.     Objective     Physical Examination:  BP (!) 140/62 (BP Location: Right arm, Patient Position: Lying)   Pulse 62   Temp 98.1 °F (36.7 °C) (Oral)   Resp 18   Ht 5' 5" (1.651 m)   Wt 102.5 kg (225 lb 15.5 oz)   SpO2 (!) 92%   BMI 37.60 kg/m²    Gen: Elderly, NAD, resting comfortably in bed  HEENT: NC/AT, EOMI grossly, normal external ears bilaterally, normal external nose, moist MM  CV: Regular rate, regular rhythm, 3/6 systolic murmur and ULSB. 2+ radial pulses bilaterally  Resp: CTAB. No wheezes, rales, rhonchi. Normal work of breathing. Normal effort. Equal chest rise. No respiratory distress  Abd: Soft, suprapubic tenderness noted, nondistended, no rebound/guarding. No CVA tenderness. Erythema long the panus  MSK/Ext: No clubbing, cyanosis, much improved pitting edema of the BLE, dry now. Moves all four extremities spontaneously. No thigh or calf tenderness. No midline or parapinal L spine tenderness  Neuro: GCS 15, alert, speech is normal, face symmetric, no focal motor deficits elicited on exam. 4/5 BLE strength, 5/5 BUE strength  Skin: Multiple crusted skin lesions on the bilateral lower legs. Indurated pruritic erythematous confluent blanchable papules over the bilateral buttocks  Psych: Normal mood and affect    Intake/Output:    Intake/Output Summary (Last 24 hours) at 6/24/2024 0820  Last data filed at 6/24/2024 0546  Gross per 24 hour " "  Intake --   Output 1801 ml   Net -1801 ml     Net IO Since Admission: -5,851 mL [06/24/24 0854]    Laboratory:  Recent Labs   Lab 06/22/24  0734 06/22/24  1141 06/23/24  0357 06/24/24  0349   WBC 7.71  --  7.66 7.17   HGB 11.2*  --  11.0* 11.0*     --  206 219   MCV 87  --  87 87     --  140 139   K 5.3*  --  4.4 4.1     --  107 104   CO2 25  --  26 26   BUN 31*  --  26* 33*   CREATININE 0.9  --  0.8 0.9   *  --  102 165*   CALCIUM 10.0  --  9.6 9.6   PROT 7.7  --  6.4 6.5   ALBUMIN 3.3*  --  2.9* 2.8*   PHOS  --  3.1 3.5 4.2   MG 2.2  --  2.0 2.0   AST 29  --  21 17   ALT 12  --  10 10   ALKPHOS 48*  --  49* 49*     No results for input(s): "APTT", "INR" in the last 168 hours.    Invalid input(s): "APT"  Lab Results   Component Value Date    COLORU Yellow 06/22/2024    APPEARANCEUA Hazy (A) 06/22/2024    SPECGRAV 1.010 06/22/2024    PHUR 7.0 06/22/2024    PROTEINUA Negative 06/22/2024    KETONESU Negative 06/22/2024    LEUKOCYTESUR 3+ (A) 06/22/2024    NITRITE Positive (A) 06/22/2024    UROBILINOGEN Negative 06/22/2024         All laboratory data reviewed    Microbiology Data: Reviewed  Uclx with E coli    EKG Data: Reviewed  Results for orders placed or performed during the hospital encounter of 06/22/24   EKG 12-lead    Collection Time: 06/22/24  8:58 AM   Result Value Ref Range    QRS Duration 108 ms    OHS QTC Calculation 420 ms    Narrative    Test Reason : R26.2,    Vent. Rate : 065 BPM     Atrial Rate : 065 BPM     P-R Int : 162 ms          QRS Dur : 108 ms      QT Int : 404 ms       P-R-T Axes : 061 -54 077 degrees     QTc Int : 420 ms    Normal sinus rhythm  Left anterior fascicular block  Abnormal ECG  When compared with ECG of 11-MAR-2024 07:08,  No significant change was found  Confirmed by Cesar aGrcia MD ([id number]) on 6/23/2024 5:06:25 PM    Referred By: [email or web address]   SELF           Confirmed By:Cesar Garcia MD       Imaging Data: Reviewed  X-Ray Chest AP " Portable    Result Date: 6/22/2024  EXAMINATION: XR CHEST AP PORTABLE CLINICAL HISTORY: FTT, weakness, cough; TECHNIQUE: Single frontal view of the chest was performed. COMPARISON: 02/16/2019 FINDINGS: The cardiomediastinal silhouette is not enlarged, magnified by technique.  There is calcification and tortuosity of the aorta..  There is no pleural effusion.  The trachea is midline.  The lungs are symmetrically expanded bilaterally with coarse interstitial attenuation accentuated by habitus..  No large focal consolidation seen.  There is no pneumothorax.  The osseous structures are remarkable for degenerative change..     1. Interstitial findings are accentuated by habitus, no large focal consolidation. Electronically signed by: Eddie Lou MD Date:    06/22/2024 Time:    12:43    X-Ray Knee 3 View Right    Result Date: 6/22/2024  EXAMINATION: XR KNEE 3 VIEW RIGHT CLINICAL HISTORY: Pain in right knee TECHNIQUE: AP, lateral, and Merchant views of the right knee were performed. COMPARISON: None FINDINGS: There is osteopenia.  There is no evidence of an acute fracture or dislocation of the knee.  Alignment is normal.  There is severe joint space narrowing of the lateral compartment, moderate joint space narrowing of the medial and patellofemoral compartments with marginal osteophytes.  There is a small suprapatellar joint effusion.  There are vascular calcifications.     No acute fracture or dislocation. Tricompartmental osteoarthritis as above. Electronically signed by: Michael Bustamante Date:    06/22/2024 Time:    08:22       Current Medications:     Infusions:       Scheduled:   acetaminophen  650 mg Oral 6 times per day    ascorbic acid (vitamin C)  500 mg Oral Daily    aspirin  81 mg Oral Daily    carvediloL  3.125 mg Oral BID WM    cefTRIAXone (Rocephin) IV (PEDS and ADULTS)  1 g Intravenous Q24H    cyanocobalamin  100 mcg Oral Daily    enoxparin  40 mg Subcutaneous Daily    furosemide  40 mg Oral Daily     losartan  100 mg Oral Daily    melatonin  9 mg Oral Nightly    miconazole NITRATE 2 %   Topical (Top) BID    multivitamin  1 tablet Oral Daily    NIFEdipine  60 mg Oral Daily    oxybutynin  10 mg Oral Daily    pregabalin  75 mg Oral BID        PRN:    Current Facility-Administered Medications:     capsicum 0.075%, , Topical (Top), TID PRN    dextrose 10%, 12.5 g, Intravenous, PRN    dextrose 10%, 25 g, Intravenous, PRN    emollient, , Topical (Top), PRN    glucagon (human recombinant), 1 mg, Intramuscular, PRN    glucose, 16 g, Oral, PRN    glucose, 24 g, Oral, PRN    insulin aspart U-100, 0-5 Units, Subcutaneous, QID (AC + HS) PRN    sodium chloride 0.9%, 10 mL, Intravenous, Q12H PRN    Antibiotics and Day Number of Therapy:  Antibiotics (From admission, onward)      Start     Stop Route Frequency Ordered    06/23/24 1215  cefTRIAXone (Rocephin) 1 g in D5W 100 mL IVPB (MB+)         06/25/24 1214 IV Every 24 hours (non-standard times) 06/23/24 1107             Lines and Day Number of Therapy:  PIV    Assessment/Plan:     Makenzie Haile is a 88 y.o. female who  has a past medical history of Acute myocardial infarction involving left circumflex coronary artery (3/19/2018), Diabetes mellitus, Hyperlipemia, and Hypertension. The patient presented to [facility] on 6/22/2024 with a primary complaint of weakness. Admitted to [facility] medicine for failure to thrive     Failure to thrive  BLE weakness  Statin induced myalgias  R knee osteoarthritis  Worsening BLE weakness over several weeks that significantly worsened 3 days after starting a statin. Home health starting in 2 days  - Admit to medicine  - Hold statin and zetia for now  - CK wnl  - No low back pain or tenderness, hold off on CT L spine  - No cauda equina symptoms, hold off on MRI  - PT/OT evaluation rec'd SNF  - R kne xray with tricompartmental OA, Tylenol for pain control. Can continue home Lyrica     Venous stasis dermatitis  Pannus fungal  infection  BLE Edema, improved  Bilateral lower leg erythema that is symmetric with occasional wounds. 3+ pitting edema in the setting of holding home lasix due to incontinence  - s/p Lasix IV in the ED. Diuresis well.  - obtain TTE inp today  - Strict I&O  - Daily PO lasix 40 mg  - Wound care for leg wounds and pannus infection  - Miconazole powder     Urinary tract infection, POA  UA with pos LE and nitrates, suprapubic tenderness  - Rocephin 1 g daily for 3 days  - No evidence of pyelo  - Uclx pending     Contact dermatitis  - Dermatitis over the bilateral buttocks, likely secondary to sitting in urine  - Will have wound care on board  - Barrier cream    Prerenal azotemia without acute kidney injury  - Elevated BUN/Cr ratio but Cr at baseline  - Encourage PO intake, but suspect fluid overloaded     HTN  - /67  - Continue home Coreg 3.125 mg BID, Losartan 100 mg, nifedipine 60 mg     Type II DM without insulin  On Januvia at home  - Repeat A1c 5.9  - SSI  - Hold home PO meds  - Hypoglycemia protocl in place     Normocytic anemia  -Hgb 11.2 with MCV 87  - No active bleeding to explain weakness  - Ferritin, iron panel, B12, folate     Hyperkalemia  - K 5.3 on arrival, slightly hemolyzed  - No EKG changes  - Trend with CMP    Hypoalbuminemia  - Boost BID       Fluids: Encourage PO intake  Electrolytes: Replete prn  Nutrition: Diabetic  Ppx: Lovenox  Code: Full     Dispo: Pending    Estimated LOS: 1-2 days       Noa Limon MD  [facility] HO-I     [facility] Hospitalist Pager numbers:   [facility] Hospitalist Medicine Team A (Gilson/Aditya):          [phone]  [facility] Hospitalist Medicine Team B (Ginger/Ran):        [phone]

## 2024-06-25 LAB
ALBUMIN SERPL BCP-MCNC: 2.9 G/DL (ref 3.5–5.2)
ALP SERPL-CCNC: 49 U/L (ref 55–135)
ALT SERPL W/O P-5'-P-CCNC: 13 U/L (ref 10–44)
ANION GAP SERPL CALC-SCNC: 7 MMOL/L (ref 8–16)
AST SERPL-CCNC: 20 U/L (ref 10–40)
BASOPHILS # BLD AUTO: 0.09 K/UL (ref 0–0.2)
BASOPHILS NFR BLD: 1.3 % (ref 0–1.9)
BILIRUB SERPL-MCNC: 0.3 MG/DL (ref 0.1–1)
BUN SERPL-MCNC: 42 MG/DL (ref 8–23)
CALCIUM SERPL-MCNC: 10.1 MG/DL (ref 8.7–10.5)
CHLORIDE SERPL-SCNC: 103 MMOL/L (ref 95–110)
CO2 SERPL-SCNC: 29 MMOL/L (ref 23–29)
CREAT SERPL-MCNC: 1 MG/DL (ref 0.5–1.4)
DIFFERENTIAL METHOD BLD: ABNORMAL
EOSINOPHIL # BLD AUTO: 0.6 K/UL (ref 0–0.5)
EOSINOPHIL NFR BLD: 8.3 % (ref 0–8)
ERYTHROCYTE [DISTWIDTH] IN BLOOD BY AUTOMATED COUNT: 14.4 % (ref 11.5–14.5)
EST. GFR  (NO RACE VARIABLE): 55 ML/MIN/1.73 M^2
GLUCOSE SERPL-MCNC: 114 MG/DL (ref 70–110)
HCT VFR BLD AUTO: 36.1 % (ref 37–48.5)
HGB BLD-MCNC: 11.5 G/DL (ref 12–16)
IMM GRANULOCYTES # BLD AUTO: 0.02 K/UL (ref 0–0.04)
IMM GRANULOCYTES NFR BLD AUTO: 0.3 % (ref 0–0.5)
LYMPHOCYTES # BLD AUTO: 1.5 K/UL (ref 1–4.8)
LYMPHOCYTES NFR BLD: 22.8 % (ref 18–48)
MAGNESIUM SERPL-MCNC: 2.1 MG/DL (ref 1.6–2.6)
MCH RBC QN AUTO: 28 PG (ref 27–31)
MCHC RBC AUTO-ENTMCNC: 31.9 G/DL (ref 32–36)
MCV RBC AUTO: 88 FL (ref 82–98)
MONOCYTES # BLD AUTO: 0.6 K/UL (ref 0.3–1)
MONOCYTES NFR BLD: 8.9 % (ref 4–15)
NEUTROPHILS # BLD AUTO: 3.9 K/UL (ref 1.8–7.7)
NEUTROPHILS NFR BLD: 58.4 % (ref 38–73)
NRBC BLD-RTO: 0 /100 WBC
PHOSPHATE SERPL-MCNC: 4.6 MG/DL (ref 2.7–4.5)
PLATELET # BLD AUTO: 211 K/UL (ref 150–450)
PMV BLD AUTO: 10.8 FL (ref 9.2–12.9)
POCT GLUCOSE: 109 MG/DL (ref 70–110)
POCT GLUCOSE: 133 MG/DL (ref 70–110)
POCT GLUCOSE: 141 MG/DL (ref 70–110)
POCT GLUCOSE: 147 MG/DL (ref 70–110)
POCT GLUCOSE: 159 MG/DL (ref 70–110)
POTASSIUM SERPL-SCNC: 4.3 MMOL/L (ref 3.5–5.1)
PROT SERPL-MCNC: 6.8 G/DL (ref 6–8.4)
RBC # BLD AUTO: 4.1 M/UL (ref 4–5.4)
SODIUM SERPL-SCNC: 139 MMOL/L (ref 136–145)
WBC # BLD AUTO: 6.75 K/UL (ref 3.9–12.7)

## 2024-06-25 PROCEDURE — 36415 COLL VENOUS BLD VENIPUNCTURE: CPT

## 2024-06-25 PROCEDURE — 84100 ASSAY OF PHOSPHORUS: CPT

## 2024-06-25 PROCEDURE — 97530 THERAPEUTIC ACTIVITIES: CPT

## 2024-06-25 PROCEDURE — 63600175 PHARM REV CODE 636 W HCPCS

## 2024-06-25 PROCEDURE — 83735 ASSAY OF MAGNESIUM: CPT

## 2024-06-25 PROCEDURE — 97116 GAIT TRAINING THERAPY: CPT | Mod: CQ

## 2024-06-25 PROCEDURE — 97530 THERAPEUTIC ACTIVITIES: CPT | Mod: CQ

## 2024-06-25 PROCEDURE — 11000001 HC ACUTE MED/SURG PRIVATE ROOM

## 2024-06-25 PROCEDURE — 80053 COMPREHEN METABOLIC PANEL: CPT

## 2024-06-25 PROCEDURE — 25000003 PHARM REV CODE 250

## 2024-06-25 PROCEDURE — 85025 COMPLETE CBC W/AUTO DIFF WBC: CPT

## 2024-06-25 PROCEDURE — 97535 SELF CARE MNGMENT TRAINING: CPT

## 2024-06-25 PROCEDURE — 97110 THERAPEUTIC EXERCISES: CPT | Mod: CQ

## 2024-06-25 RX ORDER — BENZONATATE 100 MG/1
100 CAPSULE ORAL 3 TIMES DAILY PRN
Status: DISCONTINUED | OUTPATIENT
Start: 2024-06-25 | End: 2024-06-26 | Stop reason: HOSPADM

## 2024-06-25 RX ADMIN — MICONAZOLE NITRATE: 20 POWDER TOPICAL at 09:06

## 2024-06-25 RX ADMIN — Medication 9 MG: at 08:06

## 2024-06-25 RX ADMIN — Medication 100 MCG: at 09:06

## 2024-06-25 RX ADMIN — LOSARTAN POTASSIUM 100 MG: 50 TABLET, FILM COATED ORAL at 09:06

## 2024-06-25 RX ADMIN — Medication: at 08:06

## 2024-06-25 RX ADMIN — ACETAMINOPHEN 650 MG: 325 TABLET ORAL at 01:06

## 2024-06-25 RX ADMIN — CARVEDILOL 3.12 MG: 3.12 TABLET, FILM COATED ORAL at 05:06

## 2024-06-25 RX ADMIN — FUROSEMIDE 20 MG: 20 TABLET ORAL at 09:06

## 2024-06-25 RX ADMIN — ENOXAPARIN SODIUM 40 MG: 40 INJECTION SUBCUTANEOUS at 05:06

## 2024-06-25 RX ADMIN — OXYBUTYNIN CHLORIDE 10 MG: 5 TABLET, EXTENDED RELEASE ORAL at 09:06

## 2024-06-25 RX ADMIN — MICONAZOLE NITRATE: 20 POWDER TOPICAL at 08:06

## 2024-06-25 RX ADMIN — ACETAMINOPHEN 650 MG: 325 TABLET ORAL at 08:06

## 2024-06-25 RX ADMIN — CARVEDILOL 3.12 MG: 3.12 TABLET, FILM COATED ORAL at 09:06

## 2024-06-25 RX ADMIN — ACETAMINOPHEN 650 MG: 325 TABLET ORAL at 03:06

## 2024-06-25 RX ADMIN — ACETAMINOPHEN 650 MG: 325 TABLET ORAL at 11:06

## 2024-06-25 RX ADMIN — PREGABALIN 75 MG: 75 CAPSULE ORAL at 08:06

## 2024-06-25 RX ADMIN — THERA TABS 1 TABLET: TAB at 09:06

## 2024-06-25 RX ADMIN — PREGABALIN 75 MG: 75 CAPSULE ORAL at 09:06

## 2024-06-25 RX ADMIN — NIFEDIPINE 60 MG: 60 TABLET, FILM COATED, EXTENDED RELEASE ORAL at 09:06

## 2024-06-25 RX ADMIN — ASPIRIN 81 MG: 81 TABLET, COATED ORAL at 09:06

## 2024-06-25 RX ADMIN — OXYCODONE HYDROCHLORIDE AND ACETAMINOPHEN 500 MG: 500 TABLET ORAL at 09:06

## 2024-06-25 NOTE — NURSING
"Pt wide awake, denies allergy to peanuts, stated "that was a very long time ago, I eat peanuts and peanut butter a lot when I'm at home."  "

## 2024-06-25 NOTE — PT/OT/SLP PROGRESS
Occupational Therapy   Treatment    Name: Makenzie Haile  MRN: [id number]  Admitting Diagnosis:  Bilateral leg weakness       Recommendations:     Discharge Recommendations: Moderate Intensity Therapy  Discharge Equipment Recommendations:  walker, rolling  Barriers to discharge:  Decreased caregiver support, Inaccessible home environment    Assessment:     Makenzie Haile is a 88 y.o. female with a medical diagnosis of Bilateral leg weakness.  She presents with deconditioning but improving tolerance for OOB activity with ability to complete x5 sit<>stand and to take steps in session though remains limited overall. Performance deficits affecting function are weakness, impaired endurance, impaired self care skills, impaired functional mobility, gait instability, impaired balance, edema, impaired skin, impaired fine motor, impaired coordination, decreased ROM, pain, decreased safety awareness, decreased lower extremity function, decreased upper extremity function.     Rehab Prognosis:  Good; patient would benefit from acute skilled OT services to address these deficits and reach maximum level of function.       Plan:     Patient to be seen 3 x/week to address the above listed problems via self-care/home management, therapeutic activities, therapeutic exercises  Plan of Care Expires: 07/23/24  Plan of Care Reviewed with: patient, son    Subjective     Chief Complaint: Pt reports that she wants to sit EOB for lunch but that she is worried about sitting up in bedside chair as she had a difficult time standing from chair when tired last attempt  Patient/Family Comments/goals: To return to PLOF  Pain/Comfort:  Pain Rating 1: 5/10  Location - Orientation 1: generalized  Location 1: back (and R knee)  Pain Addressed 1: Reposition, Distraction, Cessation of Activity  Pain Rating Post-Intervention 1:  (did not rate)    Objective:     Communicated with: nsg prior to session.  Patient found HOB elevated with PureWick, peripheral  IV, telemetry upon OT entry to room.    General Precautions: Standard, fall    Orthopedic Precautions:N/A  Braces: N/A  Respiratory Status: Room air     Occupational Performance:     Bed Mobility:    Patient completed Rolling/Turning to Left with  contact guard assistance  Patient completed Scooting/Bridging with minimum assistance  Patient completed Supine to Sit with moderate assistance     Functional Mobility/Transfers:  Patient completed Sit <> Stand Transfer with moderate assistance and of 2 persons  with  rolling walker   Patient completed Bed <> Chair Transfer using Step Transfer technique with minimum assistance and of 2 persons with rolling walker  Functional Mobility: Pt with fair dynamic seated and fair- standing balance. X2 sit<>stand from EOB, x3 sit<>stand from bedside chair, attempted brief change x2 but urinating each stand and required total A overall    Activities of Daily Living:  Upper Body Dressing: moderate assistance to don gown as robe seated EOB  Lower Body Dressing: total assistance to don/doff  B socks in seated and in supien  Toileting: maximal assistance and total assistance for pericare and clothing management in stance      [facility] 6 Click ADL: 15    Treatment & Education:  Pt educated on role of OT and POC.   Pt performing skills as listed above.     Patient left HOB elevated with all lines intact, call button in reach, nsg notified, and son present    GOALS:   Multidisciplinary Problems       Occupational Therapy Goals          Problem: Occupational Therapy    Goal Priority Disciplines Outcome Interventions   Occupational Therapy Goal     OT, PT/OT Progressing    Description: Goals to be met by: 07/23/2024      Patient will increase functional independence with ADLs by performing:    UE Dressing with Modified [place].  LE Dressing with Modified [place].  Grooming while standing with Modified [place].  Toileting from toilet with Modified [place] for hygiene and  clothing management.   Toilet transfer to toilet with Modified [place].  Increased functional strength to WFL for self care.  Upper extremity exercise program x10 reps per handout, with independence.                          Time Tracking:     OT Date of Treatment: 06/25/24  OT Start Time: 1128  OT Stop Time: 1224  OT Total Time (min): 56 min    Billable Minutes:Self Care/Home Management 26  Therapeutic Activity 30    OT/ZAKI: OT     Number of ZAKI visits since last OT visit: 0    6/25/2024

## 2024-06-25 NOTE — PLAN OF CARE
GENNARO sent updated information via careport to [facility] Phone: [phone].  SW spoke with intake they confirmed that they are in network with pt's insurance she is reaching out to pt's son to complete intake forms. Joe will so submit for insurance approval. Cm will continue to follow pt through transitions of care and assist with any discharge needs.    2:25 GENNARO called [facility] again to follow up on auth status. Per intake they did submit for auth this AM but have not received any authorization from pt's insurance yet. GENNARO will follow.    3:23 GENNARO met with pt at bed side to give updates about d/c. GENNARO informed pt that [facility] has received authorization from insurance for admit tomorrow 6/26/24. GENNARO informed attending team. Pt will d/c tomorrow to [facility] for SNF. Gennaro will continue to follow.      BARRIE Belle  [phone]    Future Appointments   Date Time Provider Department Center   9/11/2024 10:00 AM [facility]        06/25/24 1045   Post-Acute Status   Post-Acute Authorization Placement   Post-Acute Placement Status Referrals Sent   Coverage BCBS   Discharge Delays None known at this time   Discharge Plan   Discharge Plan A Skilled Nursing Facility

## 2024-06-25 NOTE — PT/OT/SLP PROGRESS
Physical Therapy Treatment    Patient Name:  Makenzie Haile   MRN:  [id number]    Recommendations:     Discharge Recommendations: Moderate Intensity Therapy  Discharge Equipment Recommendations: walker, rolling  Barriers to discharge:  decreased strength/functional mobility requiring increased assistance from PLOF     Assessment:     Makenzie Haile is a 88 y.o. female admitted with a medical diagnosis of Bilateral leg weakness.  She presents with the following impairments/functional limitations: weakness, impaired endurance, impaired self care skills, gait instability, impaired functional mobility, impaired balance, decreased coordination, decreased upper extremity function, decreased lower extremity function, decreased ROM, impaired skin, edema Pt would continue to benefit from P.T. To address impairments listed above.  .    Rehab Prognosis: Fair; patient would benefit from acute skilled PT services to address these deficits and reach maximum level of function.    Recent Surgery: * No surgery found *      Plan:     During this hospitalization, patient to be seen 4 x/week to address the identified rehab impairments via gait training, therapeutic activities, therapeutic exercises, neuromuscular re-education and progress toward the following goals:    Plan of Care Expires:  07/22/24    Subjective       Patient/Family Comments/goals: Pt agreed to tx  Pain/Comfort:  Pain Rating 1:  (did not rate)  Location - Side 1: Right  Location - Orientation 1: generalized  Location 1: knee  Pain Addressed 1: Reposition, Nurse notified, Distraction  Pain Rating Post-Intervention 1:  (as above)      Objective:     Communicated with RN prior to session.  Patient found HOB elevated with PureWick, telemetry, peripheral IV upon PT entry to room.     General Precautions: Standard, fall  Orthopedic Precautions: N/A  Braces: N/A  Respiratory Status: Room air     Functional Mobility:  Bed Mobility:     Rolling Left:  contact guard  assistance and with vc's and b/r for assist  Scooting: contact guard assistance and to EOB  Supine to Sit: moderate assistance and for trunk to midline able slide BLEs off EOB with SBA  Transfers:     Sit to Stand:  5 x, moderate assistance and of 2 persons with rolling walker and vc's for hand placement.  Bed to Chair: minimum assistance and of 2 persons with  rolling walker  using  Step Transfer  Gait: 5ft fwd/back with Rw and Mae of 2 for RW for stability and RW management stepping backward.  Seated rest, then ambulated 5ft to EOB at end of tx with RW and Mae of 2 for stability and occasional Rw management.  Pt required vc's for sequencing, proper foot placement inside RW with 3pt gait, and vc's for upright posture. Slow yolanda with decreased step length.  Balance: sitting fair+, standing fair-/poor+, gait poor+      AM-PAC 6 CLICK MOBILITY  Turning over in bed (including adjusting bedclothes, sheets and blankets)?: 3  Sitting down on and standing up from a chair with arms (e.g., wheelchair, bedside commode, etc.): 2  Moving from lying on back to sitting on the side of the bed?: 2  Moving to and from a bed to a chair (including a wheelchair)?: 2  Need to walk in hospital room?: 2  Climbing 3-5 steps with a railing?: 1  Basic Mobility Total Score: 12       Treatment & Education:  Pt sat EOB x ~20 mins with SBA and one bouts of CGA with vc's for forward trunk posture when performing BLE therex.  Seated BLE therex: APs x 15 reps, LAQs x 10 reps with vc's for proper technique and gentle hamstring stretch bilaterally.  Pt c/o posterior right knee pain.  Pt stated she has arthritis in her knees.  R knee appear a little more swollen than left.  Pt transferred EOB <> b/s chair ambulating 5ft with RW and Mae of 2 for stability and Rw management, especially when turning to sit.  VC's for reaching back for b/s chair and hand placement when standing. Pt stood a total of 5 reps, 2 from EOB and 3 from b/s chair.  Pt was  assisted with 2 diaper changes secondary to urinating when standing statically while assisting with donning diaper/purewick.  Pt left sitting EOB to eat lunch with pt's son present.  RN notified and pt's son asked to notify nsg if he has to leave so pt can be assisted BTB.    Patient left sitting edge of bed with all lines intact, call button in reach, and RN notified, pt's son present..    GOALS:   Multidisciplinary Problems       Physical Therapy Goals          Problem: Physical Therapy    Goal Priority Disciplines Outcome Goal Variances Interventions   Physical Therapy Goal     PT, PT/OT Progressing     Description: Goals to be met by: 2024     Patient will increase functional independence with mobility by performin. Supine to sit with Modified [place]  2. Sit to supine with Modified [place]  3. Sit to stand transfer with Minimal Assistance  4. Gait  x 50 feet with Minimal Assistance using Rolling Walker.   5. Sitting at edge of bed x15 minutes with Modified [place]                         Time Tracking:     PT Received On: 24  PT Start Time: 1130     PT Stop Time: 1224  PT Total Time (min): 54 min     Billable Minutes: Gait Training 12, Therapeutic Activity 34, and Therapeutic Exercise 8       PT/PTA: PTA     Number of PTA visits since last PT visit: 1     2024

## 2024-06-25 NOTE — PROGRESS NOTES
"[facility] Medicine Progress Note    Primary Team: [facility] Hospitalist Team A  Attending Physician: Shey Burgess MD  Resident: Dr. Velázquez  Intern: Dr. Limon    Admission Date:  6/22/2024  Hospital Day: Hospital Day: 4    Chief Complaint: weakness x 3d       Subjective/Interval History      Patient is doing well overall and was able to get some sleep. She has been tolerating the wound treatment well. She has a recorded allergy to peanut butter, but has been eating peanuts and peanut butter at home without issues. Her pannus wound is improving with the powder. Denies and CP, SOB. Legs feel slightly tight but the overall sensation is improving with fluid removal     Review of Systems:  All other systems reviewed and negative.     Objective     Physical Examination:  BP (!) 131/59 (Patient Position: Lying)   Pulse (!) 55   Temp 97.6 °F (36.4 °C) (Oral)   Resp 17   Ht 5' 5" (1.651 m)   Wt 102.5 kg (225 lb 15.5 oz)   SpO2 95%   BMI 37.60 kg/m²    Gen: Elderly, NAD, resting comfortably in bed  HEENT: NC/AT, EOMI grossly, normal external ears bilaterally, normal external nose, moist MM  CV: Regular rate, regular rhythm, 3/6 systolic murmur and ULSB. 2+ radial pulses bilaterally  Resp: CTAB. No wheezes, rales, rhonchi. Normal work of breathing. Normal effort. Equal chest rise. No respiratory distress  Abd: Soft, suprapubic tenderness noted, nondistended, no rebound/guarding. No CVA tenderness. Erythema long the panus  MSK/Ext: No clubbing, cyanosis, much improved pitting edema of the BLE, dry now. Moves all four extremities spontaneously. TTP over the L calf, no cording or swelling  Neuro: GCS 15, alert, speech is normal, face symmetric, no focal motor deficits elicited on exam. 4/5 BLE strength, 5/5 BUE strength  Skin: Multiple crusted skin lesions on the bilateral lower legs. Indurated pruritic erythematous confluent blanchable papules over the bilateral buttocks  Psych: Normal mood and " affect    Intake/Output:    Intake/Output Summary (Last 24 hours) at 6/25/2024 0841  Last data filed at 6/25/2024 0614  Gross per 24 hour   Intake 1190 ml   Output 2700 ml   Net -1510 ml     Net IO Since Admission: -7,361 mL [06/25/24 0841]    Laboratory:  Recent Labs   Lab 06/22/24  0734 06/22/24  1141 06/23/24  0357 06/24/24  0349 06/25/24  0338   WBC 7.71  --  7.66 7.17 6.75   HGB 11.2*  --  11.0* 11.0* 11.5*     --  206 219 211   MCV 87  --  87 87 88     --  140 139 139   K 5.3*  --  4.4 4.1 4.3     --  107 104 103   CO2 25  --  26 26 29   BUN 31*  --  26* 33* 42*   CREATININE 0.9  --  0.8 0.9 1.0   *  --  102 165* 114*   CALCIUM 10.0  --  9.6 9.6 10.1   PROT 7.7  --  6.4 6.5 6.8   ALBUMIN 3.3*  --  2.9* 2.8* 2.9*   PHOS  --  3.1 3.5 4.2 4.6*   MG 2.2  --  2.0 2.0 2.1   AST 29  --  21 17 20   ALT 12  --  10 10 13   ALKPHOS 48*  --  49* 49* 49*       Lab Results   Component Value Date    COLORU Yellow 06/22/2024    APPEARANCEUA Hazy (A) 06/22/2024    SPECGRAV 1.010 06/22/2024    PHUR 7.0 06/22/2024    PROTEINUA Negative 06/22/2024    KETONESU Negative 06/22/2024    LEUKOCYTESUR 3+ (A) 06/22/2024    NITRITE Positive (A) 06/22/2024    UROBILINOGEN Negative 06/22/2024         All laboratory data reviewed    Microbiology Data: Reviewed  Uclx with E coli    EKG Data: Reviewed  Results for orders placed or performed during the hospital encounter of 06/22/24   EKG 12-lead    Collection Time: 06/22/24  8:58 AM   Result Value Ref Range    QRS Duration 108 ms    [facility] QTC Calculation 420 ms    Narrative    Test Reason : R26.2,    Vent. Rate : 065 BPM     Atrial Rate : 065 BPM     P-R Int : 162 ms          QRS Dur : 108 ms      QT Int : 404 ms       P-R-T Axes : 061 -54 077 degrees     QTc Int : 420 ms    Normal sinus rhythm  Left anterior fascicular block  Abnormal ECG  When compared with ECG of 11-MAR-2024 07:08,  No significant change was found  Confirmed by Cesar Garcia MD ([id number]) on  6/23/2024 5:06:25 PM    Referred By: ELMA   SELF           Confirmed By:Cesar Garcia MD       Imaging Data: Reviewed  X-Ray Chest AP Portable    Result Date: 6/22/2024  EXAMINATION: XR CHEST AP PORTABLE CLINICAL HISTORY: FTT, weakness, cough; TECHNIQUE: Single frontal view of the chest was performed. COMPARISON: 02/16/2019 FINDINGS: The cardiomediastinal silhouette is not enlarged, magnified by technique.  There is calcification and tortuosity of the aorta..  There is no pleural effusion.  The trachea is midline.  The lungs are symmetrically expanded bilaterally with coarse interstitial attenuation accentuated by habitus..  No large focal consolidation seen.  There is no pneumothorax.  The osseous structures are remarkable for degenerative change..     1. Interstitial findings are accentuated by habitus, no large focal consolidation. Electronically signed by: Eddie Lou MD Date:    06/22/2024 Time:    12:43    X-Ray Knee 3 View Right    Result Date: 6/22/2024  EXAMINATION: XR KNEE 3 VIEW RIGHT CLINICAL HISTORY: Pain in right knee TECHNIQUE: AP, lateral, and Merchant views of the right knee were performed. COMPARISON: None FINDINGS: There is osteopenia.  There is no evidence of an acute fracture or dislocation of the knee.  Alignment is normal.  There is severe joint space narrowing of the lateral compartment, moderate joint space narrowing of the medial and patellofemoral compartments with marginal osteophytes.  There is a small suprapatellar joint effusion.  There are vascular calcifications.     No acute fracture or dislocation. Tricompartmental osteoarthritis as above. Electronically signed by: Michael Bustamante Date:    06/22/2024 Time:    08:22       Current Medications:     Infusions:       Scheduled:   acetaminophen  650 mg Oral 6 times per day    ascorbic acid (vitamin C)  500 mg Oral Daily    aspirin  81 mg Oral Daily    carvediloL  3.125 mg Oral BID WM    cyanocobalamin  100 mcg Oral Daily     enoxparin  40 mg Subcutaneous Daily    furosemide  20 mg Oral Daily    losartan  100 mg Oral Daily    melatonin  9 mg Oral Nightly    miconazole NITRATE 2 %   Topical (Top) BID    multivitamin  1 tablet Oral Daily    NIFEdipine  60 mg Oral Daily    oxybutynin  10 mg Oral Daily    pregabalin  75 mg Oral BID        PRN:    Current Facility-Administered Medications:     dextrose 10%, 12.5 g, Intravenous, PRN    dextrose 10%, 25 g, Intravenous, PRN    emollient, , Topical (Top), PRN    glucagon (human recombinant), 1 mg, Intramuscular, PRN    glucose, 16 g, Oral, PRN    glucose, 24 g, Oral, PRN    insulin aspart U-100, 0-5 Units, Subcutaneous, QID (AC + HS) PRN    sodium chloride 0.9%, 10 mL, Intravenous, Q12H PRN    Antibiotics and Day Number of Therapy:  Antibiotics (From admission, onward)      None             Lines and Day Number of Therapy:  PIV    Assessment/Plan:     Makenzie Haile is a 88 y.o. female who  has a past medical history of Acute myocardial infarction involving left circumflex coronary artery (3/19/2018), Diabetes mellitus, Hyperlipemia, and Hypertension. The patient presented to [facility] on 6/22/2024 with a primary complaint of weakness. Admitted to [facility] medicine for failure to thrive     Failure to thrive  BLE weakness  Statin induced myalgias  R knee osteoarthritis  Worsening BLE weakness over several weeks that significantly worsened 3 days after starting a statin. Home health starting in 2 days  - Admit to medicine  - Hold statin and zetia for now  - CK wnl  - No low back pain or tenderness, hold off on CT L spine  - No cauda equina symptoms, hold off on MRI  - PT/OT evaluation rec'd SNF  - R kne xray with tricompartmental OA, Tylenol for pain control. Can continue home Lyrica  - Will check BLE DVT US given new onset L calf tenderness, low suspicion, on lovenox     Venous stasis dermatitis  Pannus fungal infection, improving  BLE Edema, improved  Bilateral lower leg  erythema that is symmetric with occasional wounds. 3+ pitting edema in the setting of holding home lasix due to incontinence  - s/p Lasix IV in the ED. Diuresis well.  - obtain TTE inp today  - Strict I&O  - Daily PO lasix 40 mg  - Wound care for leg wounds and pannus infection, barrier ointment ordered  - Miconazole powder     Urinary tract infection, POA  UA with pos LE and nitrates, suprapubic tenderness  - Rocephin 1 g daily for 3 days  - No evidence of pyelo  - Uclx with E coli sensitive to rocephin, completed Abx     Contact dermatitis  - Dermatitis over the bilateral buttocks, likely secondary to sitting in urine  - Wound care on board  - Barrier cream ordered    Aortic stenosis, moderate to severe  - No syncope, SOB, or angina  - Can follow up with outpatient cardiology    Prerenal azotemia without acute kidney injury  - Elevated BUN/Cr ratio but Cr at baseline  - Encourage PO intake, but suspect fluid overloaded     HTN  - /67  - Continue home Coreg 3.125 mg BID, Losartan 100 mg, nifedipine 60 mg     Type II DM without insulin  On Januvia at home  - Repeat A1c 5.9  - SSI  - Hold home PO meds  - Hypoglycemia protocl in place     Normocytic anemia  -Hgb 11.2 with MCV 87  - No active bleeding to explain weakness  - Ferritin, iron panel, B12, folate     Hyperkalemia  - K 5.3 on arrival, slightly hemolyzed  - No EKG changes  - Trend with CMP    Hypoalbuminemia  - Boost BID       Fluids: Encourage PO intake  Electrolytes: Replete prn  Nutrition: Diabetic  Ppx: Lovenox  Code: Full     Dispo: Pending SNF [facility], medically ready for SNF  Estimated LOS: 1-2 days     Rod Velázquez DO  [facility]      [facility] Hospitalist Pager numbers:   [facility] Hospitalist Medicine Team A (Gilson/Aditya):          [phone]  [facility] Hospitalist Medicine Team B (Ginger/Ran):        [phone]

## 2024-06-26 ENCOUNTER — TELEPHONE (OUTPATIENT)
Dept: CARDIOLOGY | Facility: CLINIC | Age: 89
End: 2024-06-26
Payer: MEDICARE

## 2024-06-26 VITALS
BODY MASS INDEX: 37.83 KG/M2 | RESPIRATION RATE: 18 BRPM | TEMPERATURE: 98 F | SYSTOLIC BLOOD PRESSURE: 166 MMHG | DIASTOLIC BLOOD PRESSURE: 71 MMHG | HEART RATE: 69 BPM | OXYGEN SATURATION: 94 % | HEIGHT: 65 IN | WEIGHT: 227.06 LBS

## 2024-06-26 LAB
ALBUMIN SERPL BCP-MCNC: 2.9 G/DL (ref 3.5–5.2)
ALP SERPL-CCNC: 50 U/L (ref 55–135)
ALT SERPL W/O P-5'-P-CCNC: 30 U/L (ref 10–44)
ANION GAP SERPL CALC-SCNC: 10 MMOL/L (ref 8–16)
AST SERPL-CCNC: 53 U/L (ref 10–40)
BASOPHILS # BLD AUTO: 0.08 K/UL (ref 0–0.2)
BASOPHILS NFR BLD: 1.3 % (ref 0–1.9)
BILIRUB SERPL-MCNC: 0.3 MG/DL (ref 0.1–1)
BUN SERPL-MCNC: 45 MG/DL (ref 8–23)
CALCIUM SERPL-MCNC: 10.3 MG/DL (ref 8.7–10.5)
CHLORIDE SERPL-SCNC: 105 MMOL/L (ref 95–110)
CO2 SERPL-SCNC: 22 MMOL/L (ref 23–29)
CREAT SERPL-MCNC: 0.9 MG/DL (ref 0.5–1.4)
DIFFERENTIAL METHOD BLD: ABNORMAL
EOSINOPHIL # BLD AUTO: 0.5 K/UL (ref 0–0.5)
EOSINOPHIL NFR BLD: 8.8 % (ref 0–8)
ERYTHROCYTE [DISTWIDTH] IN BLOOD BY AUTOMATED COUNT: 14.4 % (ref 11.5–14.5)
EST. GFR  (NO RACE VARIABLE): >60 ML/MIN/1.73 M^2
GLUCOSE SERPL-MCNC: 98 MG/DL (ref 70–110)
HCT VFR BLD AUTO: 37.6 % (ref 37–48.5)
HGB BLD-MCNC: 12.1 G/DL (ref 12–16)
IMM GRANULOCYTES # BLD AUTO: 0.02 K/UL (ref 0–0.04)
IMM GRANULOCYTES NFR BLD AUTO: 0.3 % (ref 0–0.5)
LYMPHOCYTES # BLD AUTO: 1.5 K/UL (ref 1–4.8)
LYMPHOCYTES NFR BLD: 25.2 % (ref 18–48)
MAGNESIUM SERPL-MCNC: 2.1 MG/DL (ref 1.6–2.6)
MCH RBC QN AUTO: 28 PG (ref 27–31)
MCHC RBC AUTO-ENTMCNC: 32.2 G/DL (ref 32–36)
MCV RBC AUTO: 87 FL (ref 82–98)
MONOCYTES # BLD AUTO: 0.6 K/UL (ref 0.3–1)
MONOCYTES NFR BLD: 9 % (ref 4–15)
NEUTROPHILS # BLD AUTO: 3.4 K/UL (ref 1.8–7.7)
NEUTROPHILS NFR BLD: 55.4 % (ref 38–73)
NRBC BLD-RTO: 0 /100 WBC
PHOSPHATE SERPL-MCNC: 4.7 MG/DL (ref 2.7–4.5)
PLATELET # BLD AUTO: 222 K/UL (ref 150–450)
PMV BLD AUTO: 11.4 FL (ref 9.2–12.9)
POCT GLUCOSE: 109 MG/DL (ref 70–110)
POCT GLUCOSE: 119 MG/DL (ref 70–110)
POTASSIUM SERPL-SCNC: 4.8 MMOL/L (ref 3.5–5.1)
PROT SERPL-MCNC: 6.9 G/DL (ref 6–8.4)
RBC # BLD AUTO: 4.32 M/UL (ref 4–5.4)
SODIUM SERPL-SCNC: 137 MMOL/L (ref 136–145)
WBC # BLD AUTO: 6.11 K/UL (ref 3.9–12.7)

## 2024-06-26 PROCEDURE — 25000003 PHARM REV CODE 250

## 2024-06-26 PROCEDURE — 80053 COMPREHEN METABOLIC PANEL: CPT

## 2024-06-26 PROCEDURE — 84100 ASSAY OF PHOSPHORUS: CPT

## 2024-06-26 PROCEDURE — 83735 ASSAY OF MAGNESIUM: CPT

## 2024-06-26 PROCEDURE — 36415 COLL VENOUS BLD VENIPUNCTURE: CPT

## 2024-06-26 PROCEDURE — 85025 COMPLETE CBC W/AUTO DIFF WBC: CPT

## 2024-06-26 RX ORDER — CAPSAICIN 0.75 MG/G
CREAM TOPICAL 3 TIMES DAILY
Status: CANCELLED | OUTPATIENT
Start: 2024-06-26

## 2024-06-26 RX ORDER — IBUPROFEN 400 MG/1
400 TABLET ORAL ONCE
Status: COMPLETED | OUTPATIENT
Start: 2024-06-26 | End: 2024-06-26

## 2024-06-26 RX ORDER — CAPSAICIN 0.75 MG/G
CREAM TOPICAL 3 TIMES DAILY
Status: DISCONTINUED | OUTPATIENT
Start: 2024-06-26 | End: 2024-06-26 | Stop reason: HOSPADM

## 2024-06-26 RX ORDER — FUROSEMIDE 20 MG/1
20 TABLET ORAL DAILY
Qty: 30 TABLET | Refills: 11 | Status: SHIPPED | OUTPATIENT
Start: 2024-06-26 | End: 2025-06-26

## 2024-06-26 RX ORDER — GUAIFENESIN 600 MG/1
600 TABLET, EXTENDED RELEASE ORAL 2 TIMES DAILY
Status: DISCONTINUED | OUTPATIENT
Start: 2024-06-26 | End: 2024-06-26 | Stop reason: HOSPADM

## 2024-06-26 RX ORDER — MICONAZOLE NITRATE 2 %
POWDER (GRAM) TOPICAL 2 TIMES DAILY
Qty: 85 G | Refills: 0 | Status: SHIPPED | OUTPATIENT
Start: 2024-06-26

## 2024-06-26 RX ORDER — BENZONATATE 100 MG/1
100 CAPSULE ORAL 3 TIMES DAILY PRN
Qty: 30 CAPSULE | Refills: 0 | Status: SHIPPED | OUTPATIENT
Start: 2024-06-26 | End: 2024-07-06

## 2024-06-26 RX ADMIN — FUROSEMIDE 20 MG: 20 TABLET ORAL at 08:06

## 2024-06-26 RX ADMIN — OXYCODONE HYDROCHLORIDE AND ACETAMINOPHEN 500 MG: 500 TABLET ORAL at 08:06

## 2024-06-26 RX ADMIN — CAPSAICIN: 0.75 CREAM TOPICAL at 08:06

## 2024-06-26 RX ADMIN — GUAIFENESIN 600 MG: 600 TABLET, EXTENDED RELEASE ORAL at 08:06

## 2024-06-26 RX ADMIN — MICONAZOLE NITRATE: 20 POWDER TOPICAL at 08:06

## 2024-06-26 RX ADMIN — THERA TABS 1 TABLET: TAB at 08:06

## 2024-06-26 RX ADMIN — PREGABALIN 75 MG: 75 CAPSULE ORAL at 08:06

## 2024-06-26 RX ADMIN — NIFEDIPINE 60 MG: 60 TABLET, FILM COATED, EXTENDED RELEASE ORAL at 08:06

## 2024-06-26 RX ADMIN — CARVEDILOL 3.12 MG: 3.12 TABLET, FILM COATED ORAL at 08:06

## 2024-06-26 RX ADMIN — ACETAMINOPHEN 650 MG: 325 TABLET ORAL at 08:06

## 2024-06-26 RX ADMIN — ASPIRIN 81 MG: 81 TABLET, COATED ORAL at 08:06

## 2024-06-26 RX ADMIN — OXYBUTYNIN CHLORIDE 10 MG: 5 TABLET, EXTENDED RELEASE ORAL at 08:06

## 2024-06-26 RX ADMIN — IBUPROFEN 400 MG: 400 TABLET ORAL at 09:06

## 2024-06-26 RX ADMIN — Medication 100 MCG: at 08:06

## 2024-06-26 RX ADMIN — LOSARTAN POTASSIUM 100 MG: 50 TABLET, FILM COATED ORAL at 08:06

## 2024-06-26 NOTE — CARE UPDATE
[facility]    FACILITY TRANSFER ORDERS      Patient Name: Makenzie Haile  YOB: 1935    PCP: Farhat Rapp MD   PCP Address: [facility] [address] / [place] [address]  PCP Phone Number: [phone]  PCP Fax: [phone]    Encounter Date: 06/26/2024    Admit to: [place]    Vital Signs:  Routine    Diagnoses:   Active Hospital Problems    Diagnosis  POA    *Bilateral leg weakness [R29.898]  Yes    Irritant contact dermatitis due to urinary incontinence [L24.A2]  Yes    Moderate to severe aortic stenosis [I35.0]  Yes    Myalgia due to statin [M79.10, T46.6X5A]  Yes    Candida infection of flexural skin [B37.2]  Yes    Venous stasis dermatitis of both lower extremities [I87.2]  Yes    Bilateral leg edema [R60.0]  Yes    Ambulatory dysfunction [R26.2]  Yes    UTI (urinary tract infection) [N39.0]  Yes    Bilateral lower extremity edema [R60.0]  Yes    Tricompartment osteoarthritis of right knee [M17.11]  Yes    Failure to thrive in adult [R62.7]  Yes    Physical deconditioning [R53.81]  Yes    Normocytic anemia [D64.9]  Yes    Skin breakdown [R23.8]  Yes     On buttocks      Essential hypertension [I10]  Yes     Chronic    Type 2 diabetes mellitus without complication, without long-term current use of insulin [E11.9]  Yes      Resolved Hospital Problems    Diagnosis Date Resolved POA    Aortic stenosis [I35.0] 06/24/2024 Yes     Chronic       Allergies:  Review of patient's allergies indicates:   Allergen Reactions    Iodine and iodide containing products     Peanut butter flavor     Sulfa (sulfonamide antibiotics) Hives       Diet: cardiac diet and diabetic diet: 2000 calorie    Activities: Up with assistance    Goals of Care Treatment Preferences:  Code Status: Full Code      Nursing: Wound care as below     Labs:  None     CONSULTS:    Physical Therapy to evaluate and treat.  and Occupational Therapy to evaluate and treat.    MISCELLANEOUS CARE:  Routine Skin for Bedridden  Patients: Apply moisture barrier cream to all skin folds and wet areas in perineal area daily and after baths and all bowel movements.    WOUND CARE ORDERS  Yes: Triad ointment to buttocks BID, moisturizer to BLE BID, miconazole powder for skin folds BID    Medications: Review discharge medications with patient and family and provide education.      Current Discharge Medication List        START taking these medications    Details   benzonatate (TESSALON) 100 MG capsule Take 1 capsule (100 mg total) by mouth 3 (three) times daily as needed for Cough.  Qty: 30 capsule, Refills: 0      emollient Lotn Apply topically as needed (itching).  Qty: 89 mL, Refills: 0      furosemide (LASIX) 20 MG tablet Take 1 tablet (20 mg total) by mouth once daily.  Qty: 30 tablet, Refills: 11      miconazole NITRATE 2 % (MICOTIN) 2 % top powder Apply topically 2 (two) times daily.  Qty: 43 g, Refills: 0           CONTINUE these medications which have NOT CHANGED    Details   ascorbic acid, vitamin C, (VITAMIN C) 500 MG tablet Take 500 mg by mouth once daily.      aspirin (ECOTRIN) 81 MG EC tablet Take 1 tablet (81 mg total) by mouth once daily.  Qty: 30 tablet, Refills: 11      carvediloL (COREG) 3.125 MG tablet Take 1 tablet (3.125 mg total) by mouth 2 (two) times daily with meals.  Qty: 60 tablet, Refills: 11    Associated Diagnoses: Essential hypertension      cyanocobalamin (VITAMIN B-12) 100 MCG tablet Take 100 mcg by mouth once daily.      losartan (COZAAR) 100 MG tablet Take 100 mg by mouth once daily.      magnesium citrate 100 mg Tab Take 100 mg by mouth once daily.      multivit-min-iron-FA-vit K-lut (CENTRUM SILVER WOMEN) 8 mg iron-400 mcg-50 mcg Tab Take 1 tablet by mouth once daily.      NIFEdipine (PROCARDIA-XL) 60 MG (OSM) 24 hr tablet Take 1 tablet (60 mg total) by mouth once daily.  Qty: 30 tablet, Refills: 11    Associated Diagnoses: Essential hypertension      oxybutynin (DITROPAN-XL) 10 MG 24 hr tablet Take 10 mg by  mouth once daily.      pregabalin (LYRICA) 75 MG capsule Take 75 mg by mouth 2 (two) times daily.      SITagliptin (JANUVIA) 100 MG Tab Take 100 mg by mouth once daily.      zinc gluconate 50 mg tablet Take 50 mg by mouth once daily.      acetaminophen (TYLENOL) 500 MG tablet Take 500 mg by mouth every 6 (six) hours as needed for Pain.      incontinence pad,liner,disp (BLADDER CONTROL PADS EX ABSORB MISC) bladder control pads      lancets (ACCU-CHEK FASTCLIX MISC) Accu-Chek FastClix      nitroGLYCERIN (NITROSTAT) 0.4 MG SL tablet Place 0.4 mg under the tongue every 5 (five) minutes as needed for Chest pain.      ondansetron (ZOFRAN-ODT) 4 MG TbDL Take 1 tablet (4 mg total) by mouth every 6 (six) hours as needed.  Qty: 12 tablet, Refills: 0           STOP taking these medications       atorvastatin (LIPITOR) 40 MG tablet Comments:   Reason for Stopping:         ezetimibe (ZETIA) 10 mg tablet Comments:   Reason for Stopping:                  Immunizations Administered as of 6/26/2024       No immunizations on file.              _________________________________  Rod Velázquez MD  06/26/2024

## 2024-06-26 NOTE — TELEPHONE ENCOUNTER
I  reached out  and spoke to patient Daughter  [phone],    And we schedule PT (Mrs. Haile ) an appointment Post Hosp. D/c  to See Hernesto King DNP.    Appointment also mailed to patient home.    Daughter confirmed appointment too.      Stephanie Robles (rita).                        ----- Message from Alina Pandya sent at 6/26/2024 11:14 AM CDT -----  Contact: Cm  Type:  Sooner Appointment Request    Caller is requesting a sooner appointment.  Caller declined first available appointment listed below.  Caller will not accept being placed on the waitlist and is requesting a message be sent to doctor.  Name of Caller:pt daughter   When is the first available appointment?08/13  Symptoms:Hospital Follow up   Would the patient rather a call back or a response via [facility]? Call   Best Call Back Number:[phone]  Additional Information:

## 2024-06-26 NOTE — DISCHARGE SUMMARY
hospitals Hospital Medicine Discharge Summary    Primary Team: hospitals Hospitalist Team A  Attending Physician: No att. providers found  Resident: Dr. Velázquez  Intern: Dr. Limon    Date of Admit: 6/22/2024  Date of Discharge: 6/26/2024    Discharge to: SNF  Condition: Stable    Discharge Diagnoses     Patient Active Problem List   Diagnosis    Acute myocardial infarction involving left circumflex coronary artery    GERD (gastroesophageal reflux disease)    Type 2 diabetes mellitus without complication, without long-term current use of insulin    Essential hypertension    Class 3 severe obesity due to excess calories with serious comorbidity and body mass index (BMI) of 40.0 to 44.9 in adult    History of coronary artery stent placement    Neuropathy    Coronary artery disease involving native coronary artery of native heart without angina pectoris    Normocytic anemia    HLD (hyperlipidemia)    Skin breakdown    History of GI bleed    Iron deficiency anemia due to chronic blood loss    Acute blood loss anemia    Acute upper GI bleed    Symptomatic anemia    Bradycardia    Overflow incontinence of urine    Gastrointestinal hemorrhage with melena    RANDELL (acute kidney injury)    Diverticulosis of large intestine with hemorrhage    Physical deconditioning    Ambulatory dysfunction    UTI (urinary tract infection)    Bilateral lower extremity edema    Tricompartment osteoarthritis of right knee    Failure to thrive in adult    Bilateral leg weakness    Myalgia due to statin    Candida infection of flexural skin    Venous stasis dermatitis of both lower extremities    Bilateral leg edema    Irritant contact dermatitis due to urinary incontinence    Moderate to severe aortic stenosis       Consultants and Procedures     Consultants:  Wound Care    Procedures:   none    Imaging:  US Lower Extremity Veins Bilateral   Final Result      No evidence of deep venous thrombosis in either lower extremity.         Electronically signed  by: Ammon Marvin   Date:    06/25/2024   Time:    08:58      X-Ray Chest AP Portable   Final Result      1. Interstitial findings are accentuated by habitus, no large focal consolidation.         Electronically signed by: Eddie Lou MD   Date:    06/22/2024   Time:    12:43      X-Ray Knee 3 View Right   Final Result      No acute fracture or dislocation.      Tricompartmental osteoarthritis as above.         Electronically signed by: Michael Bustamante   Date:    06/22/2024   Time:    08:22            Brief History of Present Illness      Makenzie Haile is a 88 y.o. female who has a past medical history of Acute myocardial infarction involving left circumflex coronary artery (3/19/2018), Diabetes mellitus, Hyperlipemia, and Hypertension. The patient presented to [facility] on 6/22/2024 with a primary complaint of weakness. Admitted to [facility] medicine for failure to thrive.     On the day of discharge, patient was afebrile with stable vital signs and will be discharged in stable condition to SNF for rehab with wound care.    For the full HPI please refer to the History & Physical from this admission.    Hospital Course By Problem with Pertinent Findings     Failure to thrive  BLE weakness, improved  Statin induced myalgias, improved  R knee osteoarthritis  Worsening BLE weakness over several weeks that significantly worsened 3 days after starting a statin. Home health was supposed to start in 2 days  - CK wnl  - No low back pain or tenderness, hold off on CT L spine  - No cauda equina symptoms, hold off on MRI  - PT/OT evaluation rec'd SNF  - R kne xray with tricompartmental OA, Tylenol for pain control. Can continue home Lyrica.  -  BLE DVT US neg, obtained given new onset L calf tenderness, on lovenox  - Hold statin and zetia for now. May follow up closely with PCP and/or cardiology for risk v benefit of re-starting      Venous stasis dermatitis  Pannus fungal infection, improving  BLE Edema,  improved  Bilateral lower leg erythema that is symmetric with occasional wounds. 3+ pitting edema in the setting of holding home lasix due to incontinence  - s/p Lasix IV in the ED. Diuresis well.  - TTE obtained, see below in Aortic stenosis  - Strict I&O while inp  - Decreased daily PO lasix 40 mg to 20mg  - Wound care for leg wounds and pannus infection, barrier ointment ordered  - Miconazole powder     Urinary tract infection, POA  UA with pos LE and nitrates, suprapubic tenderness  - Rocephin 1 g daily for 3 days  - No evidence of pyelo  - Uclx with E coli sensitive to rocephin, completed Abx     Contact dermatitis  - Dermatitis over the bilateral buttocks, likely secondary to sitting in urine  - Wound care on board  - Barrier cream ordered     Aortic stenosis, moderate to severe  - No syncope, SOB, or angina  - TTE Left Atrium: Left atrium is moderately dilated. moderate aortic valve sclerosis. Moderately restricted motion. There is moderate to severe stenosis. Aortic valve area by VTI is 0.95 cm². Aortic valve peak velocity is 3.71 m/s. Mean gradient is 41 mmHg. The dimensionless index is 0.29. There is mild aortic regurgitation.  - Can follow up with outpatient cardiology     Cough  Likely 2/2 viral URI v. allergies  - AF, no WBC.   - sched mucinex BID, tessalon pearles prn     Prerenal azotemia without acute kidney injury  - Elevated BUN/Cr ratio but Cr at baseline on admit  - Encourage PO intake, but suspect fluid overloaded     HTN, controlled  - /67  - Continue home Coreg 3.125 mg BID, Losartan 100 mg, nifedipine 60 mg     Type II DM without insulin  On Januvia at home  - Repeat A1c 5.9  - re-start home PO meds     Normocytic anemia  -Hgb 11.2 with MCV 87  - No active bleeding to explain weakness  - Ferritin, iron panel, B12, folate     Hyperkalemia, resolved  - K 5.3 on arrival, slightly hemolyzed  - No EKG changes     Hypoalbuminemia  - Boost BID      Discharge vital signs    on RA  BP (!) 166/71  "(BP Location: Right arm, Patient Position: Lying)   Pulse 69   Temp 97.8 °F (36.6 °C) (Oral)   Resp 18   Ht 5' 5" (1.651 m)   Wt 103 kg (227 lb 1.2 oz)   SpO2 (!) 94%   BMI 37.79 kg/m²      Discharge Medications        Medication List        START taking these medications      benzonatate 100 MG capsule  Commonly known as: TESSALON  Take 1 capsule (100 mg total) by mouth 3 (three) times daily as needed for Cough.     emollient Lotn  Apply topically as needed (itching).     furosemide 20 MG tablet  Commonly known as: LASIX  Take 1 tablet (20 mg total) by mouth once daily.     Remedy Phytoplex AntifungaL 2 % top powder  Generic drug: miconazole NITRATE 2 %  Apply topically 2 (two) times daily.            CONTINUE taking these medications      ACCU-CHEK FASTCLIX MISC     acetaminophen 500 MG tablet  Commonly known as: TYLENOL     ascorbic acid (vitamin C) 500 MG tablet  Commonly known as: VITAMIN C     aspirin 81 MG EC tablet  Commonly known as: ECOTRIN  Take 1 tablet (81 mg total) by mouth once daily.     BLADDER CONTROL PADS EX ABSORB MISC     carvediloL 3.125 MG tablet  Commonly known as: COREG  Take 1 tablet (3.125 mg total) by mouth 2 (two) times daily with meals.     CENTRUM SILVER WOMEN 8 mg iron-400 mcg-50 mcg Tab  Generic drug: multivit-min-iron-FA-vit K-lut     losartan 100 MG tablet  Commonly known as: COZAAR     magnesium citrate 100 mg Tab     NIFEdipine 60 MG (OSM) 24 hr tablet  Commonly known as: PROCARDIA-XL  Take 1 tablet (60 mg total) by mouth once daily.     nitroGLYCERIN 0.4 MG SL tablet  Commonly known as: NITROSTAT     ondansetron 4 MG Tbdl  Commonly known as: ZOFRAN-ODT  Take 1 tablet (4 mg total) by mouth every 6 (six) hours as needed.     oxybutynin 10 MG 24 hr tablet  Commonly known as: DITROPAN-XL     pregabalin 75 MG capsule  Commonly known as: LYRICA     SITagliptin phosphate 100 MG Tab  Commonly known as: JANUVIA     VITAMIN B-12 100 MCG tablet  Generic drug: cyanocobalamin     zinc " gluconate 50 mg tablet            STOP taking these medications      atorvastatin 40 MG tablet  Commonly known as: LIPITOR     ezetimibe 10 mg tablet  Commonly known as: ZETIA               Where to Get Your Medications        These medications were sent to [facility] [address], [place] [address]      Hours: Mon-Fri, 8a-5:30p Phone: [phone]   benzonatate 100 MG capsule  emollient Lotn  furosemide 20 MG tablet  Remedy Phytoplex AntifungaL 2 % top powder          Discharge Information:   Diet:  Diet diabetic 2000 Calorie; Standard Tray  Diet Cardiac    Physical Activity:  As tolerated w/ PT/OT             Instructions:  1. Take all medications as prescribed  2. Keep all follow-up appointments  3. Return to the hospital or call your primary care physicians if any worsening symptoms such as fever, chest pain, shortness of breath, return of symptoms, or any other concerns.    Follow-Up Appointments:  PCP  Cardiology      Noa Limon MD  [facility] Internal Medicine [facility]  06/26/2024 12:40 PM

## 2024-06-26 NOTE — PLAN OF CARE
CARRIE sent facility transfer orders via careport to  Phone: [phone]   SW called and left a VM informing Joe that orders were sent. SW will follow.    11:09am CARRIE spoke with Joe she stated that the number for report is [phone]. Joe asked that nursing not call report until 12pm. Joe also request that transportation not be requested until 1pm. Cm will continue to follow pt through transitions of care and assist with any discharge needs.    11:40am carrie spoke with pt and pt's son Esequiel [phone] to complete final d/c assessment. Pt and family are agreeable to snf placement later today. SW will request transport for 1pm. Pt and family did not have any additional questions or concerns for sw at this time. Rounds completed on pt. All questions addressed. Bedside nurse to discuss d/c medications. Discussed importance to attend all f/u appts and take medications as prescribed. Verbalized understanding. Case Management to sign off.     BARRIE Belle  [phone]    Future Appointments   Date Time Provider Department Center   9/11/2024 10:00 AM ECHO/HOLTER/FELIPA, [facility]        06/26/24 0913   Final Note   Assessment Type Final Discharge Note   Anticipated Discharge Disposition SNF   What phone number can be called within the next 1-3 days to see how you are doing after discharge? [phone]   Post-Acute Status   Post-Acute Authorization Placement   Post-Acute Placement Status Set-up Complete/Auth obtained   Coverage [employer]BS   Discharge Delays None known at this time

## 2024-06-26 NOTE — PROGRESS NOTES
"hospitals Hospital Medicine Progress Note    Primary Team: hospitals Hospitalist Team A  Attending Physician: Shey Burgess MD  Resident: Dr. Velázquez  Intern: Dr. Limon    Admission Date:  6/22/2024  Hospital Day: Hospital Day: 5    Chief Complaint: weakness x 3d       Subjective/Interval History      Patient doing well this morning. Accepted to SNF. Complains of R knee burning pain, unrelieved by tylenol. Endorses ongoing cough with mucus stuck in throat/chest. Her pannus wound is improving with the powder. Denies CP, SOB, leg pain.      Review of Systems:  All other systems reviewed and negative.     Objective     Physical Examination:  BP (!) 135/59 (BP Location: Right arm, Patient Position: Sitting)   Pulse 66   Temp 98.7 °F (37.1 °C) (Oral)   Resp 20   Ht 5' 5" (1.651 m)   Wt 103 kg (227 lb 1.2 oz)   SpO2 (!) 91%   BMI 37.79 kg/m²    Gen: Elderly, NAD, resting comfortably in bed  HEENT: NC/AT, EOMI grossly, normal external ears bilaterally, normal external nose, moist MM  CV: Regular rate, regular rhythm, 3/6 systolic murmur and ULSB. 2+ radial pulses bilaterally  Resp: Coughing on exam. Rattling heard upper airway. Normal work of breathing. Normal effort. Equal chest rise. No respiratory distress  Abd: Soft, non-tender, nondistended, no rebound/guarding. No CVA tenderness. Erythema long the panus  MSK/Ext: No clubbing, cyanosis, much improved pitting edema of the BLE, dry now. Moves all four extremities spontaneously. TTP over the R ant knee, no cording or swelling  Neuro: GCS 15, alert, speech is normal, face symmetric, no focal motor deficits elicited on exam. 4/5 BLE strength, 5/5 BUE strength  Skin: Multiple crusted skin lesions on the bilateral lower legs. Indurated pruritic erythematous confluent blanchable papules over the bilateral buttocks  Psych: Normal mood and affect    Intake/Output:    Intake/Output Summary (Last 24 hours) at 6/26/2024 0718  Last data filed at 6/26/2024 0650  Gross per 24 hour "   Intake 240 ml   Output 1250 ml   Net -1010 ml     Net IO Since Admission: -8,371 mL [06/26/24 0718]    Laboratory:  Recent Labs   Lab 06/23/24  0357 06/24/24  0349 06/25/24  0338 06/26/24  0252   WBC 7.66 7.17 6.75 6.11   HGB 11.0* 11.0* 11.5* 12.1    219 211 222   MCV 87 87 88 87    139 139 137   K 4.4 4.1 4.3 4.8    104 103 105   CO2 26 26 29 22*   BUN 26* 33* 42* 45*   CREATININE 0.8 0.9 1.0 0.9    165* 114* 98   CALCIUM 9.6 9.6 10.1 10.3   PROT 6.4 6.5 6.8 6.9   ALBUMIN 2.9* 2.8* 2.9* 2.9*   PHOS 3.5 4.2 4.6* 4.7*   MG 2.0 2.0 2.1 2.1   AST 21 17 20 53*   ALT 10 10 13 30   ALKPHOS 49* 49* 49* 50*       Lab Results   Component Value Date    COLORU Yellow 06/22/2024    APPEARANCEUA Hazy (A) 06/22/2024    SPECGRAV 1.010 06/22/2024    PHUR 7.0 06/22/2024    PROTEINUA Negative 06/22/2024    KETONESU Negative 06/22/2024    LEUKOCYTESUR 3+ (A) 06/22/2024    NITRITE Positive (A) 06/22/2024    UROBILINOGEN Negative 06/22/2024         All laboratory data reviewed    Microbiology Data: Reviewed  Uclx with E coli    EKG Data: Reviewed  Results for orders placed or performed during the hospital encounter of 06/22/24   EKG 12-lead    Collection Time: 06/22/24  8:58 AM   Result Value Ref Range    QRS Duration 108 ms    OHS QTC Calculation 420 ms    Narrative    Test Reason : R26.2,    Vent. Rate : 065 BPM     Atrial Rate : 065 BPM     P-R Int : 162 ms          QRS Dur : 108 ms      QT Int : 404 ms       P-R-T Axes : 061 -54 077 degrees     QTc Int : 420 ms    Normal sinus rhythm  Left anterior fascicular block  Abnormal ECG  When compared with ECG of 11-MAR-2024 07:08,  No significant change was found  Confirmed by Cesar Garcia MD ([id number]) on 6/23/2024 5:06:25 PM    Referred By: ELMA   SELF           Confirmed By:Cesar Garcia MD       Imaging Data: Reviewed  X-Ray Chest AP Portable    Result Date: 6/22/2024  EXAMINATION: XR CHEST AP PORTABLE CLINICAL HISTORY: FTT, weakness,  cough; TECHNIQUE: Single frontal view of the chest was performed. COMPARISON: 02/16/2019 FINDINGS: The cardiomediastinal silhouette is not enlarged, magnified by technique.  There is calcification and tortuosity of the aorta..  There is no pleural effusion.  The trachea is midline.  The lungs are symmetrically expanded bilaterally with coarse interstitial attenuation accentuated by habitus..  No large focal consolidation seen.  There is no pneumothorax.  The osseous structures are remarkable for degenerative change..     1. Interstitial findings are accentuated by habitus, no large focal consolidation. Electronically signed by: Eddie Lou MD Date:    06/22/2024 Time:    12:43    X-Ray Knee 3 View Right    Result Date: 6/22/2024  EXAMINATION: XR KNEE 3 VIEW RIGHT CLINICAL HISTORY: Pain in right knee TECHNIQUE: AP, lateral, and Merchant views of the right knee were performed. COMPARISON: None FINDINGS: There is osteopenia.  There is no evidence of an acute fracture or dislocation of the knee.  Alignment is normal.  There is severe joint space narrowing of the lateral compartment, moderate joint space narrowing of the medial and patellofemoral compartments with marginal osteophytes.  There is a small suprapatellar joint effusion.  There are vascular calcifications.     No acute fracture or dislocation. Tricompartmental osteoarthritis as above. Electronically signed by: Michael Bustamante Date:    06/22/2024 Time:    08:22       Current Medications:     Infusions:       Scheduled:   acetaminophen  650 mg Oral 6 times per day    ascorbic acid (vitamin C)  500 mg Oral Daily    aspirin  81 mg Oral Daily    carvediloL  3.125 mg Oral BID WM    cyanocobalamin  100 mcg Oral Daily    enoxparin  40 mg Subcutaneous Daily    furosemide  20 mg Oral Daily    losartan  100 mg Oral Daily    melatonin  9 mg Oral Nightly    miconazole NITRATE 2 %   Topical (Top) BID    multivitamin  1 tablet Oral Daily    NIFEdipine  60 mg Oral Daily     oxybutynin  10 mg Oral Daily    pregabalin  75 mg Oral BID        PRN:    Current Facility-Administered Medications:     benzonatate, 100 mg, Oral, TID PRN    dextrose 10%, 12.5 g, Intravenous, PRN    dextrose 10%, 25 g, Intravenous, PRN    emollient, , Topical (Top), PRN    glucagon (human recombinant), 1 mg, Intramuscular, PRN    glucose, 16 g, Oral, PRN    glucose, 24 g, Oral, PRN    insulin aspart U-100, 0-5 Units, Subcutaneous, QID (AC + HS) PRN    sodium chloride 0.9%, 10 mL, Intravenous, Q12H PRN    Antibiotics and Day Number of Therapy:  Antibiotics (From admission, onward)      None             Lines and Day Number of Therapy:  PIV    Assessment/Plan:     Makenzie Haile is a 88 y.o. female who  has a past medical history of Acute myocardial infarction involving left circumflex coronary artery (3/19/2018), Diabetes mellitus, Hyperlipemia, and Hypertension. The patient presented to [facility] on 6/22/2024 with a primary complaint of weakness. Admitted to [facility] medicine for failure to thrive     Failure to thrive  BLE weakness  Statin induced myalgias  R knee osteoarthritis  Worsening BLE weakness over several weeks that significantly worsened 3 days after starting a statin. Home health starting in 2 days  - Admit to medicine  - Hold statin and zetia for now  - CK wnl  - No low back pain or tenderness, hold off on CT L spine  - No cauda equina symptoms, hold off on MRI  - PT/OT evaluation rec'd SNF  - R kne xray with tricompartmental OA, Tylenol for pain control. Can continue home Lyrica. 1 time ibuprofen, capsaicin cream   -  BLE DVT US neg, obtained given new onset L calf tenderness, on lovenox     Venous stasis dermatitis  Pannus fungal infection, improving  BLE Edema, improved  Bilateral lower leg erythema that is symmetric with occasional wounds. 3+ pitting edema in the setting of holding home lasix due to incontinence  - s/p Lasix IV in the ED. Diuresis well.  - TTE obtained  - Strict  I&O  - Daily PO lasix 40 mg  - Wound care for leg wounds and pannus infection, barrier ointment ordered  - Miconazole powder     Urinary tract infection, POA  UA with pos LE and nitrates, suprapubic tenderness  - Rocephin 1 g daily for 3 days  - No evidence of pyelo  - Uclx with E coli sensitive to rocephin, completed Abx     Contact dermatitis  - Dermatitis over the bilateral buttocks, likely secondary to sitting in urine  - Wound care on board  - Barrier cream ordered    Aortic stenosis, moderate to severe  - No syncope, SOB, or angina  - TTE Left Atrium: Left atrium is moderately dilated. moderate aortic valve sclerosis. Moderately restricted motion. There is moderate to severe stenosis. Aortic valve area by VTI is 0.95 cm². Aortic valve peak velocity is 3.71 m/s. Mean gradient is 41 mmHg. The dimensionless index is 0.29. There is mild aortic regurgitation.  - Can follow up with outpatient cardiology    Cough likely viral URI  - AF, no WBC.   - sched mucinex BID, tessalon pearles prn    Prerenal azotemia without acute kidney injury  - Elevated BUN/Cr ratio but Cr at baseline  - Encourage PO intake, but suspect fluid overloaded     HTN  - /67  - Continue home Coreg 3.125 mg BID, Losartan 100 mg, nifedipine 60 mg     Type II DM without insulin  On Januvia at home  - Repeat A1c 5.9  - SSI  - Hold home PO meds  - Hypoglycemia protocl in place     Normocytic anemia  -Hgb 11.2 with MCV 87  - No active bleeding to explain weakness  - Ferritin, iron panel, B12, folate     Hyperkalemia  - K 5.3 on arrival, slightly hemolyzed  - No EKG changes  - Trend with CMP    Hypoalbuminemia  - Boost BID       Fluids: Encourage PO intake  Electrolytes: Replete prn  Nutrition: Diabetic  Ppx: Lovenox  Code: Full     Dispo: medically ready for SNF today  Estimated LOS: 3-5 days     Noa Limon MD  [facility], [facility]     [facility] Hospitalist Pager numbers:   [facility] Hospitalist Medicine Team A (Gilson/Aditya):           [phone]  [facility] Hospitalist Medicine Team B (Ginger/Ran):        [phone]

## 2024-06-26 NOTE — NURSING
Wound care completed. Waffle overlay to bed tolerated well. No complaints. Remain on turn q2 hr schedule via HS shift.

## 2024-06-26 NOTE — PLAN OF CARE
VN note: VN completed AVS and attachments and notified bedside nurseDixie. Will cont to be available and intervene prn.

## 2024-07-19 ENCOUNTER — TELEPHONE (OUTPATIENT)
Dept: FAMILY MEDICINE | Facility: CLINIC | Age: 89
End: 2024-07-19
Payer: MEDICARE

## 2024-07-19 NOTE — TELEPHONE ENCOUNTER
----- Message from Tiffany Blackwell sent at 7/19/2024  2:41 PM CDT -----  Type:  Patient Returning Call    Who Called:[facility]   Would the patient rather a call back or a response via [facility]? Call back   Best Call Back Number:[phone]  Additional Information: Family ask if nurse would call to see if she can get patient establish with Dr Barrientos .. Pt called already and was told nothing until next year

## 2024-07-22 ENCOUNTER — TELEPHONE (OUTPATIENT)
Dept: FAMILY MEDICINE | Facility: CLINIC | Age: 89
End: 2024-07-22
Payer: MEDICARE

## 2024-08-12 NOTE — PROGRESS NOTES
"Subjective:    Patient ID:  Makenzie Haile is a 88 y.o. female who presents for follow-up of No chief complaint on file.      HPI    Pt  with hx of CAD s/p NSTEMI with subsequent PCI with SURAJ to LCX, HTN, DM, GERD, obesity, mild AS (EF 55-60%, SERAFIN 1.48, PV 2.08, MG 10 from 2DE 2019) who presents for f/u. Presented initially with epigastric pain she thought was heartburn, presented to ED, had elevated trop 0.77, C with LCX lesion s/p PCI with SURAJ, resolution of pain, and discharged home. Subsequent admission for symptomatic anemia from GIB with PRBC transfusion. Plavix stopped and placed on SAPT with ASA.   Last visit had developed left sided chest pressure, SPECT without ischemia (lateral wall infarct/fixed defect), 2DE with normal EF (60%) and AV unable to be evaluated due to poor windows.   Had Covid PNA about 6 weeks ago with full recovery. Had possible flu before that (Covid?).   Has chronic bilateral LE edema which improves with intermittent lasix use. Recently has had worsening bilateral LE edema. Denies SOB, orthopnea, PND, syncope, palps. Tolerating meds well. Non smoker, no EtOH. Has been sedentary bc of pandemic. Stopped taking atorvastatin bc she has been having body and joint aches (chronic) and thought they might be due to statin. Aches have persisted.     3/23/2023:  Had Covid and Covid PNA. Has residual SOB/JEFFRIES, fatigue. Has gained weight, has been less active, and is more sedentary. Was told she has a "leaky valve." 2DE from 2019 reviewed and has mild AS. Denies syncope, acute CHF symptoms, and angina. Has chronic, bilateral LE edema, unchanged. Back on statin and tolerating well.     5/25/2023:  2DE with:  The left ventricle is normal in size with normal systolic function.  The estimated ejection fraction is 60%.  Indeterminate left ventricular diastolic function.  Normal right ventricular size with normal right ventricular systolic function.  There is mild aortic valve stenosis.  Aortic valve " area is 1.74 cm2; peak velocity is 2.73 m/s; mean gradient is 19 mmHg.  Mild aortic regurgitation.  The estimated PA systolic pressure is 23 mmHg.  Normal central venous pressure (3 mmHg).    Main complaints are of bilateral knee pains for which she is receiving injections, however, unable to exercise. No new cardiac symptoms and BP acceptable. Leg wraps helping for edema.    3/11/2024: Initial visit with me. Former Pt of Dr. Moncada as above.  She is doing well today.  No major cardiovascular complaint.  Compliant with medications.  Chronic lower extremity edema better with leg elevation.  Not very compliant with compression stockings.  LDL above goal however she has not been well compliant with statin    8/2024:  Admitted with weakness.  Statin induced myalgias suspected and it was stopped along with Zetia.  Accompanied by her daughter today.  She feels better.  Apparently she was instructed to start Lipitor and Zetia again.  No significant CP, syncope, or JEFFRIES.  Repeat echo showed moderate-to-severe AS.  Chronic venous insufficiency with stasis.  Takes Lasix when she is at home few times a week.     Echo June 2024    Left Ventricle: The left ventricle is normal in size. There is concentric remodeling. Unable to assess wall motion. There is normal systolic function with a visually estimated ejection fraction of 65 - 70%. Diastolic function cannot be reliably determined in the presence of mitral annular calcification.    Right Ventricle: Systolic function is normal.    Left Atrium: Left atrium is moderately dilated.    Aortic Valve: There is moderate aortic valve sclerosis. Moderately restricted motion. There is moderate to severe stenosis. Aortic valve area by VTI is 0.95 cm². Aortic valve peak velocity is 3.71 m/s. Mean gradient is 41 mmHg. The dimensionless index is 0.29. There is mild aortic regurgitation.    Mitral Valve: There is moderate mitral annular calcification present.    Pulmonary Artery: The estimated  pulmonary artery systolic pressure is 17 mmHg.    IVC/SVC: Normal venous pressure at 3 mmHg.      Review of Systems   Constitutional: Negative for chills and fever.   HENT:  Negative for hearing loss and nosebleeds.    Eyes:  Negative for blurred vision.   Cardiovascular:  Positive for leg swelling.        As in HPI   Respiratory:  Negative for hemoptysis and shortness of breath.    Hematologic/Lymphatic: Negative for bleeding problem.   Skin:  Negative for itching.   Musculoskeletal:  Positive for joint pain. Negative for falls.   Gastrointestinal:  Negative for abdominal pain and hematochezia.   Genitourinary:  Negative for hematuria.   Neurological:  Negative for dizziness and loss of balance.   Psychiatric/Behavioral:  Negative for altered mental status and depression.         Objective:    Physical Exam  Constitutional:       Appearance: She is well-developed.   HENT:      Head: Normocephalic and atraumatic.   Eyes:      Conjunctiva/sclera: Conjunctivae normal.   Neck:      Vascular: No JVD.   Cardiovascular:      Rate and Rhythm: Normal rate and regular rhythm.      Pulses:           Carotid pulses are 2+ on the right side and 2+ on the left side.       Radial pulses are 2+ on the right side and 2+ on the left side.        Femoral pulses are 2+ on the right side and 2+ on the left side.       Dorsalis pedis pulses are 2+ on the right side and 2+ on the left side.        Posterior tibial pulses are 1+ on the right side and 1+ on the left side.      Heart sounds: Murmur heard.      Harsh midsystolic murmur is present with a grade of 2/6 at the upper right sternal border radiating to the neck.      No friction rub. No gallop.   Pulmonary:      Effort: Pulmonary effort is normal. No respiratory distress.      Breath sounds: Normal breath sounds. No stridor. No wheezing.   Abdominal:      General: Bowel sounds are normal.      Palpations: Abdomen is soft.   Musculoskeletal:      Cervical back: Neck supple.       Right lower leg: Edema present.      Left lower leg: Edema present.   Skin:     General: Skin is warm and dry.   Neurological:      Mental Status: She is alert and oriented to person, place, and time.   Psychiatric:         Behavior: Behavior normal.         Thought Content: Thought content normal.           Assessment:       1. Coronary artery disease involving native coronary artery of native heart without angina pectoris    2. Essential hypertension    3. History of coronary artery stent placement    4. Hyperlipidemia, unspecified hyperlipidemia type    5. Moderate to severe aortic stenosis    6. Venous stasis dermatitis of both lower extremities              Plan:       Continue medical therapy for coronary artery disease.   Stop atorvastatin and Zetia.  Start low-dose pravastatin 20 mg  BP well-controlled  Moderate-to-severe aortic stenosis.  Continue Lasix as taking.  We will closely monitor for time being and repeat echo after six-months.       Significant lower extremity edema.  Could be venous insufficiency.  We will consider venous ultrasound in the future    Weight loss encouraged    Continue aspirin  Weight loss    Six-month follow-up or sooner p.r.n.                 4

## 2024-08-13 ENCOUNTER — OFFICE VISIT (OUTPATIENT)
Dept: CARDIOLOGY | Facility: CLINIC | Age: 89
End: 2024-08-13
Payer: MEDICARE

## 2024-08-13 VITALS
HEART RATE: 55 BPM | BODY MASS INDEX: 39.69 KG/M2 | DIASTOLIC BLOOD PRESSURE: 70 MMHG | WEIGHT: 224 LBS | SYSTOLIC BLOOD PRESSURE: 116 MMHG | OXYGEN SATURATION: 98 % | HEIGHT: 63 IN

## 2024-08-13 DIAGNOSIS — I25.10 CORONARY ARTERY DISEASE INVOLVING NATIVE CORONARY ARTERY OF NATIVE HEART WITHOUT ANGINA PECTORIS: Primary | ICD-10-CM

## 2024-08-13 DIAGNOSIS — I87.2 VENOUS STASIS DERMATITIS OF BOTH LOWER EXTREMITIES: ICD-10-CM

## 2024-08-13 DIAGNOSIS — Z95.5 HISTORY OF CORONARY ARTERY STENT PLACEMENT: ICD-10-CM

## 2024-08-13 DIAGNOSIS — I10 ESSENTIAL HYPERTENSION: Chronic | ICD-10-CM

## 2024-08-13 DIAGNOSIS — E78.5 HYPERLIPIDEMIA, UNSPECIFIED HYPERLIPIDEMIA TYPE: ICD-10-CM

## 2024-08-13 DIAGNOSIS — I35.0 MODERATE TO SEVERE AORTIC STENOSIS: ICD-10-CM

## 2024-08-13 PROCEDURE — 99999 PR PBB SHADOW E&M-EST. PATIENT-LVL IV: CPT | Mod: PBBFAC,,, | Performed by: INTERNAL MEDICINE

## 2024-08-13 PROCEDURE — 1101F PT FALLS ASSESS-DOCD LE1/YR: CPT | Mod: CPTII,S$GLB,, | Performed by: INTERNAL MEDICINE

## 2024-08-13 PROCEDURE — 1159F MED LIST DOCD IN RCRD: CPT | Mod: CPTII,S$GLB,, | Performed by: INTERNAL MEDICINE

## 2024-08-13 PROCEDURE — 3288F FALL RISK ASSESSMENT DOCD: CPT | Mod: CPTII,S$GLB,, | Performed by: INTERNAL MEDICINE

## 2024-08-13 PROCEDURE — 99214 OFFICE O/P EST MOD 30 MIN: CPT | Mod: S$GLB,,, | Performed by: INTERNAL MEDICINE

## 2024-08-13 PROCEDURE — 1126F AMNT PAIN NOTED NONE PRSNT: CPT | Mod: CPTII,S$GLB,, | Performed by: INTERNAL MEDICINE

## 2024-08-13 PROCEDURE — 1160F RVW MEDS BY RX/DR IN RCRD: CPT | Mod: CPTII,S$GLB,, | Performed by: INTERNAL MEDICINE

## 2024-08-13 RX ORDER — PRAVASTATIN SODIUM 20 MG/1
20 TABLET ORAL DAILY
Qty: 30 TABLET | Refills: 11 | Status: SHIPPED | OUTPATIENT
Start: 2024-08-13 | End: 2025-08-13

## 2024-08-21 ENCOUNTER — OFFICE VISIT (OUTPATIENT)
Dept: FAMILY MEDICINE | Facility: CLINIC | Age: 89
End: 2024-08-21
Payer: MEDICARE

## 2024-08-21 ENCOUNTER — TELEPHONE (OUTPATIENT)
Dept: FAMILY MEDICINE | Facility: CLINIC | Age: 89
End: 2024-08-21

## 2024-08-21 VITALS
BODY MASS INDEX: 38.98 KG/M2 | OXYGEN SATURATION: 90 % | SYSTOLIC BLOOD PRESSURE: 128 MMHG | HEART RATE: 65 BPM | HEIGHT: 63 IN | DIASTOLIC BLOOD PRESSURE: 80 MMHG | TEMPERATURE: 99 F | WEIGHT: 220 LBS

## 2024-08-21 DIAGNOSIS — Z99.3 DEPENDENCE ON WHEELCHAIR: ICD-10-CM

## 2024-08-21 DIAGNOSIS — E78.2 MIXED HYPERLIPIDEMIA: ICD-10-CM

## 2024-08-21 DIAGNOSIS — I10 ESSENTIAL HYPERTENSION: Chronic | ICD-10-CM

## 2024-08-21 DIAGNOSIS — G89.29 CHRONIC LEFT SHOULDER PAIN: ICD-10-CM

## 2024-08-21 DIAGNOSIS — E11.9 TYPE 2 DIABETES MELLITUS WITHOUT COMPLICATION, WITHOUT LONG-TERM CURRENT USE OF INSULIN: ICD-10-CM

## 2024-08-21 DIAGNOSIS — N39.41 URGE INCONTINENCE OF URINE: Primary | ICD-10-CM

## 2024-08-21 DIAGNOSIS — L91.8 SKIN TAG: ICD-10-CM

## 2024-08-21 DIAGNOSIS — M25.512 CHRONIC LEFT SHOULDER PAIN: ICD-10-CM

## 2024-08-21 PROCEDURE — 1101F PT FALLS ASSESS-DOCD LE1/YR: CPT | Mod: CPTII,S$GLB,, | Performed by: STUDENT IN AN ORGANIZED HEALTH CARE EDUCATION/TRAINING PROGRAM

## 2024-08-21 PROCEDURE — 1126F AMNT PAIN NOTED NONE PRSNT: CPT | Mod: CPTII,S$GLB,, | Performed by: STUDENT IN AN ORGANIZED HEALTH CARE EDUCATION/TRAINING PROGRAM

## 2024-08-21 PROCEDURE — 99215 OFFICE O/P EST HI 40 MIN: CPT | Mod: 25,S$GLB,, | Performed by: STUDENT IN AN ORGANIZED HEALTH CARE EDUCATION/TRAINING PROGRAM

## 2024-08-21 PROCEDURE — 1160F RVW MEDS BY RX/DR IN RCRD: CPT | Mod: CPTII,S$GLB,, | Performed by: STUDENT IN AN ORGANIZED HEALTH CARE EDUCATION/TRAINING PROGRAM

## 2024-08-21 PROCEDURE — 1159F MED LIST DOCD IN RCRD: CPT | Mod: CPTII,S$GLB,, | Performed by: STUDENT IN AN ORGANIZED HEALTH CARE EDUCATION/TRAINING PROGRAM

## 2024-08-21 PROCEDURE — 20605 DRAIN/INJ JOINT/BURSA W/O US: CPT | Mod: LT,S$GLB,, | Performed by: STUDENT IN AN ORGANIZED HEALTH CARE EDUCATION/TRAINING PROGRAM

## 2024-08-21 PROCEDURE — 3288F FALL RISK ASSESSMENT DOCD: CPT | Mod: CPTII,S$GLB,, | Performed by: STUDENT IN AN ORGANIZED HEALTH CARE EDUCATION/TRAINING PROGRAM

## 2024-08-21 PROCEDURE — 17110 DESTRUCTION B9 LES UP TO 14: CPT | Mod: 59,S$GLB,, | Performed by: STUDENT IN AN ORGANIZED HEALTH CARE EDUCATION/TRAINING PROGRAM

## 2024-08-21 RX ORDER — TOLTERODINE 4 MG/1
4 CAPSULE, EXTENDED RELEASE ORAL DAILY
Qty: 30 CAPSULE | Refills: 11 | Status: SHIPPED | OUTPATIENT
Start: 2024-08-21 | End: 2025-08-21

## 2024-08-21 RX ORDER — CARVEDILOL 3.12 MG/1
3.12 TABLET ORAL 2 TIMES DAILY WITH MEALS
Qty: 60 TABLET | Refills: 11 | Status: SHIPPED | OUTPATIENT
Start: 2024-08-21 | End: 2025-08-21

## 2024-08-21 RX ORDER — TRIAMCINOLONE ACETONIDE 40 MG/ML
80 INJECTION, SUSPENSION INTRA-ARTICULAR; INTRAMUSCULAR
Status: DISCONTINUED | OUTPATIENT
Start: 2024-08-21 | End: 2024-08-25 | Stop reason: HOSPADM

## 2024-08-21 RX ADMIN — TRIAMCINOLONE ACETONIDE 80 MG: 40 INJECTION, SUSPENSION INTRA-ARTICULAR; INTRAMUSCULAR at 02:08

## 2024-08-21 NOTE — PROCEDURES
Intermediate Joint Aspiration/Injection: L acromioclavicular    Date/Time: 8/21/2024 2:20 PM    Performed by: Floyd Johnson MD  Authorized by: Floyd Johnson MD    Consent Done?:  Yes (Verbal)  Indications:  Arthritis  Site marked: The procedure site was marked    Timeout: Prior to procedure the correct patient, procedure, and site was verified      Location:  Shoulder  Site:  L acromioclavicular  Prep: Patient was prepped and draped in usual sterile fashion    Needle size:  22 G  Approach:  Posterior  Medications:  80 mg triamcinolone acetonide 40 mg/mL  Patient tolerance:  Patient tolerated the procedure well with no immediate complications

## 2024-08-21 NOTE — TELEPHONE ENCOUNTER
----- Message from Floyd Johnson MD sent at 8/21/2024  4:56 PM CDT -----  Please contact patient to schedule labs to be done prior to visit in November

## 2024-08-21 NOTE — PROCEDURES
Destruction, Benign Lesion    Date/Time: 8/21/2024 2:20 PM    Performed by: Floyd Johnson MD  Authorized by: Floyd Johnson MD    Consent Done?:  Yes (Verbal)  Pre-Procedure:     Timeout: prior to procedure the correct patient, procedure, and site was verified      Local anesthesia used?: No    Indications:     Destruction Indications: skin tag.  Location:     Head/Neck:  Neck  Prep:     Preparation: Patient was prepped and draped in usual sterile fashion    Procedure Details:     Cosmetic?: No      Number of lesions:  1    Destruction method:  Cryotherapy    Bleeding:  None     Patient tolerated the procedure well with no immediate complications.

## 2024-08-25 PROBLEM — Z99.3 DEPENDENCE ON WHEELCHAIR: Status: ACTIVE | Noted: 2024-08-25

## 2024-08-25 NOTE — PROGRESS NOTES
Patient ID: Makenzie Haile is a 88 y.o. female. This patient is new to me.    Chief Complaint: Establish Care    Shoulder Pain   The pain is present in the left shoulder. This is a chronic problem. The current episode started more than 1 month ago. There has been no history of extremity trauma. The problem occurs constantly. The problem has been unchanged. The quality of the pain is described as aching. The pain is at a severity of 5/10. The pain is moderate. Associated symptoms include a limited range of motion and stiffness. Pertinent negatives include no fever, headaches or inability to bear weight. The symptoms are aggravated by activity. She has tried acetaminophen (home health PT) for the symptoms. The treatment provided no relief. arthritis     She also reports urinary incontinence. She is compliant with oxybutynin but she does not feel like the medication is working well. She is not interested in pelvic floor PT at this time.     She also has a skin tag that she would like removed.     Past Medical History:   Diagnosis Date    Acute myocardial infarction involving left circumflex coronary artery 03/19/2018    Coronary artery disease     Diabetes mellitus     Diabetes mellitus, type 2     Heart murmur     Hyperlipemia     Hypertension        Past Surgical History:   Procedure Laterality Date    COLONOSCOPY N/A 02/18/2019    Procedure: COLONOSCOPY;  Surgeon: Buzz Meyer MD;  Location: [facility];  Service: Endoscopy;  Laterality: N/A;    COLONOSCOPY N/A 02/18/2019    Procedure: COLONOSCOPY;  Surgeon: Buzz Meyer MD;  Location: [facility];  Service: Endoscopy;  Laterality: N/A;    CORONARY ANGIOPLASTY WITH STENT PLACEMENT  03/19/2018    ESOPHAGOGASTRODUODENOSCOPY N/A 02/18/2019    Procedure: EGD (ESOPHAGOGASTRODUODENOSCOPY);  Surgeon: Buzz Meyer MD;  Location: [facility];  Service: Endoscopy;  Laterality: N/A;    ESOPHAGOGASTRODUODENOSCOPY N/A 02/18/2019    Procedure: EGD  (ESOPHAGOGASTRODUODENOSCOPY);  Surgeon: Buzz Meyer MD;  Location: [facility];  Service: Endoscopy;  Laterality: N/A;    HYSTERECTOMY         Family History   Problem Relation Name Age of Onset    Breast cancer Mother      Diabetes Father      Heart disease Father         Social History     Tobacco Use    Smoking status: Never   Substance Use Topics    Alcohol use: No    Drug use: No       Review of patient's allergies indicates:   Allergen Reactions    Iodine     Iodine and iodide containing products     Peanut butter flavor     Sulfa (sulfonamide antibiotics) Hives        Review of Systems  Review of Systems   Constitutional:  Negative for fever.   HENT:  Negative for ear pain and sinus pain.    Eyes:  Negative for discharge.   Respiratory:  Negative for cough and shortness of breath.    Cardiovascular:  Negative for chest pain and leg swelling.   Gastrointestinal:  Negative for diarrhea, nausea and vomiting.   Genitourinary:  Negative for urgency.   Musculoskeletal:  Positive for stiffness. Negative for myalgias.   Skin:  Negative for rash.   Neurological:  Negative for weakness and headaches.   Psychiatric/Behavioral:  Negative for depression.    All other systems reviewed and are negative.      Currently Medications  Current Outpatient Medications on File Prior to Visit   Medication Sig Dispense Refill    acetaminophen (TYLENOL) 500 MG tablet Take 500 mg by mouth every 6 (six) hours as needed for Pain.      ascorbic acid, vitamin C, (VITAMIN C) 500 MG tablet Take 500 mg by mouth once daily.      cyanocobalamin (VITAMIN B-12) 100 MCG tablet Take 100 mcg by mouth once daily.      emollient Lotn Apply topically as needed (itching). 89 mL 0    furosemide (LASIX) 20 MG tablet Take 1 tablet (20 mg total) by mouth once daily. 30 tablet 11    incontinence pad,liner,disp (BLADDER CONTROL PADS EX ABSORB MISC) bladder control pads      lancets (ACCU-CHEK FASTCLIX MISC) Accu-Chek FastClix      losartan (COZAAR) 100 MG  "tablet Take 100 mg by mouth once daily.      magnesium citrate 100 mg Tab Take 100 mg by mouth once daily.      miconazole NITRATE 2 % (MICOTIN) 2 % top powder Apply topically 2 (two) times daily. 85 g 0    multivit-min-iron-FA-vit K-lut (CENTRUM SILVER WOMEN) 8 mg iron-400 mcg-50 mcg Tab Take 1 tablet by mouth once daily.      nitroGLYCERIN (NITROSTAT) 0.4 MG SL tablet Place 0.4 mg under the tongue every 5 (five) minutes as needed for Chest pain.      ondansetron (ZOFRAN-ODT) 4 MG TbDL Take 1 tablet (4 mg total) by mouth every 6 (six) hours as needed. 12 tablet 0    pravastatin (PRAVACHOL) 20 MG tablet Take 1 tablet (20 mg total) by mouth once daily. 30 tablet 11    SITagliptin (JANUVIA) 100 MG Tab Take 100 mg by mouth once daily.      zinc gluconate 50 mg tablet Take 50 mg by mouth once daily.      aspirin (ECOTRIN) 81 MG EC tablet Take 1 tablet (81 mg total) by mouth once daily. 30 tablet 11    NIFEdipine (PROCARDIA-XL) 60 MG (OSM) 24 hr tablet Take 1 tablet (60 mg total) by mouth once daily. 30 tablet 11    pregabalin (LYRICA) 75 MG capsule Take 75 mg by mouth 2 (two) times daily.       No current facility-administered medications on file prior to visit.       Physical  Exam  Vitals:    08/21/24 1420   BP: 128/80   BP Location: Right arm   Patient Position: Sitting   Pulse: 65   Temp: 98.8 °F (37.1 °C)   SpO2: (!) 90%   Weight: 99.8 kg (220 lb)   Height: 5' 3" (1.6 m)      Body mass index is 38.97 kg/m².    Physical Exam  Vitals and nursing note reviewed.   Constitutional:       General: She is not in acute distress.     Appearance: She is not ill-appearing.   HENT:      Head: Normocephalic and atraumatic.      Right Ear: External ear normal.      Left Ear: External ear normal.      Nose: Nose normal.      Mouth/Throat:      Mouth: Mucous membranes are moist.   Eyes:      Extraocular Movements: Extraocular movements intact.      Conjunctiva/sclera: Conjunctivae normal.   Cardiovascular:      Rate and Rhythm: " Normal rate and regular rhythm.      Pulses: Normal pulses.      Heart sounds: No murmur heard.  Pulmonary:      Effort: Pulmonary effort is normal. No respiratory distress.      Breath sounds: No wheezing.   Abdominal:      General: There is no distension.      Palpations: Abdomen is soft. There is no mass.      Tenderness: There is no abdominal tenderness.   Musculoskeletal:         General: No swelling.      Cervical back: Normal range of motion.   Skin:     Coloration: Skin is not jaundiced.      Findings: No rash.   Neurological:      General: No focal deficit present.      Mental Status: She is alert and oriented to person, place, and time.   Psychiatric:         Mood and Affect: Mood normal.         Thought Content: Thought content normal.         Labs:    Complete Blood Count  Lab Results   Component Value Date    RBC 4.32 06/26/2024    HGB 12.1 06/26/2024    HCT 37.6 06/26/2024    MCV 87 06/26/2024    MCH 28.0 06/26/2024    MCHC 32.2 06/26/2024    RDW 14.4 06/26/2024     06/26/2024    MPV 11.4 06/26/2024    GRAN 3.4 06/26/2024    GRAN 55.4 06/26/2024    LYMPH 1.5 06/26/2024    LYMPH 25.2 06/26/2024    MONO 0.6 06/26/2024    MONO 9.0 06/26/2024    EOS 0.5 06/26/2024    BASO 0.08 06/26/2024    EOSINOPHIL 8.8 (H) 06/26/2024    BASOPHIL 1.3 06/26/2024    DIFFMETHOD Automated 06/26/2024       Comprehensive Metabolic Panel  Lab Results   Component Value Date    GLU 98 06/26/2024    BUN 45 (H) 06/26/2024    CREATININE 0.9 06/26/2024     06/26/2024    K 4.8 06/26/2024     06/26/2024    PROT 6.9 06/26/2024    ALBUMIN 2.9 (L) 06/26/2024    BILITOT 0.3 06/26/2024    AST 53 (H) 06/26/2024    ALKPHOS 50 (L) 06/26/2024    CO2 22 (L) 06/26/2024    ALT 30 06/26/2024    ANIONGAP 10 06/26/2024       TSH  Lab Results   Component Value Date    TSH 0.861 06/22/2024       Imaging:  US Lower Extremity Veins Bilateral  Narrative: EXAMINATION:  US LOWER EXTREMITY VEINS BILATERAL    CLINICAL HISTORY:  L>R leg  pain;    TECHNIQUE:  Duplex and color flow Doppler and dynamic compression was performed of the bilateral lower extremity veins was performed.    COMPARISON:  None    FINDINGS:  Right thigh veins: The common femoral, femoral, popliteal, and upper greater saphenous veins are patent and free of thrombus. The veins are normally compressible and have normal phasic flow and augmentation response.    Right calf veins: The visualized calf veins are patent.    Left thigh veins: The common femoral, femoral, popliteal, and upper greater saphenous veins are patent and free of thrombus. The veins are normally compressible and have normal phasic flow and augmentation response.    Left calf veins: The visualized calf veins are patent.    Miscellaneous: None  Impression: No evidence of deep venous thrombosis in either lower extremity.    Electronically signed by: Ammon Marvin  Date:    06/25/2024  Time:    08:58      Assessment/Plan:    1. Urge incontinence of urine  -     tolterodine (DETROL LA) 4 MG 24 hr capsule; Take 1 capsule (4 mg total) by mouth once daily.  Dispense: 30 capsule; Refill: 11    2. Essential hypertension  -     carvediloL (COREG) 3.125 MG tablet; Take 1 tablet (3.125 mg total) by mouth 2 (two) times daily with meals.  Dispense: 60 tablet; Refill: 11  -     CBC Auto Differential; Future  -     Comprehensive metabolic panel; Future; Expected date: 08/21/2024  -     TSH; Future; Expected date: 08/21/2024    3. Mixed hyperlipidemia  -     LIPID PANEL; Future; Expected date: 08/21/2024    4. Type 2 diabetes mellitus without complication, without long-term current use of insulin  -     HEMOGLOBIN A1C; Future; Expected date: 08/21/2024    5. Skin tag  -     Destruction, Benign Lesion    6. Chronic left shoulder pain  -     Intermediate Joint Aspiration/Injection: L acromioclavicular  -     triamcinolone acetonide injection 80 mg    7. Dependence on wheelchair      I spent a total of 64 minutes on the day of the  visit.This includes face to face time and non-face to face time preparing to see the patient (eg, review of tests), obtaining and/or reviewing separately obtained history, documenting clinical information in the electronic or other health record, independently interpreting results and communicating results to the patient/family/caregiver, or care coordinator.      Discussed how to stay healthy including: diet, exercise, refraining from smoking and discussed screening exams / tests needed for age, sex and family Hx.    RTC 3 mo with labs    Floyd Johnson MD

## 2024-08-28 ENCOUNTER — TELEPHONE (OUTPATIENT)
Dept: FAMILY MEDICINE | Facility: CLINIC | Age: 89
End: 2024-08-28
Payer: MEDICARE

## 2024-08-28 RX ORDER — POTASSIUM CHLORIDE 750 MG/1
10 CAPSULE, EXTENDED RELEASE ORAL DAILY
Qty: 90 CAPSULE | Refills: 3 | Status: SHIPPED | OUTPATIENT
Start: 2024-08-28

## 2024-08-28 NOTE — TELEPHONE ENCOUNTER
Pt notified that potassium was sent to the [facility]. I informed pt that if this does not help with leg cramps to contact our clinic.

## 2024-08-28 NOTE — TELEPHONE ENCOUNTER
----- Message from Laura Magallanes sent at 8/28/2024  9:02 AM CDT -----  Type:  Needs Medical Advice    Who Called: Pt   Symptoms (please be specific): legs cramps from taking furosemide (LASIX) 20 MG tablet   How long has patient had these symptoms:  on going   Pharmacy name and phone #:  [facility] - [place], [place] - [address] [facility] [address] [facility]  Would the patient rather a call back or a response via [facility]? Call   Best Call Back Number: [phone]   Additional Information:  pt believes she should be on potassium

## 2024-09-08 RX ORDER — LOSARTAN POTASSIUM 100 MG/1
100 TABLET ORAL DAILY
Qty: 90 TABLET | Refills: 3 | Status: SHIPPED | OUTPATIENT
Start: 2024-09-08

## 2024-09-09 NOTE — TELEPHONE ENCOUNTER
No care due was identified.  [facility] Embedded Care Due Messages. Reference number: [id number].   9/08/2024 7:50:38 PM CDT

## 2024-09-09 NOTE — TELEPHONE ENCOUNTER
----- Message from Kendra Berman sent at 9/7/2024 10:56 AM CDT -----  Needs advice from nurse:      Who Called:pt  Regarding:needs refill on losartan (COZAAR) 100 MG tablet - -  -   Sig - Route: Take 100 mg by mouth once daily. - Oral       Would the patient rather a call back or VIA [facility]?  Best Call Back number: [phone]     Additional Info:[facility] - [place], [place] - [address] (Ph: [phone])

## 2024-09-17 PROCEDURE — G0179 MD RECERTIFICATION HHA PT: HCPCS | Mod: ,,, | Performed by: STUDENT IN AN ORGANIZED HEALTH CARE EDUCATION/TRAINING PROGRAM

## 2024-09-23 PROBLEM — N39.0 UTI (URINARY TRACT INFECTION): Status: RESOLVED | Noted: 2024-06-22 | Resolved: 2024-09-23

## 2024-09-30 ENCOUNTER — TELEPHONE (OUTPATIENT)
Dept: FAMILY MEDICINE | Facility: CLINIC | Age: 89
End: 2024-09-30
Payer: MEDICARE

## 2024-09-30 DIAGNOSIS — E11.9 TYPE 2 DIABETES MELLITUS WITHOUT COMPLICATION, WITHOUT LONG-TERM CURRENT USE OF INSULIN: Primary | ICD-10-CM

## 2024-09-30 NOTE — TELEPHONE ENCOUNTER
----- Message from Luz sent at 9/30/2024 10:23 AM CDT -----  Type:  RX Refill Request    Who Called:  Pt   Refill or New Rx: Refill   RX Name and Strength: SITagliptin (JANUVIA) 100 MG Tab  Preferred Pharmacy with phone number:  [facility] [place], [place] - [address]  [address] [facility] [address] [address]  Phone: [phone] Fax: [phone]  Local or Mail Order: Local   Ordering Provider: Elizabeth   Would the patient rather a call back or a response via [facility]? Call back   Best Call Back Number: [phone]  Additional Information: Please be advised, pt states that for quantity, if dr can increase if from 30 to 60 as it runs out quickly

## 2024-10-08 ENCOUNTER — EXTERNAL HOME HEALTH (OUTPATIENT)
Dept: HOME HEALTH SERVICES | Facility: HOSPITAL | Age: 89
End: 2024-10-08
Payer: MEDICARE

## 2024-10-10 ENCOUNTER — DOCUMENT SCAN (OUTPATIENT)
Dept: HOME HEALTH SERVICES | Facility: HOSPITAL | Age: 89
End: 2024-10-10
Payer: MEDICARE

## 2024-10-16 ENCOUNTER — TELEPHONE (OUTPATIENT)
Dept: CARDIOLOGY | Facility: CLINIC | Age: 89
End: 2024-10-16
Payer: MEDICARE

## 2024-10-16 NOTE — TELEPHONE ENCOUNTER
I reached out and spoke to patient Daughter (Cm) and she stated this message was already handled.  Both appointments have been rescheduled with orders too.      Nw                      ----- Message from Mayela sent at 10/16/2024 11:28 AM CDT -----  Type:  Needs Medical Advice    Who Called:  Makenzie Haile    Would the patient rather a call back or a response via [facility]?  call  Best Call Back Number:  [phone]  Additional Information:  Pt is requesting a new echo order.  Pt missed her appt that was scheduled on 9.11.24

## 2024-10-22 ENCOUNTER — DOCUMENT SCAN (OUTPATIENT)
Dept: HOME HEALTH SERVICES | Facility: HOSPITAL | Age: 89
End: 2024-10-22
Payer: MEDICARE

## 2024-11-05 ENCOUNTER — OFFICE VISIT (OUTPATIENT)
Dept: FAMILY MEDICINE | Facility: CLINIC | Age: 89
End: 2024-11-05
Payer: MEDICARE

## 2024-11-05 VITALS
HEART RATE: 62 BPM | OXYGEN SATURATION: 95 % | BODY MASS INDEX: 38.98 KG/M2 | DIASTOLIC BLOOD PRESSURE: 82 MMHG | HEIGHT: 63 IN | WEIGHT: 220 LBS | TEMPERATURE: 98 F | SYSTOLIC BLOOD PRESSURE: 128 MMHG

## 2024-11-05 DIAGNOSIS — M25.512 BILATERAL SHOULDER PAIN, UNSPECIFIED CHRONICITY: ICD-10-CM

## 2024-11-05 DIAGNOSIS — E11.9 TYPE 2 DIABETES MELLITUS WITHOUT COMPLICATION, WITHOUT LONG-TERM CURRENT USE OF INSULIN: ICD-10-CM

## 2024-11-05 DIAGNOSIS — M25.561 CHRONIC PAIN OF BOTH KNEES: Primary | ICD-10-CM

## 2024-11-05 DIAGNOSIS — M25.562 CHRONIC PAIN OF BOTH KNEES: Primary | ICD-10-CM

## 2024-11-05 DIAGNOSIS — R60.0 BILATERAL LOWER EXTREMITY EDEMA: ICD-10-CM

## 2024-11-05 DIAGNOSIS — M25.511 BILATERAL SHOULDER PAIN, UNSPECIFIED CHRONICITY: ICD-10-CM

## 2024-11-05 DIAGNOSIS — G89.29 CHRONIC PAIN OF BOTH KNEES: Primary | ICD-10-CM

## 2024-11-05 DIAGNOSIS — Z23 NEED FOR INFLUENZA VACCINATION: ICD-10-CM

## 2024-11-05 DIAGNOSIS — I25.10 CORONARY ARTERY DISEASE INVOLVING NATIVE CORONARY ARTERY OF NATIVE HEART WITHOUT ANGINA PECTORIS: ICD-10-CM

## 2024-11-05 DIAGNOSIS — I35.0 MODERATE TO SEVERE AORTIC STENOSIS: ICD-10-CM

## 2024-11-05 PROCEDURE — 1101F PT FALLS ASSESS-DOCD LE1/YR: CPT | Mod: CPTII,S$GLB,, | Performed by: STUDENT IN AN ORGANIZED HEALTH CARE EDUCATION/TRAINING PROGRAM

## 2024-11-05 PROCEDURE — 1160F RVW MEDS BY RX/DR IN RCRD: CPT | Mod: CPTII,S$GLB,, | Performed by: STUDENT IN AN ORGANIZED HEALTH CARE EDUCATION/TRAINING PROGRAM

## 2024-11-05 PROCEDURE — 3288F FALL RISK ASSESSMENT DOCD: CPT | Mod: CPTII,S$GLB,, | Performed by: STUDENT IN AN ORGANIZED HEALTH CARE EDUCATION/TRAINING PROGRAM

## 2024-11-05 PROCEDURE — 1126F AMNT PAIN NOTED NONE PRSNT: CPT | Mod: CPTII,S$GLB,, | Performed by: STUDENT IN AN ORGANIZED HEALTH CARE EDUCATION/TRAINING PROGRAM

## 2024-11-05 PROCEDURE — 1159F MED LIST DOCD IN RCRD: CPT | Mod: CPTII,S$GLB,, | Performed by: STUDENT IN AN ORGANIZED HEALTH CARE EDUCATION/TRAINING PROGRAM

## 2024-11-05 PROCEDURE — G0008 ADMIN INFLUENZA VIRUS VAC: HCPCS | Mod: S$GLB,,, | Performed by: STUDENT IN AN ORGANIZED HEALTH CARE EDUCATION/TRAINING PROGRAM

## 2024-11-05 PROCEDURE — 99215 OFFICE O/P EST HI 40 MIN: CPT | Mod: 25,S$GLB,, | Performed by: STUDENT IN AN ORGANIZED HEALTH CARE EDUCATION/TRAINING PROGRAM

## 2024-11-05 PROCEDURE — 90653 IIV ADJUVANT VACCINE IM: CPT | Mod: S$GLB,,, | Performed by: STUDENT IN AN ORGANIZED HEALTH CARE EDUCATION/TRAINING PROGRAM

## 2024-11-05 RX ORDER — FUROSEMIDE 40 MG/1
40 TABLET ORAL DAILY
Qty: 90 TABLET | Refills: 3 | Status: SHIPPED | OUTPATIENT
Start: 2024-11-05

## 2024-11-05 NOTE — PROGRESS NOTES
Patient ID: Makenzie Haile is a 89 y.o. female.     Chief Complaint: leg swelling  She is here accompanied by her son    HPI  Edema  This is a recurrent problem. The current episode started more than 1 month ago. The problem occurs daily. The problem has been gradually worsening. Pertinent negatives include no abdominal pain, anorexia, arthralgias, chest pain, coughing, fever, headaches, joint swelling, myalgias, nausea, rash, sore throat, swollen glands, urinary symptoms, vomiting or weakness. Treatments tried: taking dailyt lasix, decreasing salt in diet.   She recently found out that the collagen supplement she had been taking contains 100mg of sodium. She has since discontinued use.     She also reports chronic bilateral knee pain. She was following with ortho at [facility] and she was given a knee brace which hurt her leg so she does not wear it. She did not follow up with ortho since that time.     She has completed home health PT and would like to go to outpatient PT.       Review of Systems  Review of Systems   Constitutional:  Negative for fever.   HENT:  Negative for ear pain, sinus pain and sore throat.    Eyes:  Negative for discharge.   Respiratory:  Negative for cough and shortness of breath.    Cardiovascular:  Negative for chest pain and leg swelling.   Gastrointestinal:  Negative for abdominal pain, anorexia, diarrhea, nausea and vomiting.   Genitourinary:  Negative for urgency.   Musculoskeletal:  Negative for arthralgias, joint swelling and myalgias.   Skin:  Negative for rash.   Neurological:  Negative for weakness and headaches.   Psychiatric/Behavioral:  Negative for depression.    All other systems reviewed and are negative.      Currently Medications  Current Outpatient Medications on File Prior to Visit   Medication Sig Dispense Refill    acetaminophen (TYLENOL) 500 MG tablet Take 500 mg by mouth every 6 (six) hours as needed for Pain.      ascorbic acid, vitamin C, (VITAMIN C) 500 MG tablet  Take 500 mg by mouth once daily.      carvediloL (COREG) 3.125 MG tablet Take 1 tablet (3.125 mg total) by mouth 2 (two) times daily with meals. 60 tablet 11    cyanocobalamin (VITAMIN B-12) 100 MCG tablet Take 100 mcg by mouth once daily.      emollient Lotn Apply topically as needed (itching). 89 mL 0    incontinence pad,liner,disp (BLADDER CONTROL PADS EX ABSORB MISC) bladder control pads      lancets (ACCU-CHEK FASTCLIX MISC) Accu-Chek FastClix      losartan (COZAAR) 100 MG tablet Take 1 tablet (100 mg total) by mouth once daily. 90 tablet 3    magnesium citrate 100 mg Tab Take 100 mg by mouth once daily.      miconazole NITRATE 2 % (MICOTIN) 2 % top powder Apply topically 2 (two) times daily. 85 g 0    multivit-min-iron-FA-vit K-lut (CENTRUM SILVER WOMEN) 8 mg iron-400 mcg-50 mcg Tab Take 1 tablet by mouth once daily.      nitroGLYCERIN (NITROSTAT) 0.4 MG SL tablet Place 0.4 mg under the tongue every 5 (five) minutes as needed for Chest pain.      potassium chloride (MICRO-K) 10 MEQ CpSR Take 1 capsule (10 mEq total) by mouth once daily. 90 capsule 3    pravastatin (PRAVACHOL) 20 MG tablet Take 1 tablet (20 mg total) by mouth once daily. 30 tablet 11    SITagliptin phosphate (JANUVIA) 100 MG Tab Take 1 tablet (100 mg total) by mouth once daily. 90 tablet 3    tolterodine (DETROL LA) 4 MG 24 hr capsule Take 1 capsule (4 mg total) by mouth once daily. 30 capsule 11    zinc gluconate 50 mg tablet Take 50 mg by mouth once daily.      [DISCONTINUED] furosemide (LASIX) 20 MG tablet Take 1 tablet (20 mg total) by mouth once daily. 30 tablet 11    [DISCONTINUED] ondansetron (ZOFRAN-ODT) 4 MG TbDL Take 1 tablet (4 mg total) by mouth every 6 (six) hours as needed. 12 tablet 0    [DISCONTINUED] pregabalin (LYRICA) 75 MG capsule Take 75 mg by mouth 2 (two) times daily.      aspirin (ECOTRIN) 81 MG EC tablet Take 1 tablet (81 mg total) by mouth once daily. 30 tablet 11    NIFEdipine (PROCARDIA-XL) 60 MG (OSM) 24 hr tablet  "Take 1 tablet (60 mg total) by mouth once daily. 30 tablet 11     No current facility-administered medications on file prior to visit.       Physical  Exam  Vitals:    11/05/24 1015   BP: 128/82   BP Location: Left arm   Patient Position: Sitting   Pulse: 62   Temp: 97.9 °F (36.6 °C)   SpO2: 95%   Weight: 99.8 kg (220 lb)   Height: 5' 3" (1.6 m)      Body mass index is 38.97 kg/m².  Wt Readings from Last 3 Encounters:   11/05/24 99.8 kg (220 lb)   08/21/24 99.8 kg (220 lb)   08/13/24 101.6 kg (224 lb)       Physical Exam  Vitals and nursing note reviewed.   Constitutional:       General: She is not in acute distress.     Appearance: She is not ill-appearing.   HENT:      Head: Normocephalic and atraumatic.      Right Ear: External ear normal.      Left Ear: External ear normal.      Nose: Nose normal.      Mouth/Throat:      Mouth: Mucous membranes are moist.   Eyes:      Extraocular Movements: Extraocular movements intact.      Conjunctiva/sclera: Conjunctivae normal.   Cardiovascular:      Rate and Rhythm: Normal rate and regular rhythm.      Pulses: Normal pulses.      Heart sounds: Murmur heard.      Systolic murmur is present with a grade of 3/6.   Pulmonary:      Effort: Pulmonary effort is normal. No respiratory distress.      Breath sounds: No wheezing.   Abdominal:      General: There is no distension.      Palpations: Abdomen is soft. There is no mass.      Tenderness: There is no abdominal tenderness.   Musculoskeletal:         General: No swelling.      Cervical back: Normal range of motion.      Right lower leg: Edema present.      Left lower leg: Edema present.   Skin:     Coloration: Skin is not jaundiced.      Findings: No rash.   Neurological:      General: No focal deficit present.      Mental Status: She is alert and oriented to person, place, and time.   Psychiatric:         Mood and Affect: Mood normal.         Thought Content: Thought content normal.         Labs:    Complete Blood Count  Lab " Results   Component Value Date    RBC 4.32 06/26/2024    HGB 12.1 06/26/2024    HCT 37.6 06/26/2024    MCV 87 06/26/2024    MCH 28.0 06/26/2024    MCHC 32.2 06/26/2024    RDW 14.4 06/26/2024     06/26/2024    MPV 11.4 06/26/2024    GRAN 3.4 06/26/2024    GRAN 55.4 06/26/2024    LYMPH 1.5 06/26/2024    LYMPH 25.2 06/26/2024    MONO 0.6 06/26/2024    MONO 9.0 06/26/2024    EOS 0.5 06/26/2024    BASO 0.08 06/26/2024    EOSINOPHIL 8.8 (H) 06/26/2024    BASOPHIL 1.3 06/26/2024    DIFFMETHOD Automated 06/26/2024       Comprehensive Metabolic Panel  Lab Results   Component Value Date    GLU 98 06/26/2024    BUN 45 (H) 06/26/2024    CREATININE 0.9 06/26/2024     06/26/2024    K 4.8 06/26/2024     06/26/2024    PROT 6.9 06/26/2024    ALBUMIN 2.9 (L) 06/26/2024    BILITOT 0.3 06/26/2024    AST 53 (H) 06/26/2024    ALKPHOS 50 (L) 06/26/2024    CO2 22 (L) 06/26/2024    ALT 30 06/26/2024    ANIONGAP 10 06/26/2024       TSH  Lab Results   Component Value Date    TSH 0.861 06/22/2024       Imaging:  US Lower Extremity Veins Bilateral  Narrative: EXAMINATION:  US LOWER EXTREMITY VEINS BILATERAL    CLINICAL HISTORY:  L>R leg pain;    TECHNIQUE:  Duplex and color flow Doppler and dynamic compression was performed of the bilateral lower extremity veins was performed.    COMPARISON:  None    FINDINGS:  Right thigh veins: The common femoral, femoral, popliteal, and upper greater saphenous veins are patent and free of thrombus. The veins are normally compressible and have normal phasic flow and augmentation response.    Right calf veins: The visualized calf veins are patent.    Left thigh veins: The common femoral, femoral, popliteal, and upper greater saphenous veins are patent and free of thrombus. The veins are normally compressible and have normal phasic flow and augmentation response.    Left calf veins: The visualized calf veins are patent.    Miscellaneous: None  Impression: No evidence of deep venous thrombosis  in either lower extremity.    Electronically signed by: Ammon Marvin  Date:    06/25/2024  Time:    08:58      Assessment/Plan:      1. Chronic pain of both knees  -     Ambulatory referral/consult to Physical/Occupational Therapy; Future; Expected date: 11/12/2024    2. Bilateral shoulder pain, unspecified chronicity  -     Ambulatory referral/consult to Physical/Occupational Therapy; Future; Expected date: 11/12/2024    3. Need for influenza vaccination  -     influenza (adjuvanted) (Fluad) 45 mcg/0.5 mL IM vaccine (> or = 66 yo) 0.5 mL    4. Bilateral lower extremity edema  -     furosemide (LASIX) 40 MG tablet; Take 1 tablet (40 mg total) by mouth once daily.  Dispense: 90 tablet; Refill: 3    5. Type 2 diabetes mellitus without complication, without long-term current use of insulin    6. Coronary artery disease involving native coronary artery of native heart without angina pectoris    7. Moderate to severe aortic stenosis    Take 2 tylneol arhtiris tabs BID   Diclofenac cream  Two old goats cream  Stop collagen  Take 40mg lasix for 5 days. Then go back to 1 x per day    Discussed how to stay healthy including: diet, exercise, refraining from smoking and discussed screening exams / tests needed for age, sex and family Hx.    RTC 2 weeks    I spent a total of 45 minutes on the day of the visit.This includes face to face time and non-face to face time preparing to see the patient (eg, review of tests), obtaining and/or reviewing separately obtained history, documenting clinical information in the electronic or other health record, independently interpreting results and communicating results to the patient/family/caregiver, or care coordinator.      Floyd Johnson MD

## 2024-11-14 ENCOUNTER — LAB VISIT (OUTPATIENT)
Dept: LAB | Facility: HOSPITAL | Age: 89
End: 2024-11-14
Attending: STUDENT IN AN ORGANIZED HEALTH CARE EDUCATION/TRAINING PROGRAM
Payer: MEDICARE

## 2024-11-14 DIAGNOSIS — E11.9 TYPE 2 DIABETES MELLITUS WITHOUT COMPLICATION, WITHOUT LONG-TERM CURRENT USE OF INSULIN: ICD-10-CM

## 2024-11-14 DIAGNOSIS — I10 ESSENTIAL HYPERTENSION: Chronic | ICD-10-CM

## 2024-11-14 DIAGNOSIS — E78.2 MIXED HYPERLIPIDEMIA: ICD-10-CM

## 2024-11-14 LAB
ALBUMIN SERPL BCP-MCNC: 4.3 G/DL (ref 3.5–5.2)
ALP SERPL-CCNC: 53 U/L (ref 38–126)
ALT SERPL W/O P-5'-P-CCNC: 11 U/L (ref 10–44)
ANION GAP SERPL CALC-SCNC: 5 MMOL/L (ref 8–16)
AST SERPL-CCNC: 23 U/L (ref 15–46)
BASOPHILS # BLD AUTO: 0.1 K/UL (ref 0–0.2)
BASOPHILS NFR BLD: 1.5 % (ref 0–1.9)
BILIRUB SERPL-MCNC: 0.6 MG/DL (ref 0.1–1)
CALCIUM SERPL-MCNC: 10.4 MG/DL (ref 8.7–10.5)
CHLORIDE SERPL-SCNC: 103 MMOL/L (ref 95–110)
CHOLEST SERPL-MCNC: 201 MG/DL (ref 120–199)
CHOLEST/HDLC SERPL: 4.4 {RATIO} (ref 2–5)
CO2 SERPL-SCNC: 33 MMOL/L (ref 23–29)
CREAT SERPL-MCNC: 1.04 MG/DL (ref 0.5–1.4)
DIFFERENTIAL METHOD BLD: ABNORMAL
EOSINOPHIL # BLD AUTO: 0.3 K/UL (ref 0–0.5)
EOSINOPHIL NFR BLD: 4.9 % (ref 0–8)
ERYTHROCYTE [DISTWIDTH] IN BLOOD BY AUTOMATED COUNT: 15 % (ref 11.5–14.5)
EST. GFR  (NO RACE VARIABLE): 52 ML/MIN/1.73 M^2
ESTIMATED AVG GLUCOSE: 117 MG/DL (ref 68–131)
GLUCOSE SERPL-MCNC: 102 MG/DL (ref 70–110)
HBA1C MFR BLD: 5.7 % (ref 4–5.6)
HCT VFR BLD AUTO: 41.1 % (ref 37–48.5)
HDLC SERPL-MCNC: 46 MG/DL (ref 40–75)
HDLC SERPL: 22.9 % (ref 20–50)
HGB BLD-MCNC: 13.2 G/DL (ref 12–16)
IMM GRANULOCYTES # BLD AUTO: 0.02 K/UL (ref 0–0.04)
IMM GRANULOCYTES NFR BLD AUTO: 0.3 % (ref 0–0.5)
LDLC SERPL CALC-MCNC: 125.8 MG/DL (ref 63–159)
LYMPHOCYTES # BLD AUTO: 1.5 K/UL (ref 1–4.8)
LYMPHOCYTES NFR BLD: 22.4 % (ref 18–48)
MCH RBC QN AUTO: 29.1 PG (ref 27–31)
MCHC RBC AUTO-ENTMCNC: 32.1 G/DL (ref 32–36)
MCV RBC AUTO: 91 FL (ref 82–98)
MONOCYTES # BLD AUTO: 0.5 K/UL (ref 0.3–1)
MONOCYTES NFR BLD: 7.6 % (ref 4–15)
NEUTROPHILS # BLD AUTO: 4.3 K/UL (ref 1.8–7.7)
NEUTROPHILS NFR BLD: 63.3 % (ref 38–73)
NONHDLC SERPL-MCNC: 155 MG/DL
NRBC BLD-RTO: 0 /100 WBC
PLATELET # BLD AUTO: 260 K/UL (ref 150–450)
PMV BLD AUTO: 12.1 FL (ref 9.2–12.9)
POTASSIUM SERPL-SCNC: 5.2 MMOL/L (ref 3.5–5.1)
PROT SERPL-MCNC: 8 G/DL (ref 6–8.4)
RBC # BLD AUTO: 4.53 M/UL (ref 4–5.4)
SODIUM SERPL-SCNC: 141 MMOL/L (ref 136–145)
TRIGL SERPL-MCNC: 146 MG/DL (ref 30–150)
TSH SERPL DL<=0.005 MIU/L-ACNC: 1.29 UIU/ML (ref 0.4–4)
UUN UR-MCNC: 42 MG/DL (ref 7–17)
WBC # BLD AUTO: 6.74 K/UL (ref 3.9–12.7)

## 2024-11-14 PROCEDURE — 83036 HEMOGLOBIN GLYCOSYLATED A1C: CPT | Performed by: STUDENT IN AN ORGANIZED HEALTH CARE EDUCATION/TRAINING PROGRAM

## 2024-11-14 PROCEDURE — 84443 ASSAY THYROID STIM HORMONE: CPT | Mod: PN | Performed by: STUDENT IN AN ORGANIZED HEALTH CARE EDUCATION/TRAINING PROGRAM

## 2024-11-14 PROCEDURE — 80061 LIPID PANEL: CPT | Performed by: STUDENT IN AN ORGANIZED HEALTH CARE EDUCATION/TRAINING PROGRAM

## 2024-11-14 PROCEDURE — 80053 COMPREHEN METABOLIC PANEL: CPT | Mod: PN | Performed by: STUDENT IN AN ORGANIZED HEALTH CARE EDUCATION/TRAINING PROGRAM

## 2024-11-14 PROCEDURE — 85025 COMPLETE CBC W/AUTO DIFF WBC: CPT | Mod: PN | Performed by: STUDENT IN AN ORGANIZED HEALTH CARE EDUCATION/TRAINING PROGRAM

## 2024-11-16 PROCEDURE — G0179 MD RECERTIFICATION HHA PT: HCPCS | Mod: ,,, | Performed by: STUDENT IN AN ORGANIZED HEALTH CARE EDUCATION/TRAINING PROGRAM

## 2024-11-21 ENCOUNTER — OFFICE VISIT (OUTPATIENT)
Dept: FAMILY MEDICINE | Facility: CLINIC | Age: 89
End: 2024-11-21
Payer: MEDICARE

## 2024-11-21 VITALS
TEMPERATURE: 98 F | HEIGHT: 63 IN | DIASTOLIC BLOOD PRESSURE: 80 MMHG | WEIGHT: 232.13 LBS | SYSTOLIC BLOOD PRESSURE: 130 MMHG | OXYGEN SATURATION: 95 % | HEART RATE: 60 BPM | BODY MASS INDEX: 41.13 KG/M2

## 2024-11-21 DIAGNOSIS — I10 ESSENTIAL HYPERTENSION: Primary | Chronic | ICD-10-CM

## 2024-11-21 DIAGNOSIS — E11.9 TYPE 2 DIABETES MELLITUS WITHOUT COMPLICATION, WITHOUT LONG-TERM CURRENT USE OF INSULIN: ICD-10-CM

## 2024-11-21 DIAGNOSIS — E78.2 MIXED HYPERLIPIDEMIA: ICD-10-CM

## 2024-11-21 DIAGNOSIS — R60.0 BILATERAL LOWER EXTREMITY EDEMA: ICD-10-CM

## 2024-11-21 PROCEDURE — 1101F PT FALLS ASSESS-DOCD LE1/YR: CPT | Mod: CPTII,S$GLB,, | Performed by: STUDENT IN AN ORGANIZED HEALTH CARE EDUCATION/TRAINING PROGRAM

## 2024-11-21 PROCEDURE — 1160F RVW MEDS BY RX/DR IN RCRD: CPT | Mod: CPTII,S$GLB,, | Performed by: STUDENT IN AN ORGANIZED HEALTH CARE EDUCATION/TRAINING PROGRAM

## 2024-11-21 PROCEDURE — 1125F AMNT PAIN NOTED PAIN PRSNT: CPT | Mod: CPTII,S$GLB,, | Performed by: STUDENT IN AN ORGANIZED HEALTH CARE EDUCATION/TRAINING PROGRAM

## 2024-11-21 PROCEDURE — 1159F MED LIST DOCD IN RCRD: CPT | Mod: CPTII,S$GLB,, | Performed by: STUDENT IN AN ORGANIZED HEALTH CARE EDUCATION/TRAINING PROGRAM

## 2024-11-21 PROCEDURE — 99214 OFFICE O/P EST MOD 30 MIN: CPT | Mod: S$GLB,,, | Performed by: STUDENT IN AN ORGANIZED HEALTH CARE EDUCATION/TRAINING PROGRAM

## 2024-11-21 PROCEDURE — 3288F FALL RISK ASSESSMENT DOCD: CPT | Mod: CPTII,S$GLB,, | Performed by: STUDENT IN AN ORGANIZED HEALTH CARE EDUCATION/TRAINING PROGRAM

## 2024-11-21 RX ORDER — POTASSIUM CHLORIDE 750 MG/1
10 CAPSULE, EXTENDED RELEASE ORAL
Qty: 60 CAPSULE | Refills: 3 | Status: SHIPPED | OUTPATIENT
Start: 2024-11-22

## 2024-11-21 RX ORDER — FUROSEMIDE 40 MG/1
40 TABLET ORAL DAILY
Qty: 180 TABLET | Refills: 3 | Status: SHIPPED | OUTPATIENT
Start: 2024-11-21

## 2024-11-22 NOTE — PROGRESS NOTES
Patient ID: Makenzie Haile is a 89 y.o. female.     Chief Complaint: Follow-up, Knee Pain, and Leg Swelling (/)  Patient here accompanied by her son    Follow-up  Pertinent negatives include no chest pain, coughing, fever, headaches, myalgias, nausea, rash, vomiting or weakness.   Knee Pain       History of Present Illness    Makenzie presents today for follow-up on leg and foot swelling.    She reports fluctuations in leg and foot swelling, with good and bad days. Improvement noted after taking increased dose of furosemide twice daily for 5-6 days as instructed. Feet and legs appear better in the mornings, with visible wrinkles. Blisters have developed on one side, with the left leg currently worse than the right. She has been elevating her feet 3 times daily as a management strategy. The visiting home health nurse regularly measures her ankles, with mostly good results and only one instance of elevated measurements noted.    She takes furosemide 40 mg daily, recently increased from 20 mg. She reports increased urination frequency and volume since the dose increase, but denies experiencing rapid onset of urination within 30 minutes of taking the medication. A new potassium supplement has been prescribed for use as needed for leg cramps. She continues Januvia for diabetes management.    She has well-controlled diabetes with an A1C of 5.7, the lowest it has been in a considerable time. The highest recorded A1C was 6.0.    She recently discovered high salt content in her daily breakfast cereal (plain cornflakes), which she had been consuming every morning without realizing its significant sodium content.           Review of Systems  Review of Systems   Constitutional:  Negative for fever.   HENT:  Negative for ear pain and sinus pain.    Eyes:  Negative for discharge.   Respiratory:  Negative for cough and shortness of breath.    Cardiovascular:  Negative for chest pain and leg swelling.   Gastrointestinal:   Negative for diarrhea, nausea and vomiting.   Genitourinary:  Negative for urgency.   Musculoskeletal:  Negative for myalgias.   Skin:  Negative for rash.   Neurological:  Negative for weakness and headaches.   Psychiatric/Behavioral:  Negative for depression.    All other systems reviewed and are negative.      Currently Medications  Current Outpatient Medications on File Prior to Visit   Medication Sig Dispense Refill    acetaminophen (TYLENOL) 500 MG tablet Take 500 mg by mouth every 6 (six) hours as needed for Pain.      ascorbic acid, vitamin C, (VITAMIN C) 500 MG tablet Take 500 mg by mouth once daily.      aspirin (ECOTRIN) 81 MG EC tablet Take 1 tablet (81 mg total) by mouth once daily. 30 tablet 11    carvediloL (COREG) 3.125 MG tablet Take 1 tablet (3.125 mg total) by mouth 2 (two) times daily with meals. 60 tablet 11    cyanocobalamin (VITAMIN B-12) 100 MCG tablet Take 100 mcg by mouth once daily.      emollient Lotn Apply topically as needed (itching). 89 mL 0    incontinence pad,liner,disp (BLADDER CONTROL PADS EX ABSORB MISC) bladder control pads      lancets (ACCU-CHEK FASTCLIX MISC) Accu-Chek FastClix      losartan (COZAAR) 100 MG tablet Take 1 tablet (100 mg total) by mouth once daily. 90 tablet 3    magnesium citrate 100 mg Tab Take 100 mg by mouth once daily.      miconazole NITRATE 2 % (MICOTIN) 2 % top powder Apply topically 2 (two) times daily. 85 g 0    multivit-min-iron-FA-vit K-lut (CENTRUM SILVER WOMEN) 8 mg iron-400 mcg-50 mcg Tab Take 1 tablet by mouth once daily.      nitroGLYCERIN (NITROSTAT) 0.4 MG SL tablet Place 0.4 mg under the tongue every 5 (five) minutes as needed for Chest pain.      pravastatin (PRAVACHOL) 20 MG tablet Take 1 tablet (20 mg total) by mouth once daily. 30 tablet 11    tolterodine (DETROL LA) 4 MG 24 hr capsule Take 1 capsule (4 mg total) by mouth once daily. 30 capsule 11    zinc gluconate 50 mg tablet Take 50 mg by mouth once daily.      NIFEdipine  "(PROCARDIA-XL) 60 MG (OSM) 24 hr tablet Take 1 tablet (60 mg total) by mouth once daily. 30 tablet 11     No current facility-administered medications on file prior to visit.       Physical  Exam  Vitals:    11/21/24 1408   BP: 130/80   BP Location: Right arm   Patient Position: Sitting   Pulse: 60   Temp: 97.8 °F (36.6 °C)   TempSrc: Oral   SpO2: 95%   Weight: 105.3 kg (232 lb 2.3 oz)   Height: 5' 3" (1.6 m)      Body mass index is 41.12 kg/m².  Wt Readings from Last 3 Encounters:   11/21/24 105.3 kg (232 lb 2.3 oz)   11/05/24 99.8 kg (220 lb)   08/21/24 99.8 kg (220 lb)       Physical Exam  Vitals and nursing note reviewed.   Constitutional:       General: She is not in acute distress.     Appearance: She is not ill-appearing.   HENT:      Head: Normocephalic and atraumatic.      Right Ear: External ear normal.      Left Ear: External ear normal.      Nose: Nose normal.      Mouth/Throat:      Mouth: Mucous membranes are moist.   Eyes:      Extraocular Movements: Extraocular movements intact.      Conjunctiva/sclera: Conjunctivae normal.   Cardiovascular:      Rate and Rhythm: Normal rate and regular rhythm.      Pulses: Normal pulses.      Heart sounds: No murmur heard.  Pulmonary:      Effort: Pulmonary effort is normal. No respiratory distress.      Breath sounds: No wheezing.   Abdominal:      General: There is no distension.      Palpations: Abdomen is soft. There is no mass.      Tenderness: There is abdominal tenderness.   Musculoskeletal:         General: No swelling.      Cervical back: Normal range of motion.      Right lower leg: Edema present.      Left lower leg: Edema present.      Comments: Ambulates with rollator   Skin:     Coloration: Skin is not jaundiced.      Findings: No rash.   Neurological:      General: No focal deficit present.      Mental Status: She is alert and oriented to person, place, and time.   Psychiatric:         Mood and Affect: Mood normal.         Thought Content: Thought " content normal.         Labs:    Complete Blood Count  Lab Results   Component Value Date    RBC 4.53 11/14/2024    HGB 13.2 11/14/2024    HCT 41.1 11/14/2024    MCV 91 11/14/2024    MCH 29.1 11/14/2024    MCHC 32.1 11/14/2024    RDW 15.0 (H) 11/14/2024     11/14/2024    MPV 12.1 11/14/2024    GRAN 4.3 11/14/2024    GRAN 63.3 11/14/2024    LYMPH 1.5 11/14/2024    LYMPH 22.4 11/14/2024    MONO 0.5 11/14/2024    MONO 7.6 11/14/2024    EOS 0.3 11/14/2024    BASO 0.10 11/14/2024    EOSINOPHIL 4.9 11/14/2024    BASOPHIL 1.5 11/14/2024    DIFFMETHOD Automated 11/14/2024       Comprehensive Metabolic Panel  Lab Results   Component Value Date     11/14/2024    BUN 42 (H) 11/14/2024    CREATININE 1.04 11/14/2024     11/14/2024    K 5.2 (H) 11/14/2024     11/14/2024    PROT 8.0 11/14/2024    ALBUMIN 4.3 11/14/2024    BILITOT 0.6 11/14/2024    AST 23 11/14/2024    ALKPHOS 53 11/14/2024    CO2 33 (H) 11/14/2024    ALT 11 11/14/2024    ANIONGAP 5 (L) 11/14/2024       TSH  Lab Results   Component Value Date    TSH 1.290 11/14/2024       Imaging:  US Lower Extremity Veins Bilateral  Narrative: EXAMINATION:  US LOWER EXTREMITY VEINS BILATERAL    CLINICAL HISTORY:  L>R leg pain;    TECHNIQUE:  Duplex and color flow Doppler and dynamic compression was performed of the bilateral lower extremity veins was performed.    COMPARISON:  None    FINDINGS:  Right thigh veins: The common femoral, femoral, popliteal, and upper greater saphenous veins are patent and free of thrombus. The veins are normally compressible and have normal phasic flow and augmentation response.    Right calf veins: The visualized calf veins are patent.    Left thigh veins: The common femoral, femoral, popliteal, and upper greater saphenous veins are patent and free of thrombus. The veins are normally compressible and have normal phasic flow and augmentation response.    Left calf veins: The visualized calf veins are patent.    Miscellaneous:  "None  Impression: No evidence of deep venous thrombosis in either lower extremity.    Electronically signed by: Ammon Marvin  Date:    06/25/2024  Time:    08:58      Assessment/Plan:  Assessment & Plan    Assessed patient's fluid retention and edema; determined current diuretic regimen insufficient  Evaluated labs results: thyroid, cholesterol, and blood counts within normal limits  Noted well-controlled diabetes (A1C 5.7) despite patient's belief of having diabetes  Considered kidney function in relation to diuretic use; found levels acceptable given patient's age and medication regimen    HEART FAILURE:  - Explained fluid retention varies based on diet, activity, and salt intake.  - Discussed importance of daily weight monitoring for fluid management.  - Informed about "holiday heart" risk due to increased salt intake during holidays.  - Makenzie to weigh self every morning after urinating.  - Makenzie to take additional diuretic dose if weight increases by 3 lbs overnight.  - Makenzie to continue elevating feet 3 times daily.  - Makenzie to be cautious of hidden salt in foods (e.g., cereal).  - Increased Lasix to 40 mg twice daily.  - Started flexible diuretic regimen: take 1 Lasix daily, with option for 2nd dose based on symptoms or weight increase.  - Continued potassium supplement on Monday, Wednesday, Friday only.    TYPE 2 DIABETES:  - Decreased Januvia to 50 mg daily (patient to cut current tablets in half).    GENERAL CARE:  - Explained proper blister care: leave blisters intact as natural "band-aids".    FOLLOW-UP:  - Repeat labs ordered before next 3-month follow-up.  - Follow up in 3 months.         1. Essential hypertension  -     potassium chloride (MICRO-K) 10 MEQ CpSR; Take 1 capsule (10 mEq total) by mouth 3 (three) times a week.  Dispense: 60 capsule; Refill: 3  -     TSH; Future; Expected date: 11/21/2024    2. Bilateral lower extremity edema  -     furosemide (LASIX) 40 MG tablet; Take 1 tablet " (40 mg total) by mouth once daily. Take an extra pill in the afternoon on days of increased leg swelling.  Dispense: 180 tablet; Refill: 3    3. Type 2 diabetes mellitus without complication, without long-term current use of insulin  -     SITagliptin phosphate (JANUVIA) 50 MG Tab; Take 1 tablet (50 mg total) by mouth once daily.  -     CBC Auto Differential; Future  -     Comprehensive metabolic panel; Future; Expected date: 11/21/2024  -     HEMOGLOBIN A1C; Future; Expected date: 11/21/2024  -     Microalbumin/Creatinine Ratio, Urine; Future; Expected date: 11/21/2024    4. Mixed hyperlipidemia  -     LIPID PANEL; Future; Expected date: 11/21/2024         Discussed how to stay healthy including: diet, exercise, refraining from smoking and discussed screening exams / tests needed for age, sex and family Hx.      Floyd Johnson MD    This note was generated with the assistance of ambient listening technology. Verbal consent was obtained by the patient and accompanying visitor(s) for the recording of patient appointment to facilitate this note. I attest to having reviewed and edited the generated note for accuracy, though some syntax or spelling errors may persist. Please contact the author of this note for any clarification.

## 2024-11-26 ENCOUNTER — PATIENT OUTREACH (OUTPATIENT)
Dept: ADMINISTRATIVE | Facility: HOSPITAL | Age: 89
End: 2024-11-26
Payer: MEDICARE

## 2024-11-26 NOTE — LETTER
AUTHORIZATION FOR RELEASE OF   CONFIDENTIAL INFORMATION      We are seeing Makenzie Haile, date of birth 1935, in the clinic at [facility]. Floyd Johnson MD is the patient's PCP. Makenzie Haile has an outstanding lab/procedure at the time we reviewed her chart. In order to help keep her health information updated, she has authorized us to request the following medical record(s):                                         (  x)  EYE EXAM                 Please fax records to [facility], Reine, Addy N., MD,  at [phone] or email to [email or web address].               Patient Name: Makenzie Haile  : 1935  Patient Phone #: [phone]

## 2024-12-16 LAB
LEFT EYE DM RETINOPATHY: NEGATIVE
RIGHT EYE DM RETINOPATHY: NEGATIVE

## 2024-12-17 ENCOUNTER — EXTERNAL HOME HEALTH (OUTPATIENT)
Dept: HOME HEALTH SERVICES | Facility: HOSPITAL | Age: 89
End: 2024-12-17
Payer: MEDICARE

## 2024-12-20 ENCOUNTER — TELEPHONE (OUTPATIENT)
Dept: FAMILY MEDICINE | Facility: CLINIC | Age: 89
End: 2024-12-20
Payer: MEDICARE

## 2024-12-20 NOTE — TELEPHONE ENCOUNTER
Spoke with pt she stated that she does not think her fluid pills are working pt stated that she only takes one a day and she is still retaining a lot of fluid in her legs

## 2024-12-20 NOTE — TELEPHONE ENCOUNTER
----- Message from Fany sent at 12/20/2024  8:57 AM CST -----  Regarding: Call back  Contact: [phone]  Who Called: PT     Patient is calling to talk to nurse in regards to her medication furosemide (LASIX) 40 MG tablet 180 tablet feels like is not working. Please advice

## 2024-12-20 NOTE — TELEPHONE ENCOUNTER
Pt has been notified and verbalized understanding of the amount of sodium she should consume daily.

## 2024-12-20 NOTE — TELEPHONE ENCOUNTER
Pt has been notified and verbalized understanding that she should begin taking Lasix 40 mg BID x 5 days.     Pt wants to know how much sodium she is allowed to consume daily.

## 2025-01-02 ENCOUNTER — DOCUMENT SCAN (OUTPATIENT)
Dept: HOME HEALTH SERVICES | Facility: HOSPITAL | Age: 89
End: 2025-01-02
Payer: MEDICARE

## 2025-01-15 PROCEDURE — G0179 MD RECERTIFICATION HHA PT: HCPCS | Mod: ,,, | Performed by: STUDENT IN AN ORGANIZED HEALTH CARE EDUCATION/TRAINING PROGRAM

## 2025-01-17 ENCOUNTER — TELEPHONE (OUTPATIENT)
Dept: FAMILY MEDICINE | Facility: CLINIC | Age: 89
End: 2025-01-17
Payer: MEDICARE

## 2025-01-17 NOTE — TELEPHONE ENCOUNTER
----- Message from Carole sent at 1/17/2025  1:20 PM CST -----  Regarding: Unable to Reach Patient to Reschedule PT Ava  Good Afternoon,    The physical therapy department received your order to get the attached patient scheduled for an evaluation. We have reached out to the patient and scheduled her for an evaluation; however, they have cancelled their appointment.    We have made several attempts to get the patient rescheduled but have been unsuccessful.     We wanted you to be aware that your patient has not started therapy. If you speak with them again about starting therapy please have them reach out to us at [phone] to get scheduled.      Sincerely,  Carole Fontanez

## 2025-02-10 ENCOUNTER — TELEPHONE (OUTPATIENT)
Dept: FAMILY MEDICINE | Facility: CLINIC | Age: 89
End: 2025-02-10

## 2025-02-10 ENCOUNTER — OFFICE VISIT (OUTPATIENT)
Dept: FAMILY MEDICINE | Facility: CLINIC | Age: 89
End: 2025-02-10
Payer: MEDICARE

## 2025-02-10 VITALS
WEIGHT: 233.69 LBS | SYSTOLIC BLOOD PRESSURE: 130 MMHG | HEIGHT: 63 IN | TEMPERATURE: 98 F | OXYGEN SATURATION: 95 % | BODY MASS INDEX: 41.41 KG/M2 | DIASTOLIC BLOOD PRESSURE: 76 MMHG | HEART RATE: 81 BPM

## 2025-02-10 DIAGNOSIS — F41.9 ANXIETY: ICD-10-CM

## 2025-02-10 DIAGNOSIS — R21 RASH: ICD-10-CM

## 2025-02-10 DIAGNOSIS — N39.490 OVERFLOW INCONTINENCE OF URINE: ICD-10-CM

## 2025-02-10 DIAGNOSIS — E11.9 TYPE 2 DIABETES MELLITUS WITHOUT COMPLICATION, WITHOUT LONG-TERM CURRENT USE OF INSULIN: ICD-10-CM

## 2025-02-10 DIAGNOSIS — N30.00 ACUTE CYSTITIS WITHOUT HEMATURIA: ICD-10-CM

## 2025-02-10 DIAGNOSIS — I50.30 HEART FAILURE WITH PRESERVED EJECTION FRACTION, UNSPECIFIED HF CHRONICITY: ICD-10-CM

## 2025-02-10 DIAGNOSIS — E66.813 CLASS 3 SEVERE OBESITY DUE TO EXCESS CALORIES WITH SERIOUS COMORBIDITY AND BODY MASS INDEX (BMI) OF 40.0 TO 44.9 IN ADULT: ICD-10-CM

## 2025-02-10 DIAGNOSIS — E66.01 CLASS 3 SEVERE OBESITY DUE TO EXCESS CALORIES WITH SERIOUS COMORBIDITY AND BODY MASS INDEX (BMI) OF 40.0 TO 44.9 IN ADULT: ICD-10-CM

## 2025-02-10 DIAGNOSIS — R35.0 URINARY FREQUENCY: ICD-10-CM

## 2025-02-10 DIAGNOSIS — L03.119 CELLULITIS OF LOWER EXTREMITY, UNSPECIFIED LATERALITY: ICD-10-CM

## 2025-02-10 DIAGNOSIS — L89.302 PRESSURE INJURY OF BUTTOCK, STAGE 2, UNSPECIFIED LATERALITY: Primary | ICD-10-CM

## 2025-02-10 DIAGNOSIS — R60.0 BILATERAL LOWER EXTREMITY EDEMA: ICD-10-CM

## 2025-02-10 LAB
BILIRUB SERPL-MCNC: NEGATIVE MG/DL
BLOOD URINE, POC: ABNORMAL
CLARITY, POC UA: ABNORMAL
COLOR, POC UA: YELLOW
GLUCOSE UR QL STRIP: NEGATIVE
KETONES UR QL STRIP: NEGATIVE
LEUKOCYTE ESTERASE URINE, POC: POSITIVE
NITRITE, POC UA: POSITIVE
PH, POC UA: 7
PROTEIN, POC: ABNORMAL
SPECIFIC GRAVITY, POC UA: 1.01
UROBILINOGEN, POC UA: NEGATIVE

## 2025-02-10 PROCEDURE — 1160F RVW MEDS BY RX/DR IN RCRD: CPT | Mod: CPTII,S$GLB,, | Performed by: PHYSICIAN ASSISTANT

## 2025-02-10 PROCEDURE — 87086 URINE CULTURE/COLONY COUNT: CPT | Performed by: PHYSICIAN ASSISTANT

## 2025-02-10 PROCEDURE — 81002 URINALYSIS NONAUTO W/O SCOPE: CPT | Mod: S$GLB,,, | Performed by: PHYSICIAN ASSISTANT

## 2025-02-10 PROCEDURE — 99215 OFFICE O/P EST HI 40 MIN: CPT | Mod: S$GLB,,, | Performed by: PHYSICIAN ASSISTANT

## 2025-02-10 PROCEDURE — 1126F AMNT PAIN NOTED NONE PRSNT: CPT | Mod: CPTII,S$GLB,, | Performed by: PHYSICIAN ASSISTANT

## 2025-02-10 PROCEDURE — 1159F MED LIST DOCD IN RCRD: CPT | Mod: CPTII,S$GLB,, | Performed by: PHYSICIAN ASSISTANT

## 2025-02-10 PROCEDURE — 87088 URINE BACTERIA CULTURE: CPT | Performed by: PHYSICIAN ASSISTANT

## 2025-02-10 PROCEDURE — 87186 SC STD MICRODIL/AGAR DIL: CPT | Performed by: PHYSICIAN ASSISTANT

## 2025-02-10 PROCEDURE — 3288F FALL RISK ASSESSMENT DOCD: CPT | Mod: CPTII,S$GLB,, | Performed by: PHYSICIAN ASSISTANT

## 2025-02-10 PROCEDURE — 1101F PT FALLS ASSESS-DOCD LE1/YR: CPT | Mod: CPTII,S$GLB,, | Performed by: PHYSICIAN ASSISTANT

## 2025-02-10 RX ORDER — FUROSEMIDE 20 MG/1
20 TABLET ORAL 2 TIMES DAILY
Qty: 60 TABLET | Refills: 11 | Status: SHIPPED | OUTPATIENT
Start: 2025-02-10 | End: 2025-02-11 | Stop reason: DRUGHIGH

## 2025-02-10 RX ORDER — NYSTATIN 100000 U/G
CREAM TOPICAL 2 TIMES DAILY
Qty: 30 G | Refills: 1 | Status: SHIPPED | OUTPATIENT
Start: 2025-02-10

## 2025-02-10 RX ORDER — CEPHALEXIN 500 MG/1
500 CAPSULE ORAL EVERY 6 HOURS
Qty: 28 CAPSULE | Refills: 0 | Status: SHIPPED | OUTPATIENT
Start: 2025-02-10 | End: 2025-02-17

## 2025-02-10 RX ORDER — BUSPIRONE HYDROCHLORIDE 10 MG/1
10 TABLET ORAL 3 TIMES DAILY
Qty: 90 TABLET | Refills: 11 | Status: SHIPPED | OUTPATIENT
Start: 2025-02-10 | End: 2026-02-10

## 2025-02-10 NOTE — PROGRESS NOTES
Patient ID: Makenzie Haile is a 89 y.o. female.     Chief Complaint: Rash and bladder    89 y/o female with PMHx of HTN, HLD, overflow incontinence, T2DM presents to the office with several concerns. Patient mentions a rash located on buttocks, ongoing 1 month. The patient believes the rash is due to her incontinence. She states when she gets the area wet, the rash flares up. She reports cleaning the area and applying butt paste afterwards. She states the rash is itchy and painful. The patient also mention an overactive bladder. She states states as soon as she gets back from the restroom and sits down, she feels like she has to go again. Denies dysuria or hematuria. The patient also mentions nodules on the joints at the tips of her finger. ROM intact. Patient mentions itchy bumps on legs, ongoing 1 week. She reports applying creams to legs, moderate relief. She denies any outside exposure, new detergents or new lotions. Denies nausea, vomiting, fever, or chills.    Rash  Associated symptoms include joint pain (DIPJ). Pertinent negatives include no cough, diarrhea, fever or vomiting.       Review of Systems  Review of Systems   Constitutional:  Negative for chills and fever.   HENT:  Negative for ear pain and sinus pain.    Eyes:  Negative for blurred vision, double vision and discharge.   Respiratory:  Negative for cough and wheezing.    Cardiovascular:  Negative for chest pain and leg swelling.   Gastrointestinal:  Negative for abdominal pain, diarrhea, nausea and vomiting.   Genitourinary:  Positive for frequency. Negative for dysuria, hematuria and urgency.   Musculoskeletal:  Positive for joint pain (DIPJ). Negative for myalgias.   Skin:  Positive for rash.   Neurological:  Negative for dizziness, weakness and headaches.   Psychiatric/Behavioral:  Negative for depression.        Currently Medications  Current Outpatient Medications on File Prior to Visit   Medication Sig Dispense Refill    acetaminophen  (TYLENOL) 500 MG tablet Take 500 mg by mouth every 6 (six) hours as needed for Pain.      ascorbic acid, vitamin C, (VITAMIN C) 500 MG tablet Take 500 mg by mouth once daily.      carvediloL (COREG) 3.125 MG tablet Take 1 tablet (3.125 mg total) by mouth 2 (two) times daily with meals. 60 tablet 11    cyanocobalamin (VITAMIN B-12) 100 MCG tablet Take 100 mcg by mouth once daily.      emollient Lotn Apply topically as needed (itching). 89 mL 0    furosemide (LASIX) 40 MG tablet Take 1 tablet (40 mg total) by mouth once daily. Take an extra pill in the afternoon on days of increased leg swelling. 180 tablet 3    incontinence pad,liner,disp (BLADDER CONTROL PADS EX ABSORB MISC) bladder control pads      lancets (ACCU-CHEK FASTCLIX MISC) Accu-Chek FastClix      losartan (COZAAR) 100 MG tablet Take 1 tablet (100 mg total) by mouth once daily. 90 tablet 3    magnesium citrate 100 mg Tab Take 100 mg by mouth once daily.      miconazole NITRATE 2 % (MICOTIN) 2 % top powder Apply topically 2 (two) times daily. 85 g 0    multivit-min-iron-FA-vit K-lut (CENTRUM SILVER WOMEN) 8 mg iron-400 mcg-50 mcg Tab Take 1 tablet by mouth once daily.      nitroGLYCERIN (NITROSTAT) 0.4 MG SL tablet Place 0.4 mg under the tongue every 5 (five) minutes as needed for Chest pain.      potassium chloride (MICRO-K) 10 MEQ CpSR Take 1 capsule (10 mEq total) by mouth 3 (three) times a week. 60 capsule 3    pravastatin (PRAVACHOL) 20 MG tablet Take 1 tablet (20 mg total) by mouth once daily. 30 tablet 11    SITagliptin phosphate (JANUVIA) 50 MG Tab Take 1 tablet (50 mg total) by mouth once daily.      tolterodine (DETROL LA) 4 MG 24 hr capsule Take 1 capsule (4 mg total) by mouth once daily. 30 capsule 11    zinc gluconate 50 mg tablet Take 50 mg by mouth once daily.      aspirin (ECOTRIN) 81 MG EC tablet Take 1 tablet (81 mg total) by mouth once daily. 30 tablet 11    NIFEdipine (PROCARDIA-XL) 60 MG (OSM) 24 hr tablet Take 1 tablet (60 mg total) by  "mouth once daily. 30 tablet 11     No current facility-administered medications on file prior to visit.       Physical  Exam  Vitals:    02/10/25 0808   BP: 130/76   BP Location: Right arm   Patient Position: Sitting   Pulse: 81   Temp: 97.5 °F (36.4 °C)   SpO2: 95%   Weight: 106 kg (233 lb 11 oz)   Height: 5' 3" (1.6 m)      Body mass index is 41.4 kg/m².  Wt Readings from Last 3 Encounters:   02/10/25 106 kg (233 lb 11 oz)   11/21/24 105.3 kg (232 lb 2.3 oz)   11/05/24 99.8 kg (220 lb)       Physical Exam  Vitals and nursing note reviewed.   Constitutional:       General: She is not in acute distress.     Appearance: Normal appearance. She is morbidly obese. She is not ill-appearing.   HENT:      Head: Normocephalic and atraumatic.      Right Ear: External ear normal.      Left Ear: External ear normal.      Nose: Nose normal.      Mouth/Throat:      Mouth: Mucous membranes are moist.   Eyes:      Extraocular Movements: Extraocular movements intact.      Conjunctiva/sclera: Conjunctivae normal.      Pupils: Pupils are equal, round, and reactive to light.   Cardiovascular:      Rate and Rhythm: Normal rate and regular rhythm.      Pulses: Normal pulses.      Heart sounds: No murmur heard.  Pulmonary:      Effort: Pulmonary effort is normal. No respiratory distress.      Breath sounds: Normal breath sounds. No wheezing.   Abdominal:      General: There is no distension.      Palpations: Abdomen is soft. There is no mass.      Tenderness: There is no abdominal tenderness.   Musculoskeletal:         General: No swelling.      Cervical back: Normal range of motion.   Skin:     Coloration: Skin is not jaundiced.      Findings: Erythema, rash (anterior legs and buttocks) and wound (buttocks) present.          Neurological:      General: No focal deficit present.      Mental Status: She is alert and oriented to person, place, and time.   Psychiatric:         Mood and Affect: Mood normal.         Thought Content: Thought " content normal.       Labs:    Complete Blood Count  Lab Results   Component Value Date    RBC 4.53 11/14/2024    HGB 13.2 11/14/2024    HCT 41.1 11/14/2024    MCV 91 11/14/2024    MCH 29.1 11/14/2024    MCHC 32.1 11/14/2024    RDW 15.0 (H) 11/14/2024     11/14/2024    MPV 12.1 11/14/2024    GRAN 4.3 11/14/2024    GRAN 63.3 11/14/2024    LYMPH 1.5 11/14/2024    LYMPH 22.4 11/14/2024    MONO 0.5 11/14/2024    MONO 7.6 11/14/2024    EOS 0.3 11/14/2024    BASO 0.10 11/14/2024    EOSINOPHIL 4.9 11/14/2024    BASOPHIL 1.5 11/14/2024    DIFFMETHOD Automated 11/14/2024       Comprehensive Metabolic Panel  Lab Results   Component Value Date     11/14/2024    BUN 42 (H) 11/14/2024    CREATININE 1.04 11/14/2024     11/14/2024    K 5.2 (H) 11/14/2024     11/14/2024    PROT 8.0 11/14/2024    ALBUMIN 4.3 11/14/2024    BILITOT 0.6 11/14/2024    AST 23 11/14/2024    ALKPHOS 53 11/14/2024    CO2 33 (H) 11/14/2024    ALT 11 11/14/2024    ANIONGAP 5 (L) 11/14/2024       TSH  Lab Results   Component Value Date    TSH 1.290 11/14/2024       Imaging:  US Lower Extremity Veins Bilateral  Narrative: EXAMINATION:  US LOWER EXTREMITY VEINS BILATERAL    CLINICAL HISTORY:  L>R leg pain;    TECHNIQUE:  Duplex and color flow Doppler and dynamic compression was performed of the bilateral lower extremity veins was performed.    COMPARISON:  None    FINDINGS:  Right thigh veins: The common femoral, femoral, popliteal, and upper greater saphenous veins are patent and free of thrombus. The veins are normally compressible and have normal phasic flow and augmentation response.    Right calf veins: The visualized calf veins are patent.    Left thigh veins: The common femoral, femoral, popliteal, and upper greater saphenous veins are patent and free of thrombus. The veins are normally compressible and have normal phasic flow and augmentation response.    Left calf veins: The visualized calf veins are patent.    Miscellaneous:  None  Impression: No evidence of deep venous thrombosis in either lower extremity.    Electronically signed by: Ammon Marvin  Date:    06/25/2024  Time:    08:58      Assessment/Plan:    1. Pressure injury of buttock, stage 2, unspecified laterality  Comments:  - Wound care management sent to Home Health services  Orders:  -     SUBSEQUENT HOME HEALTH ORDERS    2. Acute cystitis without hematuria  -     cephALEXin (KEFLEX) 500 MG capsule; Take 1 capsule (500 mg total) by mouth every 6 (six) hours. for 7 days  Dispense: 28 capsule; Refill: 0  -     Urine Culture High Risk    3. Anxiety  Comments:  - Refill sent  - Advised patient to take medications as prescribed  Orders:  -     busPIRone (BUSPAR) 10 MG tablet; Take 1 tablet (10 mg total) by mouth 3 (three) times daily.  Dispense: 90 tablet; Refill: 11    4. Heart failure with preserved ejection fraction, unspecified HF chronicity  Assessment & Plan:  - Chronic, stable  - Continue antihypertensives, lasix  - Follow with PCP        5. Type 2 diabetes mellitus without complication, without long-term current use of insulin  Assessment & Plan:  - Chronic, stable  - Continue januvia  - Follow with PCP        6. Overflow incontinence of urine  -     POCT URINE DIPSTICK WITHOUT MICROSCOPE  -     Ambulatory referral/consult to Urology; Future; Expected date: 02/17/2025    7. Urinary frequency  Comments:  - Urine sample obtained to rule out infection  - Referral sent to urology for further workup  Orders:  -     POCT URINE DIPSTICK WITHOUT MICROSCOPE  -     Ambulatory referral/consult to Urology; Future; Expected date: 02/17/2025    8. Cellulitis of lower extremity, unspecified laterality  -     cephALEXin (KEFLEX) 500 MG capsule; Take 1 capsule (500 mg total) by mouth every 6 (six) hours. for 7 days  Dispense: 28 capsule; Refill: 0  -     furosemide (LASIX) 20 MG tablet; Take 1 tablet (20 mg total) by mouth 2 (two) times daily.  Dispense: 60 tablet; Refill: 11    9. Rash  -      nystatin (MYCOSTATIN) cream; Apply topically 2 (two) times daily.  Dispense: 30 g; Refill: 1  -     furosemide (LASIX) 20 MG tablet; Take 1 tablet (20 mg total) by mouth 2 (two) times daily.  Dispense: 60 tablet; Refill: 11    10. Bilateral lower extremity edema  -     furosemide (LASIX) 20 MG tablet; Take 1 tablet (20 mg total) by mouth 2 (two) times daily.  Dispense: 60 tablet; Refill: 11    11. Class 3 severe obesity due to excess calories with serious comorbidity and body mass index (BMI) of 40.0 to 44.9 in adult  Assessment & Plan:  - Body mass index is 41.4 kg/m².  - Recommendation for increased physical activity >150min/week              Discussed how to stay healthy including: diet, exercise, refraining from smoking and discussed screening exams / tests needed for age, sex and family Hx.    I spent a total of 50 minutes on the day of the visit.This includes face to face time and non-face to face time preparing to see the patient (eg, review of tests), obtaining and/or reviewing separately obtained history, documenting clinical information in the electronic or other health record, independently interpreting results and communicating results to the patient/family/caregiver, or care coordinator.    RTC 4 weeks    Renae Reyes PA-C

## 2025-02-10 NOTE — PATIENT INSTRUCTIONS
Resume lasix twice daily  Sending off urine for culture  Start keflex 4x daily for rash and UTI  Follow with urology  Added wound care to home health orders  Follow 4 weeks

## 2025-02-10 NOTE — TELEPHONE ENCOUNTER
----- Message from Cassidy sent at 2/10/2025  3:06 PM CST -----  Type: Call    Who Called:Alex/E[facility]  Does the patient know what this is regarding?:Wound Care Orders  Would the patient rather a call back or a response via [facility]? call  Best Call Back Number:[phone]  fax [phone]      I discussed orders with Dr Johnson

## 2025-02-11 ENCOUNTER — TELEPHONE (OUTPATIENT)
Dept: FAMILY MEDICINE | Facility: CLINIC | Age: 89
End: 2025-02-11
Payer: MEDICARE

## 2025-02-11 NOTE — TELEPHONE ENCOUNTER
----- Message from Luz sent at 2/11/2025  2:37 PM CST -----  Type:  Needs Medical Advice    Who Called:  Alex [facility] Nurse   Symptoms (please be specific): clarify the correct dose of Lasix that pt is supposed to be on   Would the patient rather a call back or a response via [facility]? Call back   Best Call Back Number: [phone]

## 2025-02-12 LAB — BACTERIA UR CULT: ABNORMAL

## 2025-02-14 DIAGNOSIS — I10 ESSENTIAL HYPERTENSION: Chronic | ICD-10-CM

## 2025-02-14 NOTE — TELEPHONE ENCOUNTER
----- Message from Donnie sent at 2/14/2025  3:51 PM CST -----  Type:  RX Refill Request    Who Called: pt   Refill or New Rx:rx   RX Name and Strength:NIFEdipine (PROCARDIA-XL) 60 MG (OSM) 24 hr tablet  Preferred Pharmacy with phone number:[facility], [place] - [address]      Local or Mail Order:local   Ordering Provider:yousef  Would the patient rather a call back or a response via [facility]? Call   Best Call Back Number: [phone]  Additional Information:

## 2025-02-14 NOTE — TELEPHONE ENCOUNTER
Care Due:                  Date            Visit Type   Department     Provider  --------------------------------------------------------------------------------                                EP -                              PRIMARY      [facility] FAMILY  Last Visit: 11-      CARE ([facility])   MEDICINE       Floyd Johnson  Next Visit: None Scheduled  None         None Found                                                            Last  Test          Frequency    Reason                     Performed    Due Date  --------------------------------------------------------------------------------    HBA1C.......  6 months...  SITagliptin..............  11- 05-    [facility] Embedded Care Due Messages. Reference number: [id number].   2/14/2025 4:14:13 PM CST

## 2025-02-17 ENCOUNTER — LAB VISIT (OUTPATIENT)
Dept: LAB | Facility: HOSPITAL | Age: 89
End: 2025-02-17
Attending: STUDENT IN AN ORGANIZED HEALTH CARE EDUCATION/TRAINING PROGRAM
Payer: MEDICARE

## 2025-02-17 DIAGNOSIS — I10 ESSENTIAL HYPERTENSION: Chronic | ICD-10-CM

## 2025-02-17 DIAGNOSIS — E11.9 TYPE 2 DIABETES MELLITUS WITHOUT COMPLICATION, WITHOUT LONG-TERM CURRENT USE OF INSULIN: ICD-10-CM

## 2025-02-17 DIAGNOSIS — E78.2 MIXED HYPERLIPIDEMIA: ICD-10-CM

## 2025-02-17 LAB
ALBUMIN SERPL BCP-MCNC: 4.3 G/DL (ref 3.5–5.2)
ALP SERPL-CCNC: 50 U/L (ref 38–126)
ALT SERPL W/O P-5'-P-CCNC: 14 U/L (ref 10–44)
ANION GAP SERPL CALC-SCNC: 8 MMOL/L (ref 8–16)
AST SERPL-CCNC: 27 U/L (ref 15–46)
BASOPHILS # BLD AUTO: 0.11 K/UL (ref 0–0.2)
BASOPHILS NFR BLD: 1.6 % (ref 0–1.9)
BILIRUB SERPL-MCNC: 0.5 MG/DL (ref 0.1–1)
CALCIUM SERPL-MCNC: 10.5 MG/DL (ref 8.7–10.5)
CHLORIDE SERPL-SCNC: 106 MMOL/L (ref 95–110)
CHOLEST SERPL-MCNC: 180 MG/DL (ref 120–199)
CHOLEST/HDLC SERPL: 4.3 {RATIO} (ref 2–5)
CO2 SERPL-SCNC: 31 MMOL/L (ref 23–29)
CREAT SERPL-MCNC: 1.02 MG/DL (ref 0.5–1.4)
DIFFERENTIAL METHOD BLD: ABNORMAL
EOSINOPHIL # BLD AUTO: 0.4 K/UL (ref 0–0.5)
EOSINOPHIL NFR BLD: 5.9 % (ref 0–8)
ERYTHROCYTE [DISTWIDTH] IN BLOOD BY AUTOMATED COUNT: 14 % (ref 11.5–14.5)
EST. GFR  (NO RACE VARIABLE): 53.2 ML/MIN/1.73 M^2
ESTIMATED AVG GLUCOSE: 123 MG/DL (ref 68–131)
GLUCOSE SERPL-MCNC: 116 MG/DL (ref 70–110)
HBA1C MFR BLD: 5.9 % (ref 4–5.6)
HCT VFR BLD AUTO: 40.4 % (ref 37–48.5)
HDLC SERPL-MCNC: 42 MG/DL (ref 40–75)
HDLC SERPL: 23.3 % (ref 20–50)
HGB BLD-MCNC: 12.8 G/DL (ref 12–16)
IMM GRANULOCYTES # BLD AUTO: 0.01 K/UL (ref 0–0.04)
IMM GRANULOCYTES NFR BLD AUTO: 0.1 % (ref 0–0.5)
LDLC SERPL CALC-MCNC: 108.4 MG/DL (ref 63–159)
LYMPHOCYTES # BLD AUTO: 1.3 K/UL (ref 1–4.8)
LYMPHOCYTES NFR BLD: 19 % (ref 18–48)
MCH RBC QN AUTO: 29 PG (ref 27–31)
MCHC RBC AUTO-ENTMCNC: 31.7 G/DL (ref 32–36)
MCV RBC AUTO: 92 FL (ref 82–98)
MONOCYTES # BLD AUTO: 0.5 K/UL (ref 0.3–1)
MONOCYTES NFR BLD: 7.7 % (ref 4–15)
NEUTROPHILS # BLD AUTO: 4.4 K/UL (ref 1.8–7.7)
NEUTROPHILS NFR BLD: 65.7 % (ref 38–73)
NONHDLC SERPL-MCNC: 138 MG/DL
NRBC BLD-RTO: 0 /100 WBC
PLATELET # BLD AUTO: 281 K/UL (ref 150–450)
PMV BLD AUTO: 10.7 FL (ref 9.2–12.9)
POTASSIUM SERPL-SCNC: 5.1 MMOL/L (ref 3.5–5.1)
PROT SERPL-MCNC: 8.4 G/DL (ref 6–8.4)
RBC # BLD AUTO: 4.41 M/UL (ref 4–5.4)
SODIUM SERPL-SCNC: 145 MMOL/L (ref 136–145)
TRIGL SERPL-MCNC: 148 MG/DL (ref 30–150)
TSH SERPL DL<=0.005 MIU/L-ACNC: 1.32 UIU/ML (ref 0.4–4)
UUN UR-MCNC: 39 MG/DL (ref 7–17)
WBC # BLD AUTO: 6.74 K/UL (ref 3.9–12.7)

## 2025-02-17 PROCEDURE — 80053 COMPREHEN METABOLIC PANEL: CPT | Mod: PN | Performed by: STUDENT IN AN ORGANIZED HEALTH CARE EDUCATION/TRAINING PROGRAM

## 2025-02-17 PROCEDURE — 36415 COLL VENOUS BLD VENIPUNCTURE: CPT | Mod: PN | Performed by: STUDENT IN AN ORGANIZED HEALTH CARE EDUCATION/TRAINING PROGRAM

## 2025-02-17 PROCEDURE — 83036 HEMOGLOBIN GLYCOSYLATED A1C: CPT | Performed by: STUDENT IN AN ORGANIZED HEALTH CARE EDUCATION/TRAINING PROGRAM

## 2025-02-17 PROCEDURE — 84443 ASSAY THYROID STIM HORMONE: CPT | Mod: PN | Performed by: STUDENT IN AN ORGANIZED HEALTH CARE EDUCATION/TRAINING PROGRAM

## 2025-02-17 PROCEDURE — 85025 COMPLETE CBC W/AUTO DIFF WBC: CPT | Mod: PN | Performed by: STUDENT IN AN ORGANIZED HEALTH CARE EDUCATION/TRAINING PROGRAM

## 2025-02-17 PROCEDURE — 80061 LIPID PANEL: CPT | Performed by: STUDENT IN AN ORGANIZED HEALTH CARE EDUCATION/TRAINING PROGRAM

## 2025-02-17 RX ORDER — NIFEDIPINE 60 MG/1
60 TABLET, EXTENDED RELEASE ORAL DAILY
Qty: 30 TABLET | Refills: 11 | Status: SHIPPED | OUTPATIENT
Start: 2025-02-17 | End: 2026-02-17

## 2025-02-20 ENCOUNTER — HOSPITAL ENCOUNTER (OUTPATIENT)
Dept: CARDIOLOGY | Facility: HOSPITAL | Age: 89
Discharge: HOME | End: 2025-02-20
Attending: INTERNAL MEDICINE
Payer: MEDICARE

## 2025-02-20 VITALS — WEIGHT: 233 LBS | HEIGHT: 63 IN | BODY MASS INDEX: 41.29 KG/M2

## 2025-02-20 DIAGNOSIS — I35.0 MODERATE TO SEVERE AORTIC STENOSIS: ICD-10-CM

## 2025-02-20 LAB
APICAL FOUR CHAMBER EJECTION FRACTION: 72 %
APICAL TWO CHAMBER EJECTION FRACTION: 72 %
ASCENDING AORTA: 2.5 CM
AV INDEX (PROSTH): 0.33
AV MEAN GRADIENT: 31 MMHG
AV PEAK GRADIENT: 52 MMHG
AV REGURGITATION PRESSURE HALF TIME: 402 MS
AV VALVE AREA BY VELOCITY RATIO: 1 CM²
AV VALVE AREA: 1.1 CM²
AV VELOCITY RATIO: 0.28
BSA FOR ECHO PROCEDURE: 2.17 M2
CV ECHO LV RWT: 0.31 CM
DOP CALC AO PEAK VEL: 3.6 M/S
DOP CALC AO VTI: 92.2 CM
DOP CALC LVOT AREA: 3.5 CM2
DOP CALC LVOT DIAMETER: 2.1 CM
DOP CALC LVOT PEAK VEL: 1 M/S
DOP CALC LVOT STROKE VOLUME: 103.9 CM3
DOP CALC MV VTI: 50.2 CM
DOP CALCLVOT PEAK VEL VTI: 30 CM
E WAVE DECELERATION TIME: 443 MSEC
E/A RATIO: 0.62
E/E' RATIO: 15 M/S
ECHO LV POSTERIOR WALL: 0.8 CM (ref 0.6–1.1)
FRACTIONAL SHORTENING: 32.7 % (ref 28–44)
HR MV ECHO: 66 BPM
INTERVENTRICULAR SEPTUM: 1.1 CM (ref 0.6–1.1)
IVC DIAMETER: 1 CM
LEFT ATRIUM AREA SYSTOLIC (APICAL 2 CHAMBER): 23.25 CM2
LEFT ATRIUM AREA SYSTOLIC (APICAL 4 CHAMBER): 20.2 CM2
LEFT ATRIUM VOLUME INDEX MOD: 33 ML/M2
LEFT ATRIUM VOLUME MOD: 68 ML
LEFT INTERNAL DIMENSION IN SYSTOLE: 3.5 CM (ref 2.1–4)
LEFT VENTRICLE DIASTOLIC VOLUME INDEX: 63.37 ML/M2
LEFT VENTRICLE DIASTOLIC VOLUME: 130.55 ML
LEFT VENTRICLE END DIASTOLIC VOLUME APICAL 2 CHAMBER: 72.12 ML
LEFT VENTRICLE END DIASTOLIC VOLUME APICAL 4 CHAMBER: 102.47 ML
LEFT VENTRICLE END SYSTOLIC VOLUME APICAL 2 CHAMBER: 70.82 ML
LEFT VENTRICLE END SYSTOLIC VOLUME APICAL 4 CHAMBER: 58.09 ML
LEFT VENTRICLE MASS INDEX: 88.1 G/M2
LEFT VENTRICLE SYSTOLIC VOLUME INDEX: 25.2 ML/M2
LEFT VENTRICLE SYSTOLIC VOLUME: 51.89 ML
LEFT VENTRICULAR INTERNAL DIMENSION IN DIASTOLE: 5.2 CM (ref 3.5–6)
LEFT VENTRICULAR MASS: 181.4 G
LV LATERAL E/E' RATIO: 10.9 M/S
LV SEPTAL E/E' RATIO: 24.5 M/S
LVED V (TEICH): 130.55 ML
LVES V (TEICH): 51.89 ML
LVOT MG: 2.34 MMHG
LVOT MV: 0.72 CM/S
MV A" WAVE DURATION": 110.37 MSEC
MV MEAN GRADIENT: 4 MMHG
MV PEAK A VEL: 1.57 M/S
MV PEAK E VEL: 0.98 M/S
MV PEAK GRADIENT: 16 MMHG
MV STENOSIS PRESSURE HALF TIME: 128.33 MS
MV VALVE AREA BY CONTINUITY EQUATION: 2.07 CM2
MV VALVE AREA P 1/2 METHOD: 1.71 CM2
OHS CV RV/LV RATIO: 0.67 CM
OHS LV EJECTION FRACTION SIMPSONS BIPLANE MOD: 73 %
PISA AR MAX VEL: 3.45 M/S
PISA MRMAX VEL: 5.53 M/S
PISA TR MAX VEL: 2.1 M/S
PV PEAK GRADIENT: 1 MMHG
PV PEAK VELOCITY: 0.52 M/S
RA PRESSURE ESTIMATED: 3 MMHG
RIGHT VENTRICLE DIASTOLIC BASEL DIMENSION: 3.5 CM
RV TB RVSP: 5 MMHG
RV TISSUE DOPPLER FREE WALL SYSTOLIC VELOCITY 1 (APICAL 4 CHAMBER VIEW): 20.06 CM/S
SINUS: 2.7 CM
STJ: 2.82 CM
TDI LATERAL: 0.09 M/S
TDI SEPTAL: 0.04 M/S
TDI: 0.07 M/S
TR MAX PG: 18 MMHG
TRICUSPID ANNULAR PLANE SYSTOLIC EXCURSION: 2.5 CM
TV REST PULMONARY ARTERY PRESSURE: 21 MMHG
Z-SCORE OF LEFT VENTRICULAR DIMENSION IN END DIASTOLE: -1.79
Z-SCORE OF LEFT VENTRICULAR DIMENSION IN END SYSTOLE: -0.67

## 2025-02-20 PROCEDURE — 93306 TTE W/DOPPLER COMPLETE: CPT | Mod: PN

## 2025-02-21 ENCOUNTER — RESULTS FOLLOW-UP (OUTPATIENT)
Dept: CARDIOLOGY | Facility: CLINIC | Age: 89
End: 2025-02-21

## 2025-02-21 ENCOUNTER — PATIENT MESSAGE (OUTPATIENT)
Dept: CARDIOLOGY | Facility: CLINIC | Age: 89
End: 2025-02-21
Payer: MEDICARE

## 2025-02-21 NOTE — PROGRESS NOTES
Echo result is stable. Stable tightness of the aortic valve. No interventions needed at this time.     Sincerely,  Jeffrey Mirza MD.   Interventional Cardiologist  [facility] [place]

## 2025-02-24 ENCOUNTER — OFFICE VISIT (OUTPATIENT)
Dept: FAMILY MEDICINE | Facility: CLINIC | Age: 89
End: 2025-02-24
Payer: MEDICARE

## 2025-02-24 ENCOUNTER — TELEPHONE (OUTPATIENT)
Dept: CARDIOLOGY | Facility: CLINIC | Age: 89
End: 2025-02-24
Payer: MEDICARE

## 2025-02-24 VITALS
HEIGHT: 63 IN | SYSTOLIC BLOOD PRESSURE: 132 MMHG | DIASTOLIC BLOOD PRESSURE: 60 MMHG | HEART RATE: 75 BPM | BODY MASS INDEX: 41.27 KG/M2 | TEMPERATURE: 98 F | OXYGEN SATURATION: 96 %

## 2025-02-24 DIAGNOSIS — E78.5 HYPERLIPIDEMIA, UNSPECIFIED HYPERLIPIDEMIA TYPE: ICD-10-CM

## 2025-02-24 DIAGNOSIS — G62.9 NEUROPATHY: ICD-10-CM

## 2025-02-24 DIAGNOSIS — E11.9 TYPE 2 DIABETES MELLITUS WITHOUT COMPLICATION, WITHOUT LONG-TERM CURRENT USE OF INSULIN: ICD-10-CM

## 2025-02-24 DIAGNOSIS — I10 ESSENTIAL HYPERTENSION: Chronic | ICD-10-CM

## 2025-02-24 DIAGNOSIS — E66.01 CLASS 3 SEVERE OBESITY DUE TO EXCESS CALORIES WITH SERIOUS COMORBIDITY AND BODY MASS INDEX (BMI) OF 40.0 TO 44.9 IN ADULT: ICD-10-CM

## 2025-02-24 DIAGNOSIS — Z95.5 HISTORY OF CORONARY ARTERY STENT PLACEMENT: ICD-10-CM

## 2025-02-24 DIAGNOSIS — R60.0 BILATERAL LEG EDEMA: Primary | ICD-10-CM

## 2025-02-24 DIAGNOSIS — N18.31 CHRONIC KIDNEY DISEASE, STAGE 3A: ICD-10-CM

## 2025-02-24 DIAGNOSIS — E66.813 CLASS 3 SEVERE OBESITY DUE TO EXCESS CALORIES WITH SERIOUS COMORBIDITY AND BODY MASS INDEX (BMI) OF 40.0 TO 44.9 IN ADULT: ICD-10-CM

## 2025-02-24 PROCEDURE — 99214 OFFICE O/P EST MOD 30 MIN: CPT | Mod: S$GLB,,, | Performed by: STUDENT IN AN ORGANIZED HEALTH CARE EDUCATION/TRAINING PROGRAM

## 2025-02-24 PROCEDURE — 1126F AMNT PAIN NOTED NONE PRSNT: CPT | Mod: CPTII,S$GLB,, | Performed by: STUDENT IN AN ORGANIZED HEALTH CARE EDUCATION/TRAINING PROGRAM

## 2025-02-24 PROCEDURE — 1160F RVW MEDS BY RX/DR IN RCRD: CPT | Mod: CPTII,S$GLB,, | Performed by: STUDENT IN AN ORGANIZED HEALTH CARE EDUCATION/TRAINING PROGRAM

## 2025-02-24 PROCEDURE — G2211 COMPLEX E/M VISIT ADD ON: HCPCS | Mod: S$GLB,,, | Performed by: STUDENT IN AN ORGANIZED HEALTH CARE EDUCATION/TRAINING PROGRAM

## 2025-02-24 PROCEDURE — 1101F PT FALLS ASSESS-DOCD LE1/YR: CPT | Mod: CPTII,S$GLB,, | Performed by: STUDENT IN AN ORGANIZED HEALTH CARE EDUCATION/TRAINING PROGRAM

## 2025-02-24 PROCEDURE — 3288F FALL RISK ASSESSMENT DOCD: CPT | Mod: CPTII,S$GLB,, | Performed by: STUDENT IN AN ORGANIZED HEALTH CARE EDUCATION/TRAINING PROGRAM

## 2025-02-24 PROCEDURE — 1159F MED LIST DOCD IN RCRD: CPT | Mod: CPTII,S$GLB,, | Performed by: STUDENT IN AN ORGANIZED HEALTH CARE EDUCATION/TRAINING PROGRAM

## 2025-02-24 RX ORDER — METOLAZONE 2.5 MG/1
2.5 TABLET ORAL DAILY
Qty: 30 TABLET | Refills: 11 | Status: SHIPPED | OUTPATIENT
Start: 2025-02-24

## 2025-02-24 NOTE — TELEPHONE ENCOUNTER
Patient missed her appointment today due to a stomach bug.  Assisted patient with rescheduling her appointment in the [place] location.  Provided her the results of her echo per Dr Mirza below.    Jeffrey Mirza MD  2/21/2025  9:55 AM CST       Echo result is stable. Stable tightness of the aortic valve. No interventions needed at this time.      Sincerely,  Jeffrey Mirza MD.   Interventional Cardiologist  [facility] [place]      Patient verbalized understanding and appreciated the call.      ----- Message from Vani sent at 2/24/2025  9:48 AM CST -----  Regarding: self  Type: Patient Call Back Who called:self What is the request in detail:pt is stating she had an appt this morning and feels unwell with a tummy bug, she is stating it was for results and is asking if someone can call her with the results instead Can the clinic reply by [facility]? No Would the patient rather a call back or a response via [facility]? Call back Best call back number: [phone] Additional Information: Thank you.

## 2025-02-24 NOTE — PROGRESS NOTES
Patient ID: Makenzie Haile is a 89 y.o. female.     Chief Complaint: f/u labs    HPI  History of Present Illness    Makenzie presents today for a check-up.    She wears knee braces bilaterally and experiences cracking in both knees when walking or attempting to step up. She is considering physical therapy for knee management. She also has age-related arthritis in her ankles. Due to knee problems, she can only drive in favorable weather conditions.    Her foot wound has healed per home health assessment. She continues to apply A&D ointment as instructed by wound care. She is nearly finished with home health therapy, with two days remaining in her treatment course.    She recently completed a course of antibiotics with reported improvement and denies any side effects including diarrhea.    She takes Lasix and Tylenol arthritis for pain management.    She has a history of heart murmur. She reports minimal urination with Lasix, denying urination within 30 minutes of administration.    Her weight fluctuates between 227-230 lbs, most frequently at 227 lbs. Blood sugar levels range from 110-117.         Review of Systems  Review of Systems   Constitutional:  Negative for fever.   HENT:  Negative for ear pain and sinus pain.    Eyes:  Negative for discharge.   Respiratory:  Negative for cough and shortness of breath.    Cardiovascular:  Negative for chest pain and leg swelling.   Gastrointestinal:  Negative for diarrhea, nausea and vomiting.   Genitourinary:  Negative for urgency.   Musculoskeletal:  Negative for myalgias.   Skin:  Negative for rash.   Neurological:  Negative for weakness and headaches.   Psychiatric/Behavioral:  Negative for depression.    All other systems reviewed and are negative.      Currently Medications  Medications Ordered Prior to Encounter[1]    Physical  Exam  Vitals:    02/24/25 0804   BP: 132/60   BP Location: Right arm   Patient Position: Sitting   Pulse: 75   Temp: 97.8 °F (36.6 °C)  "  SpO2: 96%   Weight: Comment: wheelchair   Height: 5' 3" (1.6 m)      Body mass index is 41.27 kg/m².  Wt Readings from Last 3 Encounters:   02/20/25 105.7 kg (233 lb)   02/10/25 106 kg (233 lb 11 oz)   11/21/24 105.3 kg (232 lb 2.3 oz)       Physical Exam  Vitals and nursing note reviewed.   Constitutional:       General: She is not in acute distress.     Appearance: She is not ill-appearing.   HENT:      Head: Normocephalic and atraumatic.      Right Ear: External ear normal.      Left Ear: External ear normal.      Nose: Nose normal.      Mouth/Throat:      Mouth: Mucous membranes are moist.   Eyes:      Extraocular Movements: Extraocular movements intact.      Conjunctiva/sclera: Conjunctivae normal.   Cardiovascular:      Rate and Rhythm: Normal rate and regular rhythm.      Pulses: Normal pulses.      Heart sounds: No murmur heard.  Pulmonary:      Effort: Pulmonary effort is normal. No respiratory distress.      Breath sounds: No wheezing.   Abdominal:      General: There is no distension.      Palpations: Abdomen is soft. There is no mass.      Tenderness: There is no abdominal tenderness.   Musculoskeletal:         General: No swelling.      Cervical back: Normal range of motion.      Right lower leg: Edema present.      Left lower leg: Edema present.      Comments: Seated in wheelchair   Skin:     Coloration: Skin is not jaundiced.      Findings: No rash.      Comments: No wound present to gluteal cleft   Neurological:      General: No focal deficit present.      Mental Status: She is alert and oriented to person, place, and time.   Psychiatric:         Mood and Affect: Mood normal.         Thought Content: Thought content normal.         Labs:    Complete Blood Count  Lab Results   Component Value Date    RBC 4.41 02/17/2025    HGB 12.8 02/17/2025    HCT 40.4 02/17/2025    MCV 92 02/17/2025    MCH 29.0 02/17/2025    MCHC 31.7 (L) 02/17/2025    RDW 14.0 02/17/2025     02/17/2025    MPV 10.7 " 02/17/2025    GRAN 4.4 02/17/2025    GRAN 65.7 02/17/2025    LYMPH 1.3 02/17/2025    LYMPH 19.0 02/17/2025    MONO 0.5 02/17/2025    MONO 7.7 02/17/2025    EOS 0.4 02/17/2025    BASO 0.11 02/17/2025    EOSINOPHIL 5.9 02/17/2025    BASOPHIL 1.6 02/17/2025    DIFFMETHOD Automated 02/17/2025       Comprehensive Metabolic Panel  Lab Results   Component Value Date     (H) 02/17/2025    BUN 39 (H) 02/17/2025    CREATININE 1.02 02/17/2025     02/17/2025    K 5.1 02/17/2025     02/17/2025    PROT 8.4 02/17/2025    ALBUMIN 4.3 02/17/2025    BILITOT 0.5 02/17/2025    AST 27 02/17/2025    ALKPHOS 50 02/17/2025    CO2 31 (H) 02/17/2025    ALT 14 02/17/2025    ANIONGAP 8 02/17/2025       TSH  Lab Results   Component Value Date    TSH 1.320 02/17/2025       Imaging:  Echo    Left Ventricle: The left ventricle is normal in size. Mildly increased   wall thickness. Normal wall motion. There is hyperdynamic systolic   function. Quantitated ejection fraction is 73%. Diastolic function cannot   be reliably determined in the presence of mitral annular calcification.    Right Ventricle: Normal right ventricular cavity size. Systolic   function is normal.    Aortic Valve: Moderately calcified cusps. Moderately restricted motion.   There is moderate stenosis. Aortic valve area by VTI is 1.1 cm². Aortic   valve peak velocity is 3.6 m/s. Mean gradient is 31 mmHg. The   dimensionless index is 0.33. There is mild aortic regurgitation.    Mitral Valve: There is mitral annular calcification present. There is   no stenosis. The mean pressure gradient across the mitral valve is 4 mmHg   at a heart rate of 66 bpm. There is mild regurgitation.    Pulmonary Artery: The estimated pulmonary artery systolic pressure is   21 mmHg.      Assessment/Plan:  Assessment & Plan    Reviewed recent labs: all within acceptable ranges  Noted well controlled diabetes  persisting since 2018  Assessed cholesterol improvement: down 20 points since  last visit after medication change  Evaluated effectiveness of current Lasix regimen for fluid management  Considered adding metolazone to improve Lasix efficacy rather than increasing Lasix dose to minimize kidney strain  Reviewed recent echocardiogram: moderate aortic valve stenosis noted, consistent with known heart murmur, but EF adequate  Examined healed wound on foot, noted slight residual irritation  Discussed management of arthritis symptoms in knees and ankles, considering patient's age and kidney function    CHRONIC KIDNEY DISEASE, STAGE 3A:  - Monitored kidney function through recent labs, noting creatinine level of 53, which is excellent for the patient's age.  - Addressed the patient's concern about Lasix affecting kidney function and provided reassurance about close monitoring.  - Explained the relationship between Lasix use, fluid management, and kidney function.  - Prescribed metolazone to be taken concurrently with current dose of Lasix to improve efficacy.  - Scheduled labs in 4 months for follow-up.    KNEE ARTHRITIS:  - Monitored ongoing knee issues, including crepitus in both knees when walking or stepping up.  - Evaluated the patient's use of knee braces and current pain management with Tylenol.  - Educated the patient on arthritis management options, explaining that crepitus is a sign of arthritis but not necessarily indicative of joint instability.  - Recommend physical therapy as the primary treatment option for knees, advising to start after a 1-week break.  - Continued Tylenol arthritis for pain management.  - Suggested using stronger analgesics if needed, but advised against NSAIDs due to kidney concerns.    ANKLE ARTHRITIS:  - Monitored age-related arthritis in ankles and discussed management options.  - Recommend physical therapy as the primary treatment option, advising to start after a 1-week break.  - Continued Tylenol arthritis for pain management.    AORTIC VALVE STENOSIS:  -  Evaluated and explained echocardiogram results to the patient, showing moderate stenosis of the aortic valve causing the murmur.  - Referred the patient to a cardiologist for follow-up.    FLUID RETENTION:  - Monitored urination frequency with current Lasix dose, noting insufficient frequency.  - Assessed that the patient should be urinating more with the current medication.    PRE-DIABETES:  - Monitored blood sugar, noting range between 110 to 117.  - Evaluated A1C results, indicating pre-diabetic status.    WOUND CARE:  - Monitored recent antibiotic treatment and wound care progress.  - Examined healed wound site, noting some irritation but overall good healing.  - Advised continued use of A&D ointment on healed wound site as recommended by wound care.    FOLLOW UP:  - Refilled medication at [facility] pharmacy.  - Makenzie advised to contact the office if needed before the scheduled follow-up.         1. Bilateral leg edema  -     metOLazone (ZAROXOLYN) 2.5 MG tablet; Take 1 tablet (2.5 mg total) by mouth once daily. Take at the same time as the furosemide  Dispense: 30 tablet; Refill: 11    2. Type 2 diabetes mellitus without complication, without long-term current use of insulin    3. Essential hypertension  -     CBC Auto Differential; Future  -     Comprehensive Metabolic Panel; Future; Expected date: 02/24/2025  -     TSH; Future; Expected date: 02/24/2025    4. Class 3 severe obesity due to excess calories with serious comorbidity and body mass index (BMI) of 40.0 to 44.9 in adult    5. History of coronary artery stent placement    6. Neuropathy    7. Hyperlipidemia, unspecified hyperlipidemia type  -     Lipid Panel; Future; Expected date: 02/24/2025    8. Chronic kidney disease, stage 3a         Discussed how to stay healthy including: diet, exercise, refraining from smoking and discussed screening exams / tests needed for age, sex and family Hx.    RTC 4 mo    Floyd Johnson MD    This note was generated with  the assistance of ambient listening technology. Verbal consent was obtained by the patient and accompanying visitor(s) for the recording of patient appointment to facilitate this note. I attest to having reviewed and edited the generated note for accuracy, though some syntax or spelling errors may persist. Please contact the author of this note for any clarification.           [1]   Current Outpatient Medications on File Prior to Visit   Medication Sig Dispense Refill    acetaminophen (TYLENOL) 500 MG tablet Take 500 mg by mouth every 6 (six) hours as needed for Pain.      ascorbic acid, vitamin C, (VITAMIN C) 500 MG tablet Take 500 mg by mouth once daily.      aspirin (ECOTRIN) 81 MG EC tablet Take 1 tablet (81 mg total) by mouth once daily. 30 tablet 11    busPIRone (BUSPAR) 10 MG tablet Take 1 tablet (10 mg total) by mouth 3 (three) times daily. 90 tablet 11    carvediloL (COREG) 3.125 MG tablet Take 1 tablet (3.125 mg total) by mouth 2 (two) times daily with meals. 60 tablet 11    cyanocobalamin (VITAMIN B-12) 100 MCG tablet Take 100 mcg by mouth once daily.      emollient Lotn Apply topically as needed (itching). 89 mL 0    furosemide (LASIX) 40 MG tablet Take 1 tablet (40 mg total) by mouth once daily. Take an extra pill in the afternoon on days of increased leg swelling. 180 tablet 3    incontinence pad,liner,disp (BLADDER CONTROL PADS EX ABSORB MISC) bladder control pads      lancets (ACCU-CHEK FASTCLIX MISC) Accu-Chek FastClix      losartan (COZAAR) 100 MG tablet Take 1 tablet (100 mg total) by mouth once daily. 90 tablet 3    magnesium citrate 100 mg Tab Take 100 mg by mouth once daily.      miconazole NITRATE 2 % (MICOTIN) 2 % top powder Apply topically 2 (two) times daily. 85 g 0    multivit-min-iron-FA-vit K-lut (CENTRUM SILVER WOMEN) 8 mg iron-400 mcg-50 mcg Tab Take 1 tablet by mouth once daily.      NIFEdipine (PROCARDIA-XL) 60 MG (OSM) 24 hr tablet Take 1 tablet (60 mg total) by mouth once daily. 30  tablet 11    nitroGLYCERIN (NITROSTAT) 0.4 MG SL tablet Place 0.4 mg under the tongue every 5 (five) minutes as needed for Chest pain.      nystatin (MYCOSTATIN) cream Apply topically 2 (two) times daily. 30 g 1    potassium chloride (MICRO-K) 10 MEQ CpSR Take 1 capsule (10 mEq total) by mouth 3 (three) times a week. 60 capsule 3    pravastatin (PRAVACHOL) 20 MG tablet Take 1 tablet (20 mg total) by mouth once daily. 30 tablet 11    SITagliptin phosphate (JANUVIA) 50 MG Tab Take 1 tablet (50 mg total) by mouth once daily.      tolterodine (DETROL LA) 4 MG 24 hr capsule Take 1 capsule (4 mg total) by mouth once daily. 30 capsule 11    zinc gluconate 50 mg tablet Take 50 mg by mouth once daily.      [DISCONTINUED] cephALEXin (KEFLEX) 500 MG capsule Take 1 capsule (500 mg total) by mouth every 6 (six) hours. for 7 days 28 capsule 0     No current facility-administered medications on file prior to visit.

## 2025-02-25 ENCOUNTER — EXTERNAL HOME HEALTH (OUTPATIENT)
Dept: HOME HEALTH SERVICES | Facility: HOSPITAL | Age: 89
End: 2025-02-25
Payer: MEDICARE

## 2025-03-03 ENCOUNTER — PATIENT OUTREACH (OUTPATIENT)
Dept: ADMINISTRATIVE | Facility: HOSPITAL | Age: 89
End: 2025-03-03
Payer: MEDICARE

## 2025-03-03 NOTE — LETTER
AUTHORIZATION FOR RELEASE OF   CONFIDENTIAL INFORMATION    Dear [facility],    We are seeing Makenzie Haile, date of birth 1935, in the clinic at [facility]. Floyd Johnson MD is the patient's PCP. Makenzie Haile has an outstanding lab/procedure at the time we reviewed her chart. In order to help keep her health information updated, she has authorized us to request the following medical record(s):                                           ( X )  EYE EXAM                 Please fax records to [facility], Reine, Addy N., MD, [phone]              Patient Name: Makenzie Haile  : 1935  Patient Phone #: [phone]

## 2025-03-03 NOTE — PROGRESS NOTES
Population Health Chart Review & Patient Outreach Details      Additional [facility] Health Notes:               Updates Requested / Reviewed:      Updated Care Coordination Note, Care Everywhere, and Immunizations Reconciliation Completed or Queried: [place]         Health Maintenance Topics Overdue:      [facility] Score: 1     Eye Exam    Tetanus Vaccine  Shingles/Zoster Vaccine  RSV Vaccine                  Health Maintenance Topic(s) Outreach Outcomes & Actions Taken:    Eye Exam - Outreach Outcomes & Actions Taken  : External Records Requested & Care Team Updated if Applicable and E[place] to [facility]

## 2025-03-11 ENCOUNTER — PATIENT OUTREACH (OUTPATIENT)
Dept: ADMINISTRATIVE | Facility: HOSPITAL | Age: 89
End: 2025-03-11
Payer: MEDICARE

## 2025-03-11 NOTE — PROGRESS NOTES
Population Health Chart Review & Patient Outreach Details      Additional [facility] Health Notes:               Updates Requested / Reviewed:      Updated Care Coordination Note         Health Maintenance Topics Overdue:      VBHM Score: 0     Patient is not due for any topics at this time.    Tetanus Vaccine  Shingles/Zoster Vaccine  RSV Vaccine                  Health Maintenance Topic(s) Outreach Outcomes & Actions Taken:    Eye Exam - Outreach Outcomes & Actions Taken  : Diabetic Eye External Records Uploaded, Care Team & History Updated if Applicable

## 2025-03-19 ENCOUNTER — TELEPHONE (OUTPATIENT)
Dept: FAMILY MEDICINE | Facility: CLINIC | Age: 89
End: 2025-03-19
Payer: MEDICARE

## 2025-03-19 DIAGNOSIS — R60.0 BILATERAL LEG EDEMA: ICD-10-CM

## 2025-03-19 DIAGNOSIS — I87.2 VENOUS STASIS DERMATITIS OF BOTH LOWER EXTREMITIES: ICD-10-CM

## 2025-03-19 DIAGNOSIS — Z95.5 HISTORY OF CORONARY ARTERY STENT PLACEMENT: Primary | ICD-10-CM

## 2025-03-19 NOTE — TELEPHONE ENCOUNTER
Pt requesting wheel chair order     ---- Message from Cassidy sent at 3/19/2025 10:43 AM CDT -----  Type:  Call Who Called: pt Does the patient know what this is regarding?:Wheel Chair Would the patient rather a call back or a response via [facility]? Call Best Call Back Number: [phone] Additional Information: Pt would like a referral for a wheel chair sent to her insurance.

## 2025-03-20 NOTE — TELEPHONE ENCOUNTER
"Pt states that she was told she would need a 20" width.    Someone came in to look at her husbands chair and advised pt that she would need one a little bigger than her husbands chair which is 18"   "

## 2025-03-27 ENCOUNTER — DOCUMENT SCAN (OUTPATIENT)
Dept: HOME HEALTH SERVICES | Facility: HOSPITAL | Age: 89
End: 2025-03-27
Payer: MEDICARE

## 2025-04-26 NOTE — PLAN OF CARE
Problem: COPD (Chronic Obstructive Pulmonary Disease)  Goal: Optimal Chronic Illness Coping  Outcome: Progressing  Goal: Optimal Level of Functional [place]  Outcome: Progressing  Goal: Absence of Infection Signs and Symptoms  Outcome: Progressing  Goal: Improved Oral Intake  Outcome: Progressing  Goal: Effective Oxygenation and Ventilation  Outcome: Progressing     Problem: Adult Inpatient Plan of Care  Goal: Plan of Care Review  Outcome: Progressing  Goal: Patient-Specific Goal (Individualized)  Outcome: Progressing  Goal: Absence of Hospital-Acquired Illness or Injury  Outcome: Progressing  Goal: Optimal Comfort and Wellbeing  Outcome: Progressing  Goal: Readiness for Transition of Care  Outcome: Progressing     Problem: Skin Injury Risk Increased  Goal: Skin Health and Integrity  Outcome: Progressing      VN cued into patients room for rounding. VN role explained and informed pt that VN will be working alongside bedside nurse and PCT throughout the day shift. Pt verbalized understanding that VN is available for any questions and education, and nurse and PCT will continue hourly rounding at bedside. Denies any pain at this time.  Allotted time given for questions - all questions answered. Will continue to cue in to patients room and monitor.        02/21/19 1043   Type of Frequent Check   Type Other (see comments)  (VN rounds)   Safety/Activity   Patient Rounds bed in low position;visualized patient   Safety Promotion/Fall Prevention side rails raised x 2   Positioning   Body Position neutral body alignment   Head of Bed (HOB) HOB elevated   Pain/Comfort/Sleep   Preferred Pain Scale number (Numeric Rating Pain Scale)   Pain Rating (0-10): Rest 0   Assessments (Pre/Post)   Level of Consciousness (AVPU) alert

## 2025-05-06 ENCOUNTER — TELEPHONE (OUTPATIENT)
Dept: FAMILY MEDICINE | Facility: CLINIC | Age: 89
End: 2025-05-06
Payer: MEDICARE

## 2025-05-06 DIAGNOSIS — I10 ESSENTIAL HYPERTENSION: Chronic | ICD-10-CM

## 2025-05-06 RX ORDER — AMMONIUM LACTATE 12 G/100G
1 CREAM TOPICAL DAILY
Qty: 385 G | Refills: 5 | Status: SHIPPED | OUTPATIENT
Start: 2025-05-06

## 2025-05-06 RX ORDER — CARVEDILOL 3.12 MG/1
3.12 TABLET ORAL 2 TIMES DAILY WITH MEALS
Qty: 60 TABLET | Refills: 11 | Status: SHIPPED | OUTPATIENT
Start: 2025-05-06 | End: 2026-05-06

## 2025-05-06 NOTE — TELEPHONE ENCOUNTER
Tiffany Blackwell Staff  Caller: Unspecified (Today, 11:51 AM)  Type:  RX Refill Request    Who Called: Pt  Refill or New Rx:Rx  RX Name and Strength:ammonium lactate 12% cream  How is the patient currently taking it? (ex. 1XDay):  Is this a 30 day or 90 day RX:  Preferred Pharmacy with phone number:[facility] [place], [address]  Phone: [phone]  Fax: [phone]  Would the patient rather a call back or a response via [facility]? Call back  Best Call Back Number:[phone]  Additional Information: pt states this was prescribed for her by Dr Mukund Hubbard  2024 for blisters on her legs

## 2025-05-06 NOTE — TELEPHONE ENCOUNTER
Laura Magallanes Staff  Caller: Unspecified (Today, 11:26 AM)  Type:  RX Refill Request    Who Called:  Pt  Refill or New Rx: Refill  RX Name and Strength: carvediloL (COREG) 3.125 MG tablet  Preferred Pharmacy with phone number:[facility] [place] [place] - [address]  [address] [place] [address]  Phone: [phone] Fax: [phone]  Local or Mail Order: Local  Ordering Provider: Elizabeth  Would the patient rather a call back or a response via [facility]? Call  Best Call Back Number:[phone]  Additional Information: rx #: [id number]

## 2025-05-29 ENCOUNTER — TELEPHONE (OUTPATIENT)
Dept: FAMILY MEDICINE | Facility: CLINIC | Age: 89
End: 2025-05-29
Payer: MEDICARE

## 2025-05-29 RX ORDER — AMOXICILLIN AND CLAVULANATE POTASSIUM 875; 125 MG/1; MG/1
1 TABLET, FILM COATED ORAL 2 TIMES DAILY
Qty: 10 TABLET | Refills: 0 | Status: SHIPPED | OUTPATIENT
Start: 2025-05-29 | End: 2025-06-03

## 2025-05-29 NOTE — TELEPHONE ENCOUNTER
Dianna Holden Staff  Caller: Unspecified (Today,  1:18 PM)  Type:  Patient Returning Call    Who Called:pt  Who Left Message for Patient:Cecilia Herbert MA  Does the patient know what this is regarding?:returning call  Would the patient rather a call back or a response via [facility]? call  Best Call Back Number: [phone]  Additional Information:

## 2025-05-29 NOTE — TELEPHONE ENCOUNTER
I spoke with patient and walked her through how to use at home covid test.    She is requesting that we call her back in 15 minutes to obtain test result.

## 2025-05-29 NOTE — TELEPHONE ENCOUNTER
Spoke with pt. She will take home covid test.     Staff to call back in 20 minutes to obtain result.

## 2025-05-29 NOTE — TELEPHONE ENCOUNTER
Spoke to pt. Pt is complaining of a runny nose, scratchy throat and slight cough. Symptoms started yesterday.     Pt is requesting something to be sent in to [facility] in [place].

## 2025-05-29 NOTE — TELEPHONE ENCOUNTER
Dianna Holden Staff  Caller: Unspecified (Today,  9:28 AM)  Type:  Needs Medical Advice    Who Called: pt  Would the patient rather a call back or a response via [facility]? call  Best Call Back Number: [phone]  Additional Information: pt wanting to know if medication can be called in for her sinuses    Symptom: Sinus Symptoms  Outcome: Schedule an appointment to be seen within 24 hours.  Reason: Caller denied all higher acuity questions

## 2025-06-01 DIAGNOSIS — R05.9 COUGH, UNSPECIFIED TYPE: Primary | ICD-10-CM

## 2025-06-01 RX ORDER — CODEINE PHOSPHATE AND GUAIFENESIN 10; 100 MG/5ML; MG/5ML
5 SOLUTION ORAL EVERY 6 HOURS PRN
Qty: 118 ML | Refills: 0 | Status: SHIPPED | OUTPATIENT
Start: 2025-06-01

## 2025-06-02 ENCOUNTER — TELEPHONE (OUTPATIENT)
Dept: FAMILY MEDICINE | Facility: CLINIC | Age: 89
End: 2025-06-02
Payer: MEDICARE

## 2025-06-17 ENCOUNTER — LAB VISIT (OUTPATIENT)
Dept: LAB | Facility: HOSPITAL | Age: 89
End: 2025-06-17
Attending: STUDENT IN AN ORGANIZED HEALTH CARE EDUCATION/TRAINING PROGRAM
Payer: MEDICARE

## 2025-06-17 DIAGNOSIS — E78.5 HYPERLIPIDEMIA, UNSPECIFIED HYPERLIPIDEMIA TYPE: ICD-10-CM

## 2025-06-17 DIAGNOSIS — I10 ESSENTIAL HYPERTENSION: ICD-10-CM

## 2025-06-17 LAB
ABSOLUTE EOSINOPHIL (OHS): 0.33 K/UL
ABSOLUTE MONOCYTE (OHS): 0.64 K/UL (ref 0.3–1)
ABSOLUTE NEUTROPHIL COUNT (OHS): 5.09 K/UL (ref 1.8–7.7)
ALBUMIN SERPL BCP-MCNC: 3.6 G/DL (ref 3.5–5.2)
ALP SERPL-CCNC: 55 UNIT/L (ref 38–126)
ALT SERPL W/O P-5'-P-CCNC: 9 UNIT/L (ref 10–44)
ANION GAP (OHS): 7 MMOL/L (ref 8–16)
AST SERPL-CCNC: 20 UNIT/L (ref 15–46)
BASOPHILS # BLD AUTO: 0.08 K/UL
BASOPHILS NFR BLD AUTO: 1.1 %
BILIRUB SERPL-MCNC: 0.6 MG/DL (ref 0.1–1)
BUN SERPL-MCNC: 26 MG/DL (ref 7–17)
CALCIUM SERPL-MCNC: 9.5 MG/DL (ref 8.7–10.5)
CHLORIDE SERPL-SCNC: 106 MMOL/L (ref 95–110)
CHOLEST SERPL-MCNC: 141 MG/DL (ref 120–199)
CHOLEST/HDLC SERPL: 4.3 {RATIO} (ref 2–5)
CO2 SERPL-SCNC: 28 MMOL/L (ref 23–29)
CREAT SERPL-MCNC: 0.8 MG/DL (ref 0.5–1.4)
ERYTHROCYTE [DISTWIDTH] IN BLOOD BY AUTOMATED COUNT: 15.1 % (ref 11.5–14.5)
GFR SERPLBLD CREATININE-BSD FMLA CKD-EPI: >60 ML/MIN/1.73/M2
GLUCOSE SERPL-MCNC: 101 MG/DL (ref 70–110)
HCT VFR BLD AUTO: 36.2 % (ref 37–48.5)
HDLC SERPL-MCNC: 33 MG/DL (ref 40–75)
HDLC SERPL: 23.4 % (ref 20–50)
HGB BLD-MCNC: 11 GM/DL (ref 12–16)
IMM GRANULOCYTES # BLD AUTO: 0.02 K/UL (ref 0–0.04)
IMM GRANULOCYTES NFR BLD AUTO: 0.3 % (ref 0–0.5)
LDLC SERPL CALC-MCNC: 88 MG/DL (ref 63–159)
LYMPHOCYTES # BLD AUTO: 1.31 K/UL (ref 1–4.8)
MCH RBC QN AUTO: 26.7 PG (ref 27–31)
MCHC RBC AUTO-ENTMCNC: 30.4 G/DL (ref 32–36)
MCV RBC AUTO: 88 FL (ref 82–98)
NONHDLC SERPL-MCNC: 108 MG/DL
NUCLEATED RBC (/100WBC) (OHS): 0 /100 WBC
PLATELET # BLD AUTO: 271 K/UL (ref 150–450)
PMV BLD AUTO: 11.2 FL (ref 9.2–12.9)
POTASSIUM SERPL-SCNC: 4.7 MMOL/L (ref 3.5–5.1)
PROT SERPL-MCNC: 7.4 GM/DL (ref 6–8.4)
RBC # BLD AUTO: 4.12 M/UL (ref 4–5.4)
RELATIVE EOSINOPHIL (OHS): 4.4 %
RELATIVE LYMPHOCYTE (OHS): 17.5 % (ref 18–48)
RELATIVE MONOCYTE (OHS): 8.6 % (ref 4–15)
RELATIVE NEUTROPHIL (OHS): 68.1 % (ref 38–73)
SODIUM SERPL-SCNC: 141 MMOL/L (ref 136–145)
TRIGL SERPL-MCNC: 100 MG/DL (ref 30–150)
TSH SERPL-ACNC: 1.07 UIU/ML (ref 0.4–4)
WBC # BLD AUTO: 7.47 K/UL (ref 3.9–12.7)

## 2025-06-17 PROCEDURE — 36415 COLL VENOUS BLD VENIPUNCTURE: CPT | Mod: PN

## 2025-06-17 PROCEDURE — 85025 COMPLETE CBC W/AUTO DIFF WBC: CPT | Mod: PN

## 2025-06-17 PROCEDURE — 80061 LIPID PANEL: CPT | Mod: PN

## 2025-06-17 PROCEDURE — 82040 ASSAY OF SERUM ALBUMIN: CPT | Mod: PN

## 2025-06-17 PROCEDURE — 84443 ASSAY THYROID STIM HORMONE: CPT | Mod: PN

## 2025-06-24 ENCOUNTER — OFFICE VISIT (OUTPATIENT)
Dept: FAMILY MEDICINE | Facility: CLINIC | Age: 89
End: 2025-06-24
Payer: MEDICARE

## 2025-06-24 VITALS
SYSTOLIC BLOOD PRESSURE: 128 MMHG | HEART RATE: 61 BPM | BODY MASS INDEX: 41.27 KG/M2 | TEMPERATURE: 98 F | DIASTOLIC BLOOD PRESSURE: 86 MMHG | OXYGEN SATURATION: 98 % | HEIGHT: 63 IN

## 2025-06-24 DIAGNOSIS — D64.9 NORMOCYTIC ANEMIA: Primary | ICD-10-CM

## 2025-06-24 DIAGNOSIS — T14.8XXA NONHEALING NONSURGICAL WOUND: ICD-10-CM

## 2025-06-24 DIAGNOSIS — I35.0 MODERATE TO SEVERE AORTIC STENOSIS: ICD-10-CM

## 2025-06-24 DIAGNOSIS — D53.9 NUTRITIONAL ANEMIA, UNSPECIFIED: ICD-10-CM

## 2025-06-24 PROCEDURE — 99214 OFFICE O/P EST MOD 30 MIN: CPT | Mod: S$GLB,,, | Performed by: STUDENT IN AN ORGANIZED HEALTH CARE EDUCATION/TRAINING PROGRAM

## 2025-06-24 PROCEDURE — 1101F PT FALLS ASSESS-DOCD LE1/YR: CPT | Mod: CPTII,S$GLB,, | Performed by: STUDENT IN AN ORGANIZED HEALTH CARE EDUCATION/TRAINING PROGRAM

## 2025-06-24 PROCEDURE — 1159F MED LIST DOCD IN RCRD: CPT | Mod: CPTII,S$GLB,, | Performed by: STUDENT IN AN ORGANIZED HEALTH CARE EDUCATION/TRAINING PROGRAM

## 2025-06-24 PROCEDURE — 1160F RVW MEDS BY RX/DR IN RCRD: CPT | Mod: CPTII,S$GLB,, | Performed by: STUDENT IN AN ORGANIZED HEALTH CARE EDUCATION/TRAINING PROGRAM

## 2025-06-24 PROCEDURE — 1125F AMNT PAIN NOTED PAIN PRSNT: CPT | Mod: CPTII,S$GLB,, | Performed by: STUDENT IN AN ORGANIZED HEALTH CARE EDUCATION/TRAINING PROGRAM

## 2025-06-24 PROCEDURE — 3288F FALL RISK ASSESSMENT DOCD: CPT | Mod: CPTII,S$GLB,, | Performed by: STUDENT IN AN ORGANIZED HEALTH CARE EDUCATION/TRAINING PROGRAM

## 2025-06-24 NOTE — PROGRESS NOTES
Patient ID: Makenzie Haile is a 89 y.o. female.     Chief Complaint: f/u chronic medical conditions    Follow-up  Pertinent negatives include no chest pain, coughing, fever, headaches, myalgias, nausea, rash, vomiting or weakness.     History of Present Illness    Makenzie presents today with fatigue.     She reports significant fatigue and weakness impacting daily activities. She experiences unexpected episodes of falling asleep, including falling asleep in her wheelchair while watching television for approximately 2 hours and in the bathroom. She expresses fear of using her walker due to weakness and becomes exhausted quickly, particularly after simple tasks like using the bathroom. She describes having no energy and difficulty performing basic activities, feeling drained. She verbalizes significant concern about her persistent sleepiness and lack of physical stamina.    She was diagnosed with new onset anemia four months ago during routine bloodwork. She has a history of prior GI bleeding.    She reports right-sided back pain above the waist, potentially related to frequent wheelchair use and increased arm strain from self-propelling wheelchair without using legs. The pain is localized to a specific muscle area and seems associated with mobility and positioning.    She has an ongoing non-healing wound on buttocks that occasionally bleeds when dressing or underwear is removed. She currently uses buttock paste for treatment and utilizes a donut pillow for comfort and to reduce pressure on the wound. She expresses concern about wound healing and frequency of wound-related issues.    She reports significant decrease in appetite with inconsistent eating patterns. She states that if she eats something one day, she does not want to eat the same food in the following days or week. She acknowledges her reduced food intake and notes that her current eating habits differ markedly from her previous nutritional  "patterns.    She reports persistent left lower extremity swelling that fluctuates, temporarily subsiding but then recurring.       Review of Systems  Review of Systems   Constitutional:  Negative for fever.   HENT:  Negative for ear pain and sinus pain.    Eyes:  Negative for discharge.   Respiratory:  Negative for cough and shortness of breath.    Cardiovascular:  Negative for chest pain and leg swelling.   Gastrointestinal:  Negative for diarrhea, nausea and vomiting.   Genitourinary:  Negative for urgency.   Musculoskeletal:  Negative for myalgias.   Skin:  Negative for rash.   Neurological:  Negative for weakness and headaches.   Psychiatric/Behavioral:  Negative for depression.    All other systems reviewed and are negative.      Currently Medications  Medications Ordered Prior to Encounter[1]    Physical  Exam  Vitals:    06/24/25 0818   BP: 128/86   BP Location: Right arm   Patient Position: Sitting   Pulse: 61   Temp: 97.8 °F (36.6 °C)   TempSrc: Oral   SpO2: 98%   Height: 5' 3" (1.6 m)      Body mass index is 41.27 kg/m².  Wt Readings from Last 3 Encounters:   02/20/25 105.7 kg (233 lb)   02/10/25 106 kg (233 lb 11 oz)   11/21/24 105.3 kg (232 lb 2.3 oz)       Physical Exam  Vitals and nursing note reviewed.   Constitutional:       General: She is not in acute distress.     Appearance: She is not ill-appearing.   HENT:      Head: Normocephalic and atraumatic.      Right Ear: External ear normal.      Left Ear: External ear normal.      Nose: Nose normal.      Mouth/Throat:      Mouth: Mucous membranes are moist.   Eyes:      Extraocular Movements: Extraocular movements intact.      Conjunctiva/sclera: Conjunctivae normal.   Cardiovascular:      Rate and Rhythm: Normal rate and regular rhythm.      Pulses: Normal pulses.      Heart sounds: Murmur heard.   Pulmonary:      Effort: Pulmonary effort is normal. No respiratory distress.      Breath sounds: No wheezing.   Abdominal:      General: There is no " distension.      Palpations: Abdomen is soft. There is no mass.      Tenderness: There is no abdominal tenderness.   Musculoskeletal:         General: No swelling.        Arms:       Cervical back: Normal range of motion.      Comments: Tenderness to palpation   Skin:     Coloration: Skin is not jaundiced.      Findings: No rash.   Neurological:      General: No focal deficit present.      Mental Status: She is alert and oriented to person, place, and time.   Psychiatric:         Mood and Affect: Mood normal.         Thought Content: Thought content normal.         Labs:    Complete Blood Count  Lab Results   Component Value Date    RBC 4.12 06/17/2025    HGB 11.0 (L) 06/17/2025    HCT 36.2 (L) 06/17/2025    MCV 88 06/17/2025    MCH 26.7 (L) 06/17/2025    MCHC 30.4 (L) 06/17/2025    RDW 15.1 (H) 06/17/2025     06/17/2025    MPV 11.2 06/17/2025    GRAN 4.4 02/17/2025    GRAN 65.7 02/17/2025    LYMPH 17.5 (L) 06/17/2025    LYMPH 1.31 06/17/2025    MONO 8.6 06/17/2025    MONO 0.64 06/17/2025    EOS 4.4 06/17/2025    EOS 0.33 06/17/2025    BASO 0.11 02/17/2025    EOSINOPHIL 5.9 02/17/2025    BASOPHIL 1.1 06/17/2025    BASOPHIL 0.08 06/17/2025    DIFFMETHOD Automated 02/17/2025       Comprehensive Metabolic Panel  Lab Results   Component Value Date     06/17/2025    BUN 26 (H) 06/17/2025    CREATININE 0.8 06/17/2025     06/17/2025    K 4.7 06/17/2025     06/17/2025    PROT 7.4 06/17/2025    ALBUMIN 3.6 06/17/2025    BILITOT 0.6 06/17/2025    AST 20 06/17/2025    ALKPHOS 55 06/17/2025    CO2 28 06/17/2025    ALT 9 (L) 06/17/2025    ANIONGAP 7 (L) 06/17/2025       TSH  Lab Results   Component Value Date    TSH 1.070 06/17/2025       Imaging:  Echo    Left Ventricle: The left ventricle is normal in size. Mildly increased   wall thickness. Normal wall motion. There is hyperdynamic systolic   function. Quantitated ejection fraction is 73%. Diastolic function cannot   be reliably determined in the  presence of mitral annular calcification.    Right Ventricle: Normal right ventricular cavity size. Systolic   function is normal.    Aortic Valve: Moderately calcified cusps. Moderately restricted motion.   There is moderate stenosis. Aortic valve area by VTI is 1.1 cm². Aortic   valve peak velocity is 3.6 m/s. Mean gradient is 31 mmHg. The   dimensionless index is 0.33. There is mild aortic regurgitation.    Mitral Valve: There is mitral annular calcification present. There is   no stenosis. The mean pressure gradient across the mitral valve is 4 mmHg   at a heart rate of 66 bpm. There is mild regurgitation.    Pulmonary Artery: The estimated pulmonary artery systolic pressure is   21 mmHg.      Assessment/Plan:  Assessment & Plan    Patient presents with new onset anemia, fatigue, and a non-healing wound on buttocks.  Opted against immediate invasive procedures like colonoscopy or upper endoscopy, considering age (89) and history of GI bleed.  Considered potential causes of anemia including dietary factors, aortic stenosis, and possible GI blood loss.  Assessed need for supplementation (iron, B12, folic acid) based on upcoming lab results.  Evaluated current vitamin regimen for potential adjustments.  Noted stable liver, kidney, cholesterol, and thyroid function from recent labs.  Consider consulting with hematologist regarding appropriate anemia workup.    ANEMIA:  - Identified anemia as a new condition from 4 months ago through labs.  - Planned consultation with a hematologist to determine the cause, including discussion about potential dietary factors and aortic stenosis.  - Ordered labs for next week to further investigate; patient can arrive at any time with no fasting required.  - Advised to contact the office for labs on Monday.  - Documented patient's history of a GI bleed, which may be relevant to the anemia etiology.    AORTIC STENOSIS:  - Monitored the patient's known aortic stenosis.  - Noted that  this condition may contribute to anemia and affect SpO2, explaining why it will not reach 100%.    PRESSURE ULCER:  - Monitored the non-healing wound on the buttock, which sometimes bleeds when the scab comes off.  - Instructed the patient to continue using donut pillow when sitting.  - Referred for home health services to assist with wound care.    BACK PAIN:  - Noted the patient reports back pain on the right side above the waist, possibly due to wheelchair use.    FATIGUE:  - Makenzie reports feeling very tired, falling asleep frequently, and having no energy.  - Explained that anemia is likely the cause of these symptoms and that targeted treatment, once the cause is identified, should improve energy levels.    LOCALIZED EDEMA:  - Noted the patient reports recurrent swelling in the left foot that temporarily resolves but returns.    ANOREXIA:  - Noted the patient reports decreased appetite compared to before, possibly contributing to anemia.    WHEELCHAIR DEPENDENCE:  - Noted the patient uses a wheelchair frequently, propelling with arms rather than legs, and uses a donut pillow for sitting due to the non-healing wound.    FOLLOW-UP:  - Follow up in 3 months.         1. Normocytic anemia  -     E-Consult to Hemonc  -     Iron and TIBC; Future  -     Ferritin; Future  -     Vitamin B12; Future  -     Folate; Future  -     Transferrin; Future; Expected date: 06/24/2025  -     Peripheral Smear Review for Hemolysis or Low Platelets; Future; Expected date: 06/24/2025  -     CBC Auto Differential; Future    2. Nutritional anemia, unspecified  -     Vitamin B12; Future  -     Folate; Future    3. Nonhealing nonsurgical wound  -     Ambulatory referral/consult to Home Health; Future; Expected date: 06/25/2025    4. Moderate to severe aortic stenosis  -     Ambulatory referral/consult to Home Health; Future; Expected date: 06/25/2025         Discussed how to stay healthy including: diet, exercise, refraining from smoking  and discussed screening exams / tests needed for age, sex and family Hx.    RTC 3 mo    Floyd Johnson MD    This note was generated with the assistance of ambient listening technology. Verbal consent was obtained by the patient and accompanying visitor(s) for the recording of patient appointment to facilitate this note. I attest to having reviewed and edited the generated note for accuracy, though some syntax or spelling errors may persist. Please contact the author of this note for any clarification.           [1]   Current Outpatient Medications on File Prior to Visit   Medication Sig Dispense Refill    acetaminophen (TYLENOL) 500 MG tablet Take 500 mg by mouth every 6 (six) hours as needed for Pain.      ammonium lactate 12 % Crea Apply 1 g topically Daily. 385 g 5    ascorbic acid, vitamin C, (VITAMIN C) 500 MG tablet Take 500 mg by mouth once daily.      aspirin (ECOTRIN) 81 MG EC tablet Take 1 tablet (81 mg total) by mouth once daily. 30 tablet 11    busPIRone (BUSPAR) 10 MG tablet Take 1 tablet (10 mg total) by mouth 3 (three) times daily. 90 tablet 11    carvediloL (COREG) 3.125 MG tablet Take 1 tablet (3.125 mg total) by mouth 2 (two) times daily with meals. 60 tablet 11    cyanocobalamin (VITAMIN B-12) 100 MCG tablet Take 100 mcg by mouth once daily.      emollient Lotn Apply topically as needed (itching). 89 mL 0    furosemide (LASIX) 40 MG tablet Take 1 tablet (40 mg total) by mouth once daily. Take an extra pill in the afternoon on days of increased leg swelling. 180 tablet 3    guaiFENesin-codeine 100-10 mg/5 ml (TUSSI-ORGANIDIN NR)  mg/5 mL syrup Take 5 mLs by mouth every 6 (six) hours as needed for Cough. LESS THAN 7 DAYS 118 mL 0    incontinence pad,liner,disp (BLADDER CONTROL PADS EX ABSORB MISC) bladder control pads      lancets (ACCU-CHEK FASTCLIX MISC) Accu-Chek FastClix      losartan (COZAAR) 100 MG tablet Take 1 tablet (100 mg total) by mouth once daily. 90 tablet 3    magnesium citrate  100 mg Tab Take 100 mg by mouth once daily.      metOLazone (ZAROXOLYN) 2.5 MG tablet Take 1 tablet (2.5 mg total) by mouth once daily. Take at the same time as the furosemide 30 tablet 11    miconazole NITRATE 2 % (MICOTIN) 2 % top powder Apply topically 2 (two) times daily. 85 g 0    multivit-min-iron-FA-vit K-lut (CENTRUM SILVER WOMEN) 8 mg iron-400 mcg-50 mcg Tab Take 1 tablet by mouth once daily.      NIFEdipine (PROCARDIA-XL) 60 MG (OSM) 24 hr tablet Take 1 tablet (60 mg total) by mouth once daily. 30 tablet 11    nitroGLYCERIN (NITROSTAT) 0.4 MG SL tablet Place 0.4 mg under the tongue every 5 (five) minutes as needed for Chest pain.      nystatin (MYCOSTATIN) cream Apply topically 2 (two) times daily. 30 g 1    potassium chloride (MICRO-K) 10 MEQ CpSR Take 1 capsule (10 mEq total) by mouth 3 (three) times a week. 60 capsule 3    pravastatin (PRAVACHOL) 20 MG tablet Take 1 tablet (20 mg total) by mouth once daily. 30 tablet 11    SITagliptin phosphate (JANUVIA) 50 MG Tab Take 1 tablet (50 mg total) by mouth once daily.      tolterodine (DETROL LA) 4 MG 24 hr capsule Take 1 capsule (4 mg total) by mouth once daily. 30 capsule 11    zinc gluconate 50 mg tablet Take 50 mg by mouth once daily.       No current facility-administered medications on file prior to visit.

## 2025-06-25 ENCOUNTER — E-CONSULT (OUTPATIENT)
Dept: HEMATOLOGY/ONCOLOGY | Facility: CLINIC | Age: 89
End: 2025-06-25
Payer: MEDICARE

## 2025-06-25 DIAGNOSIS — D64.9 NORMOCYTIC ANEMIA: Primary | ICD-10-CM

## 2025-06-25 NOTE — CONSULTS
[facility]  Response for E-Consult     Patient Name: Makenzie Haile  MRN: [id number]  Primary Care Provider: Floyd Johnson MD   Requesting Provider: Floyd Johnson MD  [facility]  Consult performed by: Nick Leong MD  Consult ordered by: Floyd Johnson MD  Reason for consult: new onset anemia in 89 year old. recommended labs for work up. history of GI bleed and aortic stenosis          Recommendation: I see that iron, transferrin, ferritin, b12, folate, and peripheral smear have been ordered. I think this is an appropriate first-line of testing for new onset, mild normocytic anemia without other cytopenias. If iron saturation is low and ferritin is normal or elevated, you can also check STFR as this can help to differentiate iron deficiency (when STFR is elevated) from anemia of chronic disease (when STFR is not elevated)    Additional future steps to consider: na    Total time of Consultation: 5 minute    I did not speak to the requesting provider verbally about this.     *This eConsult is based on the clinical data available to me and is furnished without benefit of a physical examination. The eConsult will need to be interpreted in light of any clinical issues or changes in patient status not available to me at the time of filing this eConsults. Significant changes in patient condition or level of acuity should result in immediate formal consultation and reevaluation. Please alert me if you have further questions.    Thank you for this eConsult referral.     Nick Leong MD  [facility]

## 2025-06-30 ENCOUNTER — PATIENT OUTREACH (OUTPATIENT)
Dept: ADMINISTRATIVE | Facility: HOSPITAL | Age: 89
End: 2025-06-30
Payer: MEDICARE

## 2025-06-30 NOTE — LETTER
AUTHORIZATION FOR RELEASE OF   CONFIDENTIAL INFORMATION    Dear [facility],    We are seeing Makenzie Haile, date of birth 1935, in the clinic at [facility]. Floyd Johnson MD is the patient's PCP. Makenzie Haile has an outstanding lab/procedure at the time we reviewed her chart. In order to help keep her health information updated, she has authorized us to request the following medical record(s):        (  )  MAMMOGRAM                                      (  )  COLONOSCOPY      (  )  PAP SMEAR                                          (  )  OUTSIDE LAB RESULTS     (  )  DEXA SCAN                                          ( X )  EYE EXAM            (  )  FOOT EXAM                                          (  )  ENTIRE RECORD     (  )  OUTSIDE IMMUNIZATIONS                 (  )  _______________         Please fax records to [facility], Reine, Addy N., MD, [phone]               Patient Name: Makenzie Haile  : 1935  Patient Phone #: [phone]

## 2025-06-30 NOTE — PROGRESS NOTES
Population Health Chart Review & Patient Outreach Details      Additional [facility] Health Notes:               Updates Requested / Reviewed:      Updated Care Coordination Note, Care Everywhere, and Immunizations Reconciliation Completed or Queried: [facility] Topics Overdue:      [facility] Score: 0     Patient is not due for any topics at this time.    Tetanus Vaccine  Shingles/Zoster Vaccine  RSV Vaccine                  Health Maintenance Topic(s) Outreach Outcomes & Actions Taken:    Eye Exam - Outreach Outcomes & Actions Taken  : External Records Requested & Care Team Updated if Applicable and Efax to [facility], VISH uploaded to media

## 2025-07-01 ENCOUNTER — LAB REQUISITION (OUTPATIENT)
Dept: LAB | Facility: HOSPITAL | Age: 89
End: 2025-07-01
Attending: INTERNAL MEDICINE
Payer: MEDICARE

## 2025-07-01 DIAGNOSIS — L89.323 PRESSURE ULCER OF LEFT BUTTOCK, STAGE 3: ICD-10-CM

## 2025-07-01 DIAGNOSIS — E11.9 TYPE 2 DIABETES MELLITUS WITHOUT COMPLICATIONS: ICD-10-CM

## 2025-07-01 DIAGNOSIS — I50.30 UNSPECIFIED DIASTOLIC (CONGESTIVE) HEART FAILURE: ICD-10-CM

## 2025-07-01 DIAGNOSIS — L89.313 PRESSURE ULCER OF RIGHT BUTTOCK, STAGE 3: ICD-10-CM

## 2025-07-01 LAB
ANISOCYTOSIS BLD QL SMEAR: SLIGHT
ERYTHROCYTE [DISTWIDTH] IN BLOOD BY AUTOMATED COUNT: 15.3 % (ref 11.5–14.5)
FERRITIN SERPL-MCNC: 86.2 NG/ML (ref 20–300)
FOLATE SERPL-MCNC: 16.2 NG/ML (ref 4–24)
HCT VFR BLD AUTO: 35 % (ref 37–48.5)
HGB BLD-MCNC: 11.2 GM/DL (ref 12–16)
HYPOCHROMIA BLD QL SMEAR: ABNORMAL
IRON SATN MFR SERPL: 27 % (ref 20–50)
IRON SERPL-MCNC: 77 UG/DL (ref 30–160)
MCH RBC QN AUTO: 27.1 PG (ref 27–31)
MCHC RBC AUTO-ENTMCNC: 32 G/DL (ref 32–36)
MCV RBC AUTO: 85 FL (ref 82–98)
NUCLEATED RBC (/100WBC) (OHS): 0 /100 WBC
PLATELET # BLD AUTO: 155 K/UL (ref 150–450)
PLATELET BLD QL SMEAR: ABNORMAL
PMV BLD AUTO: 12.5 FL (ref 9.2–12.9)
RBC # BLD AUTO: 4.13 M/UL (ref 4–5.4)
TIBC SERPL-MCNC: 290 UG/DL (ref 250–450)
TRANSFERRIN SERPL-MCNC: 196 MG/DL (ref 200–375)
TRANSFERRIN SERPL-MCNC: 196 MG/DL (ref 200–375)
VIT B12 SERPL-MCNC: 777 PG/ML (ref 210–950)
WBC # BLD AUTO: 7.83 K/UL (ref 3.9–12.7)

## 2025-07-01 PROCEDURE — 84466 ASSAY OF TRANSFERRIN: CPT | Mod: 59,PN

## 2025-07-01 PROCEDURE — 83540 ASSAY OF IRON: CPT | Mod: PN

## 2025-07-01 PROCEDURE — 85025 COMPLETE CBC W/AUTO DIFF WBC: CPT | Mod: PN

## 2025-07-01 PROCEDURE — 82607 VITAMIN B-12: CPT | Mod: PN

## 2025-07-01 PROCEDURE — 82728 ASSAY OF FERRITIN: CPT | Mod: PN

## 2025-07-01 PROCEDURE — 82746 ASSAY OF FOLIC ACID SERUM: CPT | Mod: PN

## 2025-07-09 ENCOUNTER — TELEPHONE (OUTPATIENT)
Dept: FAMILY MEDICINE | Facility: CLINIC | Age: 89
End: 2025-07-09
Payer: MEDICARE

## 2025-07-09 NOTE — TELEPHONE ENCOUNTER
Pt states she will have her daughter  a Covid test to take today. Pt will call with results    Pt is asking what to do about her left leg and ankle pain.   Pt states it stated last night. Pt is currently elevating the leg

## 2025-07-09 NOTE — TELEPHONE ENCOUNTER
JORGE    Copied from CRM #[id number]. Topic: General Inquiry - Patient Advice  >> Jul 9, 2025 10:43 AM Laura wrote:  Type:  HH Update     Who Called: Melvi Doan    Does the patient know what this is regarding?: pt had congestion in chest this morning, able to cough and  clear, pt having sinus problems , dry cough and pain radiating on interial  left side of leg down to ankle   Would the patient rather a call back or a response via [facility]? Call   Best Call Back Number: [phone] Melvi   Additional Information:

## 2025-07-09 NOTE — TELEPHONE ENCOUNTER
She can take up to 3000mg of tylenol per day. If this does not help, she needs to be seen in clinic

## 2025-07-10 ENCOUNTER — TELEPHONE (OUTPATIENT)
Dept: FAMILY MEDICINE | Facility: CLINIC | Age: 89
End: 2025-07-10
Payer: MEDICARE

## 2025-07-10 NOTE — TELEPHONE ENCOUNTER
Copied from CRM #[id number]. Topic: General Inquiry - Test Results  >> Jul 10, 2025  1:21 PM Donnie wrote:  Type:  Test Results    Who Called: pt  Name of Test (Lab/Mammo/Etc): lab   Date of Test: pt forgot  Ordering Provider: carlos  Where the test was performed: [facility] at her home  Would the patient rather a call back or a response via [facility]? call  Best Call Back Number: [phone]  Additional Information:  caller stated she took covid test and it was negative

## 2025-07-10 NOTE — TELEPHONE ENCOUNTER
I would suggest using Claritin generic loratadine 10 milligrams daily for 5-7 days also use over-the-counter Robitussin DM or similar generic and then consider using a nasal spray such as normal saline or ocean spray to reduce the amount of mucus in your nose.

## 2025-07-14 ENCOUNTER — OFFICE VISIT (OUTPATIENT)
Dept: CARDIOLOGY | Facility: CLINIC | Age: 89
End: 2025-07-14
Payer: MEDICARE

## 2025-07-14 VITALS
OXYGEN SATURATION: 96 % | HEART RATE: 60 BPM | HEIGHT: 63 IN | SYSTOLIC BLOOD PRESSURE: 139 MMHG | DIASTOLIC BLOOD PRESSURE: 75 MMHG | BODY MASS INDEX: 41.27 KG/M2

## 2025-07-14 DIAGNOSIS — I50.30 HEART FAILURE WITH PRESERVED EJECTION FRACTION, UNSPECIFIED HF CHRONICITY: ICD-10-CM

## 2025-07-14 DIAGNOSIS — I10 ESSENTIAL HYPERTENSION: Chronic | ICD-10-CM

## 2025-07-14 DIAGNOSIS — I35.0 MODERATE TO SEVERE AORTIC STENOSIS: ICD-10-CM

## 2025-07-14 DIAGNOSIS — R60.9 SWELLING: ICD-10-CM

## 2025-07-14 DIAGNOSIS — Z95.5 HISTORY OF CORONARY ARTERY STENT PLACEMENT: ICD-10-CM

## 2025-07-14 DIAGNOSIS — I25.10 CORONARY ARTERY DISEASE INVOLVING NATIVE CORONARY ARTERY OF NATIVE HEART WITHOUT ANGINA PECTORIS: Primary | ICD-10-CM

## 2025-07-14 DIAGNOSIS — I87.2 VENOUS STASIS DERMATITIS OF BOTH LOWER EXTREMITIES: ICD-10-CM

## 2025-07-14 DIAGNOSIS — E78.5 HYPERLIPIDEMIA, UNSPECIFIED HYPERLIPIDEMIA TYPE: ICD-10-CM

## 2025-07-14 PROCEDURE — 99214 OFFICE O/P EST MOD 30 MIN: CPT | Mod: S$GLB,,, | Performed by: INTERNAL MEDICINE

## 2025-07-14 PROCEDURE — 1101F PT FALLS ASSESS-DOCD LE1/YR: CPT | Mod: CPTII,S$GLB,, | Performed by: INTERNAL MEDICINE

## 2025-07-14 PROCEDURE — G2211 COMPLEX E/M VISIT ADD ON: HCPCS | Mod: S$GLB,,, | Performed by: INTERNAL MEDICINE

## 2025-07-14 PROCEDURE — 3288F FALL RISK ASSESSMENT DOCD: CPT | Mod: CPTII,S$GLB,, | Performed by: INTERNAL MEDICINE

## 2025-07-14 PROCEDURE — 99999 PR PBB SHADOW E&M-EST. PATIENT-LVL IV: CPT | Mod: PBBFAC,,, | Performed by: INTERNAL MEDICINE

## 2025-07-14 PROCEDURE — 1159F MED LIST DOCD IN RCRD: CPT | Mod: CPTII,S$GLB,, | Performed by: INTERNAL MEDICINE

## 2025-07-14 PROCEDURE — 1126F AMNT PAIN NOTED NONE PRSNT: CPT | Mod: CPTII,S$GLB,, | Performed by: INTERNAL MEDICINE

## 2025-07-14 NOTE — PROGRESS NOTES
"Subjective:    Patient ID:  Makenzie Haile is a 89 y.o. female who presents for follow-up of No chief complaint on file.      HPI    Pt  with hx of CAD s/p NSTEMI with subsequent PCI with SURAJ to LCX, HTN, DM, GERD, obesity, mild AS (EF 55-60%, SERAFIN 1.48, PV 2.08, MG 10 from 2DE 2019) who presents for f/u. Presented initially with epigastric pain she thought was heartburn, presented to ED, had elevated trop 0.77, C with LCX lesion s/p PCI with SURAJ, resolution of pain, and discharged home. Subsequent admission for symptomatic anemia from GIB with PRBC transfusion. Plavix stopped and placed on SAPT with ASA.   Last visit had developed left sided chest pressure, SPECT without ischemia (lateral wall infarct/fixed defect), 2DE with normal EF (60%) and AV unable to be evaluated due to poor windows.   Had Covid PNA about 6 weeks ago with full recovery. Had possible flu before that (Covid?).   Has chronic bilateral LE edema which improves with intermittent lasix use. Recently has had worsening bilateral LE edema. Denies SOB, orthopnea, PND, syncope, palps. Tolerating meds well. Non smoker, no EtOH. Has been sedentary bc of pandemic. Stopped taking atorvastatin bc she has been having body and joint aches (chronic) and thought they might be due to statin. Aches have persisted.     3/23/2023:  Had Covid and Covid PNA. Has residual SOB/JEFFRIES, fatigue. Has gained weight, has been less active, and is more sedentary. Was told she has a "leaky valve." 2DE from 2019 reviewed and has mild AS. Denies syncope, acute CHF symptoms, and angina. Has chronic, bilateral LE edema, unchanged. Back on statin and tolerating well.     5/25/2023:  2DE with:  The left ventricle is normal in size with normal systolic function.  The estimated ejection fraction is 60%.  Indeterminate left ventricular diastolic function.  Normal right ventricular size with normal right ventricular systolic function.  There is mild aortic valve stenosis.  Aortic valve " area is 1.74 cm2; peak velocity is 2.73 m/s; mean gradient is 19 mmHg.  Mild aortic regurgitation.  The estimated PA systolic pressure is 23 mmHg.  Normal central venous pressure (3 mmHg).    Main complaints are of bilateral knee pains for which she is receiving injections, however, unable to exercise. No new cardiac symptoms and BP acceptable. Leg wraps helping for edema.    3/11/2024: Initial visit with me. Former Pt of Dr. Moncada as above.  She is doing well today.  No major cardiovascular complaint.  Compliant with medications.  Chronic lower extremity edema better with leg elevation.  Not very compliant with compression stockings.  LDL above goal however she has not been well compliant with statin    8/2024:  Admitted with weakness.  Statin induced myalgias suspected and it was stopped along with Zetia.  Accompanied by her daughter today.  She feels better.  Apparently she was instructed to start Lipitor and Zetia again.  No significant CP, syncope, or JEFFRIES.  Repeat echo showed moderate-to-severe AS.  Chronic venous insufficiency with stasis.  Takes Lasix when she is at home few times a week.     07/2025: F/U doing okay.  Some good days and some bad days.  Chronic persistent LE edema.  She takes Lasix daily and metolazone p.r.n..  Not very compliant with compression stockings as she should.      Echo 02/2025:    Left Ventricle: The left ventricle is normal in size. Mildly increased wall thickness. Normal wall motion. There is hyperdynamic systolic function. Quantitated ejection fraction is 73%. Diastolic function cannot be reliably determined in the presence of mitral annular calcification.    Right Ventricle: Normal right ventricular cavity size. Systolic function is normal.    Aortic Valve: Moderately calcified cusps. Moderately restricted motion. There is moderate stenosis. Aortic valve area by VTI is 1.1 cm². Aortic valve peak velocity is 3.6 m/s. Mean gradient is 31 mmHg. The dimensionless index is 0.33.  There is mild aortic regurgitation.    Mitral Valve: There is mitral annular calcification present. There is no stenosis. The mean pressure gradient across the mitral valve is 4 mmHg at a heart rate of 66 bpm. There is mild regurgitation.    Pulmonary Artery: The estimated pulmonary artery systolic pressure is 21 mmHg.    Echo June 2024    Left Ventricle: The left ventricle is normal in size. There is concentric remodeling. Unable to assess wall motion. There is normal systolic function with a visually estimated ejection fraction of 65 - 70%. Diastolic function cannot be reliably determined in the presence of mitral annular calcification.    Right Ventricle: Systolic function is normal.    Left Atrium: Left atrium is moderately dilated.    Aortic Valve: There is moderate aortic valve sclerosis. Moderately restricted motion. There is moderate to severe stenosis. Aortic valve area by VTI is 0.95 cm². Aortic valve peak velocity is 3.71 m/s. Mean gradient is 41 mmHg. The dimensionless index is 0.29. There is mild aortic regurgitation.    Mitral Valve: There is moderate mitral annular calcification present.    Pulmonary Artery: The estimated pulmonary artery systolic pressure is 17 mmHg.    IVC/SVC: Normal venous pressure at 3 mmHg.      Review of Systems   Constitutional: Negative for chills and fever.   HENT:  Negative for hearing loss and nosebleeds.    Eyes:  Negative for blurred vision.   Cardiovascular:  Positive for leg swelling.        As in HPI   Respiratory:  Negative for hemoptysis and shortness of breath.    Hematologic/Lymphatic: Negative for bleeding problem.   Skin:  Negative for itching.   Musculoskeletal:  Positive for joint pain. Negative for falls.   Gastrointestinal:  Negative for abdominal pain and hematochezia.   Genitourinary:  Negative for hematuria.   Neurological:  Negative for dizziness and loss of balance.   Psychiatric/Behavioral:  Negative for altered mental status and depression.          Objective:    Physical Exam  Constitutional:       Appearance: She is well-developed.   HENT:      Head: Normocephalic and atraumatic.   Eyes:      Conjunctiva/sclera: Conjunctivae normal.   Neck:      Vascular: No JVD.   Cardiovascular:      Rate and Rhythm: Normal rate and regular rhythm.      Pulses:           Carotid pulses are 2+ on the right side and 2+ on the left side.       Radial pulses are 2+ on the right side and 2+ on the left side.        Femoral pulses are 2+ on the right side and 2+ on the left side.       Dorsalis pedis pulses are 2+ on the right side and 2+ on the left side.        Posterior tibial pulses are 1+ on the right side and 1+ on the left side.      Heart sounds: Murmur heard.      Harsh midsystolic murmur is present with a grade of 2/6 at the upper right sternal border radiating to the neck.      No friction rub. No gallop.   Pulmonary:      Effort: Pulmonary effort is normal. No respiratory distress.      Breath sounds: Normal breath sounds. No stridor. No wheezing.   Abdominal:      General: Bowel sounds are normal.      Palpations: Abdomen is soft.   Musculoskeletal:      Cervical back: Neck supple.      Right lower leg: Edema present.      Left lower leg: Edema present.   Skin:     General: Skin is warm and dry.   Neurological:      Mental Status: She is alert and oriented to person, place, and time.   Psychiatric:         Behavior: Behavior normal.         Thought Content: Thought content normal.           Assessment:       1. Coronary artery disease involving native coronary artery of native heart without angina pectoris    2. Essential hypertension    3. Heart failure with preserved ejection fraction, unspecified HF chronicity    4. History of coronary artery stent placement    5. Hyperlipidemia, unspecified hyperlipidemia type    6. Moderate to severe aortic stenosis    7. Venous stasis dermatitis of both lower extremities    8. Swelling                Plan:         1. Coronary  artery disease  Post PCI to left circumflex    Continue medical therapy for coronary artery disease.   Unable to tolerate high dose statin and ezetimibe due to myalgia.  Was switched to pravastatin 20 mg.  Continue pravastatin        2. Moderate  aortic stenosis  Continue Lasix  Stable echocardiogram  We will repeat echo next visit in six-months    3. Hyperlipidemia  Continue pravastatin    4. Hypertension  BP well-controlled.  Continue current regimen    5. Heart failure preserved EF  Continue Lasix  Continue metolazone p.r.n.    6. Lower extremity edema  Check venous ultrasound insufficiency protocol  Compression stockings and leg elevation         Weight loss encouraged    Continue aspirin  Weight loss      Visit today included increased complexity associated with the care of the episodic problem CAD, heart failure, hyperlipidemia, hypertension, possible venous insufficiency addressed and managing the longitudinal care of the patient due to the serious and/or complex managed problems  Six-month follow-up or sooner p.r.n.

## 2025-07-24 ENCOUNTER — TELEPHONE (OUTPATIENT)
Dept: FAMILY MEDICINE | Facility: CLINIC | Age: 89
End: 2025-07-24
Payer: MEDICARE

## 2025-07-24 NOTE — TELEPHONE ENCOUNTER
Copied from CRM #[id number]. Topic: General Inquiry - Patient Advice  >> Jul 24, 2025  8:19 AM Ya wrote:  Type:  Needs Medical Advice    Who Called: pt [facility] PT [place] [facility]   Would the patient rather a call back or a response via [facility]? Call back   Best Call Back Number: [phone]  Additional Information: PT [place] requesting a call back in regards to requesting orders for more PT for pt

## 2025-07-29 ENCOUNTER — TELEPHONE (OUTPATIENT)
Dept: FAMILY MEDICINE | Facility: CLINIC | Age: 89
End: 2025-07-29
Payer: MEDICARE

## 2025-07-29 NOTE — TELEPHONE ENCOUNTER
Copied from CRM #[id number]. Topic: General Inquiry - Patient Advice  >> Jul 29, 2025  9:27 AM Lilia wrote:  Type: General Call Back     Name of Caller:pt  Symptoms:weight loss pills  Would the patient rather a call back or a response via [facility]? Call back  Best Call Back Number:[phone]  Additional Information: Pt sts she would like to speak the nurse regarding her opinion on the weight loss pills and if she would be a candidate for them. Please reach out to patient as soon as available.

## 2025-07-29 NOTE — TELEPHONE ENCOUNTER
I do not recommend prescription weight loss pills as these will put a strain on her heart. She can try over the counter ALi.

## 2025-07-30 ENCOUNTER — TELEPHONE (OUTPATIENT)
Dept: CARDIOLOGY | Facility: CLINIC | Age: 89
End: 2025-07-30
Payer: MEDICARE

## 2025-07-30 NOTE — TELEPHONE ENCOUNTER
Copied from CRM #[id number]. Topic: General Inquiry - Patient Advice  >> Jul 30, 2025  9:25 AM Rona wrote:  Type:  Needs Medical Advice    Who Called: Pt  Symptoms (please be specific): Providers recommendation/advice   Would the patient rather a call back or a response via [facility]? Call  Best Call Back Number: [phone]  Additional Information: Pt would like to speak with provider in office regarding if she is a good candidate for the weight loss injections.

## 2025-07-30 NOTE — TELEPHONE ENCOUNTER
Reached out and spoke to patient regarding weight lost injections.  She wanted to know if she could qualify as a good candidate or weight loss injections. I advised her to first call her insurance Co. And ask them what weight loss injection would they pay for ,Then she should go see her pcp and see what he suggests. She stated she is pre-diabetes.  Pt stated for the time and appreciates the call back.    Nw

## 2025-08-13 ENCOUNTER — TELEPHONE (OUTPATIENT)
Dept: FAMILY MEDICINE | Facility: CLINIC | Age: 89
End: 2025-08-13
Payer: MEDICARE

## 2025-08-19 ENCOUNTER — TELEPHONE (OUTPATIENT)
Dept: FAMILY MEDICINE | Facility: CLINIC | Age: 89
End: 2025-08-19
Payer: MEDICARE

## 2025-08-20 ENCOUNTER — TELEPHONE (OUTPATIENT)
Dept: FAMILY MEDICINE | Facility: CLINIC | Age: 89
End: 2025-08-20

## 2025-08-21 ENCOUNTER — OFFICE VISIT (OUTPATIENT)
Dept: FAMILY MEDICINE | Facility: CLINIC | Age: 89
End: 2025-08-21
Payer: MEDICARE

## 2025-08-21 VITALS
WEIGHT: 219.56 LBS | BODY MASS INDEX: 38.9 KG/M2 | OXYGEN SATURATION: 92 % | SYSTOLIC BLOOD PRESSURE: 132 MMHG | HEIGHT: 63 IN | DIASTOLIC BLOOD PRESSURE: 70 MMHG | HEART RATE: 72 BPM | TEMPERATURE: 98 F

## 2025-08-21 DIAGNOSIS — L89.301 PRESSURE INJURY OF BUTTOCK, STAGE 1, UNSPECIFIED LATERALITY: Primary | ICD-10-CM

## 2025-08-21 DIAGNOSIS — L03.818 CELLULITIS OF OTHER SPECIFIED SITE: ICD-10-CM

## 2025-08-21 DIAGNOSIS — E11.9 TYPE 2 DIABETES MELLITUS WITHOUT COMPLICATION, WITHOUT LONG-TERM CURRENT USE OF INSULIN: ICD-10-CM

## 2025-08-21 PROCEDURE — 1101F PT FALLS ASSESS-DOCD LE1/YR: CPT | Mod: CPTII,S$GLB,, | Performed by: PHYSICIAN ASSISTANT

## 2025-08-21 PROCEDURE — 3288F FALL RISK ASSESSMENT DOCD: CPT | Mod: CPTII,S$GLB,, | Performed by: PHYSICIAN ASSISTANT

## 2025-08-21 PROCEDURE — 1160F RVW MEDS BY RX/DR IN RCRD: CPT | Mod: CPTII,S$GLB,, | Performed by: PHYSICIAN ASSISTANT

## 2025-08-21 PROCEDURE — 1159F MED LIST DOCD IN RCRD: CPT | Mod: CPTII,S$GLB,, | Performed by: PHYSICIAN ASSISTANT

## 2025-08-21 PROCEDURE — 99214 OFFICE O/P EST MOD 30 MIN: CPT | Mod: S$GLB,,, | Performed by: PHYSICIAN ASSISTANT

## 2025-08-21 RX ORDER — CEPHALEXIN 500 MG/1
500 CAPSULE ORAL EVERY 6 HOURS
Qty: 28 CAPSULE | Refills: 0 | Status: SHIPPED | OUTPATIENT
Start: 2025-08-21 | End: 2025-08-28

## 2025-08-27 ENCOUNTER — HOSPITAL ENCOUNTER (OUTPATIENT)
Dept: CARDIOLOGY | Facility: HOSPITAL | Age: 89
Discharge: HOME | End: 2025-08-27
Attending: INTERNAL MEDICINE
Payer: MEDICARE

## 2025-08-27 DIAGNOSIS — R60.9 SWELLING: ICD-10-CM

## 2025-08-27 DIAGNOSIS — I87.2 VENOUS STASIS DERMATITIS OF BOTH LOWER EXTREMITIES: ICD-10-CM

## 2025-08-27 PROCEDURE — 93970 EXTREMITY STUDY: CPT | Mod: 26,,, | Performed by: INTERNAL MEDICINE

## 2025-08-27 PROCEDURE — 93970 EXTREMITY STUDY: CPT | Mod: TC

## 2025-08-28 ENCOUNTER — TELEPHONE (OUTPATIENT)
Dept: FAMILY MEDICINE | Facility: CLINIC | Age: 89
End: 2025-08-28
Payer: MEDICARE

## 2025-08-28 LAB
LEFT GREAT SAPHENOUS DISTAL THIGH DIA: 0.39 CM
LEFT GREAT SAPHENOUS JUNCTION DIA: 0.64 CM
LEFT GREAT SAPHENOUS KNEE DIA: 0.2 CM
LEFT GREAT SAPHENOUS MIDDLE THIGH DIA: 0.49 CM
LEFT GREAT SAPHENOUS PROXIMAL CALF DIA: 0.21 CM
LEFT SMALL SAPHENOUS KNEE DIA: 0.36 CM
RIGHT GREAT SAPHENOUS DISTAL THIGH DIA: 0.4 CM
RIGHT GREAT SAPHENOUS JUNCTION DIA: 0.58 CM
RIGHT GREAT SAPHENOUS KNEE DIA: 0.47 CM
RIGHT GREAT SAPHENOUS KNEE REFLUX: 1656 MS
RIGHT GREAT SAPHENOUS MIDDLE THIGH DIA: 0.41 CM
RIGHT GREAT SAPHENOUS PROXIMAL CALF DIA: 0.27 CM
RIGHT SMALL SAPHENOUS KNEE DIA: 0.21 CM

## 2025-09-02 ENCOUNTER — TELEPHONE (OUTPATIENT)
Dept: FAMILY MEDICINE | Facility: CLINIC | Age: 89
End: 2025-09-02
Payer: MEDICARE

## 2025-09-02 DIAGNOSIS — R21 RASH: ICD-10-CM

## 2025-09-02 RX ORDER — NYSTATIN 100000 U/G
CREAM TOPICAL 2 TIMES DAILY
Qty: 30 G | Refills: 1 | Status: SHIPPED | OUTPATIENT
Start: 2025-09-02

## 2025-09-05 ENCOUNTER — DOCUMENT SCAN (OUTPATIENT)
Dept: HOME HEALTH SERVICES | Facility: HOSPITAL | Age: 89
End: 2025-09-05
Payer: MEDICARE